# Patient Record
Sex: FEMALE
[De-identification: names, ages, dates, MRNs, and addresses within clinical notes are randomized per-mention and may not be internally consistent; named-entity substitution may affect disease eponyms.]

---

## 2017-07-21 ENCOUNTER — HOSPITAL ENCOUNTER (EMERGENCY)
Dept: HOSPITAL 31 - C.ER | Age: 82
Discharge: HOME | End: 2017-07-21
Payer: MEDICARE

## 2017-07-21 VITALS — RESPIRATION RATE: 18 BRPM

## 2017-07-21 VITALS — TEMPERATURE: 98.1 F | SYSTOLIC BLOOD PRESSURE: 115 MMHG | HEART RATE: 84 BPM | DIASTOLIC BLOOD PRESSURE: 67 MMHG

## 2017-07-21 VITALS — OXYGEN SATURATION: 97 %

## 2017-07-21 VITALS — BODY MASS INDEX: 25.5 KG/M2

## 2017-07-21 DIAGNOSIS — M25.462: Primary | ICD-10-CM

## 2017-07-21 NOTE — C.PDOC
History Of Present Illness


85 yr old female with PMHx of NIDDM, HTN, hyperlipidemia, TIA, cardiomyopathy 

and CHF, OA come in accompanied by family for evaluation of Left knee pain and 

swelling gradually developed for past week after " receive injection to knee by 

her PMD Dr. Fierro". As per pt, pain is localized over Left knee with severe 

pain especially today. Otherwise, pt and family denies known trauma or injury, 

fever, chills, CP, SOB, dyspnea, diaphoresis, cough, denies deformity to left 

knee, skin changes, denies new weakness, sensory or vascular deficits to left 

leg.


Time Seen by Provider: 07/21/17 12:09


Chief Complaint (Nursing): Lower Extremity Problem/Injury


History Per: Patient, Family


History/Exam Limitations: no limitations


Onset/Duration Of Symptoms: Days (1 week)





Past Medical History


Reviewed: Historical Data, Nursing Documentation, Vital Signs


Vital Signs: 


 Last Vital Signs











Temp  98.1 F   07/21/17 14:36


 


Pulse  84   07/21/17 14:36


 


Resp  18   07/21/17 14:36


 


BP  115/67   07/21/17 14:36


 


Pulse Ox  98   07/21/17 14:36














- Medical History


PMH: Anemia, Anxiety, Arthritis (BACK AND KNEES AND R SHOULDER), Asthma, Cardia 

Arrhythmia, CHF, COPD, Diabetes, HTN, Hypercholesterolemia, TIA


Surgical History: Appendectomy, Coronary Stent, Endoscopy, Pacemaker (2016)





- [facility] Procedures








ASSISTANCE WITH RESPIRATORY VENTILATION, <24 HRS, CPAP (07/21/16)


ESOPHAGOGASTRODUODENOSCOPY [EGD] W/CLOSED BIOPSY (06/14/15)


LEFT HEART CARDIAC CATH (04/03/14)


LT HEART ANGIOCARDIOGRAM (04/03/14)


MEASURE OF CARDIAC SAMPL & PRESSURE, L HEART, PERC APPROACH (11/03/15)


PACKED CELL TRANSFUSION (06/14/15)


PLAIN RADIOGRAPHY OF MULT COR ART USING OTH CONTRAST (11/03/15)


TRANSFUSE NONAUT RED BLOOD CELLS IN PERIPH VEIN, PERC (07/21/16)








Family History: States: No Known Family Hx





- Social History


Hx Tobacco Use: No


Hx Alcohol Use: No


Hx Substance Use: No





- Immunization History


Hx Tetanus Toxoid Vaccination: No


Hx Influenza Vaccination: Yes


Hx Pneumococcal Vaccination: Yes





Review Of Systems


Except As Marked, All Systems Reviewed And Found Negative.


Constitutional: Negative for: Fever, Chills


Cardiovascular: Negative for: Chest Pain


Respiratory: Negative for: Cough, Shortness of Breath


Musculoskeletal: Positive for: Other ((+) Left knee pain and swelling )


Neurological: Negative for: Weakness, Numbness





Physical Exam





- Physical Exam


Appears: Well, Non-toxic, No Acute Distress


Skin: Normal Color, Warm, No Rash, No Ecchymosis


Extremity: No Normal ROM ( decrease and discomfort left knee flexion/extension 

due to pain and swelling), Tenderness (diffuse tenderness over left knee with 

superior/lateral knee effusion. NO erythema, no flactulance, no palpable 

deformity.), No Pedal Edema, Calf Tenderness (mild, left ), Capillary Refill (

less than 2sec to Left foot), No Deformity


Neurological/Psych: Oriented x3, Normal Speech, Normal Motor, Normal Sensation, 

Normal Reflexes





ED Course And Treatment


O2 Sat by Pulse Oximetry: 97 (RA )


Pulse Ox Interpretation: Normal





- Other Rad


  ** Left knee xray


X-Ray: Read By Radiologist


Interpretation: Accession No. : [id number].  Patient Name / ID : JOSE A HESTER  / [id number].  Exam Date : 07/21/2017 12:45:24 ( Approved ).  Study 

Comment :  Sex / Age : F  / 85Y.  Creator : Jay Matos MD.  Dictator : 

Jay Matos MD.   :  Approver : Jay Matos MD.  

Approver2 :  Report Date : 07/21/2017 13:04:32.  My Comment :  *****************

******************************************************************.  PROCEDURE:

  Left Knee Radiographs.  HISTORY:  Pain.  COMPARISON:  Comparison made with 

prior radiographs of the left knee 12/30/2012.  FINDINGS:  BONES:  No 

definitive radiographic evidence of displaced fracture nor dislocation osseous 

structures grossly intact.  JOINTS:  Mild degenerative osteoarthritis medial 

joint space narrowing and small marginal medial osteophyte formation. Tiny 

posterior superior patellar osteophyte present. .  JOINT EFFUSION:  Small to 

medium size suprapatellar joint effusion.  OTHER FINDINGS:  None.  IMPRESSION:  

No definitive radiographic evidence of acute displaced fracture nor 

dislocation.  Suspect that it extends persist or occult fracture suspected 

clinically recommend repeat radiographs 5-10 days as most fractures should 

become radiographically evident in this timeframe. Small -medium-sized 

suprapatellar joint effusion.  Mild DJD.





- CT Scan/US


  ** Dupple US LLE


Other Rad Studies (CT/US): Read By Radiologist


CT/US Interpretation: (-) DVT LLE


Progress Note: On re-evaluation, pt appears comfortable.  Case dsicussed with 

ortho-on-call  and knee immobilizer, NWB, analgesics with outpt f/u 

on 7/25/17 recommend. Pt was given option to return to ED on 7/24/17, 

 will be in house.  results review and discussed with pt and family, 

advised on course of ds and ortho recommendation.  Pt has clinical findings c/w 

Left knee arthritis/DJD with knee effusion.  Knee immobilizer placed to left 

knee.  ref. to f/u with Ortho in 3 days for re-eavl and further tx.  return to 

ED at any time if any worsening or new changes.  Pt and family understand and 

agrees with discharges.





Medical Decision Making


Medical Decision Making: 


PLAN:


* X-Ray - Left Knee 


* Venous Duplex 


* Percocet PO 








Disposition


Counseled Patient/Family Regarding: Studies Performed, Diagnosis, Need For 

Followup





- Disposition


Referrals: 


Elliott Wells III, MD [Staff Provider] - 


Disposition: HOME/ ROUTINE


Disposition Time: 13:50


Condition: STABLE


Additional Instructions: 


KNEE IMMOBILIZER





STRICT WEIGHT BEARING





TAKE PAIN MEDICATION AS NEED





FOLLOW UP WITH ORTHOPEDIST  ON TUESDAY 7/25/17 FOR FURTHER EVALUATION AND 

TREATMENT AS NEED





RETURN TO ED AT ANY TIME IF ANY WORSENING OR NEW CHANGES.


Prescriptions: 


oxyCODONE/Acetaminophen [Percocet 5/325 mg Tab] 1 tab PO BID PRN #7 tab


 PRN Reason: Pain


Instructions:  Swollen Knee Joint (ED), Knee Pain (ED)


Print Language: [place]ian





- Clinical Impression


Clinical Impression: 


 Knee effusion








- PA / NP / Resident Statement


MD/DO has reviewed & agrees with the documentation as recorded.





- Scribe Statement


The provider has reviewed the documentation as recorded by the Scribe


Viktoriya Ayala





All medical record entries made by the Scribe were at my direction and 

personally dictated by me. I have reviewed the chart and agree that the record 

accurately reflects my personal performance of the history, physical exam, 

medical decision making, and the department course for this patient. I have 

also personally directed, reviewed, and agree with the discharge instructions 

and disposition.

## 2017-07-21 NOTE — RAD
PROCEDURE:  Left Knee Radiographs.



HISTORY:

Pain.



COMPARISON:

Comparison made with prior radiographs of the left knee 12/30/2012



FINDINGS:



BONES:

No definitive radiographic evidence of displaced fracture nor 

dislocation osseous structures grossly intact. 



JOINTS:

Mild degenerative osteoarthritis medial joint space narrowing and 

small marginal medial osteophyte formation. Tiny posterior superior 

patellar osteophyte present. . 



JOINT EFFUSION:

Small to medium size suprapatellar joint effusion 



OTHER FINDINGS:

None.



IMPRESSION:

No definitive radiographic evidence of acute displaced fracture nor 

dislocation.  Suspect that it extends persist or occult fracture 

suspected clinically recommend repeat radiographs 5-10 days as most 

fractures should become radiographically evident in this timeframe. 

Small -medium-sized suprapatellar joint effusion 



Mild DJD.

## 2017-07-24 ENCOUNTER — HOSPITAL ENCOUNTER (OUTPATIENT)
Dept: HOSPITAL 14 - H.ER | Age: 82
Setting detail: OBSERVATION
LOS: 1 days | Discharge: HOME HEALTH | End: 2017-07-25
Attending: HOSPITALIST | Admitting: HOSPITALIST
Payer: MEDICARE

## 2017-07-24 VITALS — BODY MASS INDEX: 25.5 KG/M2

## 2017-07-24 DIAGNOSIS — Z86.73: ICD-10-CM

## 2017-07-24 DIAGNOSIS — Z95.810: ICD-10-CM

## 2017-07-24 DIAGNOSIS — E78.00: ICD-10-CM

## 2017-07-24 DIAGNOSIS — E78.5: ICD-10-CM

## 2017-07-24 DIAGNOSIS — S83.232A: Primary | ICD-10-CM

## 2017-07-24 DIAGNOSIS — J44.9: ICD-10-CM

## 2017-07-24 DIAGNOSIS — K59.09: ICD-10-CM

## 2017-07-24 DIAGNOSIS — Z95.5: ICD-10-CM
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
## 2017-07-24 DIAGNOSIS — N18.9: ICD-10-CM

## 2017-07-24 DIAGNOSIS — Z95.0: ICD-10-CM

## 2017-07-24 DIAGNOSIS — M65.862: ICD-10-CM

## 2017-07-24 DIAGNOSIS — M17.12: ICD-10-CM

## 2017-07-24 DIAGNOSIS — I50.9: ICD-10-CM

## 2017-07-24 DIAGNOSIS — E11.22: ICD-10-CM

## 2017-07-24 DIAGNOSIS — I25.10: ICD-10-CM
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
## 2017-07-24 DIAGNOSIS — I42.9: ICD-10-CM

## 2017-07-24 DIAGNOSIS — I13.0: ICD-10-CM

## 2017-07-24 DIAGNOSIS — S83.272A: ICD-10-CM

## 2017-07-24 DIAGNOSIS — M25.462: ICD-10-CM

## 2017-07-24 LAB
ALBUMIN SERPL-MCNC: 3.8 G/DL (ref 3.5–5)
ALBUMIN/GLOB SERPL: 1.1 {RATIO} (ref 1–2.1)
ALT SERPL-CCNC: 23 U/L (ref 9–52)
APTT BLD: 22.5 SECONDS (ref 25.6–37.1)
AST SERPL-CCNC: 28 U/L (ref 14–36)
BASOPHILS # BLD AUTO: 0.1 K/UL (ref 0–0.2)
BASOPHILS NFR BLD: 1.2 % (ref 0–2)
BODY FLD TYPE: (no result)
BUN SERPL-MCNC: 28 MG/DL (ref 7–17)
CALCIUM SERPL-MCNC: 9.8 MG/DL (ref 8.4–10.2)
EOSINOPHIL # BLD AUTO: 0.2 K/UL (ref 0–0.7)
EOSINOPHIL NFR BLD: 2.4 % (ref 0–4)
ERYTHROCYTE [DISTWIDTH] IN BLOOD BY AUTOMATED COUNT: 14.5 % (ref 11.5–14.5)
GFR NON-AFRICAN AMERICAN: 36
HGB BLD-MCNC: 11.6 G/DL (ref 12–16)
INR PPP: 1 (ref 0.9–1.2)
LYMPHOCYTES # BLD AUTO: 0.8 K/UL (ref 1–4.3)
LYMPHOCYTES NFR BLD AUTO: 13 % (ref 20–40)
MCH RBC QN AUTO: 30 PG (ref 27–31)
MCHC RBC AUTO-ENTMCNC: 33.2 G/DL (ref 33–37)
MCV RBC AUTO: 90.3 FL (ref 81–99)
MONOCYTES # BLD: 0.8 K/UL (ref 0–0.8)
MONOCYTES NFR BLD: 12.2 % (ref 0–10)
NEUTROPHILS # BLD: 4.5 K/UL (ref 1.8–7)
NEUTROPHILS NFR BLD AUTO: 71.2 % (ref 50–75)
NRBC BLD AUTO-RTO: 0.1 % (ref 0–0)
PLATELET # BLD: 130 K/UL (ref 130–400)
PMV BLD AUTO: 10.4 FL (ref 7.2–11.7)
PROTHROMBIN TIME: 11.6 SECONDS (ref 9.8–13.1)
RBC # BLD AUTO: 3.87 MIL/UL (ref 3.8–5.2)
SF GROSS APPEARANCE: (no result)
SYNOVIAL FLUID COMMENT: (no result)
SYNOVIAL FLUID MONO/MACROPHAGE: 11 % (ref 0–0)
WBC # BLD AUTO: 6.4 K/UL (ref 4.8–10.8)

## 2017-07-24 PROCEDURE — 36415 COLL VENOUS BLD VENIPUNCTURE: CPT

## 2017-07-24 PROCEDURE — 73700 CT LOWER EXTREMITY W/O DYE: CPT

## 2017-07-24 PROCEDURE — 85610 PROTHROMBIN TIME: CPT

## 2017-07-24 PROCEDURE — 29880 ARTHRS KNE SRG MNISECTMY M&L: CPT

## 2017-07-24 PROCEDURE — 99285 EMERGENCY DEPT VISIT HI MDM: CPT

## 2017-07-24 PROCEDURE — 89051 BODY FLUID CELL COUNT: CPT

## 2017-07-24 PROCEDURE — 86850 RBC ANTIBODY SCREEN: CPT

## 2017-07-24 PROCEDURE — 80048 BASIC METABOLIC PNL TOTAL CA: CPT

## 2017-07-24 PROCEDURE — 87070 CULTURE OTHR SPECIMN AEROBIC: CPT

## 2017-07-24 PROCEDURE — 80053 COMPREHEN METABOLIC PANEL: CPT

## 2017-07-24 PROCEDURE — 85025 COMPLETE CBC W/AUTO DIFF WBC: CPT

## 2017-07-24 PROCEDURE — 29876 ARTHRS KNEE SURG SYNVCT MAJ: CPT

## 2017-07-24 PROCEDURE — 93005 ELECTROCARDIOGRAM TRACING: CPT

## 2017-07-24 PROCEDURE — 87116 MYCOBACTERIA CULTURE: CPT

## 2017-07-24 PROCEDURE — 86900 BLOOD TYPING SEROLOGIC ABO: CPT

## 2017-07-24 PROCEDURE — 87101 SKIN FUNGI CULTURE: CPT

## 2017-07-24 PROCEDURE — 85730 THROMBOPLASTIN TIME PARTIAL: CPT

## 2017-07-24 PROCEDURE — 87015 SPECIMEN INFECT AGNT CONCNTJ: CPT

## 2017-07-24 PROCEDURE — 82948 REAGENT STRIP/BLOOD GLUCOSE: CPT

## 2017-07-24 PROCEDURE — 71010: CPT

## 2017-07-24 PROCEDURE — 87206 SMEAR FLUORESCENT/ACID STAI: CPT

## 2017-07-24 PROCEDURE — 87075 CULTR BACTERIA EXCEPT BLOOD: CPT

## 2017-07-24 NOTE — CT
PROCEDURE:  CT right knee



HISTORY:

pain



COMPARISON:

Not available



TECHNIQUE:

2.5 mm contiguous axial sections were acquired through the right 

knee.  Sagittal and coronal images were reformatted from the axial 

scan.



FINDINGS:

There is no evidence of fracture. There is no lytic or blastic 

osseous lesion.



There is mild tricompartmental osteoarthritis.  There are no 

articular erosions.



There is a small joint effusion. This is nonspecific. 



IMPRESSION:

No acute fracture. Mild tricompartmental osteoarthritis.

## 2017-07-24 NOTE — ED PDOC
- Laboratory Results


Result Diagrams: 


 07/24/17 07:33





 07/24/17 07:33





- ECG


O2 Sat by Pulse Oximetry: 98 (RA)


Pulse Ox Interpretation: Normal





- Progress


ED Course And Treament: 





per dr gomez will admit to hospitalist. obtain preop labs and ct scan of le.


Condition: Unchanged





Disposition


Counseled Patient/Family Regarding: Studies Performed, Diagnosis





- Clinical Impression


Clinical Impression: 


 Knee injury, Gait abnormality








- POA


Present On Arrival: None





- Disposition


Disposition: Admitted as In-Patient


Disposition Time: 09:30


Condition: STABLE

## 2017-07-24 NOTE — CARD
--------------- APPROVED REPORT --------------





EKG Measurement

Heart [id number]

OR [id number]

[id number]

[id number]

[id number]



<Conclusion>

Normal sinus rhythm

Left axis deviation

Left bundle branch block

Abnormal ECG

## 2017-07-24 NOTE — RAD
HISTORY:

 pre op 



COMPARISON:

No prior. 



FINDINGS:



LUNGS:

Bilateral interstitial changes.



PLEURA:

No significant pleural effusion identified, no pneumothorax apparent.



CARDIOVASCULAR:

Cardiomegaly. Pacemaker and leads in place.



OSSEOUS STRUCTURES:

No significant abnormalities.



VISUALIZED UPPER ABDOMEN:

Normal.



OTHER FINDINGS:

None.



IMPRESSION:

Bilateral nonspecific interstitial changes.

## 2017-07-24 NOTE — CP.PCM.HP
History of Present Illness





- History of Present Illness


History of Present Illness: 


86 y/o female with PMH  cardiomyopathy s/p AICD placement  , CAD s/p 1 stent ,

DM , Dyslipidemia, hypertension , constipation presented to Er complaining of 

severe pain to her left knee and inability to ambulate. 


She denies any trauma to her knee. as per patient she has been experiencing 

progressive knee pain for a long time, more so the last 1 month until last week 

on Friday shew could not even put for foot down and ambulate due to severe 

pain.She was taken to [facility] ER and was told to follow up with Dr. Galindo and was given oxycodone for pain control which made her drowsy. Today 

she presented to ER with severe left knee pain/.


She has extensive cardiac history with stent and AICD placemnet ahnd follows up 

with a cardiologist. as per patient she hasd stress test done 1 month ago and 

was good.


At present denies any chest pain SOB, palpittaions, PND, orthopnea, urinary 

symptoms. Has chronic constipation.


Last meal was last night but had coffee at 5 AM.


She is on blood thinners Brilinta and ASa and and last dose was yesterday 











Allergies ; Plavix


PMH ; cardiomyopathy , CAD s/p 1 stent ,DM , Dyslipidemia, hypertension , 

constipation 


Medications: Brilinta,  mg po QD, metoprololdexilant, vitamin D, 

furosemide Nitro SL, ranolazine,prandin,rosuvastatin, entresto


Surgery; appendectomy, AICD placement ( December 2014)


Fmaily history ; Mother had heart condition and DM


Social history ; lives with  in [facility], has 2 children, denies any 

ETOH, smiking or drug abuse, walks with no assist devices








ROS ; 14 point review of system negative except above








PMD ; Dr. Miller


Code status ; full 








Present on Admission





- Present on Admission


Any Indicators Present on Admission: No





Review of Systems





- Review of Systems


All systems: reviewed and no additional remarkable complaints except





Past Patient History





- Infectious Disease


Hx of Infectious Diseases: None





- Tetanus Immunizations


Tetanus Immunization: Unknown





- Past Medical History & Family History


Past Medical History?: Yes





- Past Social History


Smoking Status: Never Smoked


Chewing Tobacco Use: No


Cigar Use: No


Alcohol: None


Drugs: Denies


Home Situation {Lives}: With Family


Domestic Violence: Negative





- CARDIAC


Hx Cardiac Disorders: Yes


Hx Congestive Heart Failure: Yes


Hx Hypercholesterolemia: Yes


Hx Hypertension: Yes





- PULMONARY


Hx Respiratory Disorders: Yes





- NEUROLOGICAL


Hx Neurological Disorder: No


Hx Transient Ischemic Attacks (TIA): Yes





- HEENT


Hx HEENT Problems: Yes





- ENDOCRINE/METABOLIC


Hx Endocrine Disorders: Yes





- HEMATOLOGICAL/ONCOLOGICAL


Hx Anemia: Yes





- MUSCULOSKELETAL/RHEUMATOLOGICAL


Hx Arthritis: Yes (BACK AND KNEES AND R SHOULDER)





- PSYCHIATRIC


Hx Anxiety: Yes





- SURGICAL HISTORY


Hx Appendectomy: Yes


Hx Coronary Stent: Yes





- ANESTHESIA


Hx Anesthesia: Yes


Hx Anesthesia Reactions: No


Hx Malignant Hyperthermia: No





Meds


Allergies/Adverse Reactions: 


 Allergies











Allergy/AdvReac Type Severity Reaction Status Date / Time


 


clopidogrel Allergy Mild SWELLING Verified 07/21/17 11:37














Physical Exam





- Constitutional


Appears: Non-toxic, No Acute Distress





- Head Exam


Head Exam: ATRAUMATIC, NORMAL INSPECTION, NORMOCEPHALIC





- Eye Exam


Eye Exam: EOMI, Normal appearance, PERRL


Pupil Exam: NORMAL ACCOMODATION





- ENT Exam


ENT Exam: Mucous Membranes Moist, Normal Exam





- Neck Exam


Neck exam: Positive for: Full Rom, Normal Inspection





- Respiratory Exam


Respiratory Exam: Clear to Auscultation Bilateral, NORMAL BREATHING PATTERN.  

absent: Rales, Rhonchi, Wheezes





- Cardiovascular Exam


Cardiovascular Exam: REGULAR RHYTHM, RRR, +S1, +S2.  absent: JVD





- GI/Abdominal Exam


GI & Abdominal Exam: Normal Bowel Sounds, Soft.  absent: Distended, Guarding, 

Rebound, Tenderness





- Rectal Exam


Rectal Exam: Deferred





- Extremities Exam


Extremities exam: Positive for: normal capillary refill, normal inspection, 

pedal pulses present.  Negative for: calf tenderness, pedal edema


Additional comments: 





left knee warmer to touch than right knee with minimal effusion 





- Back Exam


Back exam: NORMAL INSPECTION





- Neurological Exam


Neurological exam: Alert, CN II-XII Intact, Oriented x3, Reflexes Normal





- Psychiatric Exam


Psychiatric exam: Normal Affect, Normal Mood





- Skin


Skin Exam: Dry, Intact, Normal Color, Warm





Results





- Vital Signs


Recent Vital Signs: 





 Last Vital Signs











Temp  98.5 F   07/24/17 10:23


 


Pulse  90   07/24/17 10:23


 


Resp  18   07/24/17 10:23


 


BP  148/73   07/24/17 10:23


 


Pulse Ox  99   07/24/17 10:23














- Labs


Result Diagrams: 


 07/24/17 07:33





 07/24/17 07:33





Assessment & Plan





- Assessment and Plan (Free Text)


Assessment: 


86 y/o female with PMH  cardiomyopathy s/p AICD placement  , CAD s/p 1 stent ,

DM , Dyslipidemia, hypertension , constipation presented to Er complaining of 

severe pain to her left knee and inability to ambulate. 


She denies any trauma to her knee. as per patient she has been experiencing 

progressive knee pain for a long time, more so the last 1 month until last week 

on Friday shew could not even put for foot down and ambulate due to severe 

pain.She was taken to [facility] ER and was told to follow up with Dr. Galindo and was given oxycodone for pain control which made her drowsy. Today 

she presented to ER with severe left knee pain/.


She has extensive cardiac history with stent and AICD placemnet ahnd follows up 

with a cardiologist. as per patient she hasd stress test done 1 month ago and 

was good.


At present denies any chest pain SOB, palpittaions, PND, orthopnea, urinary 

symptoms. Has chronic constipation.


Last meal was last night but had coffee at 5 AM.


She is on blood thinners Brilinta and ASa and and last dose was yesterday 








1.Intractable left knee pain secondary to OA 


will place patient under observation in med/surg


Ortho consult with Dr. Galindo


CT knee showed No acute fracture. Mild tricompartmental osteoarthritis.


pain mangement


Keep NPO for now


Patient is 


on Brilinta and ASa 325 mg po daily with last dose yesterday. Orthopedist Dr. Galindo is aware.Family informed of risks of bleeding while on blood thinners 


Will get in touch with her cardiologist Dr. Miller [phone] to get stress 

test results that was performed 1 month ago for preop clearance





2. Cardiomyopathy/ CAD s/p AICD 


Resume her home meds Metoprolol, nitro SL, Ensestro,Lasix


hold ASA and Brilinta for now


Will call her cardiologist for stress trest results








3. Hypertension


resume home meds








4. DM 


Keep NPO


start IVF D%NS


Accucfhecks with insulin coverage


Hold lantus and prandin for now








5. Dyslipidemia


on rosuvastatin








6. Constipation


colace 








7. JAI on CKD


most likely patient has baseline CKD 


start IVF








8.DVt prophylaxis


patient is on Brilinta ( last dose yesterday)


hold any anticoagulation for now


SCD

## 2017-07-24 NOTE — CP.PCM.CON
History of Present Illness





- History of Present Illness


History of Present Illness: 





THE PATIENT IS AN 85 YEAR OLD FEMALE WHI IS ADMITTED FOR OA OF THE LEFT KNEE 

WITH SEVERE PAIN, SWELLING AND INABILITY TO WALK THAT GOT PROGRESSIVELY WORSE 

OVER THE LAST MONTH.  SHE DENIES TRAUMA.  SHE CAME TO THE ER TODAY AND WAS 

ADMITTED FOR SURGERY.  SHE HAS A LONG PAST MEDICAL HISTORY THAT INCLUDES CAD, 

CARDIOMYOPATHY, HYPERTENSION, HYPERLIPIDEMIA AND TYPE 2 DM.  SHE HAD A CORONARY 

STENT INSERTED AND HAS SIGNIFICANT LV HYPOKINESIA AND HAD AN AICD INSERTED.  

SHE HAD A STRESS ECHO LAST MONTH BY HER LOCAL CARDIOLOGIST AND IT WAS REPORTED 

TO BE GOOD.  SHE IS PRESENTLY CHEST PAIN FREE.  CARDIOLOGY WAS ASKED TO SEE HER 

PREOPERATIVELY.  SHE IS ON ASPIRIN AND BRILINTA BUT DID NOT TAKE THEM SINCE 

YESTERDAY.





Past Patient History





- Infectious Disease


Hx of Infectious Diseases: None





- Tetanus Immunizations


Tetanus Immunization: Unknown





- Past Medical History & Family History


Past Medical History?: Yes





- Past Social History


Smoking Status: Never Smoked





- CARDIAC


Hx Cardiac Disorders: Yes


Hx Congestive Heart Failure: Yes


Hx Hypercholesterolemia: Yes


Hx Hypertension: Yes





- PULMONARY


Hx Respiratory Disorders: Yes





- NEUROLOGICAL


Hx Neurological Disorder: No


Hx Transient Ischemic Attacks (TIA): Yes





- HEENT


Hx HEENT Problems: Yes





- ENDOCRINE/METABOLIC


Hx Endocrine Disorders: Yes





- HEMATOLOGICAL/ONCOLOGICAL


Hx Anemia: Yes





- MUSCULOSKELETAL/RHEUMATOLOGICAL


Hx Falls: No





- PSYCHIATRIC


Hx Substance Use: No





- SURGICAL HISTORY


Hx Appendectomy: Yes


Hx Coronary Stent: Yes





- ANESTHESIA


Hx Anesthesia: Yes


Hx Anesthesia Reactions: No


Hx Malignant Hyperthermia: No





Meds


Allergies/Adverse Reactions: 


 Allergies











Allergy/AdvReac Type Severity Reaction Status Date / Time


 


clopidogrel Allergy Mild SWELLING Verified 07/21/17 11:37














- Medications


Medications: 


 Current Medications





Acetaminophen (Tylenol 325mg Tab)  650 mg PO Q6 PRN


   PRN Reason: Pain, Mild (1-3)


Docusate Sodium (Colace)  100 mg PO BID FILIPPO


Ergocalciferol (Drisdol 50,000 Intl Units Cap)  1 cap PO QWK FILIPPO


Furosemide (Lasix)  40 mg PO DAILY FILIPPO


Home Med (Nitroglycerin [Nitrostat Sl Tab])  0.6 mg SL Q5MIN PRN


   PRN Reason: CHEST PAIN


Dextrose/Sodium Chloride (Dextrose 5%/0.9% Ns 1000 Ml)  1,000 mls @ 60 mls/hr 

IV [id number] ONE


   Stop: 07/25/17 03:46


   Last Admin: 07/24/17 12:00 Dose:  60 mls/hr


Insulin Human Regular (Humulin R)  0 units SC ACHS FILIPPO


   PRN Reason: Protocol


   Last Admin: 07/24/17 12:30 Dose:  Not Given


Ketorolac Tromethamine (Toradol)  15 mg IVP Q6 PRN


   PRN Reason: Pain, moderate (4-7)


Ketorolac Tromethamine (Toradol)  30 mg IVP Q6 PRN


   PRN Reason: Pain, severe (8-10)


Metoprolol Tartrate (Lopressor)  25 mg PO BID FILIPPO


Ondansetron HCl (Zofran Inj)  4 mg IVP Q6 PRN


   PRN Reason: Nausea/Vomiting


Pantoprazole Sodium (Protonix Ec Tab)  40 mg PO DAILY FILIPPO


Repaglinide (Prandin)  2 mg PO DAILY FILIPPO











Physical Exam





- Respiratory Exam


Respiratory Exam: Clear to Auscultation Bilateral





- Cardiovascular Exam


Cardiovascular Exam: REGULAR RHYTHM, +S1, +S2





- Extremities Exam


Additional comments: 





LEFT KNEE SWOLLEN AND WARM





- Additional Findings


Additional findings: 





EKG NSR, LBBB





Results





- Vital Signs


Recent Vital Signs: 


 Last Vital Signs











Temp  97.6 F   07/24/17 12:00


 


Pulse  90   07/24/17 12:00


 


Resp  18   07/24/17 12:00


 


BP  155/78 H  07/24/17 12:00


 


Pulse Ox  98   07/24/17 12:00














- Labs


Result Diagrams: 


 07/24/17 07:33





 07/24/17 07:33


Labs: 


 Laboratory Results - last 24 hr











  07/24/17





  11:06


 


POC Glucose (mg/dL)  160 H














Assessment & Plan





- Assessment and Plan (Free Text)


Assessment: 





PAIN LEFT KNEE OA





CAD





CARDIOMYOPATHY





HYPERTENSION





HYPERLIPIDEMIA





DM


Plan: 





THE PATIENT IS CLEARED FOR SURGERY BUT THERE IS A MODERATE RISK DUE TO HER 

CARDIOMYOPATHY





THE  WAS TOLD OF THE RISK DUE TO HER CARDIAC DISEASE AND HE UNDERSTANDS 

AND AGREES TO PROCEED.

## 2017-07-24 NOTE — PCM.SURG1
Surgeon's Initial Post Op Note





- Surgeon's Notes


Surgeon: Mateo


Assistant: SANDRA Giraldo


Type of Anesthesia: General LMA


Anesthesia Administered By: dR Ritter


Pre-Operative Diagnosis: lKNEE EFFUSION/TRICOMPARTMENTAL o/a l KNEE


Operative Findings: TEAR MEDIAL MENISCUS l kNEE/ TEAR LATERAL MENISCUS l KNEE.  

TRICOMPARTMENTAL SYNOVITIS.  L knee effusion


Post-Operative Diagnosis: as above


Operation Performed: arthroscopic partial medial/lateral meniscectomy.  

arthroscopic tricompartmental synovectomy.  intraarticular injection


Specimen/Specimens Removed: synovium/cartilage


Estimated Blood Loss: EBL {In ML}: 5


Blood Products Given: N/A


Drains Used: No Drains


Post-Op Condition: Good


Date of Surgery/Procedure: 07/24/17


Time of Surgery/Procedure: 16:55 (time iun room 16:15)

## 2017-07-24 NOTE — VASCLAB
PROCEDURE:  Left Lower Extremity Venous Duplex Exam. 



HISTORY:

pain 



PRIORS:

None. 



TECHNIQUE:

Left common femoral, femoral, popliteal and posterior tibial, 

peroneal and great saphenous veins were evaluated. Flow was assessed 

with color Doppler, compressibility, assessment of phasic flow and 

augmentation response.



Report prepared by   MERNA Lizarraga, RVT



FINDINGS:



LEFT:

1. Common Femoral Vein:



1.1.  Compressibility - Fully compressible: Thrombus - None : Flow - 

Phasic: Augmentation -Normal: Reflux - None.



2. Femoral Vein: 



2.1. Compressibility - Fully compressible: Thrombus -  None: Flow - 

Phasic: Augmentation -Normal: Reflux - None.



3. Popliteal Vein: 



3.1. Compressibility - Fully compressible: Thrombus -  None: Flow - 

Phasic: Augmentation -Normal: Reflux - None.



4. Posterior Tibial Vein: 



4.1. Compressibility - Fully compressible: Thrombus -  None: Flow - 

Phasic: Augmentation -Normal: Reflux - None.



5. Peroneal Vein: 



5.1. Compressibility - Fully compressible: Thrombus -  None: Flow - 

Phasic: Augmentation -Normal: Reflux - None.



6. Great Saphenous Vein:

6.1.  Compressibility - Fully compressible: Thrombus - None: Flow - 

Phasic: Augmentation - Normal: Reflux - Severe.





OTHER FINDINGS:  Severe valvular incompetence of the left greater 

saphenous vein.



IMPRESSION:

No evidence of deep or superficial vein thrombosis of the left lower 

extremity with excellent venous flow.     



Normal venous flow noted in the right common femoral vein.

## 2017-07-24 NOTE — ED PDOC
Lower Extremity Pain/Injury


Time Seen by Provider: 17 06:11


Chief Complaint (Nursing): Lower Extremity Problem/Injury


Chief Complaint (Provider): Knee Effusion


History Per: Patient


History/Exam Limitations: no limitations


Additional Complaint(s): 


Nayely Byrd, an 85 year old female, presents to the ED for same day surgery 

with Dr. Galindo. The patient sates thats he is here for arthrocentesis of the 

left knee.








Past Medical History


Reviewed: Historical Data, Nursing Documentation, Vital Signs


Vital Signs: 





 Last Vital Signs











Temp  98 F   17 06:12


 


Pulse  98 H  17 06:12


 


Resp  18   17 06:12


 


BP  134/64   17 06:12


 


Pulse Ox  98   17 06:12














- Medical History


PMH: Anemia, Anxiety, Arthritis (BACK AND KNEES AND R SHOULDER), Asthma, Cardia 

Arrhythmia, CHF, COPD, Diabetes, HTN, Hypercholesterolemia, TIA





- Surgical History


Surgical History: Appendectomy, Coronary Stent, Endoscopy, Pacemaker ()





- Family History


Family History: States: Unknown Family Hx





- Immunization History


Hx Tetanus Toxoid Vaccination: No


Hx Influenza Vaccination: Yes


Hx Pneumococcal Vaccination: Yes





- Home Medications


Home Medications: 


 Ambulatory Orders











 Medication  Instructions  Recorded


 


Aspirin [Ecotrin] 325 mg PO DAILY 04/05/15


 


Ranolazine [Ranexa] 500 mg PO BID 04/05/15


 


Ticagrelor [Brilinta] 90 mg PO QOD6 04/05/15


 


Insulin Glargine, Recombina 20 unit SC HS #0 unit 04/07/15





[Lantus]  


 


Nitroglycerin [Nitrostat SL Tab] 0.6 mg SL Q5MIN PRN 11/03/15


 


Dexlansoprazole [Dexilant] 60 mg PO DAILY 17


 


Furosemide 40 mg PO DAILY 17


 


Repaglinide [Prandin] 2 mg PO DAILY 17


 


Rosuvastatin Calcium [Crestor] 20 mg PO HS 17


 


Sacubitril/Valsartan [Entresto 24 1 tab PO DAILY 17





mg-26 mg]  


 


oxyCODONE/Acetaminophen [Percocet 1 tab PO BID PRN #7 tab 17





5/325 mg Tab]  


 


Acetaminophen [Tylenol 325mg tab] 650 mg PO Q6 PRN #30 tab 17


 


Ergocalciferol (Vitamin D2) 50,000 unit PO QWK 17





[Vitamin D2]  


 


Metoprolol Tartrate [Lopressor] 25 mg PO BID 17


 


oxyCODONE/Acetaminophen 1/2TAB 0.5 ea PO Q6 PRN #20 tab 17





[Percocet 5-325 mg HALF TAB]  














- Allergies


Allergies/Adverse Reactions: 


 Allergies











Allergy/AdvReac Type Severity Reaction Status Date / Time


 


clopidogrel Allergy Mild SWELLING Verified 17 11:37














Review of Systems


Musculoskeletal: Positive for: Other (arthrocentesis of left knee)





Physical Exam





- Reviewed


Nursing Documentation Reviewed: Yes


Vital Signs Reviewed: Yes





- Physical Exam


Appears: Positive for: Non-toxic, No Acute Distress


Extremity: Positive for: Other (Left knee in a knee mobilizer with effusion; 

nerovascularly intact.).  Negative for: Tenderness, Pedal Edema, Deformity, 

Swelling


Neurologic/Psych: Positive for: Alert, Oriented





- Laboratory Results


Result Diagrams: 


 17 07:33





 17 07:33





- ECG


O2 Sat by Pulse Oximetry: 98 (RA)


Pulse Ox Interpretation: Normal





Medical Decision Making


Medical Decision Makin Initial Impression: 85 year old female presenting with knee effusion.  

Awaiting call back from Dr. Galindo.  Will transfer care to Dr. Howe.





_________________________________________________________________


Scribe Attestation


Documented by Zina Calderón acting as a scribe for Wu Alexandra MD.





Provider Attestation


All medical record entries made by the Scribe were at my direction and 

personally dictated by me. I have reviewed the chart and agree that the record 

accurately reflects my personal performance of the history, physical exam, 

medical decision making, and the department course for this patient. I have 

also personally directed, reviewed, and agree with the discharge instructions 

and disposition.








Disposition





- Clinical Impression


Clinical Impression: 


 Knee injury, Gait abnormality








- Disposition


Disposition: Transfer of Care


Disposition Time: 07:00


Condition: STABLE


Patient Signed Over To: Wilian Howe


Handoff Comments: pending call back from Dr. Galindo

## 2017-07-24 NOTE — CP.PCM.DIS
Provider





- Provider


Date of Admission: 


07/24/17 09:10





Attending physician: 


Silver Singh MD





Primary care physician: 


Al Richter MD





Consults: 





cardiology consult


Ortho consult 


Time Spent in preparation of Discharge (in minutes): 20





Hospital Course





- Lab Results


Lab Results: 


 Micro Results





07/24/17 17:25   Knee - Left   Gram Stain - Final





 Most Recent Lab Values











WBC  6.4 K/uL (4.8-10.8)   07/24/17  07:33    


 


RBC  3.87 Mil/uL (3.80-5.20)   07/24/17  07:33    


 


Hgb  11.6 g/dL (12.0-16.0)  L  07/24/17  07:33    


 


Hct  34.9 % (34.0-47.0)   07/24/17  07:33    


 


MCV  90.3 fl (81.0-99.0)   07/24/17  07:33    


 


MCH  30.0 pg (27.0-31.0)   07/24/17  07:33    


 


MCHC  33.2 g/dL (33.0-37.0)   07/24/17  07:33    


 


RDW  14.5 % (11.5-14.5)   07/24/17  07:33    


 


Plt Count  130 K/uL (130-400)   07/24/17  07:33    


 


MPV  10.4 fl (7.2-11.7)   07/24/17  07:33    


 


Neut % (Auto)  71.2 % (50.0-75.0)   07/24/17  07:33    


 


Lymph % (Auto)  13.0 % (20.0-40.0)  L  07/24/17  07:33    


 


Mono % (Auto)  12.2 % (0.0-10.0)  H  07/24/17  07:33    


 


Eos % (Auto)  2.4 % (0.0-4.0)   07/24/17  07:33    


 


Baso % (Auto)  1.2 % (0.0-2.0)   07/24/17  07:33    


 


Neut #  4.5 K/uL (1.8-7.0)   07/24/17  07:33    


 


Lymph #  0.8 K/uL (1.0-4.3)  L  07/24/17  07:33    


 


Mono #  0.8 K/uL (0.0-0.8)   07/24/17  07:33    


 


Eos #  0.2 K/uL (0.0-0.7)   07/24/17  07:33    


 


Baso #  0.1 K/uL (0.0-0.2)   07/24/17  07:33    


 


PT  11.6 Seconds (9.8-13.1)   07/24/17  07:33    


 


INR  1.0  (0.9-1.2)   07/24/17  07:33    


 


APTT  22.5 Seconds (25.6-37.1)  L  07/24/17  07:33    


 


Sodium  139 mmol/l (132-148)   07/24/17  07:33    


 


Potassium  4.6 MMOL/L (3.6-5.0)   07/24/17  07:33    


 


Chloride  107 mmol/L ()   07/24/17  07:33    


 


Carbon Dioxide  24 mmol/L (22-30)   07/24/17  07:33    


 


Anion Gap  13  (10-20)   07/24/17  07:33    


 


BUN  28 mg/dl (7-17)  H  07/24/17  07:33    


 


Creatinine  1.4 mg/dL (0.7-1.2)  H  07/24/17  07:33    


 


Est GFR ( Amer)  43   07/24/17  07:33    


 


Est GFR (Non-Af Amer)  36   07/24/17  07:33    


 


POC Glucose (mg/dL)  147 mg/dL ()  H  07/24/17  17:43    


 


Random Glucose  155 mg/dL ()  H  07/24/17  07:33    


 


Calcium  9.8 mg/dL (8.4-10.2)   07/24/17  07:33    


 


Total Bilirubin  0.5 mg/dl (0.2-1.3)   07/24/17  07:33    


 


AST  28 U/L (14-36)   07/24/17  07:33    


 


ALT  23 U/L (9-52)   07/24/17  07:33    


 


Alkaline Phosphatase  67 U/L ()   07/24/17  07:33    


 


Total Protein  7.2 G/DL (6.3-8.2)   07/24/17  07:33    


 


Albumin  3.8 g/dL (3.5-5.0)   07/24/17  07:33    


 


Globulin  3.4 gm/dL (2.2-3.9)   07/24/17  07:33    


 


Albumin/Globulin Ratio  1.1  (1.0-2.1)   07/24/17  07:33    


 


Fluid Type  Synovial fluid   07/24/17  17:25    


 


Synovial WBC  139.0 /mm3 (0.0-150.0)   07/24/17  17:25    


 


Synovial RBC  701.0 /mm3 (0.0-0.0)  H  07/24/17  17:25    


 


Synovial Neutrophils  67.0 % (0-0)  H  07/24/17  17:25    


 


Synovial Lymphocytes  22.0 % (0-0)  H  07/24/17  17:25    


 


Synov Monos/Macrophage  11 % (0-0)  H  07/24/17  17:25    


 


Synovial Fluid Comment  Light yellow   07/24/17  17:25    


 


Blood Type  A POSITIVE   07/24/17  07:33    


 


Antibody Screen  Negative   07/24/17  07:33    


 


BBK History Checked  Patient has bt   07/24/17  07:33    














- Hospital Course


Hospital Course: 


86 y/o female with PMH  cardiomyopathy s/p AICD placement  , CAD s/p 1 stent ,

DM , Dyslipidemia, hypertension , constipation presented to Er complaining of 

severe pain to her left knee and inability to ambulate. 


She denies any trauma to her knee. as per patient she has been experiencing 

progressive knee pain for a long time, more so the last 1 month until last week 

on Friday shew could not even put for foot down and ambulate due to severe 

pain.She was taken to [facility] ER and was told to follow up with Dr. Galindo and was given oxycodone for pain control which made her drowsy. Today 

she presented to ER with severe left knee pain.


She has extensive cardiac history with stent and AICD placemnet and follows up 

with a cardiologist. As per patient she had stress test done 1 month ago and 

was good.


At present denies any chest pain SOB, palpittaions, PND, orthopnea, urinary 

symptoms. Has chronic constipation.


Last meal was last night but had coffee at 5 AM.


She is on blood thinners Brilinta and ASa and and last dose was yesterday 


Echo nuclear test report was faxed by dain cardiologist gerardo and showed 

severe LV dysfunction with hypokineiss. Cardiology was consulted for preop 

clearance and patient was cleared for surgery


CT left knee showed tricompartmental DJD . Ortho was consulted and decided to 

take patient to OR for arthroscopy. Orthopedist informed about patient being on 

blood thinners.


Post procedure she was monitored on rexcovery room and telemetry and doing well


Awake , alert oriented , hemodynamically stable, pain controlled 


Will discharge [facility] family


Weaight bearing as tolerated with walker to LLE


Follow up with Dr. Galindo in his office in 1 week


Continue pain mangement Percoset PRN or Tylenol PRN 











1.Intractable left knee pain secondary to OA 


s/p arthroscopy 





2. Cardiomyopathy/ CAD s/p AICD 


Resume her home meds Metoprolol, nitro SL, Ensestro,Lasix


 ASA and Brilinta 











3. Hypertension


resume home meds








4. DM 





continue home meds on discharge  lantus and prandin 








5. Dyslipidemia


on rosuvastatin








6. Constipation


 








7. JAI on CKD


most likely patient has baseline CKD 


follow up with her PMD 








8.DVt prophylaxis





SCD








Discharge Exam





- Head Exam


Head Exam: ATRAUMATIC, NORMAL INSPECTION, NORMOCEPHALIC





- Eye Exam


Eye Exam: EOMI, Normal appearance, PERRL


Pupil Exam: NORMAL ACCOMODATION





- ENT Exam


ENT Exam: Mucous Membranes Moist, Normal Exam





- Neck Exam


Neck exam: Full Rom, Normal Inspection





- Respiratory Exam


Respiratory Exam: Clear to PA & Lateral, NORMAL BREATHING PATTERN.  absent: 

Rales, Rhonchi, Wheezes





- Cardiovascular Exam


Cardiovascular Exam: REGULAR RHYTHM, RRR, +S1, +S2.  absent: JVD





- GI/Abdominal Exam


GI & Abdominal Exam: Normal Bowel Sounds, Soft.  absent: Guarding, Rebound, 

Tenderness





- Rectal Exam


Rectal Exam: Deferred





- Extremities Exam


Extremities exam: normal capillary refill, normal inspection, pedal pulses 

present


Additional comments: 





left knee ace bandage in place


pulses intact 





- Back Exam


Back exam: NORMAL INSPECTION





- Neurological Exam


Neurological exam: Alert, CN II-XII Intact, Oriented x3





- Psychiatric Exam


Psychiatric exam: Normal Affect





- Skin


Skin Exam: Dry, Normal Color, Warm





Discharge Plan





- Discharge Medications


Prescriptions: 


Acetaminophen [Tylenol 325mg tab] 650 mg PO Q6 PRN #30 tab


 PRN Reason: Pain, Mild (1-3)


oxyCODONE/Acetaminophen 1/2TAB [Percocet 5-325 mg HALF TAB] 0.5 ea PO Q6 PRN #

20 tab


 PRN Reason: Pain, Severe (8-10)





- Follow Up Plan


Condition: STABLE


Disposition: HOME/ ROUTINE


Patient education suggested?: Yes


Instructions:  Operative Knee Arthroscopy (DC)


Additional Instructions: 


weight bearing as tolerated to LLE with walker


follow up with Dr. Galindo in  1week


Referrals: 


Al Richter MD [Primary Care Provider] - 


William Galindo III, MD [Staff Provider] -

## 2017-07-25 VITALS — OXYGEN SATURATION: 97 % | TEMPERATURE: 97.6 F

## 2017-07-25 VITALS — DIASTOLIC BLOOD PRESSURE: 72 MMHG | SYSTOLIC BLOOD PRESSURE: 128 MMHG | RESPIRATION RATE: 18 BRPM | HEART RATE: 90 BPM

## 2017-07-25 LAB
ANISOCYTOSIS BLD QL SMEAR: SLIGHT
BASOPHILS # BLD AUTO: 0 K/UL (ref 0–0.2)
BASOPHILS NFR BLD: 0.3 % (ref 0–2)
BASOPHILS NFR BLD: 1 % (ref 0–2)
BUN SERPL-MCNC: 27 MG/DL (ref 7–17)
CALCIUM SERPL-MCNC: 9.3 MG/DL (ref 8.4–10.2)
EOSINOPHIL # BLD AUTO: 0 K/UL (ref 0–0.7)
EOSINOPHIL NFR BLD: 0.3 % (ref 0–4)
ERYTHROCYTE [DISTWIDTH] IN BLOOD BY AUTOMATED COUNT: 14.8 % (ref 11.5–14.5)
GFR NON-AFRICAN AMERICAN: 36
HGB BLD-MCNC: 11.5 G/DL (ref 12–16)
HYPOCHROMIC: SLIGHT
LG PLATELETS BLD QL SMEAR: PRESENT
LYMPHOCYTE: 9 % (ref 20–50)
LYMPHOCYTES # BLD AUTO: 0.5 K/UL (ref 1–4.3)
LYMPHOCYTES NFR BLD AUTO: 6.6 % (ref 20–40)
MCH RBC QN AUTO: 29.6 PG (ref 27–31)
MCHC RBC AUTO-ENTMCNC: 32.4 G/DL (ref 33–37)
MCV RBC AUTO: 91.3 FL (ref 81–99)
MONOCYTE: 6 % (ref 0–10)
MONOCYTES # BLD: 0.3 K/UL (ref 0–0.8)
MONOCYTES NFR BLD: 4.3 % (ref 0–10)
NEUTROPHILS # BLD: 6.4 K/UL (ref 1.8–7)
NEUTROPHILS NFR BLD AUTO: 83 % (ref 42–75)
NEUTROPHILS NFR BLD AUTO: 88.5 % (ref 50–75)
NEUTS BAND NFR BLD: 1 % (ref 0–2)
NRBC BLD AUTO-RTO: 0 % (ref 0–0)
PLATELET # BLD EST: NORMAL 10*3/UL
PLATELET # BLD: 132 K/UL (ref 130–400)
PLATELET CLUMP BLD QL SMEAR: PRESENT
PMV BLD AUTO: 10.1 FL (ref 7.2–11.7)
RBC # BLD AUTO: 3.89 MIL/UL (ref 3.8–5.2)
TOTAL CELLS COUNTED BLD: 100
WBC # BLD AUTO: 7.3 K/UL (ref 4.8–10.8)

## 2017-07-25 NOTE — CP.PCM.PN
Subjective





- Date & Time of Evaluation


Date of Evaluation: 07/25/17


Time of Evaluation: 08:00





- Subjective


Subjective: 





FEELS GOOD





NO CHEST PAIN OR SOB





Objective





- Vital Signs/Intake and Output


Vital Signs (last 24 hours): 


 











Temp Pulse Resp BP Pulse Ox


 


 97.9 F   88   19   138/69   95 


 


 07/25/17 04:56  07/25/17 04:56  07/25/17 04:56  07/25/17 04:56  07/25/17 04:56











- Medications


Medications: 


 Current Medications





Acetaminophen (Tylenol 325mg Tab)  650 mg PO Q6 PRN


   PRN Reason: Pain, Mild (1-3)


Docusate Sodium (Colace)  100 mg PO BID [facility]


Ergocalciferol (Drisdol 50,000 Intl Units Cap)  1 cap PO QWK [facility]


Furosemide (Lasix)  40 mg PO DAILY [facility]


Home Med (Nitroglycerin [Nitrostat Sl Tab])  0.6 mg SL Q5MIN PRN


   PRN Reason: CHEST PAIN


Insulin Human Regular (Humulin R)  0 units SC ACHS [facility]


   PRN Reason: Protocol


   Last Admin: 07/25/17 06:30 Dose:  1 u


Ketorolac Tromethamine (Toradol)  15 mg IVP Q6 PRN


   PRN Reason: Pain, moderate (4-7)


Ketorolac Tromethamine (Toradol)  30 mg IVP Q6 PRN


   PRN Reason: Pain, severe (8-10)


Metoprolol Tartrate (Lopressor)  25 mg PO BID [facility]


   Last Admin: 07/24/17 17:00 Dose:  Not Given


Ondansetron HCl (Zofran Inj)  4 mg IVP Q6 PRN


   PRN Reason: Nausea/Vomiting


Pantoprazole Sodium (Protonix Ec Tab)  40 mg PO DAILY [facility]


Repaglinide (Prandin)  2 mg PO DAILY [facility]











- Labs


Labs: 


 





 07/25/17 05:30 





 07/25/17 05:30 





 











PT  11.6 Seconds (9.8-13.1)   07/24/17  07:33    


 


INR  1.0  (0.9-1.2)   07/24/17  07:33    


 


APTT  22.5 Seconds (25.6-37.1)  L  07/24/17  07:33    














- Respiratory Exam


Respiratory Exam: Clear to Ausculation Bilateral





- Cardiovascular Exam


Cardiovascular Exam: REGULAR RHYTHM, +S1, +S2





- Extremities Exam


Additional comments: 





LEFT KNEE WITH SURGICAL DRESSINGS





- Additional Findings


Additional findings: 





LEFT KNEE OPERATIVE REPORT REVIEWED





Assessment and Plan





- Assessment and Plan (Free Text)


Assessment: 





S/P LEFT KNEE SURGERY





CAD WITH CARDIOMYOPATHY





HYPERTENSION





HYPERLIPIDEMIA





TYPE 2 DM


Plan: 





CONTINUE METOPROLOL





FOR PROBABLE DISCHARGE TODAY





FU WITH DR GIDEON ROMANO WITH HER LOCAL CARDIOLOGIST

## 2017-07-26 NOTE — OP
PROCEDURE DATE:  07/24/2017



PREOPERATIVE DIAGNOSES:

1.  Left knee effusion.

2.  Osteoarthritis, left knee.



POSTOPERATIVE DIAGNOSES:

1.  Complex tear, medial meniscus tear, lateral meniscus.

2.  Tricompartmental osteoarthritis.

3.  Tricompartmental synovitis.

4.  Left knee effusion.



PROCEDURE:  Surgical arthroscopy left knee, partial arthroscopic medial

meniscectomy, surgical arthroscopy, partial arthroscopic lateral

meniscectomy, arthroscopy left knee, partial tricompartmental synovectomy,

intraarticular injection.



SURGEON:  Dr. William Galindo.



FIRST ASSISTANT:  Migdalia Antoine, certified registered nursing.



TYPE OF ANESTHESIA:  General endotracheal anesthesia.



ANESTHESIA ADMINISTERED BY:  Dr. Holley.



OPERATIVE INDICATIONS:  Cherelle Byrd is an 85-year-old woman who presents

with marked contusion pain, and restricted range of motion of the left knee

to [facility] ER.  The patient was admitted as an

emergency.  The patient cannot ambulate and was having severe discomfort. 

There was no evidence of sepsis.  Pros, cons, risks, and benefits of

surgical arthroscopy were discussed, possibility of mechanical failure,

infection, thromboembolic disease secondary or tertiary surgery discussed. 

The concept that in my opinion, the patient will require a knee replacement

in the future was discussed with the patient and her .  It was

decided at this point, we will do a lavage and surgical arthroscopy of the

left knee.



DESCRIPTION OF PROCEDURE:  After having obtained informed consent from the

patient and her  through the counseling _____ and Maryann.  After the satisfactory induction of the anesthetic and after

having identified the side, site and procedure in a critical pause/timeout,

the left lower extremity was prepped and free-draped in the usual fashion

for lower extremity surgery.  A tourniquet had been applied, but not yet

inflated.  After exsanguinating the limb with 6-inch Esmarch bandage, the

tourniquet which had been applied was inflated to 350 mmHg.  The joint was

insufflated with 10 mL of 1% Lidocaine without epinephrine.  Using #11

blade from an anterolateral portal followed by spreading, followed by

insertion of blunt trocar, the arthroscope was introduced.  With the

arthroscope anterolaterally triangulation was accomplished using #18 gauge

spinal needle at a 0.1 fingerbreadth medial to the inferior fold of the

patella.  Using #11 blade followed by spreading, insertion of the blunt

trocar, the arthroscope was introduced.  With the arthroscope

anterolaterally, there was found to be a marked effusion, which was

drained.  There was no evidence of sepsis, fluid was sent to the lab for

stat Gram stain, number of white cells per high-power field, and also

cultures.  With the arthroscope anterolaterally with the surgeon exerting a

gentle valgus stress, a careful partial tricompartmental synovectomy was

accomplished with improved visualization and _____ tissue.  Careful partial

tricompartmental synovectomy having been accomplished.  Bleeding points

were controlled with the ArthroCare wand.  With the arthroscope

anteromedially, careful partial tricompartmental synovectomy was

accomplished, bleeding points were controlled.  With the arthroscope

anterolaterally, the surgeon exerting a gentle valgus stress, there was

found to be a tear of the inner free edge of the medial meniscus.  With

arthroscope anterolaterally using the arthroscopic shaver, there was found

to be chondrocalcinosis and a complex third medial meniscus.  Using a

straight biting basket forceps, and a side biting basket forceps, a partial

medial meniscectomy was accomplished.  Excision of chondrocalcinosis was

accomplished as well.  The inner free edge was smoothen using the Gema

surface on.  With the arthroscope anterolaterally, the knee in figure-4

position, lateral compartment was entered.  There was found to be a tear of

the lateral meniscus at the inner free edge.  There was chondrocalcinosis

as well.  Using a combination of the 3.4 mm Bizweb.vns suction punch and the

arthroscopic shaver, a partial lateral meniscectomy was accomplished.  With

the arthroscope anterolaterally, partial lateral meniscectomy having been

accomplished, careful partial tricompartmental synovectomy was completed,

bleeding points were controlled with the Gema surface on.  Wound was

thoroughly irrigated.  There was found to be complete eburnation of bone

and severe tricompartmental osteoarthritis.  Closure was in layers with

interrupted Vicryl and Nylon.  Again it have been discussed with the

patient that replacement arthroplasty would be necessary in the future with

the patient and her .  Cy Peters compression dressing was

applied.





__________________________________________

William Galindo MD



DD:  07/25/2017 9:17:37

DT:  07/25/2017 20:02:11

Job # [id number]

## 2017-11-28 ENCOUNTER — HOSPITAL ENCOUNTER (INPATIENT)
Dept: HOSPITAL 31 - C.ER | Age: 82
LOS: 4 days | Discharge: HOME | DRG: 291 | End: 2017-12-02
Attending: INTERNAL MEDICINE | Admitting: INTERNAL MEDICINE
Payer: MEDICARE

## 2017-11-28 VITALS — BODY MASS INDEX: 25.5 KG/M2

## 2017-11-28 DIAGNOSIS — I25.10: ICD-10-CM

## 2017-11-28 DIAGNOSIS — D64.9: ICD-10-CM

## 2017-11-28 DIAGNOSIS — Z86.73: ICD-10-CM

## 2017-11-28 DIAGNOSIS — J18.9: ICD-10-CM

## 2017-11-28 DIAGNOSIS — E78.00: ICD-10-CM

## 2017-11-28 DIAGNOSIS — I50.23: ICD-10-CM

## 2017-11-28 DIAGNOSIS — J44.0: ICD-10-CM

## 2017-11-28 DIAGNOSIS — I11.0: Primary | ICD-10-CM

## 2017-11-28 DIAGNOSIS — J96.01: ICD-10-CM

## 2017-11-28 DIAGNOSIS — Z95.0: ICD-10-CM

## 2017-11-28 DIAGNOSIS — Z90.49: ICD-10-CM

## 2017-11-28 DIAGNOSIS — E11.9: ICD-10-CM

## 2017-11-28 DIAGNOSIS — Z95.5: ICD-10-CM

## 2017-11-28 LAB
ALBUMIN/GLOB SERPL: 1 {RATIO} (ref 1–2.1)
ALBUMIN/GLOB SERPL: 1.4 {RATIO} (ref 1–2.1)
ALP SERPL-CCNC: 61 U/L (ref 38–126)
ALP SERPL-CCNC: 75 U/L (ref 38–126)
ALT SERPL-CCNC: 32 U/L (ref 9–52)
ALT SERPL-CCNC: 34 U/L (ref 9–52)
APTT BLD: 22 SECONDS (ref 21–34)
ARTERIAL PATENCY WRIST A: (no result)
AST SERPL-CCNC: 24 U/L (ref 14–36)
AST SERPL-CCNC: 32 U/L (ref 14–36)
BASOPHILS # BLD AUTO: 0 K/UL (ref 0–0.2)
BASOPHILS # BLD AUTO: 0.1 K/UL (ref 0–0.2)
BASOPHILS NFR BLD: 0.6 % (ref 0–2)
BASOPHILS NFR BLD: 0.7 % (ref 0–2)
BILIRUB SERPL-MCNC: 0.5 MG/DL (ref 0.2–1.3)
BILIRUB SERPL-MCNC: 0.7 MG/DL (ref 0.2–1.3)
BILIRUB UR-MCNC: NEGATIVE MG/DL
BUN SERPL-MCNC: 22 MG/DL (ref 7–17)
BUN SERPL-MCNC: 23 MG/DL (ref 7–17)
CALCIUM SERPL-MCNC: 8.1 MG/DL (ref 8.6–10.4)
CALCIUM SERPL-MCNC: 8.4 MG/DL (ref 8.6–10.4)
CHLORIDE SERPL-SCNC: 103 MMOL/L (ref 98–107)
CHLORIDE SERPL-SCNC: 105 MMOL/L (ref 98–107)
CO2 SERPL-SCNC: 21 MMOL/L (ref 22–30)
CO2 SERPL-SCNC: 24 MMOL/L (ref 22–30)
DRAW SITE: (no result)
EOSINOPHIL # BLD AUTO: 0.1 K/UL (ref 0–0.7)
EOSINOPHIL # BLD AUTO: 0.2 K/UL (ref 0–0.7)
EOSINOPHIL NFR BLD: 0.7 % (ref 0–4)
EOSINOPHIL NFR BLD: 2.3 % (ref 0–4)
ERYTHROCYTE [DISTWIDTH] IN BLOOD BY AUTOMATED COUNT: 15.8 % (ref 11.5–14.5)
ERYTHROCYTE [DISTWIDTH] IN BLOOD BY AUTOMATED COUNT: 15.9 % (ref 11.5–14.5)
GLOBULIN SER-MCNC: 2.5 GM/DL (ref 2.2–3.9)
GLOBULIN SER-MCNC: 3.7 GM/DL (ref 2.2–3.9)
GLUCOSE SERPL-MCNC: 108 MG/DL (ref 65–105)
GLUCOSE SERPL-MCNC: 163 MG/DL (ref 65–105)
GLUCOSE UR STRIP-MCNC: NORMAL MG/DL
HCT VFR BLD CALC: 34.4 % (ref 34–47)
HCT VFR BLD CALC: 38 % (ref 34–47)
INR PPP: 1
KETONES UR STRIP-MCNC: NEGATIVE MG/DL
LEUKOCYTE ESTERASE UR-ACNC: (no result) LEU/UL
LYMPHOCYTES # BLD AUTO: 0.8 K/UL (ref 1–4.3)
LYMPHOCYTES # BLD AUTO: 1 K/UL (ref 1–4.3)
LYMPHOCYTES NFR BLD AUTO: 10.4 % (ref 20–40)
LYMPHOCYTES NFR BLD AUTO: 13.1 % (ref 20–40)
MCH RBC QN AUTO: 28.4 PG (ref 27–31)
MCH RBC QN AUTO: 28.5 PG (ref 27–31)
MCHC RBC AUTO-ENTMCNC: 32.3 G/DL (ref 33–37)
MCHC RBC AUTO-ENTMCNC: 32.7 G/DL (ref 33–37)
MCV RBC AUTO: 86.9 FL (ref 81–99)
MCV RBC AUTO: 88.2 FL (ref 81–99)
MONOCYTES # BLD: 0.6 K/UL (ref 0–0.8)
MONOCYTES # BLD: 0.6 K/UL (ref 0–0.8)
MONOCYTES NFR BLD: 7.7 % (ref 0–10)
MONOCYTES NFR BLD: 8.4 % (ref 0–10)
NRBC BLD AUTO-RTO: 0 % (ref 0–2)
NRBC BLD AUTO-RTO: 0 % (ref 0–2)
PH UR STRIP: 5 [PH] (ref 5–8)
PLATELET # BLD: 103 K/UL (ref 130–400)
PLATELET # BLD: 91 K/UL (ref 130–400)
PMV BLD AUTO: 10.5 FL (ref 7.2–11.7)
PMV BLD AUTO: 10.8 FL (ref 7.2–11.7)
POTASSIUM SERPL-SCNC: 4 MMOL/L (ref 3.6–5.2)
POTASSIUM SERPL-SCNC: 4.4 MMOL/L (ref 3.6–5.2)
PROT SERPL-MCNC: 5.9 G/DL (ref 6.3–8.3)
PROT SERPL-MCNC: 7.4 G/DL (ref 6.3–8.3)
PROT UR STRIP-MCNC: NEGATIVE MG/DL
RBC # UR STRIP: (no result) /UL
RBC #/AREA URNS HPF: 2 /HPF (ref 0–3)
SODIUM SERPL-SCNC: 135 MMOL/L (ref 132–148)
SODIUM SERPL-SCNC: 136 MMOL/L (ref 132–148)
SP GR UR STRIP: 1 (ref 1–1.03)
TROPONIN I SERPL-MCNC: 0.06 NG/ML (ref 0–0.12)
TROPONIN I SERPL-MCNC: 0.08 NG/ML (ref 0–0.12)
TROPONIN I SERPL-MCNC: 0.09 NG/ML (ref 0–0.12)
UROBILINOGEN UR-MCNC: NORMAL MG/DL (ref 0.2–1)
WBC # BLD AUTO: 7.3 K/UL (ref 4.8–10.8)
WBC # BLD AUTO: 7.4 K/UL (ref 4.8–10.8)
WBC #/AREA URNS HPF: 3 /HPF (ref 0–5)

## 2017-11-28 PROCEDURE — 5A09457 ASSISTANCE WITH RESPIRATORY VENTILATION, 24-96 CONSECUTIVE HOURS, CONTINUOUS POSITIVE AIRWAY PRESSURE: ICD-10-PCS | Performed by: INTERNAL MEDICINE

## 2017-11-28 RX ADMIN — SACUBITRIL AND VALSARTAN SCH: 24; 26 TABLET, FILM COATED ORAL at 10:35

## 2017-11-28 RX ADMIN — RANOLAZINE SCH MG: 500 TABLET, FILM COATED, EXTENDED RELEASE ORAL at 10:35

## 2017-11-28 RX ADMIN — HUMAN INSULIN SCH UNIT: 100 INJECTION, SOLUTION SUBCUTANEOUS at 17:23

## 2017-11-28 RX ADMIN — ASPIRIN SCH MG: 325 TABLET, DELAYED RELEASE ORAL at 10:35

## 2017-11-28 RX ADMIN — HUMAN INSULIN SCH: 100 INJECTION, SOLUTION SUBCUTANEOUS at 12:01

## 2017-11-28 RX ADMIN — INSULIN GLARGINE SCH: 100 INJECTION, SOLUTION SUBCUTANEOUS at 23:07

## 2017-11-28 RX ADMIN — HUMAN INSULIN SCH: 100 INJECTION, SOLUTION SUBCUTANEOUS at 21:58

## 2017-11-28 RX ADMIN — HUMAN INSULIN SCH UNIT: 100 INJECTION, SOLUTION SUBCUTANEOUS at 07:53

## 2017-11-28 RX ADMIN — IPRATROPIUM BROMIDE AND ALBUTEROL SULFATE SCH ML: .5; 3 SOLUTION RESPIRATORY (INHALATION) at 01:22

## 2017-11-28 RX ADMIN — RANOLAZINE SCH MG: 500 TABLET, FILM COATED, EXTENDED RELEASE ORAL at 18:50

## 2017-11-28 NOTE — C.PDOC
History Of Present Illness


Pt presents in acutre respiratory distress. Worsening since yesterday. 

Occasionally c/o chest discomfort. Answers in one word sentences. No f/c/n/v


Time Seen by Provider: 11/28/17 00:32


Chief Complaint (Nursing): Chest Pain


History Per: Patient


History/Exam Limitations: no limitations


Onset/Duration Of Symptoms: Hrs


Current Symptoms Are (Timing): Worse


Initiating Event: Other


Quality: Dull


Exacerbating Factor(s): Laying Flat, Coughing.  denies: Exertion


Current Respiratory Medications: See Home Med List


Severity: Severe


Pain Scale Rating Of: 8


Associated Symptoms: denies: Fever, Chills, Chest Pain


Reports Recently: Seen In ED, Treated By A Physician, Hospitalized


Recent travel outside of the [place]: No


Additional History Per: Family





Past Medical History


Reviewed: Historical Data, Nursing Documentation, Vital Signs


Vital Signs: 


 Last Vital Signs











Temp      


 


Pulse  93 H  11/28/17 02:09


 


Resp  24   11/28/17 02:09


 


BP  87/45 L  11/28/17 02:09


 


Pulse Ox  100   11/28/17 02:30














- Medical History


PMH: Anemia, Anxiety, Arthritis (BACK AND KNEES AND R SHOULDER), Asthma, Cardia 

Arrhythmia, CHF, COPD, Diabetes, HTN, Hypercholesterolemia, TIA


Surgical History: Appendectomy, Coronary Stent, Endoscopy, Pacemaker (2016)





- [facility] Procedures








ASSISTANCE WITH RESPIRATORY VENTILATION, <24 HRS, CPAP (07/21/16)


ESOPHAGOGASTRODUODENOSCOPY [EGD] W/CLOSED BIOPSY (06/14/15)


LEFT HEART CARDIAC CATH (04/03/14)


LT HEART ANGIOCARDIOGRAM (04/03/14)


MEASURE OF CARDIAC SAMPL & PRESSURE, L HEART, PERC APPROACH (11/03/15)


PACKED CELL TRANSFUSION (06/14/15)


PLAIN RADIOGRAPHY OF MULT COR ART USING OTH CONTRAST (11/03/15)


TRANSFUSE NONAUT RED BLOOD CELLS IN PERIPH VEIN, PERC (07/21/16)








Family History: States: Unknown Family Hx





- Social History


Hx Tobacco Use: No


Hx Alcohol Use: No


Hx Substance Use: No





- Immunization History


Hx Tetanus Toxoid Vaccination: No


Hx Influenza Vaccination: Yes


Hx Pneumococcal Vaccination: Yes





Review Of Systems


Review Of Systems: ROS cannot be obtained secondary to pt's inabilty to answer 

questions.





Physical Exam





- Physical Exam


Appears: In Acute Distress


Skin: Warm


Head: Normacephalic


Eye(s): bilateral: Normal Inspection


Oral Mucosa: Moist


Neck: Trachea Midline, Supple


Chest: Symmetrical


Cardiovascular: Rhythm Regular (tachy)


Respiratory: Decreased Breath Sounds, Rales (thruout), Wheezing


Gastrointestinal/Abdominal: Soft, No Distention


Back: Normal Inspection


Extremity: Normal ROM


Extremity: Bilateral: Atraumatic, Normal Color And Temperature, Normal ROM


Pulses: Left Dorsalis Pedis: Normal, Right Dorsalis Pedis: Normal


Neurological/Psych: Oriented x3


Gait: Steady





ED Course And Treatment





- Laboratory Results


Result Diagrams: 


 11/28/17 00:57





 11/28/17 00:57


ECG: Interpreted By Me, Viewed By Me


ECG Rhythm: Sinus Rhythm (113), L BBB, Nonspecific Changes


O2 Sat by Pulse Oximetry: 100


Pulse Ox Interpretation: Normal





- Radiology


CXR: Interpreted by Me, Viewed By Me


CXR Interpretation: Yes: Infiltrates, Cardiomegaly, Other (pacer left, chf, ? 

rll infiltrate)





Critical Care Time





- Critical Care Note


Total Time (in mins): 30


Documented critical care: time excludes all time spent performing seperately 

billable procedures.





Disposition


Discussed With Dr.: Osmar Fierro


Comment: accepted the pt on his service and took over the care at 2:33 AM


Doctor Will See Patient In The: Hospital


Counseled Patient/Family Regarding: Studies Performed, Diagnosis





- Disposition


Disposition: HOSPITALIZED


Disposition Time: 00:40


Condition: GUARDED


Forms:  [facility] (English)





- POA


Present On Arrival: None





- Clinical Impression


Clinical Impression: 


 Congestive heart failure, Respiratory distress, Chest pain








Decision To Admit





- Pt Status Changed To:


Hospital Disposition Of: Inpatient





- Admit Certification


Admit to Inpatient:: After my assessment, the patient will require 

hospitalization for at least two midnights.  This is because of the severity of 

symptoms shown, intensity of services needed, and/or the medical risk in this 

patient being treated as an outpatient.





- InPatient:


Physician Admission Certification: I certify that this patient requires 2 or 

more midnights of care for the following reason:: After my assessment, the 

patient will require hospitalization for at least two midnights.  This is 

because of the severity of symptoms shown, intensity of services needed, and/or 

the medical risk in this patient being treated as an outpatient.





- .


Bed Request Type: Telemetry


Admitting Physician: Osmar Fierro


Patient Diagnosis: 


 Congestive heart failure, Respiratory distress, Chest pain

## 2017-11-28 NOTE — CP.PCM.HP
History of Present Illness





- History of Present Illness


History of Present Illness: 





COMPREHENSIVE   HISTORY & PHYSICAL EXAM 


   


HPI


FOR LAST FEW DAYS PT HAS SOB AND INCREASINGLY GETTING WORSE WITH SOB AT [facility]/

E COUGH AND WHEEZING MILD EXPECTORATION 


IN  ER PT HAD CHF/PNEUMONIA AND IMPROVED ON IV LASIX WITH BIPAP 





PAST HIST.


CAD/2 STENTS RECENT /CARIOMYOPATHY EF 30% /


T2DM/COPD/FE DEF ANEMIA /


PERSONAL HIST:   Smoking.   N   Alcohol.   N      Allergy N            Travel_-

      .


FAMILY HIST : 


ROS :


Constitutional: Negative for weight change, 


  Eyes: Negative for redness, swelling, itching, discharge, vision changes, 

blurry vision, double vision, glaucoma, cataracts, 


  Ears: Negative for hearing loss, ringing, , tinnitus, vertigo


 Nose: Negative for rhinorrhea, stuffiness, sniffing, itching, postnasal drip, 

discoloration, nasal congestion and epistaxis. 


 Throat: Negative for throat clearing, sore throat, hoarseness, difficulty 

swallowing and difficulty speaking. 


  Respiratory: Negative for , pleuritic chest pain ,daytime somnolence, chronic 

cough, hemoptysis, snoring at night,


 Cardiovascular: Negative for chest pain, palpitations, , Edema of legs, leg 

cramps, angina, claudication, , irregular heartbeat,


 Neurology: Negative for irritability, muscle weakness, numbness and tingling, 

seizures, tremors, migraines, slurred speech, syncope, memory loss, mood changes

, recurrent headaches 


Gastrointestinal: Negative for difficulty swallowing, diarrhea, constipation, 

black stools, rectal bleeding, nausea, flatulence, reflux, poor appetite, 

changes in bowel habits, abdominal pain


  Genitourinary: Negative for frequent urination, hematuria, discharge, 

incontinence, urinary retention, frequent UTI, Psychiatric: Negative for 

depression, anxiety/panic, suicidal tendencies, 


Musculoskeletal: Negative for swollen joints, back pain, , neck pain, morning 

stiffness of joints, . 


 Skin: Negative for rash, ulcers, itching, dry skin and pigmented lesions.


P/E: 


  Constitutional: Appears stated age and in no apparent distress. 


 Head: Normocephalic. 


  Ears: External ear canals patent without inflammation. Tympanic membranes 

intact with normal light reflex and landmark. 


  Eyes: Pupils are central, bilaterally equal, symmetrical and reacts to light 

with normal movements and no icterus or pallor. 


 Nose: External nares are patent. Mucosa is pink  Mouth-Throat: Good general 

appearance and condition. No post-pharyngeal/oropharyngeal erythema and 

tonsillar hypertrophy. Good dental hygiene. 


  Neck-Lymphatic: Neck is supple with normal ROM, no thyromegaly, lymph nodes 

or masses. JVD is normal with no carotid bruit. 


  Lungs: Clear to percussion and auscultation with bilateral normal air entry. 


  Cardiovascular: S1 and S2 are normal with no murmurs, gallops and rub. 


  GI Exam: No hepatomegaly. Abdomen is soft and non-tender. No Organomegaly , 

masses or hernias are evident and bowel sounds are normal and active. 


  Neurology: Higher function and all cranial nerves intact, with no gross motor 

or sensory deficit. Superficial and deep reflexes are normal with downwards 

planters. No cerebellar deficit with normal gait. 


  Musculoskeletal: No tender spots with normal curvature of the spine with no 

swelling or restricted ROM of the small and large joints. 


  Extremities: Homans sign absent. Intact pulses with no pitting edema, calf 

tenderness or skin color changes. 


  Skin: No rash, eruptions or abnormal skin pigmentation





LAB/RADIOLOGY:


ASSESMENT :


ACUTE ON CH SYSTOLIC HF (HFrEF)


MULTI LOBE PNEUMONIA 


COPD 


T2DM








PLAN: 


SEE ORDERS 








Present on Admission





- Present on Admission


Any Indicators Present on Admission: No





Past Patient History





- Infectious Disease


Hx of Infectious Diseases: None





- Tetanus Immunizations


Tetanus Immunization: Unknown





- Past Medical History & Family History


Past Medical History?: Yes





- Past Social History


Smoking Status: Never Smoked





- CARDIAC


Hx Cardia Arrhythmia: Yes


Hx Congestive Heart Failure: Yes


Hx Hypercholesterolemia: Yes


Hx Hypertension: Yes


Hx Pacemaker: Yes (2016)





- PULMONARY


Hx Asthma: Yes


Hx Chronic Obstructive Pulmonary Disease (COPD): Yes





- NEUROLOGICAL


Hx Transient Ischemic Attacks (TIA): Yes





- HEENT


Hx HEENT Problems: No





- RENAL


Hx Chronic Kidney Disease: No





- ENDOCRINE/METABOLIC


Hx Endocrine Disorders: Yes


Hx Diabetes Mellitus Type 2: Yes





- HEMATOLOGICAL/ONCOLOGICAL


Hx Anemia: Yes





- MUSCULOSKELETAL/RHEUMATOLOGICAL


Hx Falls: Yes





- PSYCHIATRIC


Hx Substance Use: No





- SURGICAL HISTORY


Hx Appendectomy: Yes


Hx Coronary Stent: Yes





- ANESTHESIA


Hx Anesthesia: Yes


Hx Anesthesia Reactions: No


Hx Malignant Hyperthermia: No





Meds


Allergies/Adverse Reactions: 


 Allergies











Allergy/AdvReac Type Severity Reaction Status Date / Time


 


clopidogrel Allergy Mild SWELLING Verified 11/28/17 00:30














Results





- Vital Signs


Recent Vital Signs: 





 Last Vital Signs











Temp  97.7 F   11/28/17 10:05


 


Pulse  83   11/28/17 12:40


 


Resp  16   11/28/17 12:26


 


BP  99/60 L  11/28/17 12:26


 


Pulse Ox  100   11/28/17 12:26














- Labs


Result Diagrams: 


 11/28/17 09:20





 11/28/17 09:20


Labs: 





 Laboratory Results - last 24 hr











  11/28/17 11/28/17 11/28/17





  00:57 00:57 00:57


 


WBC  7.4  


 


RBC  4.30  


 


Hgb  12.3  


 


Hct  38.0  


 


MCV  88.2  D  


 


MCH  28.5  


 


MCHC  32.3 L  


 


RDW  15.9 H  


 


Plt Count  103 L D  


 


MPV  10.8  


 


Neut % (Auto)  76.2 H  


 


Lymph % (Auto)  13.1 L  


 


Mono % (Auto)  7.7  


 


Eos % (Auto)  2.3  


 


Baso % (Auto)  0.7  


 


Neut #  5.6  


 


Lymph #  1.0  


 


Mono #  0.6  


 


Eos #  0.2  


 


Baso #  0.1  


 


Differential Comment   


 


PT   11.5 


 


INR   1.0 


 


APTT   22 


 


Puncture Site   


 


pCO2   


 


pO2   


 


HCO3   


 


ABG pH   


 


ABG Total CO2   


 


ABG O2 Saturation   


 


ABG Base Excess   


 


Benji Test   


 


ABG Potassium   


 


A-a O2 Difference   


 


Respiratory Index   


 


Glucose   


 


Lactate   


 


Liter Flow   


 


FiO2   


 


Sodium    135


 


Potassium    4.4


 


Chloride    105


 


Carbon Dioxide    21 L


 


Anion Gap    14


 


BUN    23 H


 


Creatinine    1.4 H


 


Est GFR ( Amer)    43


 


Est GFR (Non-Af Amer)    36


 


POC Glucose (mg/dL)   


 


Random Glucose    163 H


 


Calcium    8.4 L


 


Total Bilirubin    0.5


 


AST    32


 


ALT    34


 


Alkaline Phosphatase    75


 


Total Creatine Kinase   


 


CK-MB (Mass)   


 


Troponin I    0.0590


 


NT-Pro-B Natriuret Pep    86200 H


 


Total Protein    7.4


 


Albumin    3.8


 


Globulin    3.7


 


Albumin/Globulin Ratio    1.0


 


Arterial Blood Potassium   


 


Urine Color   


 


Urine Clarity   


 


Urine pH   


 


Ur Specific Gravity   


 


Urine Protein   


 


Urine Glucose (UA)   


 


Urine Ketones   


 


Urine Blood   


 


Urine Nitrate   


 


Urine Bilirubin   


 


Urine Urobilinogen   


 


Ur Leukocyte Esterase   


 


Urine WBC (Auto)   


 


Urine RBC (Auto)   


 


Ur Squamous Epith Cells   














  11/28/17 11/28/17 11/28/17





  01:05 03:44 07:31


 


WBC   


 


RBC   


 


Hgb   


 


Hct   


 


MCV   


 


MCH   


 


MCHC   


 


RDW   


 


Plt Count   


 


MPV   


 


Neut % (Auto)   


 


Lymph % (Auto)   


 


Mono % (Auto)   


 


Eos % (Auto)   


 


Baso % (Auto)   


 


Neut #   


 


Lymph #   


 


Mono #   


 


Eos #   


 


Baso #   


 


Differential Comment   


 


PT   


 


INR   


 


APTT   


 


Puncture Site  Rr  


 


pCO2  34 L  


 


pO2  57 L  


 


HCO3  21.9  


 


ABG pH  7.39  


 


ABG Total CO2  21.6 L  


 


ABG O2 Saturation  93.3 L  


 


ABG Base Excess  -3.6 L  


 


Benji Test  Pos  


 


ABG Potassium  4.5  


 


A-a O2 Difference  100.0  


 


Respiratory Index  1.8  


 


Glucose  184 H  


 


Lactate  1.0  


 


Liter Flow  2.0  


 


FiO2  28.0  


 


Sodium  139.0  


 


Potassium   


 


Chloride  109.0 H  


 


Carbon Dioxide   


 


Anion Gap   


 


BUN   


 


Creatinine   


 


Est GFR ( Amer)   


 


Est GFR (Non-Af Amer)   


 


POC Glucose (mg/dL)    162 H


 


Random Glucose   


 


Calcium   


 


Total Bilirubin   


 


AST   


 


ALT   


 


Alkaline Phosphatase   


 


Total Creatine Kinase   


 


CK-MB (Mass)   


 


Troponin I   


 


NT-Pro-B Natriuret Pep   


 


Total Protein   


 


Albumin   


 


Globulin   


 


Albumin/Globulin Ratio   


 


Arterial Blood Potassium  4.5  


 


Urine Color   Straw 


 


Urine Clarity   Clear 


 


Urine pH   5.0 


 


Ur Specific Gravity   1.005 


 


Urine Protein   Negative 


 


Urine Glucose (UA)   Normal 


 


Urine Ketones   Negative 


 


Urine Blood   1+ H 


 


Urine Nitrate   Negative 


 


Urine Bilirubin   Negative 


 


Urine Urobilinogen   Normal 


 


Ur Leukocyte Esterase   1+ H 


 


Urine WBC (Auto)   3 


 


Urine RBC (Auto)   2 


 


Ur Squamous Epith Cells   < 1 














  11/28/17 11/28/17 11/28/17





  09:20 09:20 12:00


 


WBC   7.3 


 


RBC   3.96 


 


Hgb   11.3 


 


Hct   34.4 


 


MCV   86.9 


 


MCH   28.4 


 


MCHC   32.7 L 


 


RDW   15.8 H 


 


Plt Count   91 L 


 


MPV   10.5 


 


Neut % (Auto)   79.9 H 


 


Lymph % (Auto)   10.4 L 


 


Mono % (Auto)   8.4 


 


Eos % (Auto)   0.7 


 


Baso % (Auto)   0.6 


 


Neut #   5.8 


 


Lymph #   0.8 L 


 


Mono #   0.6 


 


Eos #   0.1 


 


Baso #   0.0 


 


Differential Comment    


 


PT   


 


INR   


 


APTT   


 


Puncture Site   


 


pCO2   


 


pO2   


 


HCO3   


 


ABG pH   


 


ABG Total CO2   


 


ABG O2 Saturation   


 


ABG Base Excess   


 


Benji Test   


 


ABG Potassium   


 


A-a O2 Difference   


 


Respiratory Index   


 


Glucose   


 


Lactate   


 


Liter Flow   


 


FiO2   


 


Sodium  136  


 


Potassium  4.0  


 


Chloride  103  


 


Carbon Dioxide  24  


 


Anion Gap  13  


 


BUN  22 H  


 


Creatinine  1.3 H  


 


Est GFR ( Amer)  47  


 


Est GFR (Non-Af Amer)  39  


 


POC Glucose (mg/dL)    96


 


Random Glucose  108 H  


 


Calcium  8.1 L  


 


Total Bilirubin  0.7  


 


AST  24  


 


ALT  32  


 


Alkaline Phosphatase  61  


 


Total Creatine Kinase  76  


 


CK-MB (Mass)  2.50  


 


Troponin I  0.0920  


 


NT-Pro-B Natriuret Pep   


 


Total Protein  5.9 L  


 


Albumin  3.4 L  


 


Globulin  2.5  


 


Albumin/Globulin Ratio  1.4  


 


Arterial Blood Potassium   


 


Urine Color   


 


Urine Clarity   


 


Urine pH   


 


Ur Specific Gravity   


 


Urine Protein   


 


Urine Glucose (UA)   


 


Urine Ketones   


 


Urine Blood   


 


Urine Nitrate   


 


Urine Bilirubin   


 


Urine Urobilinogen   


 


Ur Leukocyte Esterase   


 


Urine WBC (Auto)   


 


Urine RBC (Auto)   


 


Ur Squamous Epith Cells

## 2017-11-28 NOTE — CP.PCM.CON
History of Present Illness





- History of Present Illness


History of Present Illness: 


INFECTIOUS DISEASE CONSULT;


HPI;





85-year-old female with history of CAD/2 stents recent, hypertension, 

cardiomyopathy with ejection fraction of 30%, diabetes mellitus type 2, COPD, 

iron deficiency anemia, TIA, pacemaker 2016 who presented to [facility] 

with progressive increasing shortness of breath and cough and wheezing with 

mild expectoration.  Patient was experiencing worsening shortness of breath 

especially on lying down and on exertion.  Patient denies any fever or chills 

and admits to feeling weak.  Chest x-ray on admission showed multifocal air 

space disease RT> LT side consistent with pneumonia versus pulmonary edema.


Patient's proBNP was found to be 17,000.  Patient was treated with Lasix and 

appropriately cultured with a dose of ceftriaxone,and zithromax 





.PATIENT PRESENTLY ON BIPAP AND MORE COMFORTABLE.


Patient admits to some  chest discomfort anteriorly.


Infectious disease consultation requested by PMD for possible pneumonia/CHF.





PMH: Anemia, Anxiety, Arthritis (BACK AND KNEES AND R SHOULDER), Asthma, Cardia 

Arrhythmia, CHF, COPD, Diabetes, HTN, Hypercholesterolemia, TIA


Surgical History: Appendectomy, Coronary Stent, Endoscopy, Pacemaker (2016)





- [place] Procedures








ASSISTANCE WITH RESPIRATORY VENTILATION, <24 HRS, CPAP (07/21/16)


ESOPHAGOGASTRODUODENOSCOPY [EGD] W/CLOSED BIOPSY (06/14/15)


LEFT HEART CARDIAC CATH (04/03/14)


LT HEART ANGIOCARDIOGRAM (04/03/14)


MEASURE OF CARDIAC SAMPL & PRESSURE, L HEART, PERC APPROACH (11/03/15)


PACKED CELL TRANSFUSION (06/14/15)


PLAIN RADIOGRAPHY OF MULT COR ART USING OTH CONTRAST (11/03/15)


TRANSFUSE NONAUT RED BLOOD CELLS IN PERIPH VEIN, PERC (07/21/16)








Family History: States: Unknown Family Hx





- Social History


Hx Tobacco Use: No


Hx Alcohol Use: No


Hx Substance Use: No





- Immunization History


Hx Tetanus Toxoid Vaccination: No


Hx Influenza Vaccination: Yes


Hx Pneumococcal Vaccination: Yes


ALLERGY;  PLAVIX.





Review of Systems





- Review of Systems


Systems not reviewed;Unavailable: Respiratory Distress





Past Patient History





- Infectious Disease


Hx of Infectious Diseases: None





- Tetanus Immunizations


Tetanus Immunization: Unknown





- Past Medical History & Family History


Past Medical History?: Yes





- Past Social History


Smoking Status: Never Smoked





- CARDIAC


Hx Cardia Arrhythmia: Yes


Hx Congestive Heart Failure: Yes


Hx Hypercholesterolemia: Yes


Hx Hypertension: Yes


Hx Pacemaker: Yes (2016)





- PULMONARY


Hx Asthma: Yes


Hx Chronic Obstructive Pulmonary Disease (COPD): Yes





- NEUROLOGICAL


Hx Transient Ischemic Attacks (TIA): Yes





- HEENT


Hx HEENT Problems: No





- RENAL


Hx Chronic Kidney Disease: No





- ENDOCRINE/METABOLIC


Hx Endocrine Disorders: Yes


Hx Diabetes Mellitus Type 2: Yes





- HEMATOLOGICAL/ONCOLOGICAL


Hx Anemia: Yes





- MUSCULOSKELETAL/RHEUMATOLOGICAL


Hx Falls: Yes





- PSYCHIATRIC


Hx Substance Use: No





- SURGICAL HISTORY


Hx Appendectomy: Yes


Hx Coronary Stent: Yes





- ANESTHESIA


Hx Anesthesia: Yes


Hx Anesthesia Reactions: No


Hx Malignant Hyperthermia: No





Meds


Allergies/Adverse Reactions: 


 Allergies











Allergy/AdvReac Type Severity Reaction Status Date / Time


 


clopidogrel Allergy Mild SWELLING Verified 11/28/17 00:30














- Medications


Medications: 


 Current Medications





Aspirin (Ecotrin)  325 mg PO DAILY [facility]


   Last Admin: 11/28/17 10:35 Dose:  325 mg


Furosemide (Lasix)  40 mg IVP DAILY [facility]


   Last Admin: 11/28/17 10:30 Dose:  Not Given


Heparin Sodium (Porcine) (Heparin)  5,000 units SC Q12 [facility]


   Last Admin: 11/28/17 13:53 Dose:  5,000 units


Ceftriaxone Sodium (Rocephin Iv 1 Gm Duplex)  50 mls @ 100 mls/hr IVPB Q24H [facility]


Insulin Glargine (Lantus)  20 unit SC HS [facility]


Insulin Human Regular (Novolin R)  0 unit SC ACHS [facility]


   PRN Reason: Protocol


   Last Admin: 11/28/17 12:01 Dose:  Not Given


Metoprolol Tartrate (Lopressor)  25 mg PO BID [facility]


   Last Admin: 11/28/17 10:28 Dose:  Not Given


Pneumococcal Polyvalent Vaccine (Pneumovax 23 Vaccine)  0.5 ml IM .ONCE ONE


   Stop: 12/01/17 10:01


Ranolazine (Ranexa)  500 mg PO BID [facility]


   Last Admin: 11/28/17 10:35 Dose:  500 mg


Repaglinide (Prandin)  2 mg PO DAILY [facility]


   Last Admin: 11/28/17 10:35 Dose:  2 mg


Rosuvastatin Calcium (Crestor)  20 mg PO HS [facility]


Sacubitril/Valsartan (Entresto 24 Mg-26 Mg)  1 tab PO DAILY [facility]


   Last Admin: 11/28/17 10:35 Dose:  Not Given


Ticagrelor (Brilinta)  90 mg PO QOD6 [facility]











Physical Exam





- Constitutional


Appears: No Acute Distress





- Head Exam


Head Exam: NORMAL INSPECTION





- Eye Exam


Eye Exam: EOMI, PERRL





- ENT Exam


ENT Exam: Mucous Membranes Moist





- Neck Exam


Neck exam: Positive for: Normal Inspection





- Respiratory Exam


Respiratory Exam: Rales, Wheezes





- Cardiovascular Exam


Cardiovascular Exam: REGULAR RHYTHM, +S1, +S2





- GI/Abdominal Exam


GI & Abdominal Exam: Normal Bowel Sounds, Soft.  absent: Tenderness





- Extremities Exam


Extremities exam: Positive for: normal capillary refill, pedal pulses present.  

Negative for: calf tenderness, pedal edema





- Neurological Exam


Neurological exam: Alert, CN II-XII Intact, Oriented x3, Reflexes Normal





- Psychiatric Exam


Psychiatric exam: Normal Mood





- Skin


Skin Exam: Normal Color, Warm





Results





- Vital Signs


Recent Vital Signs: 


 Last Vital Signs











Temp  97.5 F L  11/28/17 15:15


 


Pulse  93 H  11/28/17 15:15


 


Resp  17   11/28/17 15:15


 


BP  101/39 L  11/28/17 15:15


 


Pulse Ox  99   11/28/17 15:15














- Labs


Result Diagrams: 


 11/28/17 09:20





 11/28/17 09:20


Labs: 


 Laboratory Results - last 24 hr











  11/28/17 11/28/17 11/28/17





  00:57 00:57 00:57


 


WBC  7.4  


 


RBC  4.30  


 


Hgb  12.3  


 


Hct  38.0  


 


MCV  88.2  D  


 


MCH  28.5  


 


MCHC  32.3 L  


 


RDW  15.9 H  


 


Plt Count  103 L D  


 


MPV  10.8  


 


Neut % (Auto)  76.2 H  


 


Lymph % (Auto)  13.1 L  


 


Mono % (Auto)  7.7  


 


Eos % (Auto)  2.3  


 


Baso % (Auto)  0.7  


 


Neut #  5.6  


 


Lymph #  1.0  


 


Mono #  0.6  


 


Eos #  0.2  


 


Baso #  0.1  


 


Differential Comment   


 


PT   11.5 


 


INR   1.0 


 


APTT   22 


 


Puncture Site   


 


pCO2   


 


pO2   


 


HCO3   


 


ABG pH   


 


ABG Total CO2   


 


ABG O2 Saturation   


 


ABG Base Excess   


 


Benji Test   


 


ABG Potassium   


 


A-a O2 Difference   


 


Respiratory Index   


 


Glucose   


 


Lactate   


 


Liter Flow   


 


FiO2   


 


Sodium    135


 


Potassium    4.4


 


Chloride    105


 


Carbon Dioxide    21 L


 


Anion Gap    14


 


BUN    23 H


 


Creatinine    1.4 H


 


Est GFR ( Amer)    43


 


Est GFR (Non-Af Amer)    36


 


POC Glucose (mg/dL)   


 


Random Glucose    163 H


 


Calcium    8.4 L


 


Total Bilirubin    0.5


 


AST    32


 


ALT    34


 


Alkaline Phosphatase    75


 


Total Creatine Kinase   


 


CK-MB (Mass)   


 


Troponin I    0.0590


 


NT-Pro-B Natriuret Pep    23325 H


 


Total Protein    7.4


 


Albumin    3.8


 


Globulin    3.7


 


Albumin/Globulin Ratio    1.0


 


Arterial Blood Potassium   


 


Urine Color   


 


Urine Clarity   


 


Urine pH   


 


Ur Specific Gravity   


 


Urine Protein   


 


Urine Glucose (UA)   


 


Urine Ketones   


 


Urine Blood   


 


Urine Nitrate   


 


Urine Bilirubin   


 


Urine Urobilinogen   


 


Ur Leukocyte Esterase   


 


Urine WBC (Auto)   


 


Urine RBC (Auto)   


 


Ur Squamous Epith Cells   














  11/28/17 11/28/17 11/28/17





  01:05 03:44 07:31


 


WBC   


 


RBC   


 


Hgb   


 


Hct   


 


MCV   


 


MCH   


 


MCHC   


 


RDW   


 


Plt Count   


 


MPV   


 


Neut % (Auto)   


 


Lymph % (Auto)   


 


Mono % (Auto)   


 


Eos % (Auto)   


 


Baso % (Auto)   


 


Neut #   


 


Lymph #   


 


Mono #   


 


Eos #   


 


Baso #   


 


Differential Comment   


 


PT   


 


INR   


 


APTT   


 


Puncture Site  Rr  


 


pCO2  34 L  


 


pO2  57 L  


 


HCO3  21.9  


 


ABG pH  7.39  


 


ABG Total CO2  21.6 L  


 


ABG O2 Saturation  93.3 L  


 


ABG Base Excess  -3.6 L  


 


Benji Test  Pos  


 


ABG Potassium  4.5  


 


A-a O2 Difference  100.0  


 


Respiratory Index  1.8  


 


Glucose  184 H  


 


Lactate  1.0  


 


Liter Flow  2.0  


 


FiO2  28.0  


 


Sodium  139.0  


 


Potassium   


 


Chloride  109.0 H  


 


Carbon Dioxide   


 


Anion Gap   


 


BUN   


 


Creatinine   


 


Est GFR ( Amer)   


 


Est GFR (Non-Af Amer)   


 


POC Glucose (mg/dL)    162 H


 


Random Glucose   


 


Calcium   


 


Total Bilirubin   


 


AST   


 


ALT   


 


Alkaline Phosphatase   


 


Total Creatine Kinase   


 


CK-MB (Mass)   


 


Troponin I   


 


NT-Pro-B Natriuret Pep   


 


Total Protein   


 


Albumin   


 


Globulin   


 


Albumin/Globulin Ratio   


 


Arterial Blood Potassium  4.5  


 


Urine Color   Straw 


 


Urine Clarity   Clear 


 


Urine pH   5.0 


 


Ur Specific Gravity   1.005 


 


Urine Protein   Negative 


 


Urine Glucose (UA)   Normal 


 


Urine Ketones   Negative 


 


Urine Blood   1+ H 


 


Urine Nitrate   Negative 


 


Urine Bilirubin   Negative 


 


Urine Urobilinogen   Normal 


 


Ur Leukocyte Esterase   1+ H 


 


Urine WBC (Auto)   3 


 


Urine RBC (Auto)   2 


 


Ur Squamous Epith Cells   < 1 














  11/28/17 11/28/17 11/28/17





  09:20 09:20 12:00


 


WBC   7.3 


 


RBC   3.96 


 


Hgb   11.3 


 


Hct   34.4 


 


MCV   86.9 


 


MCH   28.4 


 


MCHC   32.7 L 


 


RDW   15.8 H 


 


Plt Count   91 L 


 


MPV   10.5 


 


Neut % (Auto)   79.9 H 


 


Lymph % (Auto)   10.4 L 


 


Mono % (Auto)   8.4 


 


Eos % (Auto)   0.7 


 


Baso % (Auto)   0.6 


 


Neut #   5.8 


 


Lymph #   0.8 L 


 


Mono #   0.6 


 


Eos #   0.1 


 


Baso #   0.0 


 


Differential Comment    


 


PT   


 


INR   


 


APTT   


 


Puncture Site   


 


pCO2   


 


pO2   


 


HCO3   


 


ABG pH   


 


ABG Total CO2   


 


ABG O2 Saturation   


 


ABG Base Excess   


 


Benji Test   


 


ABG Potassium   


 


A-a O2 Difference   


 


Respiratory Index   


 


Glucose   


 


Lactate   


 


Liter Flow   


 


FiO2   


 


Sodium  136  


 


Potassium  4.0  


 


Chloride  103  


 


Carbon Dioxide  24  


 


Anion Gap  13  


 


BUN  22 H  


 


Creatinine  1.3 H  


 


Est GFR ( Amer)  47  


 


Est GFR (Non-Af Amer)  39  


 


POC Glucose (mg/dL)    96


 


Random Glucose  108 H  


 


Calcium  8.1 L  


 


Total Bilirubin  0.7  


 


AST  24  


 


ALT  32  


 


Alkaline Phosphatase  61  


 


Total Creatine Kinase  76  


 


CK-MB (Mass)  2.50  


 


Troponin I  0.0920  


 


NT-Pro-B Natriuret Pep   


 


Total Protein  5.9 L  


 


Albumin  3.4 L  


 


Globulin  2.5  


 


Albumin/Globulin Ratio  1.4  


 


Arterial Blood Potassium   


 


Urine Color   


 


Urine Clarity   


 


Urine pH   


 


Ur Specific Gravity   


 


Urine Protein   


 


Urine Glucose (UA)   


 


Urine Ketones   


 


Urine Blood   


 


Urine Nitrate   


 


Urine Bilirubin   


 


Urine Urobilinogen   


 


Ur Leukocyte Esterase   


 


Urine WBC (Auto)   


 


Urine RBC (Auto)   


 


Ur Squamous Epith Cells   














- Imaging and Cardiology


  ** Chest x-ray


Status: Report reviewed by me (see report.)





Assessment & Plan


(1) Respiratory distress


Assessment and Plan: 


PATIENT PRESENTLY ON BIPAP.


CONTINUE PULMONARY TOILET/ DIURESIS.


Status: Acute   





(2) Pneumonia


Assessment and Plan: 


pancultures


ESR,CRP.


ATYPICAL TITERS.


MRSA SCREEN.


SPUTUM gRAM STAIN AND CULTURE.


CONTINUE iv CEFTRIAXONE 1 G ONCE A DAY DAILY.11/28/17


CONTINUE iv ZITHROMAX 500 MG ONCE A DAY DAILY.11/28/17.


PULMONARY TOILET.


DIURESESIS AS PER  PMD.


FOLLOW-UP CHEST X-RAY


Status: Acute   





(3) CHF exacerbation


Status: Acute   





(4) Cardiomyopathy


Assessment and Plan: 


2-d ECHO IN PROGRESS.


Status: Acute   





(5) CAD (coronary artery disease)


Status: Acute

## 2017-11-28 NOTE — CP.PCM.CON
History of Present Illness





- History of Present Illness


History of Present Illness: 





84yo F. PMHx CAD (2 stents), systolic HF (EF-25%, 01/2016), DM type 2, COPD, 

iron deficiency anemia.  p/w SOB, placed on BIPAP in ED, with improvement.  





Review of Systems





- Review of Systems


All systems: reviewed and no additional remarkable complaints except (feels hot)





Past Patient History





- Infectious Disease


Hx of Infectious Diseases: None





- Tetanus Immunizations


Tetanus Immunization: Unknown





- Past Medical History & Family History


Past Medical History?: Yes





- Past Social History


Smoking Status: Never Smoked





- CARDIAC


Hx Cardia Arrhythmia: Yes


Hx Congestive Heart Failure: Yes


Hx Hypercholesterolemia: Yes


Hx Hypertension: Yes


Hx Pacemaker: Yes (2016)





- PULMONARY


Hx Asthma: Yes


Hx Chronic Obstructive Pulmonary Disease (COPD): Yes





- NEUROLOGICAL


Hx Transient Ischemic Attacks (TIA): Yes





- HEENT


Hx HEENT Problems: No





- RENAL


Hx Chronic Kidney Disease: No





- ENDOCRINE/METABOLIC


Hx Endocrine Disorders: Yes


Hx Diabetes Mellitus Type 2: Yes





- HEMATOLOGICAL/ONCOLOGICAL


Hx Anemia: Yes





- MUSCULOSKELETAL/RHEUMATOLOGICAL


Hx Falls: Yes





- PSYCHIATRIC


Hx Substance Use: No





- SURGICAL HISTORY


Hx Appendectomy: Yes


Hx Coronary Stent: Yes





- ANESTHESIA


Hx Anesthesia: Yes


Hx Anesthesia Reactions: No


Hx Malignant Hyperthermia: No





Meds


Allergies/Adverse Reactions: 


 Allergies











Allergy/AdvReac Type Severity Reaction Status Date / Time


 


clopidogrel Allergy Mild SWELLING Verified 11/28/17 00:30














- Medications


Medications: 


 Current Medications





Aspirin (Ecotrin)  325 mg PO DAILY [facility]


   Last Admin: 11/28/17 10:35 Dose:  325 mg


Furosemide (Lasix)  40 mg IVP Q12 [facility]


Heparin Sodium (Porcine) (Heparin)  5,000 units SC Q12 [facility]


   Last Admin: 11/28/17 13:53 Dose:  5,000 units


Azithromycin 500 mg/ Sodium (Chloride)  250 mls @ 250 mls/hr IVPB DAILY [facility]


Ceftriaxone Sodium 1 gm/ (Sodium Chloride)  100 mls @ 100 mls/hr IVPB DAILY [facility]


   Last Admin: 11/28/17 19:54 Dose:  100 mls/hr


Insulin Glargine (Lantus)  20 unit SC HS [facility]


Insulin Human Regular (Novolin R)  0 unit SC ACHS [facility]


   PRN Reason: Protocol


   Last Admin: 11/28/17 17:23 Dose:  3 unit


Metoprolol Tartrate (Lopressor)  25 mg PO BID [facility]


   Last Admin: 11/28/17 18:38 Dose:  Not Given


Pneumococcal Polyvalent Vaccine (Pneumovax 23 Vaccine)  0.5 ml IM .ONCE ONE


   Stop: 12/01/17 10:01


Ranolazine (Ranexa)  500 mg PO BID [facility]


   Last Admin: 11/28/17 18:50 Dose:  500 mg


Repaglinide (Prandin)  2 mg PO DAILY [facility]


   Last Admin: 11/28/17 10:35 Dose:  2 mg


Rosuvastatin Calcium (Crestor)  20 mg PO [facility]


Sacubitril/Valsartan (Entresto 24 Mg-26 Mg)  1 tab PO DAILY [facility]


   Last Admin: 11/28/17 10:35 Dose:  Not Given


Ticagrelor (Brilinta)  90 mg PO QOD6 [facility]


   Last Admin: 11/28/17 18:50 Dose:  90 mg











Physical Exam





- Head Exam


Head Exam: ATRAUMATIC, NORMAL INSPECTION, NORMOCEPHALIC





- Eye Exam


Eye Exam: EOMI, Normal appearance, PERRL


Pupil Exam: NORMAL ACCOMODATION, PERRL





- ENT Exam


ENT Exam: Mucous Membranes Dry, Normal Exam





- Respiratory Exam


Respiratory Exam: Rales, NORMAL BREATHING PATTERN





- Cardiovascular Exam


Cardiovascular Exam: REGULAR RHYTHM





- GI/Abdominal Exam


GI & Abdominal Exam: Normal Bowel Sounds, Soft.  absent: Tenderness





- Neurological Exam


Neurological exam: Alert, CN II-XII Intact, Oriented x3, Reflexes Normal





- Psychiatric Exam


Psychiatric exam: Normal Affect, Normal Mood





Results





- Vital Signs


Recent Vital Signs: 


 Last Vital Signs











Temp  97.5 F L  11/28/17 18:50


 


Pulse  97 H  11/28/17 18:25


 


Resp  16   11/28/17 18:25


 


BP  109/46 L  11/28/17 18:38


 


Pulse Ox  100   11/28/17 18:25














- Labs


Result Diagrams: 


 11/28/17 09:20





 11/28/17 09:20


Labs: 


 Laboratory Results - last 24 hr











  11/28/17 11/28/17 11/28/17





  00:57 00:57 00:57


 


WBC  7.4  


 


RBC  4.30  


 


Hgb  12.3  


 


Hct  38.0  


 


MCV  88.2  D  


 


MCH  28.5  


 


MCHC  32.3 L  


 


RDW  15.9 H  


 


Plt Count  103 L D  


 


MPV  10.8  


 


Neut % (Auto)  76.2 H  


 


Lymph % (Auto)  13.1 L  


 


Mono % (Auto)  7.7  


 


Eos % (Auto)  2.3  


 


Baso % (Auto)  0.7  


 


Neut #  5.6  


 


Lymph #  1.0  


 


Mono #  0.6  


 


Eos #  0.2  


 


Baso #  0.1  


 


Differential Comment   


 


ESR   


 


PT   11.5 


 


INR   1.0 


 


APTT   22 


 


Puncture Site   


 


pCO2   


 


pO2   


 


HCO3   


 


ABG pH   


 


ABG Total CO2   


 


ABG O2 Saturation   


 


ABG Base Excess   


 


Benji Test   


 


ABG Potassium   


 


A-a O2 Difference   


 


Respiratory Index   


 


Glucose   


 


Lactate   


 


Liter Flow   


 


FiO2   


 


Sodium    135


 


Potassium    4.4


 


Chloride    105


 


Carbon Dioxide    21 L


 


Anion Gap    14


 


BUN    23 H


 


Creatinine    1.4 H


 


Est GFR ( Amer)    43


 


Est GFR (Non-Af Amer)    36


 


POC Glucose (mg/dL)   


 


Random Glucose    163 H


 


Calcium    8.4 L


 


Total Bilirubin    0.5


 


AST    32


 


ALT    34


 


Alkaline Phosphatase    75


 


Total Creatine Kinase   


 


CK-MB (Mass)   


 


Troponin I    0.0590


 


NT-Pro-B Natriuret Pep    14278 H


 


Total Protein    7.4


 


Albumin    3.8


 


Globulin    3.7


 


Albumin/Globulin Ratio    1.0


 


Arterial Blood Potassium   


 


Urine Color   


 


Urine Clarity   


 


Urine pH   


 


Ur Specific Gravity   


 


Urine Protein   


 


Urine Glucose (UA)   


 


Urine Ketones   


 


Urine Blood   


 


Urine Nitrate   


 


Urine Bilirubin   


 


Urine Urobilinogen   


 


Ur Leukocyte Esterase   


 


Urine WBC (Auto)   


 


Urine RBC (Auto)   


 


Ur Squamous Epith Cells   


 


Mycoplasma pneumon IgM   














  11/28/17 11/28/17 11/28/17





  01:05 03:44 07:31


 


WBC   


 


RBC   


 


Hgb   


 


Hct   


 


MCV   


 


MCH   


 


MCHC   


 


RDW   


 


Plt Count   


 


MPV   


 


Neut % (Auto)   


 


Lymph % (Auto)   


 


Mono % (Auto)   


 


Eos % (Auto)   


 


Baso % (Auto)   


 


Neut #   


 


Lymph #   


 


Mono #   


 


Eos #   


 


Baso #   


 


Differential Comment   


 


ESR   


 


PT   


 


INR   


 


APTT   


 


Puncture Site  Rr  


 


pCO2  34 L  


 


pO2  57 L  


 


HCO3  21.9  


 


ABG pH  7.39  


 


ABG Total CO2  21.6 L  


 


ABG O2 Saturation  93.3 L  


 


ABG Base Excess  -3.6 L  


 


Benji Test  Pos  


 


ABG Potassium  4.5  


 


A-a O2 Difference  100.0  


 


Respiratory Index  1.8  


 


Glucose  184 H  


 


Lactate  1.0  


 


Liter Flow  2.0  


 


FiO2  28.0  


 


Sodium  139.0  


 


Potassium   


 


Chloride  109.0 H  


 


Carbon Dioxide   


 


Anion Gap   


 


BUN   


 


Creatinine   


 


Est GFR ( Amer)   


 


Est GFR (Non-Af Amer)   


 


POC Glucose (mg/dL)    162 H


 


Random Glucose   


 


Calcium   


 


Total Bilirubin   


 


AST   


 


ALT   


 


Alkaline Phosphatase   


 


Total Creatine Kinase   


 


CK-MB (Mass)   


 


Troponin I   


 


NT-Pro-B Natriuret Pep   


 


Total Protein   


 


Albumin   


 


Globulin   


 


Albumin/Globulin Ratio   


 


Arterial Blood Potassium  4.5  


 


Urine Color   Straw 


 


Urine Clarity   Clear 


 


Urine pH   5.0 


 


Ur Specific Gravity   1.005 


 


Urine Protein   Negative 


 


Urine Glucose (UA)   Normal 


 


Urine Ketones   Negative 


 


Urine Blood   1+ H 


 


Urine Nitrate   Negative 


 


Urine Bilirubin   Negative 


 


Urine Urobilinogen   Normal 


 


Ur Leukocyte Esterase   1+ H 


 


Urine WBC (Auto)   3 


 


Urine RBC (Auto)   2 


 


Ur Squamous Epith Cells   < 1 


 


Mycoplasma pneumon IgM   














  11/28/17 11/28/17 11/28/17





  09:20 09:20 12:00


 


WBC   7.3 


 


RBC   3.96 


 


Hgb   11.3 


 


Hct   34.4 


 


MCV   86.9 


 


MCH   28.4 


 


MCHC   32.7 L 


 


RDW   15.8 H 


 


Plt Count   91 L 


 


MPV   10.5 


 


Neut % (Auto)   79.9 H 


 


Lymph % (Auto)   10.4 L 


 


Mono % (Auto)   8.4 


 


Eos % (Auto)   0.7 


 


Baso % (Auto)   0.6 


 


Neut #   5.8 


 


Lymph #   0.8 L 


 


Mono #   0.6 


 


Eos #   0.1 


 


Baso #   0.0 


 


Differential Comment    


 


ESR   


 


PT   


 


INR   


 


APTT   


 


Puncture Site   


 


pCO2   


 


pO2   


 


HCO3   


 


ABG pH   


 


ABG Total CO2   


 


ABG O2 Saturation   


 


ABG Base Excess   


 


Benji Test   


 


ABG Potassium   


 


A-a O2 Difference   


 


Respiratory Index   


 


Glucose   


 


Lactate   


 


Liter Flow   


 


FiO2   


 


Sodium  136  


 


Potassium  4.0  


 


Chloride  103  


 


Carbon Dioxide  24  


 


Anion Gap  13  


 


BUN  22 H  


 


Creatinine  1.3 H  


 


Est GFR ( Amer)  47  


 


Est GFR (Non-Af Amer)  39  


 


POC Glucose (mg/dL)    96


 


Random Glucose  108 H  


 


Calcium  8.1 L  


 


Total Bilirubin  0.7  


 


AST  24  


 


ALT  32  


 


Alkaline Phosphatase  61  


 


Total Creatine Kinase  76  


 


CK-MB (Mass)  2.50  


 


Troponin I  0.0920  


 


NT-Pro-B Natriuret Pep   


 


Total Protein  5.9 L  


 


Albumin  3.4 L  


 


Globulin  2.5  


 


Albumin/Globulin Ratio  1.4  


 


Arterial Blood Potassium   


 


Urine Color   


 


Urine Clarity   


 


Urine pH   


 


Ur Specific Gravity   


 


Urine Protein   


 


Urine Glucose (UA)   


 


Urine Ketones   


 


Urine Blood   


 


Urine Nitrate   


 


Urine Bilirubin   


 


Urine Urobilinogen   


 


Ur Leukocyte Esterase   


 


Urine WBC (Auto)   


 


Urine RBC (Auto)   


 


Ur Squamous Epith Cells   


 


Mycoplasma pneumon IgM   














  11/28/17 11/28/17 11/28/17





  16:57 18:19 18:19


 


WBC   


 


RBC   


 


Hgb   


 


Hct   


 


MCV   


 


MCH   


 


MCHC   


 


RDW   


 


Plt Count   


 


MPV   


 


Neut % (Auto)   


 


Lymph % (Auto)   


 


Mono % (Auto)   


 


Eos % (Auto)   


 


Baso % (Auto)   


 


Neut #   


 


Lymph #   


 


Mono #   


 


Eos #   


 


Baso #   


 


Differential Comment   


 


ESR   


 


PT   


 


INR   


 


APTT   


 


Puncture Site   


 


pCO2   


 


pO2   


 


HCO3   


 


ABG pH   


 


ABG Total CO2   


 


ABG O2 Saturation   


 


ABG Base Excess   


 


Benji Test   


 


ABG Potassium   


 


A-a O2 Difference   


 


Respiratory Index   


 


Glucose   


 


Lactate   


 


Liter Flow   


 


FiO2   


 


Sodium   


 


Potassium   


 


Chloride   


 


Carbon Dioxide   


 


Anion Gap   


 


BUN   


 


Creatinine   


 


Est GFR ( Amer)   


 


Est GFR (Non-Af Amer)   


 


POC Glucose (mg/dL)  200 H  


 


Random Glucose   


 


Calcium   


 


Total Bilirubin   


 


AST   


 


ALT   


 


Alkaline Phosphatase   


 


Total Creatine Kinase   55 


 


CK-MB (Mass)   1.71 


 


Troponin I   0.0780 


 


NT-Pro-B Natriuret Pep   


 


Total Protein   


 


Albumin   


 


Globulin   


 


Albumin/Globulin Ratio   


 


Arterial Blood Potassium   


 


Urine Color   


 


Urine Clarity   


 


Urine pH   


 


Ur Specific Gravity   


 


Urine Protein   


 


Urine Glucose (UA)   


 


Urine Ketones   


 


Urine Blood   


 


Urine Nitrate   


 


Urine Bilirubin   


 


Urine Urobilinogen   


 


Ur Leukocyte Esterase   


 


Urine WBC (Auto)   


 


Urine RBC (Auto)   


 


Ur Squamous Epith Cells   


 


Mycoplasma pneumon IgM    Positive H














  11/28/17





  18:19


 


WBC 


 


RBC 


 


Hgb 


 


Hct 


 


MCV 


 


MCH 


 


MCHC 


 


RDW 


 


Plt Count 


 


MPV 


 


Neut % (Auto) 


 


Lymph % (Auto) 


 


Mono % (Auto) 


 


Eos % (Auto) 


 


Baso % (Auto) 


 


Neut # 


 


Lymph # 


 


Mono # 


 


Eos # 


 


Baso # 


 


Differential Comment 


 


ESR  40 H


 


PT 


 


INR 


 


APTT 


 


Puncture Site 


 


pCO2 


 


pO2 


 


HCO3 


 


ABG pH 


 


ABG Total CO2 


 


ABG O2 Saturation 


 


ABG Base Excess 


 


Benji Test 


 


ABG Potassium 


 


A-a O2 Difference 


 


Respiratory Index 


 


Glucose 


 


Lactate 


 


Liter Flow 


 


FiO2 


 


Sodium 


 


Potassium 


 


Chloride 


 


Carbon Dioxide 


 


Anion Gap 


 


BUN 


 


Creatinine 


 


Est GFR ( Amer) 


 


Est GFR (Non-Af Amer) 


 


POC Glucose (mg/dL) 


 


Random Glucose 


 


Calcium 


 


Total Bilirubin 


 


AST 


 


ALT 


 


Alkaline Phosphatase 


 


Total Creatine Kinase 


 


CK-MB (Mass) 


 


Troponin I 


 


NT-Pro-B Natriuret Pep 


 


Total Protein 


 


Albumin 


 


Globulin 


 


Albumin/Globulin Ratio 


 


Arterial Blood Potassium 


 


Urine Color 


 


Urine Clarity 


 


Urine pH 


 


Ur Specific Gravity 


 


Urine Protein 


 


Urine Glucose (UA) 


 


Urine Ketones 


 


Urine Blood 


 


Urine Nitrate 


 


Urine Bilirubin 


 


Urine Urobilinogen 


 


Ur Leukocyte Esterase 


 


Urine WBC (Auto) 


 


Urine RBC (Auto) 


 


Ur Squamous Epith Cells 


 


Mycoplasma pneumon IgM 














Assessment & Plan


(1) Congestive heart failure


Assessment and Plan: 


84yo F. PMHx CAD (2 stents), systolic HF (EF-25%), DM type 2, COPD, iron 

deficiency anemia.  p/w acute on chronic systolic CHF exacerbation.  





Neuro: alert and oriented x 3.





Pulm: acute hypoxic respiratory failure secondary to pulmonary edema from acute 

on chronic CHF exacerbation, started on BIPAP, adding diuretics.





CV: relatively hypotensive for a normally hypertensive patient, but 

asymptomatic.  





Hem: chronic anemia, continue iron supplementation.





Renal: no acute issues.  Starting albumin drip with diuretics to improve blood 

pressure and pulmonary edema.





Endo: type 2 DM, short acting insulin sliding scale for coverage





GI: heart healthy diet





ID: empiric treatemnt for possible underlying community acquired pneumonia, 

suspicion low.  continue ceftriaxone and azithromycin.





DVT proph - lovenox


GI proph - not currently indicated


esparza for strict I/O's during acute illness


Code status - full code





Patient is clinically stable for continued management on telemetry floors, if 

clinical status changes, please reconsult ICU.





Critical Care Time spent 35 minutes


The documented time is cumulative and includes review of patient data/exams/labs

/chart review and examination of the patient on rounds and throughout the day; 

time is exclusive of any procedures or teaching time.


Status: Acute

## 2017-11-28 NOTE — RAD
PROCEDURE:  CHEST RADIOGRAPH, 1 VIEW



HISTORY:

Shortness of breath 



COMPARISON:

07/22/2016.



FINDINGS:



LUNGS:

Again seen are low lung volumes.  There is interval worsening of 

multifocal airspace disease with near complete opacification of the 

right lung.



PLEURA:

No pneumothorax. No change in bilateral pleural effusions. 



CARDIOVASCULAR:

The heart remains enlarged.  There is stable position of left-sided 

AICD.



OSSEOUS STRUCTURES:

No significant abnormalities.



VISUALIZED UPPER ABDOMEN:

Normal.



OTHER FINDINGS:

None. 



IMPRESSION:

Interval worsening of multifocal airspace disease, worse in the right 

lung which may represent multifocal pneumonia or pulmonary edema.  

Follow-up is advised.

## 2017-11-29 LAB
ALBUMIN/GLOB SERPL: 1.3 {RATIO} (ref 1–2.1)
ALP SERPL-CCNC: 62 U/L (ref 38–126)
ALT SERPL-CCNC: 30 U/L (ref 9–52)
AST SERPL-CCNC: 23 U/L (ref 14–36)
BASOPHILS # BLD AUTO: 0 K/UL (ref 0–0.2)
BASOPHILS NFR BLD: 0.5 % (ref 0–2)
BILIRUB SERPL-MCNC: 0.7 MG/DL (ref 0.2–1.3)
BUN SERPL-MCNC: 18 MG/DL (ref 7–17)
CALCIUM SERPL-MCNC: 8.2 MG/DL (ref 8.6–10.4)
CHLORIDE SERPL-SCNC: 99 MMOL/L (ref 98–107)
CO2 SERPL-SCNC: 31 MMOL/L (ref 22–30)
EOSINOPHIL # BLD AUTO: 0.1 K/UL (ref 0–0.7)
EOSINOPHIL NFR BLD: 3 % (ref 0–4)
ERYTHROCYTE [DISTWIDTH] IN BLOOD BY AUTOMATED COUNT: 15.7 % (ref 11.5–14.5)
GLOBULIN SER-MCNC: 2.6 GM/DL (ref 2.2–3.9)
GLUCOSE SERPL-MCNC: 95 MG/DL (ref 65–105)
HCT VFR BLD CALC: 34 % (ref 34–47)
LYMPHOCYTES # BLD AUTO: 0.7 K/UL (ref 1–4.3)
LYMPHOCYTES NFR BLD AUTO: 13.6 % (ref 20–40)
MCH RBC QN AUTO: 28.4 PG (ref 27–31)
MCHC RBC AUTO-ENTMCNC: 32.8 G/DL (ref 33–37)
MCV RBC AUTO: 86.6 FL (ref 81–99)
MONOCYTES # BLD: 0.5 K/UL (ref 0–0.8)
MONOCYTES NFR BLD: 10.6 % (ref 0–10)
NRBC BLD AUTO-RTO: 0.1 % (ref 0–2)
PLATELET # BLD: 84 K/UL (ref 130–400)
PMV BLD AUTO: 11.2 FL (ref 7.2–11.7)
POTASSIUM SERPL-SCNC: 3.5 MMOL/L (ref 3.6–5.2)
PROT SERPL-MCNC: 5.9 G/DL (ref 6.3–8.3)
SODIUM SERPL-SCNC: 138 MMOL/L (ref 132–148)
WBC # BLD AUTO: 4.8 K/UL (ref 4.8–10.8)

## 2017-11-29 RX ADMIN — INSULIN GLARGINE SCH UNITS: 100 INJECTION, SOLUTION SUBCUTANEOUS at 22:01

## 2017-11-29 RX ADMIN — ASPIRIN SCH MG: 325 TABLET, DELAYED RELEASE ORAL at 10:10

## 2017-11-29 RX ADMIN — HUMAN INSULIN SCH: 100 INJECTION, SOLUTION SUBCUTANEOUS at 12:08

## 2017-11-29 RX ADMIN — RANOLAZINE SCH MG: 500 TABLET, FILM COATED, EXTENDED RELEASE ORAL at 18:17

## 2017-11-29 RX ADMIN — HUMAN INSULIN SCH: 100 INJECTION, SOLUTION SUBCUTANEOUS at 07:48

## 2017-11-29 RX ADMIN — SACUBITRIL AND VALSARTAN SCH TAB: 24; 26 TABLET, FILM COATED ORAL at 10:09

## 2017-11-29 RX ADMIN — RANOLAZINE SCH MG: 500 TABLET, FILM COATED, EXTENDED RELEASE ORAL at 10:09

## 2017-11-29 RX ADMIN — HUMAN INSULIN SCH: 100 INJECTION, SOLUTION SUBCUTANEOUS at 17:15

## 2017-11-29 RX ADMIN — HUMAN INSULIN SCH: 100 INJECTION, SOLUTION SUBCUTANEOUS at 21:47

## 2017-11-29 NOTE — CP.PCM.PN
Subjective





- Date & Time of Evaluation


Date of Evaluation: 11/29/17


Time of Evaluation: 13:53





- Subjective


Subjective: 





CHIEF COMPLAINTS TODAY :


afebrile


Occasional cough


SOB ON MINIMAL EXERTION 





ROS.


HEENT :  N.


Resp :       No pleuritic CP ,or hemoptysis 


Cardio :     No anginal  CP, 


GI :           No abd.pain, n/v ,diarrhea or GI bleeding .


CNS : No headache, vertigo, focal deficit.


Musculoskel :  No joint swelling ,


Derm :        No rash 


Psych :     Normal affect.


Ext :  No  swelling ,calf pain 





PE.


Pt. is alert awake in no distress.


V.S  As noted in the chart 


Head ,ear nose,throat and eyes : Normal.


Neck : Supple with normal carotids.


Lungs: EVELIA POOR AIR ENTRY WITH WHEEZING 


Heart : S1 & S2 normal with S4. No murmur.


Abd : Soft non tender with normal bowel sounds.


Neuro : Moves all ext. with no localized deficit.


Ext : No edema with intact pulses.Non tender calves 


Derm : No rashes or decubitus ulcer.





LABS/RADIOLOGY:


WBC 4.8, H&H 11.2 /34.0,


 plt 84K





creatinine 1.3/BUN 18





Chest x-ray;11/28/17.


 improving infiltrate and peripheral vascular congestion.








Objective





- Vital Signs/Intake and Output


Vital Signs (last 24 hours): 


 











Temp Pulse Resp BP Pulse Ox


 


 98.1 F   90   20   127/74   101 H


 


 11/29/17 08:03  11/29/17 08:03  11/29/17 08:03  11/29/17 10:10  11/29/17 08:03








Intake and Output: 


 











 11/29/17 11/29/17





 06:59 18:59


 


Intake Total  250


 


Output Total 1900 


 


Balance -1900 250














- Medications


Medications: 


 Current Medications





Aspirin (Ecotrin)  325 mg PO DAILY [facility]


   Last Admin: 11/29/17 10:10 Dose:  325 mg


Furosemide (Lasix)  40 mg IVP Q12 [facility]


   Last Admin: 11/29/17 10:10 Dose:  40 mg


Heparin Sodium (Porcine) (Heparin)  5,000 units SC Q12 [facility]


   Last Admin: 11/29/17 10:11 Dose:  5,000 units


Azithromycin 500 mg/ Sodium (Chloride)  250 mls @ 250 mls/hr IVPB DAILY [facility]


   Last Admin: 11/29/17 11:00 Dose:  250 mls/hr


Ceftriaxone Sodium 1 gm/ (Sodium Chloride)  100 mls @ 100 mls/hr IVPB DAILY [facility]


   Last Admin: 11/29/17 08:59 Dose:  100 mls/hr


Insulin Glargine (Lantus)  20 unit SC HS [facility]


   Last Admin: 11/28/17 23:07 Dose:  Not Given


Insulin Human Regular (Novolin R)  0 unit SC ACHS [facility]


   PRN Reason: Protocol


   Last Admin: 11/29/17 12:08 Dose:  Not Given


Metoprolol Tartrate (Lopressor)  25 mg PO BID [facility]


   Last Admin: 11/29/17 10:09 Dose:  25 mg


Pneumococcal Polyvalent Vaccine (Pneumovax 23 Vaccine)  0.5 ml IM .ONCE ONE


   Stop: 12/01/17 10:01


Ranolazine (Ranexa)  500 mg PO BID [facility]


   Last Admin: 11/29/17 10:09 Dose:  500 mg


Repaglinide (Prandin)  2 mg PO DAILY [facility]


   Last Admin: 11/29/17 10:09 Dose:  2 mg


Rosuvastatin Calcium (Crestor)  20 mg PO HS [facility]


   Last Admin: 11/28/17 23:07 Dose:  Not Given


Sacubitril/Valsartan (Entresto 24 Mg-26 Mg)  1 tab PO DAILY [facility]


   Last Admin: 11/29/17 10:09 Dose:  1 tab


Ticagrelor (Brilinta)  90 mg PO QOD6 [facility]


   Last Admin: 11/28/17 18:50 Dose:  90 mg











- Labs


Labs: 


 





 11/29/17 07:10 





 11/29/17 07:10 





 











PT  11.5 SECONDS (9.7-12.2)   11/28/17  00:57    


 


INR  1.0   11/28/17  00:57    


 


APTT  22 SECONDS (21-34)   11/28/17  00:57    














Assessment and Plan


(1) Respiratory distress


Assessment & Plan: 


patient on BiPAP and nasal cannula intermittently..


continue IV ceftriaxone 1 g once a day daily.11/28/17


Continue IV Zithromax 500 mg once a day daily.11/28/17.


Sputum Gram stain and culture.-p


Status: Acute   





(2) Pneumonia


Assessment & Plan: 


follow-up chest x-ray.


Patient on IV


Status: Acute   





(3) CHF exacerbation


Assessment & Plan: 


f/u pro- bnp


Status: Acute   





(4) Cardiomyopathy


Assessment & Plan: 


2-d ECHO IN PROGRESS


Status: Acute   





(5) CAD (coronary artery disease)


Status: Acute

## 2017-11-29 NOTE — RAD
HISTORY:

Repeat  



COMPARISON:

Portable chest 10/20/2017 1:06 a.m.. 



FINDINGS:



LUNGS:

Diminishing scattered infiltrates are appreciated with improved CHF 

evident.  Cardiomegaly is stable with pacemaker again identified.  

Large hiatal hernia is again appreciate posterior the heart.  No 

apparent pneumothorax bilaterally.  Trachea remains midline in 

position. Minimal left pleural effusion in question with none 

identified at the right.



OSSEOUS STRUCTURES:

No significant abnormalities.



VISUALIZED UPPER ABDOMEN:

Normal.



OTHER FINDINGS:

None.



IMPRESSION:

Improved CHF pattern with bilateral airspace disease diminishing as 

well. Stable cardiomegaly with residual pulmonary venous congestion 

identified as discussed above.

## 2017-11-29 NOTE — CP.PCM.PN
Subjective





- Date & Time of Evaluation


Date of Evaluation: 11/29/17


Time of Evaluation: 13:41





- Subjective


Subjective: 





CHIEF COMPLAINTS TODAY :


SOB ON MINIMAL EXERTION 





ROS.


HEENT :  N.


Resp :       No pleuritic CP ,or hemoptysis 


Cardio :     No anginal  CP, 


GI :           No abd.pain, n/v ,diarrhea or GI bleeding .


CNS : No headache, vertigo, focal deficit.


Musculoskel :  No joint swelling ,


Derm :        No rash 


Psych :     Normal affect.


Ext :  No  swelling ,calf pain 





PE.


Pt. is alert awake in no distress.


V.S  As noted in the chart 


Head ,ear nose,throat and eyes : Normal.


Neck : Supple with normal carotids.


Lungs: EVELIA POOR AIR ENTRY WITH WHEEZING 


Heart : S1 & S2 normal with S4. No murmur.


Abd : Soft non tender with normal bowel sounds.


Neuro : Moves all ext. with no localized deficit.


Ext : No edema with intact pulses.Non tender calves 


Derm : No rashes or decubitus ulcer.





LABS/RADIOLOGY:





ASSESSMENT/PLAN :


BIPAP


IV LASIX/STEROIDS /AB 





Objective





- Vital Signs/Intake and Output


Vital Signs (last 24 hours): 


 











Temp Pulse Resp BP Pulse Ox


 


 98.1 F   90   20   127/74   101 H


 


 11/29/17 08:03  11/29/17 08:03  11/29/17 08:03  11/29/17 10:10  11/29/17 08:03








Intake and Output: 


 











 11/29/17 11/29/17





 11:59 23:59


 


Intake Total  250


 


Output Total 1900 


 


Balance -1900 250














- Medications


Medications: 


 Current Medications





Aspirin (Ecotrin)  325 mg PO DAILY [facility]


   Last Admin: 11/29/17 10:10 Dose:  325 mg


Furosemide (Lasix)  40 mg IVP Q12 [facility]


   Last Admin: 11/29/17 10:10 Dose:  40 mg


Heparin Sodium (Porcine) (Heparin)  5,000 units SC Q12 [facility]


   Last Admin: 11/29/17 10:11 Dose:  5,000 units


Azithromycin 500 mg/ Sodium (Chloride)  250 mls @ 250 mls/hr IVPB DAILY [facility]


   Last Admin: 11/29/17 11:00 Dose:  250 mls/hr


Ceftriaxone Sodium 1 gm/ (Sodium Chloride)  100 mls @ 100 mls/hr IVPB DAILY [facility]


   Last Admin: 11/29/17 08:59 Dose:  100 mls/hr


Insulin Glargine (Lantus)  20 unit SC [facility]


   Last Admin: 11/28/17 23:07 Dose:  Not Given


Ins[facility] Human [facility]  [facility]


   PRN Reason: Protocol


   Last Admin: 11/29/17 12:08 Dose:  Not Given


Metoprolol Tartrate (Lopressor)  25 mg PO BID [facility]


   Last Admin: 11/29/17 10:09 Dose:  25 mg


Pneumococcal Polyvalent Vaccine (Pneumovax 23 Vaccine)  0.5 ml IM .ONCE ONE


   Stop: 12/01/17 10:01


Ranolazine (Ranexa)  500 mg PO BID [facility]


   Last Admin: 11/29/17 10:09 Dose:  500 mg


Repaglinide (Prandin)  2 mg PO DAILY [facility]


   Last Admin: 11/29/17 10:09 Dose:  2 mg


Rosuvastatin Calcium (Crestor)  20 mg PO HS [facility]


   Last Admin: 11/28/17 23:07 Dose:  Not Given


Sacubitril/Valsartan (Entresto 24 Mg-26 Mg)  1 tab PO DAILY [facility]


   Last Admin: 11/29/17 10:09 Dose:  1 tab


Ticagrelor (Brilinta)  90 mg PO QOD6 [facility]


   Last Admin: 11/28/17 18:50 Dose:  90 mg











- Labs


Labs: 


 





 11/29/17 07:10 





 11/29/17 07:10 





 











PT  11.5 SECONDS (9.7-12.2)   11/28/17  00:57    


 


INR  1.0   11/28/17  00:57    


 


APTT  22 SECONDS (21-34)   11/28/17  00:57

## 2017-11-30 LAB
ALBUMIN/GLOB SERPL: 1.3 {RATIO} (ref 1–2.1)
ALP SERPL-CCNC: 57 U/L (ref 38–126)
ALT SERPL-CCNC: 26 U/L (ref 9–52)
AST SERPL-CCNC: 28 U/L (ref 14–36)
BASOPHILS # BLD AUTO: 0 K/UL (ref 0–0.2)
BASOPHILS NFR BLD: 0.7 % (ref 0–2)
BILIRUB SERPL-MCNC: 0.6 MG/DL (ref 0.2–1.3)
BUN SERPL-MCNC: 23 MG/DL (ref 7–17)
CALCIUM SERPL-MCNC: 8.5 MG/DL (ref 8.6–10.4)
CHLORIDE SERPL-SCNC: 95 MMOL/L (ref 98–107)
CO2 SERPL-SCNC: 35 MMOL/L (ref 22–30)
EOSINOPHIL # BLD AUTO: 0.2 K/UL (ref 0–0.7)
EOSINOPHIL NFR BLD: 3.6 % (ref 0–4)
ERYTHROCYTE [DISTWIDTH] IN BLOOD BY AUTOMATED COUNT: 15.8 % (ref 11.5–14.5)
GLOBULIN SER-MCNC: 2.6 GM/DL (ref 2.2–3.9)
GLUCOSE SERPL-MCNC: 61 MG/DL (ref 65–105)
HCT VFR BLD CALC: 35 % (ref 34–47)
LYMPHOCYTES # BLD AUTO: 0.9 K/UL (ref 1–4.3)
LYMPHOCYTES NFR BLD AUTO: 14.9 % (ref 20–40)
MCH RBC QN AUTO: 28.6 PG (ref 27–31)
MCHC RBC AUTO-ENTMCNC: 32.9 G/DL (ref 33–37)
MCV RBC AUTO: 87.2 FL (ref 81–99)
MONOCYTES # BLD: 0.7 K/UL (ref 0–0.8)
MONOCYTES NFR BLD: 11.8 % (ref 0–10)
NRBC BLD AUTO-RTO: 0 % (ref 0–2)
PLATELET # BLD: 98 K/UL (ref 130–400)
PMV BLD AUTO: 10.9 FL (ref 7.2–11.7)
POTASSIUM SERPL-SCNC: 3.7 MMOL/L (ref 3.6–5.2)
PROT SERPL-MCNC: 6 G/DL (ref 6.3–8.3)
SODIUM SERPL-SCNC: 138 MMOL/L (ref 132–148)
WBC # BLD AUTO: 6 K/UL (ref 4.8–10.8)

## 2017-11-30 RX ADMIN — HUMAN INSULIN SCH UNIT: 100 INJECTION, SOLUTION SUBCUTANEOUS at 11:47

## 2017-11-30 RX ADMIN — HUMAN INSULIN SCH: 100 INJECTION, SOLUTION SUBCUTANEOUS at 07:36

## 2017-11-30 RX ADMIN — HUMAN INSULIN SCH: 100 INJECTION, SOLUTION SUBCUTANEOUS at 16:30

## 2017-11-30 RX ADMIN — RANOLAZINE SCH MG: 500 TABLET, FILM COATED, EXTENDED RELEASE ORAL at 09:11

## 2017-11-30 RX ADMIN — RANOLAZINE SCH MG: 500 TABLET, FILM COATED, EXTENDED RELEASE ORAL at 17:15

## 2017-11-30 RX ADMIN — ASPIRIN SCH MG: 325 TABLET, DELAYED RELEASE ORAL at 09:10

## 2017-11-30 RX ADMIN — HUMAN INSULIN SCH: 100 INJECTION, SOLUTION SUBCUTANEOUS at 21:42

## 2017-11-30 RX ADMIN — SACUBITRIL AND VALSARTAN SCH TAB: 24; 26 TABLET, FILM COATED ORAL at 09:19

## 2017-11-30 RX ADMIN — INSULIN GLARGINE SCH UNITS: 100 INJECTION, SOLUTION SUBCUTANEOUS at 21:38

## 2017-11-30 NOTE — CP.PCM.PN
Subjective





- Date & Time of Evaluation


Date of Evaluation: 11/30/17


Time of Evaluation: 22:32





- Subjective


Subjective: 





CHIEF COMPLAINTS TODAY :


afebrile


FEELING BETTER


DENIES SHORTNESS OF BREATH





ROS.


HEENT :  N.


Resp :       No pleuritic CP ,or hemoptysis 


Cardio :     No anginal  CP, 


GI :           No abd.pain, n/v ,diarrhea or GI bleeding .


CNS : No headache, vertigo, focal deficit.


Musculoskel :  No joint swelling ,


Derm :        No rash 


Psych :     Normal affect.


Ext :  No  swelling ,calf pain 





PE.


Pt. is alert awake in no distress.


V.S  As noted in the chart 


Head ,ear nose,throat and eyes : Normal.


Neck : Supple with normal carotids.


Lungs: FEW RHONCHI NO WHEEZES. 


Heart : S1 & S2 normal with S4. No murmur.


Abd : Soft non tender with normal bowel sounds.


Neuro : Moves all ext. with no localized deficit.


Ext : No edema with intact pulses.Non tender calves 


Derm : No rashes or decubitus ulcer.





LABS/RADIOLOGY:


WBC 6.0 IMPROVING


 plt 98K IMPROVING


Mycoplasma IgM +ve





creatinine 1.7/bun 23  gfr 29-low





Chest x-ray;11/28/17.


 improving infiltrate and peripheral vascular congestion.











Objective





- Vital Signs/Intake and Output


Vital Signs (last 24 hours): 


 











Temp Pulse Resp BP Pulse Ox


 


 97.8 F   93 H  20   111/69   100 


 


 11/30/17 16:00  11/30/17 16:00  11/30/17 16:00  11/30/17 21:35  11/30/17 16:00








Intake and Output: 


 











 11/30/17 12/01/17





 18:59 06:59


 


Intake Total 700 


 


Output Total 600 


 


Balance 100 














- Medications


Medications: 


 Current Medications





Aspirin (Ecotrin)  325 mg PO DAILY [facility]


   Last Admin: 11/30/17 09:10 Dose:  325 mg


Furosemide (Lasix)  40 mg IVP Q12 [facility]


   Last Admin: 11/30/17 21:35 Dose:  40 mg


Heparin Sodium (Porcine) (Heparin)  5,000 units SC Q12 [facility]


   Last Admin: 11/30/17 21:24 Dose:  5,000 units


Azithromycin 500 mg/ Sodium (Chloride)  250 mls @ 250 mls/hr IVPB DAILY [facility]


   Last Admin: 11/30/17 09:18 Dose:  250 mls/hr


Ceftriaxone Sodium 1 gm/ (Sodium Chloride)  100 mls @ 100 mls/hr IVPB DAILY [facility]


   Last Admin: 11/30/17 09:13 Dose:  100 mls/hr


Insulin Glargine (Lantus)  20 unit SC [facility]


   Last Admin: 11/30/17 21:38 Dose:  20 units


Insulin [facility] ([facility] R)  [facility]


   PRN Reason: Protocol


   Last Admin: 11/30/17 21:42 Dose:  Not Given


Lactulose (Enulose)  20 gm PO [facility]


   Last Admin: 11/30/17 21:30 Dose:  Not Given


Metoprolol Tartrate (Lopressor)  25 mg PO BID [facility]


   Last Admin: 11/30/17 17:14 Dose:  Not Given


Pneumococcal Polyvalent Vaccine (Pneumovax 23 Vaccine)  0.5 ml IM .ONCE ONE


   Stop: 12/01/17 10:01


Ranolazine (Ranexa)  500 mg PO BID [facility]


   Last Admin: 11/30/17 17:15 Dose:  500 mg


Repaglinide (Prandin)  2 mg PO DAILY [facility]


   Last Admin: 11/30/17 09:11 Dose:  2 mg


Rosuvastatin Calcium (Crestor)  20 mg PO HS [facility]


   Last Admin: 11/30/17 21:29 Dose:  20 mg


Sacubitril/Valsartan (Entresto 24 Mg-26 Mg)  1 tab PO DAILY [facility]


   Last Admin: 11/30/17 09:19 Dose:  1 tab


Ticagrelor (Brilinta)  90 mg PO QOD6 [facility]


   Last Admin: 11/30/17 17:15 Dose:  90 mg











- Labs


Labs: 


 





 11/30/17 06:35 





 11/30/17 06:35 





 











PT  11.5 SECONDS (9.7-12.2)   11/28/17  00:57    


 


INR  1.0   11/28/17  00:57    


 


APTT  22 SECONDS (21-34)   11/28/17  00:57    














Assessment and Plan


(1) Respiratory distress


Status: Acute   





(2) Pneumonia


Assessment & Plan: 


continue IV ceftriaxone 1 g once a day daily.11/28/17


Continue IV Zithromax 500 mg once a day daily.11/28/17.


f/u CXR.


Status: Acute   





(3) CHF exacerbation


Status: Acute   





(4) Cardiomyopathy


Assessment & Plan: 


2-D echo to follow


Status: Acute   





(5) CAD (coronary artery disease)


Status: Acute

## 2017-11-30 NOTE — CARD
--------------- APPROVED REPORT --------------





EXAM: Two-dimensional and M-mode echocardiogram with Doppler and 

color Doppler.



Other Information 

Quality : GoodRhythm : NSR



INDICATION

Dyspnea Cardiac Disease: CAD Congestive Heart Failure RESPIRATORY 

DISTRESS



RISK FACTORS

Hypertension 

Hyperlipidemia

Diabetes



2D DIMENSIONS 

IVSd1.1   (0.7-1.1cm)LVDd4.5   (3.9-5.9cm)

PWd1.2   (0.7-1.1cm)LVDs4.1   (2.5-4.0cm)

FS (%) 9.9   %LVEF (%)21.9   (>50%)



M-Mode DIMENSIONS 

RVDd1.04   (2.1-3.2cm)Left Atrium (MM)2.22   (2.5-4.0cm)

IVSd1.04   (0.7-1.1cm)Aortic Root2.57   (2.2-3.7cm)

LVDd4.91   (4.0-5.6cm)Aortic Cusp Exc.1.65   (1.5-2.0cm)

PWd1.16   (0.7-1.1cm)FS (%) 11   %

LVDs4.37   (2.0-3.8cm)LVEF (%)24   (>50%)



Aortic Valve

AI P 1/2 [id number]



Mitral Valve

MV E Qeuojram925.4cm/sMV A Mxxmdhuk01.3cm/sE/A ratio1.1



TDI

E/Lateral E'0.0E/Medial E'0.0



Tricuspid Valve

TR Peak Lndlcxqa230ux/sTR Peak Gr.[id number]



 LEFT VENTRICLE 

The left ventricle is normal size.

There is normal left ventricular wall thickness.

The systolic function is severely impaired.

There is global hypokinesis of the left ventricle.

The left ventricular diastolic function is normal.



 RIGHT VENTRICLE 

The right ventricle is normal size.



 ATRIA 

The left atrium size is normal.

The right atrium size is normal.



 AORTIC VALVE 

There is trace to mild aortic regurgitation.



 MITRAL VALVE 

Mitral regurgitation is trace to mild.



 TRICUSPID VALVE 

There is mild tricuspid regurgitation.



<Conclusion>

Severe LV systolic dysfunction.

Normal chamber size.

Trace to mild AR.

Trace to mild MR.

Mild TR.

## 2017-11-30 NOTE — CP.PCM.PN
Subjective





- Date & Time of Evaluation


Date of Evaluation: 11/30/17


Time of Evaluation: 14:02





- Subjective


Subjective: 





CHIEF COMPLAINTS TODAY :


SOB ON MINIMAL EXERTION 





ROS.


HEENT :  N.


Resp :       No pleuritic CP ,or hemoptysis 


Cardio :     No anginal  CP, 


GI :           No abd.pain, n/v ,diarrhea or GI bleeding .


CNS : No headache, vertigo, focal deficit.


Musculoskel :  No joint swelling ,


Derm :        No rash 


Psych :     Normal affect.


Ext :  No  swelling ,calf pain 





PE.


Pt. is alert awake in no distress.


V.S  As noted in the chart 


Head ,ear nose,throat and eyes : Normal.


Neck : Supple with normal carotids.


Lungs: EVELIA POOR AIR ENTRY WITH WHEEZING 


Heart : S1 & S2 normal with S4. No murmur.


Abd : Soft non tender with normal bowel sounds.


Neuro : Moves all ext. with no localized deficit.


Ext : No edema with intact pulses.Non tender calves 


Derm : No rashes or decubitus ulcer.





LABS/RADIOLOGY: CXR IMPROVING , LABS OK 





ASSESSMENT/PLAN :


BIPAP


IV LASIX/STEROIDS /AB 





Objective





- Vital Signs/Intake and Output


Vital Signs (last 24 hours): 


 











Temp Pulse Resp BP Pulse Ox


 


 98.2 F   80   20   99/52 L  99 


 


 11/30/17 08:03  11/30/17 08:03  11/30/17 08:03  11/30/17 09:20  11/30/17 08:03








Intake and Output: 


 











 11/30/17 11/30/17





 11:59 23:59


 


Output Total 1250 


 


Balance -1250 














- Medications


Medications: 


 Current Medications





Aspirin (Ecotrin)  325 mg PO DAILY [facility]


   Last Admin: 11/30/17 09:10 Dose:  325 mg


Furosemide (Lasix)  40 mg IVP Q12 [facility]


   Last Admin: 11/30/17 09:09 Dose:  Not Given


Heparin Sodium (Porcine) (Heparin)  5,000 units SC Q12 [facility]


   Last Admin: 11/30/17 09:12 Dose:  5,000 units


Azithromycin 500 mg/ Sodium (Chloride)  250 mls @ 250 mls/hr IVPB DAILY [facility]


   Last Admin: 11/30/17 09:18 Dose:  250 mls/hr


Ceftriaxone Sodium 1 gm/ (Sodium Chloride)  100 mls @ 100 mls/hr IVPB DAILY [facility]


   Last Admin: 11/30/17 09:13 Dose:  100 mls/hr


Insulin Glargine (Lantus)  20 unit SC [facility]


   Last Admin: 11/29/17 22:01 Dose:  20 units


Insulin Human Regular (Novolin R)  0 unit SC [facility]


   PRN Reason: Protocol


   Last Admin: 11/30/17 11:47 Dose:  3 unit


Metoprolol Tartrate (Lopressor)  25 mg PO BID [facility]


   Last Admin: 11/30/17 09:20 Dose:  Not Given


Pneumococcal Polyvalent Vaccine (Pneumovax 23 Vaccine)  0.5 ml IM .ONCE ONE


   Stop: 12/01/17 10:01


Ranolazine (Ranexa)  500 mg PO BID [facility]


   Last Admin: 11/30/17 09:11 Dose:  500 mg


Repaglinide (Prandin)  2 mg PO DAILY [facility]


   Last Admin: 11/30/17 09:11 Dose:  2 mg


Rosuvastatin Calcium (Crestor)  20 mg PO HS [facility]


   Last Admin: 11/29/17 21:58 Dose:  20 mg


Sacubitril/Valsartan (Entresto 24 Mg-26 Mg)  1 tab PO DAILY [facility]


   Last Admin: 11/30/17 09:19 Dose:  1 tab


Ticagrelor (Brilinta)  90 mg PO QOD6 [facility]


   Last Admin: 11/28/17 18:50 Dose:  90 mg











- Labs


Labs: 


 





 11/30/17 06:35 





 11/30/17 06:35 





 











PT  11.5 SECONDS (9.7-12.2)   11/28/17  00:57    


 


INR  1.0   11/28/17  00:57    


 


APTT  22 SECONDS (21-34)   11/28/17  00:57

## 2017-12-01 LAB
ALBUMIN/GLOB SERPL: 0.9 {RATIO} (ref 1–2.1)
ALP SERPL-CCNC: 68 U/L (ref 38–126)
ALT SERPL-CCNC: 38 U/L (ref 9–52)
AST SERPL-CCNC: 29 U/L (ref 14–36)
BASOPHILS # BLD AUTO: 0 K/UL (ref 0–0.2)
BASOPHILS NFR BLD: 0.5 % (ref 0–2)
BASOPHILS NFR BLD: 1 % (ref 0–2)
BILIRUB SERPL-MCNC: 0.3 MG/DL (ref 0.2–1.3)
BUN SERPL-MCNC: 22 MG/DL (ref 7–17)
CALCIUM SERPL-MCNC: 8.7 MG/DL (ref 8.6–10.4)
CHLORIDE SERPL-SCNC: 97 MMOL/L (ref 98–107)
CO2 SERPL-SCNC: 36 MMOL/L (ref 22–30)
EOSINOPHIL # BLD AUTO: 0.2 K/UL (ref 0–0.7)
EOSINOPHIL NFR BLD: 2.8 % (ref 0–4)
EOSINOPHIL NFR BLD: 5 % (ref 0–4)
ERYTHROCYTE [DISTWIDTH] IN BLOOD BY AUTOMATED COUNT: 15.6 % (ref 11.5–14.5)
GLOBULIN SER-MCNC: 3.6 GM/DL (ref 2.2–3.9)
GLUCOSE SERPL-MCNC: 73 MG/DL (ref 65–105)
HCT VFR BLD CALC: 36.4 % (ref 34–47)
LYMPHOCYTES # BLD AUTO: 0.6 K/UL (ref 1–4.3)
LYMPHOCYTES NFR BLD AUTO: 9.8 % (ref 20–40)
MCH RBC QN AUTO: 28.5 PG (ref 27–31)
MCHC RBC AUTO-ENTMCNC: 32.9 G/DL (ref 33–37)
MCV RBC AUTO: 86.8 FL (ref 81–99)
MONOCYTES # BLD: 0.7 K/UL (ref 0–0.8)
MONOCYTES NFR BLD: 11.4 % (ref 0–10)
NEUTROPHILS NFR BLD AUTO: 71 % (ref 50–75)
NRBC BLD AUTO-RTO: 0 % (ref 0–2)
PLATELET # BLD: 101 K/UL (ref 130–400)
PMV BLD AUTO: 11.7 FL (ref 7.2–11.7)
POTASSIUM SERPL-SCNC: 3.7 MMOL/L (ref 3.6–5.2)
PROT SERPL-MCNC: 7 G/DL (ref 6.3–8.3)
SODIUM SERPL-SCNC: 140 MMOL/L (ref 132–148)
TOTAL CELLS COUNTED BLD: 100
VARIANT LYMPHS NFR BLD MANUAL: 1 % (ref 0–0)
WBC # BLD AUTO: 6.2 K/UL (ref 4.8–10.8)

## 2017-12-01 RX ADMIN — RANOLAZINE SCH MG: 500 TABLET, FILM COATED, EXTENDED RELEASE ORAL at 17:46

## 2017-12-01 RX ADMIN — HUMAN INSULIN SCH UNIT: 100 INJECTION, SOLUTION SUBCUTANEOUS at 17:00

## 2017-12-01 RX ADMIN — INSULIN GLARGINE SCH: 100 INJECTION, SOLUTION SUBCUTANEOUS at 21:33

## 2017-12-01 RX ADMIN — HUMAN INSULIN SCH: 100 INJECTION, SOLUTION SUBCUTANEOUS at 11:39

## 2017-12-01 RX ADMIN — HUMAN INSULIN SCH: 100 INJECTION, SOLUTION SUBCUTANEOUS at 07:44

## 2017-12-01 RX ADMIN — SACUBITRIL AND VALSARTAN SCH TAB: 24; 26 TABLET, FILM COATED ORAL at 09:30

## 2017-12-01 RX ADMIN — RANOLAZINE SCH MG: 500 TABLET, FILM COATED, EXTENDED RELEASE ORAL at 09:29

## 2017-12-01 RX ADMIN — HUMAN INSULIN SCH: 100 INJECTION, SOLUTION SUBCUTANEOUS at 21:32

## 2017-12-01 RX ADMIN — ASPIRIN SCH MG: 325 TABLET, DELAYED RELEASE ORAL at 09:31

## 2017-12-01 NOTE — CP.PCM.PN
Subjective





- Date & Time of Evaluation


Date of Evaluation: 12/01/17


Time of Evaluation: 13:48





- Subjective


Subjective: 





CHIEF COMPLAINTS TODAY :


SOB ON MINIMAL EXERTION 





ROS.


HEENT :  N.


Resp :       No pleuritic CP ,or hemoptysis 


Cardio :     No anginal  CP, 


GI :           No abd.pain, n/v ,diarrhea or GI bleeding .


CNS : No headache, vertigo, focal deficit.


Musculoskel :  No joint swelling ,


Derm :        No rash 


Psych :     Normal affect.


Ext :  No  swelling ,calf pain 





PE.


Pt. is alert awake in no distress.


V.S  As noted in the chart 


Head ,ear nose,throat and eyes : Normal.


Neck : Supple with normal carotids.


Lungs: EVELIA POOR AIR ENTRY WITH WHEEZING 


Heart : S1 & S2 normal with S4. No murmur.


Abd : Soft non tender with normal bowel sounds.


Neuro : Moves all ext. with no localized deficit.


Ext : No edema with intact pulses.Non tender calves 


Derm : No rashes or decubitus ulcer.





LABS/RADIOLOGY: CXR IMPROVING , LABS OK 





ASSESSMENT/PLAN :


BIPAP


IV LASIX/STEROIDS /AB 





Objective





- Vital Signs/Intake and Output


Vital Signs (last 24 hours): 


 











Temp Pulse Resp BP Pulse Ox


 


 98.8 F   90   20   104/62   100 


 


 12/01/17 08:22  12/01/17 08:22  12/01/17 08:22  12/01/17 09:32  12/01/17 08:22











- Medications


Medications: 


 Current Medications





Aspirin (Ecotrin)  325 mg PO DAILY [facility]


   Last Admin: 12/01/17 09:31 Dose:  325 mg


Furosemide (Lasix)  40 mg IVP Q12 [facility]


   Last Admin: 12/01/17 09:32 Dose:  Not Given


Azithromycin 500 mg/ Sodium (Chloride)  250 mls @ 250 mls/hr IVPB DAILY [facility]


   Last Admin: 12/01/17 11:20 Dose:  250 mls/hr


Ceftriaxone Sodium 1 gm/ (Sodium Chloride)  100 mls @ 100 mls/hr IVPB DAILY [facility]


   Last Admin: 12/01/17 09:33 Dose:  100 mls/hr


Insulin Glargine (Lantus)  20 unit SC HS [facility]


   Last Admin: 11/30/17 21:38 Dose:  20 units


Ins[facility]  [facility]


   PRN Reason: Protocol


   Last Admin: 12/01/17 11:39 Dose:  Not Given


Lactulose (Enulose)  20 gm PO HS [facility]


   Last Admin: 11/30/17 21:30 Dose:  Not Given


Metoprolol Tartrate (Lopressor)  25 mg PO BID [facility]


   Last Admin: 12/01/17 09:32 Dose:  Not Given


Ranolazine (Ranexa)  500 mg PO BID [facility]


   Last Admin: 12/01/17 09:29 Dose:  500 mg


Repaglinide (Prandin)  2 mg PO DAILY [facility]


   Last Admin: 12/01/17 09:30 Dose:  2 mg


Rosuvastatin Calcium (Crestor)  20 mg PO HS [facility]


   Last Admin: 11/30/17 21:29 Dose:  20 mg


Sacubitril/Valsartan (Entresto 24 Mg-26 Mg)  1 tab PO DAILY [facility]


   Last Admin: 12/01/17 09:30 Dose:  1 tab


Ticagrelor (Brilinta)  90 mg PO QOD6 [facility]


   Last Admin: 11/30/17 17:15 Dose:  90 mg











- Labs


Labs: 


 





 12/01/17 06:37 





 12/01/17 06:37 





 











PT  11.5 SECONDS (9.7-12.2)   11/28/17  00:57    


 


INR  1.0   11/28/17  00:57    


 


APTT  22 SECONDS (21-34)   11/28/17  00:57

## 2017-12-01 NOTE — CARD
--------------- APPROVED REPORT --------------





EKG Measurement

Heart [id number]

AK [id number]

[id number]

[id number]

[id number]



<Conclusion>

Normal sinus rhythm

Left bundle branch block

Abnormal ECG

## 2017-12-01 NOTE — CP.PCM.PN
Subjective





- Date & Time of Evaluation


Date of Evaluation: 12/01/17


Time of Evaluation: 23:18





- Subjective


Subjective: 





CHIEF COMPLAINTS TODAY :


afebrile


FEELING BETTER


DENIES SHORTNESS OF BREATH





ROS.


HEENT :  N.


Resp :       No pleuritic CP ,or hemoptysis 


Cardio :     No anginal  CP, 


GI :           No abd.pain, n/v ,diarrhea or GI bleeding .


CNS : No headache, vertigo, focal deficit.


Musculoskel :  No joint swelling ,


Derm :        No rash 


Psych :     Normal affect.


Ext :  No  swelling ,calf pain 





PE.


Pt. is alert awake in no distress.


V.S  As noted in the chart 


Head ,ear nose,throat and eyes : Normal.


Neck : Supple with normal carotids.


Lungs: FEW RHONCHI NO WHEEZES. 


Heart : S1 & S2 normal with S4. No murmur.


Abd : Soft non tender with normal bowel sounds.


Neuro : Moves all ext. with no localized deficit.


Ext : No edema with intact pulses.Non tender calves 


Derm : No rashes or decubitus ulcer.





LABS/RADIOLOGY:


WBC 6.2 IMPROVING


 plt 101K IMPROVING


Mycoplasma IgM +ve





creatinine 1.8/bun 22  gfr 29-low


LFTS N








Objective





- Vital Signs/Intake and Output


Vital Signs (last 24 hours): 


 











Temp Pulse Resp BP Pulse Ox


 


 98.4 F   130 H  16   145/77   95 


 


 12/01/17 15:00  12/01/17 21:30  12/01/17 15:00  12/01/17 21:30  12/01/17 15:00








Intake and Output: 


 











 12/01/17 12/02/17





 18:59 06:59


 


Intake Total 650 


 


Balance 650 














- Medications


Medications: 


 Current Medications





Aspirin (Ecotrin)  325 mg PO DAILY [facility]


   Last Admin: 12/01/17 09:31 Dose:  325 mg


Furosemide (Lasix)  40 mg IVP Q12 [facility]


   Last Admin: 12/01/17 21:25 Dose:  40 mg


Azithromycin 500 mg/ Sodium (Chloride)  250 mls @ 250 mls/hr IVPB DAILY [facility]


   Last Admin: 12/01/17 11:20 Dose:  250 mls/hr


Ceftriaxone Sodium 1 gm/ (Sodium Chloride)  100 mls @ 100 mls/hr IVPB DAILY [facility]


   Last Admin: 12/01/17 09:33 Dose:  100 mls/hr


Insulin Glargine (Lantus)  20 unit SC [facility]


   Last Admin: 12/01/17 21:33 Dose:  Not Given


Insulin Human Regular (Novolin R)  [facility]


   PRN Reason: Protocol


   Last Admin: 12/01/17 21:32 Dose:  Not Given


Lactulose (Enulose)  20 gm PO [facility]


   Last Admin: 12/01/17 21:34 Dose:  Not Given


Metoprolol Tartrate (Lopressor)  25 mg PO BID [facility]


   Last Admin: 12/01/17 17:22 Dose:  Not Given


Ranolazine (Ranexa)  500 mg PO BID [facility]


   Last Admin: 12/01/17 17:46 Dose:  500 mg


Repaglinide (Prandin)  2 mg PO DAILY [facility]


   Last Admin: 12/01/17 09:30 Dose:  2 mg


Rosuvastatin Calcium (Crestor)  20 mg PO HS [facility]


   Last Admin: 12/01/17 21:24 Dose:  20 mg


Sacubitril/Valsartan (Entresto 24 Mg-26 Mg)  1 tab PO DAILY [facility]


   Last Admin: 12/01/17 09:30 Dose:  1 tab


Ticagrelor (Brilinta)  90 mg PO QOD6 [facility]


   Last Admin: 11/30/17 17:15 Dose:  90 mg











- Labs


Labs: 


 





 12/01/17 06:37 





 12/01/17 06:37 





 











PT  11.5 SECONDS (9.7-12.2)   11/28/17  00:57    


 


INR  1.0   11/28/17  00:57    


 


APTT  22 SECONDS (21-34)   11/28/17  00:57    














Assessment and Plan


(1) Respiratory distress


Assessment & Plan: 


PATIENT DENIES SHORTNESS OF BREATH


DENIES ANY EXPECTORATION.


C/O  INTERMITTENT DRY COUGH .


Status: Acute   





(2) Pneumonia


Assessment & Plan: 


continue IV ceftriaxone 1 g once a day daily.11/28/17


Continue IV Zithromax 500 mg once a day daily.11/28/17.





CASE DISCUSSED WITH PMD.





PATIENT HAS MYCOPLASMA PNEUMONIA WITH CHF SUPERIMPOSED AND IMPROVING.


DC IV CEFTRIAXONE IN A.M.


CONTINUE ZITHROMAX 500 MG BY MOUTH DAILY X 5 DAYS.


FOLLOW CHEST X-RAY AS OUTPATIENT AFTER COMPLETION OF THERAPY.





Status: Acute   





(3) CHF exacerbation


Status: Acute   





(4) Cardiomyopathy


Assessment & Plan: 


2-D echo noted.


SEVERE LEFT VENTRICLE systolic DYSFUNCTION


MILD AR.


mild MR.


MILD TR


Status: Acute   





(5) CAD (coronary artery disease)


Status: Acute

## 2017-12-01 NOTE — CARD
--------------- APPROVED REPORT --------------





EKG Measurement

Heart [id number]

AL 128P2

[id number]

[id number]

[id number]



<Conclusion>

Sinus tachycardia

Possible Left atrial enlargement

Left bundle branch block

Abnormal ECG

## 2017-12-02 VITALS — SYSTOLIC BLOOD PRESSURE: 135 MMHG | DIASTOLIC BLOOD PRESSURE: 74 MMHG

## 2017-12-02 VITALS — OXYGEN SATURATION: 98 % | RESPIRATION RATE: 18 BRPM | TEMPERATURE: 97.5 F

## 2017-12-02 VITALS — HEART RATE: 93 BPM

## 2017-12-02 LAB
ALBUMIN/GLOB SERPL: 1.2 {RATIO} (ref 1–2.1)
ALP SERPL-CCNC: 48 U/L (ref 38–126)
ALT SERPL-CCNC: 31 U/L (ref 9–52)
AST SERPL-CCNC: 28 U/L (ref 14–36)
BASOPHILS # BLD AUTO: 0 K/UL (ref 0–0.2)
BASOPHILS NFR BLD: 0.8 % (ref 0–2)
BILIRUB SERPL-MCNC: 0.7 MG/DL (ref 0.2–1.3)
BUN SERPL-MCNC: 30 MG/DL (ref 7–17)
CALCIUM SERPL-MCNC: 8.6 MG/DL (ref 8.6–10.4)
CHLORIDE SERPL-SCNC: 99 MMOL/L (ref 98–107)
CO2 SERPL-SCNC: 35 MMOL/L (ref 22–30)
EOSINOPHIL # BLD AUTO: 0.2 K/UL (ref 0–0.7)
EOSINOPHIL NFR BLD: 4.7 % (ref 0–4)
ERYTHROCYTE [DISTWIDTH] IN BLOOD BY AUTOMATED COUNT: 15.7 % (ref 11.5–14.5)
GLOBULIN SER-MCNC: 2.7 GM/DL (ref 2.2–3.9)
GLUCOSE SERPL-MCNC: 102 MG/DL (ref 65–105)
HCT VFR BLD CALC: 33.7 % (ref 34–47)
LYMPHOCYTES # BLD AUTO: 0.9 K/UL (ref 1–4.3)
LYMPHOCYTES NFR BLD AUTO: 18.6 % (ref 20–40)
MCH RBC QN AUTO: 28.5 PG (ref 27–31)
MCHC RBC AUTO-ENTMCNC: 32.8 G/DL (ref 33–37)
MCV RBC AUTO: 87 FL (ref 81–99)
MONOCYTES # BLD: 0.6 K/UL (ref 0–0.8)
MONOCYTES NFR BLD: 12.7 % (ref 0–10)
NRBC BLD AUTO-RTO: 0 % (ref 0–2)
PLATELET # BLD: 95 K/UL (ref 130–400)
PMV BLD AUTO: 11 FL (ref 7.2–11.7)
POTASSIUM SERPL-SCNC: 4.2 MMOL/L (ref 3.6–5.2)
PROT SERPL-MCNC: 5.9 G/DL (ref 6.3–8.3)
SODIUM SERPL-SCNC: 138 MMOL/L (ref 132–148)
WBC # BLD AUTO: 5 K/UL (ref 4.8–10.8)

## 2017-12-02 RX ADMIN — RANOLAZINE SCH MG: 500 TABLET, FILM COATED, EXTENDED RELEASE ORAL at 09:59

## 2017-12-02 RX ADMIN — HUMAN INSULIN SCH: 100 INJECTION, SOLUTION SUBCUTANEOUS at 09:46

## 2017-12-02 RX ADMIN — HUMAN INSULIN SCH UNIT: 100 INJECTION, SOLUTION SUBCUTANEOUS at 12:25

## 2017-12-02 RX ADMIN — ASPIRIN SCH MG: 325 TABLET, DELAYED RELEASE ORAL at 09:57

## 2017-12-02 RX ADMIN — SACUBITRIL AND VALSARTAN SCH TAB: 24; 26 TABLET, FILM COATED ORAL at 09:58

## 2017-12-02 NOTE — PCM.HF
Heart Failure Core Measure





- Heart Failure


Ejection Fraction: Less Than 40 %


ACE Inhibitor Prescribed: Yes


Beta-Blocker Prescribed: Metoprolol Succinate


Angiotensin II Receptor Blocker Prescribed: Yes


AnticoagulationTherapy for Atrial Fibrillation/Atrialflutter: No


Contraindication/Reason for not providing: no hx of a ib 


Aldosterone Antagonist Prescribed: No


Contraindication/Reason for not providing: ARF


Hydralazine Nitrate Prescribed: No


Contraindication/Reason for not providing: bp running low


Implantable Cardioverter Defibrillator Therapy: No


Contraindication/Reason for not providing: pt has pacemaker


Cardiac Resynchronization Therapy Prescribed: No


Contraindication/Reason for not providing: NSR and pt has pacemaker

## 2017-12-02 NOTE — CP.PCM.PN
Subjective





- Date & Time of Evaluation


Date of Evaluation: 12/02/17


Time of Evaluation: 13:50





- Subjective


Subjective: 





NP NOTES 





Patient seen today and examined , awake, alert, ox3, denies any chest pain, sob,

m cough, fever, chills, abdominal pain /n/v/d


a febrile 





Objective





- Vital Signs/Intake and Output


Vital Signs (last 24 hours): 


 











Temp Pulse Resp BP Pulse Ox


 


 97.5 F L  93 H  18   135/74   98 


 


 12/02/17 07:05  12/02/17 14:13  12/02/17 07:05  12/02/17 09:58  12/02/17 07:05











- Medications


Medications: 


 Current Medications





Aspirin (Ecotrin)  325 mg PO DAILY [facility]


   Last Admin: 12/02/17 09:57 Dose:  325 mg


Furosemide (Lasix)  40 mg IVP Q12 [facility]


   Last Admin: 12/02/17 09:58 Dose:  40 mg


Azithromycin 500 mg/ Sodium (Chloride)  250 mls @ 250 mls/hr IVPB DAILY [facility]


   Last Admin: 12/02/17 10:06 Dose:  250 mls/hr


Ceftriaxone Sodium 1 gm/ (Sodium Chloride)  100 mls @ 100 mls/hr IVPB DAILY [facility]


   Last Admin: 12/02/17 09:59 Dose:  100 mls/hr


Insulin Glargine (Lantus)  20 unit SC [facility]


   Last Admin: 12/01/17 21:33 Dose:  Not Given


Insulin Human Regular (Novolin R)  0 unit SC [facility]


   PRN Reason: Protocol


   Last Admin: 12/02/17 12:25 Dose:  4 unit


Lactulose (Enulose)  20 gm PO [facility]


   Last Admin: 12/01/17 21:34 Dose:  Not Given


Metoprolol Tartrate (Lopressor)  25 mg PO BID [facility]


   Last Admin: 12/02/17 09:57 Dose:  25 mg


Ranolazine (Ranexa)  500 mg PO BID [facility]


   Last Admin: 12/02/17 09:59 Dose:  500 mg


Repaglinide (Prandin)  2 mg PO DAILY [facility]


   Last Admin: 12/02/17 09:58 Dose:  2 mg


Rosuvastatin Calcium (Crestor)  20 mg PO [facility]


   Last Admin: 12/01/17 21:24 Dose:  20 mg


Sacubitril/Valsartan (Entresto 24 Mg-26 Mg)  1 tab PO DAILY [facility]


   Last Admin: 12/02/17 09:58 Dose:  1 tab


Ticagrelor (Brilinta)  90 mg PO QOD6 [facility]


   Last Admin: 11/30/17 17:15 Dose:  90 mg











- Labs


Labs: 


 





 12/02/17 07:12 





 12/02/17 07:12 





 











PT  11.5 SECONDS (9.7-12.2)   11/28/17  00:57    


 


INR  1.0   11/28/17  00:57    


 


APTT  22 SECONDS (21-34)   11/28/17  00:57    














- Constitutional


Appears: Well, No Acute Distress





- Respiratory Exam


Respiratory Exam: Rhonchi, NORMAL BREATHING PATTERN





- Cardiovascular Exam


Cardiovascular Exam: REGULAR RHYTHM, +S1, +S2





- Neurological Exam


Neurological Exam: Alert, Awake, Oriented x3





Assessment and Plan





- Assessment and Plan (Free Text)


Assessment: 





A/P 


85 yr old female admitted with sob/pneumonia/ chf 


blood cultures -negative 


D/W Dr. JEFF killian , cleared for discharge home today with 5 days of zithromax and f

/u with Dr. Fierro office in 1 week 


D/W Dr. Piedra, stable for discharge home today and f/u with his offfice in 1 

week 


Discharge plan discussed with patient  and family at bed eunice e, who understands 

and  agrees with plan 


 rx e prescribed to patient pharmacy

## 2017-12-02 NOTE — CP.PCM.DIS
Provider





- Provider


Date of Admission: 


11/28/17 02:30





Attending physician: 


Osmar Fierro MD





Time Spent in preparation of Discharge (in minutes): 35





Hospital Course





- Lab Results


Lab Results: 


 Micro Results





11/28/17 04:15   Blood-Venous   Blood Culture - Preliminary


                            NO GROWTH AFTER 3 DAYS


11/28/17 00:45   Blood-Venous   Blood Culture - Preliminary


                            NO GROWTH AFTER 3 DAYS


11/28/17 17:11   Naris   MRSA Culture (Admit) - Final


                            MRSA NOT DETECTED





 Most Recent Lab Values











WBC  5.0 K/uL (4.8-10.8)   12/02/17  07:12    


 


RBC  3.88 Mil/uL (3.80-5.20)   12/02/17  07:12    


 


Hgb  11.1 g/dL (11.0-16.0)   12/02/17  07:12    


 


Hct  33.7 % (34.0-47.0)  L  12/02/17  07:12    


 


MCV  87.0 fL (81.0-99.0)   12/02/17  07:12    


 


MCH  28.5 pg (27.0-31.0)   12/02/17  07:12    


 


MCHC  32.8 g/dL (33.0-37.0)  L  12/02/17  07:12    


 


RDW  15.7 % (11.5-14.5)  H  12/02/17  07:12    


 


Plt Count  95 K/uL (130-400)  L  12/02/17  07:12    


 


MPV  11.0 fL (7.2-11.7)   12/02/17  07:12    


 


Neut % (Auto)  63.2 % (50.0-75.0)   12/02/17  07:12    


 


Lymph % (Auto)  18.6 % (20.0-40.0)  L  12/02/17  07:12    


 


Mono % (Auto)  12.7 % (0.0-10.0)  H  12/02/17  07:12    


 


Eos % (Auto)  4.7 % (0.0-4.0)  H  12/02/17  07:12    


 


Baso % (Auto)  0.8 % (0.0-2.0)   12/02/17  07:12    


 


Neut #  3.1 K/uL (1.8-7.0)   12/02/17  07:12    


 


Lymph #  0.9 K/uL (1.0-4.3)  L  12/02/17  07:12    


 


Mono #  0.6 K/uL (0.0-0.8)   12/02/17  07:12    


 


Eos #  0.2 K/uL (0.0-0.7)   12/02/17  07:12    


 


Baso #  0.0 K/uL (0.0-0.2)   12/02/17  07:12    


 


Neutrophils % (Manual)  71 % (50-75)   12/01/17  06:37    


 


Band Neutrophils %  3 % (0-2)  H  12/01/17  06:37    


 


Lymphocytes % (Manual)  8 % (20-40)  L  12/01/17  06:37    


 


Reactive Lymphs %  1 % (0-0)  H  12/01/17  06:37    


 


Monocytes % (Manual)  11 % (0-10)  H  12/01/17  06:37    


 


Eosinophils % (Manual)  5 % (0-4)  H  12/01/17  06:37    


 


Basophils % (Manual)  1 % (0-2)   12/01/17  06:37    


 


Differential Comment     11/28/17  09:20    


 


Platelet Estimate  Slightly decreased  (NORMAL)  L  12/01/17  06:37    


 


Anisocytosis (manual)  Slight   12/01/17  06:37    


 


ESR  40 mm/hr (0-20)  H  11/28/17  18:19    


 


PT  11.5 SECONDS (9.7-12.2)   11/28/17  00:57    


 


INR  1.0   11/28/17  00:57    


 


APTT  22 SECONDS (21-34)   11/28/17  00:57    


 


Puncture Site  Rr   11/28/17  01:05    


 


pCO2  34 mm/Hg (35-45)  L  11/28/17  01:05    


 


pO2  57 mm/Hg ()  L  11/28/17  01:05    


 


HCO3  21.9 mmol/L (21-28)   11/28/17  01:05    


 


ABG pH  7.39  (7.35-7.45)   11/28/17  01:05    


 


ABG Total CO2  21.6 mmol/L (22-28)  L  11/28/17  01:05    


 


ABG O2 Saturation  93.3 % (95-98)  L  11/28/17  01:05    


 


ABG Base Excess  -3.6 mmol/L (-2.0-3.0)  L  11/28/17  01:05    


 


Benji Test  Pos   11/28/17  01:05    


 


ABG Potassium  4.5 mmol/L (3.6-5.2)   11/28/17  01:05    


 


A-a O2 Difference  100.0 mm/Hg  11/28/17  01:05    


 


Respiratory Index  1.8   11/28/17  01:05    


 


Sodium  139.0 mmol/l (132-148)   11/28/17  01:05    


 


Chloride  109.0 mmol/L ()  H  11/28/17  01:05    


 


Glucose  184 mg/dl ()  H  11/28/17  01:05    


 


Lactate  1.0 mmol/L (0.7-2.1)   11/28/17  01:05    


 


Liter Flow  2.0   11/28/17  01:05    


 


FiO2  28.0 %  11/28/17  01:05    


 


Sodium  138 mmol/L (132-148)   12/02/17  07:12    


 


Potassium  4.2 mmol/L (3.6-5.2)   12/02/17  07:12    


 


Chloride  99 mmol/L ()   12/02/17  07:12    


 


Carbon Dioxide  35 mmol/L (22-30)  H  12/02/17  07:12    


 


Anion Gap  8  (10-20)  L  12/02/17  07:12    


 


BUN  30 mg/dL (7-17)  H  12/02/17  07:12    


 


Creatinine  2.0 mg/dL (0.7-1.2)  H  12/02/17  07:12    


 


Est GFR ( Amer)  29   12/02/17  07:12    


 


Est GFR (Non-Af Amer)  24   12/02/17  07:12    


 


POC Glucose (mg/dL)  252 mg/dL ()  H  12/02/17  11:40    


 


Random Glucose  102 mg/dL ()   12/02/17  07:12    


 


Calcium  8.6 mg/dl (8.6-10.4)   12/02/17  07:12    


 


Total Bilirubin  0.7 mg/dL (0.2-1.3)   12/02/17  07:12    


 


AST  28 U/L (14-36)   12/02/17  07:12    


 


ALT  31 U/L (9-52)   12/02/17  07:12    


 


Alkaline Phosphatase  48 U/L ()   12/02/17  07:12    


 


Total Creatine Kinase  55 U/L ()   11/28/17  18:19    


 


CK-MB (Mass)  1.71 ng/mL (0.0-3.38)   11/28/17  18:19    


 


Troponin I  0.0780 ng/mL (0.00-0.120)   11/28/17  18:19    


 


C-React Prot High Sens  2.83 mg/L (1.00-3.00)   11/29/17  13:44    


 


NT-Pro-B Natriuret Pep  59715 pg/mL (0-900)  H  11/28/17  00:57    


 


Total Protein  5.9 g/dL (6.3-8.3)  L  12/02/17  07:12    


 


Albumin  3.2 g/dL (3.5-5.0)  L  12/02/17  07:12    


 


Globulin  2.7 gm/dL (2.2-3.9)   12/02/17  07:12    


 


Albumin/Globulin Ratio  1.2  (1.0-2.1)   12/02/17  07:12    


 


Arterial Blood Potassium  4.5 mmol/L (3.6-5.2)   11/28/17  01:05    


 


Urine Color  Straw  (YELLOW)   11/28/17  03:44    


 


Urine Clarity  Clear  (Clear)   11/28/17  03:44    


 


Urine pH  5.0  (5.0-8.0)   11/28/17  03:44    


 


Ur Specific Gravity  1.005  (1.003-1.030)   11/28/17  03:44    


 


Urine Protein  Negative mg/dL (NEGATIVE)   11/28/17  03:44    


 


Urine Glucose (UA)  Normal mg/dL (Normal)   11/28/17  03:44    


 


Urine Ketones  Negative mg/dL (NEGATIVE)   11/28/17  03:44    


 


Urine Blood  1+  (NEGATIVE)  H  11/28/17  03:44    


 


Urine Nitrate  Negative  (NEGATIVE)   11/28/17  03:44    


 


Urine Bilirubin  Negative  (NEGATIVE)   11/28/17  03:44    


 


Urine Urobilinogen  Normal mg/dL (0.2-1.0)   11/28/17  03:44    


 


Ur Leukocyte Esterase  1+ Hunter/uL (Negative)  H  11/28/17  03:44    


 


Urine WBC (Auto)  3 /hpf (0-5)   11/28/17  03:44    


 


Urine RBC (Auto)  2 /hpf (0-3)   11/28/17  03:44    


 


Ur Squamous Epith Cells  < 1 /hpf (0-5)   11/28/17  03:44    


 


Ur L.pneumophila Ag  Negative  (NEGATIVE)   11/29/17  07:40    


 


Mycoplasma pneumon IgM  Positive  (NEGATIVE)  H  11/28/17  18:19    














- Hospital Course


Hospital Course: 





FOR LAST FEW DAYS PT HAS SOB AND INCREASINGLY GETTING WORSE WITH SOB AT [facility]/

E COUGH AND WHEEZING MILD EXPECTORATION 


IN [facility] PT HAD CHF/PNEUMONIA AND IMPROVED ON IV LASIX WITH BIPAP 





PAST HIST.


CAD/2 STENTS RECENT /CARIOMYOPATHY EF 30% /


T2DM/COPD/FE DEF ANEMIA /


PT RESPONDED TO IV LASIX/STEROIDS/BIPAP AND AB 


CXR SHOWED IMPROVEMENT 


D/W ID 


D/C ON PO MEDS AT HOME AND PO AB 





Discharge Exam





- Head Exam


Head Exam: NORMAL INSPECTION





Discharge Plan





- Follow Up Plan


Condition: GUARDED


Disposition: HOME/ ROUTINE

## 2017-12-02 NOTE — CP.PCM.PN
Subjective





- Date & Time of Evaluation


Date of Evaluation: 12/02/17


Time of Evaluation: 14:01





- Subjective


Subjective: 





afebrile


no acute events overnight.


NO SOB,


NO CHEST PAIN 





CASE DISCUSSED W NP MS WEEKS 


DC IV ABX,'


START PO ZITHROMAX 500MG WITH FOOD OD X 5 DAYS. 





Objective





- Vital Signs/Intake and Output


Vital Signs (last 24 hours): 


 











Temp Pulse Resp BP Pulse Ox


 


 97.5 F L  93 H  18   135/74   98 


 


 12/02/17 07:05  12/02/17 07:45  12/02/17 07:05  12/02/17 09:58  12/02/17 07:05











- Medications


Medications: 


 Current Medications





Aspirin (Ecotrin)  325 mg PO DAILY [facility]


   Last Admin: 12/02/17 09:57 Dose:  325 mg


Furosemide (Lasix)  40 mg IVP Q12 [facility]


   Last Admin: 12/02/17 09:58 Dose:  40 mg


Azithromycin 500 mg/ Sodium (Chloride)  250 mls @ 250 mls/hr IVPB DAILY [facility]


   Last Admin: 12/02/17 10:06 Dose:  250 mls/hr


Ceftriaxone Sodium 1 gm/ (Sodium Chloride)  100 mls @ 100 mls/hr IVPB DAILY [facility]


   Last Admin: 12/02/17 09:59 Dose:  100 mls/hr


Insulin Glargine (Lantus)  20 unit SC [facility]


   Last Admin: 12/01/17 21:33 Dose:  Not Given


Insulin Human Regular (Novolin R)  [facility] unit [facility]


   PRN Reason: Protocol


   Last Admin: 12/02/17 12:25 Dose:  4 unit


Lactulose (Enulose)  20 gm PO HS [facility]


   Last Admin: 12/01/17 21:34 Dose:  Not Given


Metoprolol Tartrate (Lopressor)  25 mg PO BID [facility]


   Last Admin: 12/02/17 09:57 Dose:  25 mg


Ranolazine (Ranexa)  500 mg PO BID [facility]


   Last Admin: 12/02/17 09:59 Dose:  500 mg


Repaglinide (Prandin)  2 mg PO DAILY [facility]


   Last Admin: 12/02/17 09:58 Dose:  2 mg


Rosuvastatin Calcium (Crestor)  20 mg PO HS [facility]


   Last Admin: 12/01/17 21:24 Dose:  20 mg


Sacubitril/Valsartan (Entresto 24 Mg-26 Mg)  1 tab PO DAILY [facility]


   Last Admin: 12/02/17 09:58 Dose:  1 tab


Ticagrelor (Brilinta)  90 mg PO QOD6 [facility]


   Last Admin: 11/30/17 17:15 Dose:  90 mg











- Labs


Labs: 


 





 12/02/17 07:12 





 12/02/17 07:12 





 











PT  11.5 SECONDS (9.7-12.2)   11/28/17  00:57    


 


INR  1.0   11/28/17  00:57    


 


APTT  22 SECONDS (21-34)   11/28/17  00:57    














- Constitutional


Appears: No Acute Distress





- Head Exam


Head Exam: NORMAL INSPECTION





- Eye Exam


Eye Exam: EOMI, PERRL





- ENT Exam


ENT Exam: Normal Oropharynx





- Respiratory Exam


Respiratory Exam: Rhonchi (FEW SCATTERRED RHONCHI), NORMAL BREATHING PATTERN.  

absent: Wheezes





- Cardiovascular Exam


Cardiovascular Exam: REGULAR RHYTHM, +S1





- GI/Abdominal Exam


GI & Abdominal Exam: Soft, Normal Bowel Sounds





- Neurological Exam


Neurological Exam: Awake, CN II-XII Intact, Oriented x3





- Skin


Skin Exam: Normal Color, Warm





Assessment and Plan


(1) Respiratory distress


Assessment & Plan: 


COMFORTABLE 


LYING FLAT


Status: Acute   





(2) Pneumonia


Assessment & Plan: 


AS ABOVE .


DC IV ABX 


PO ZITHROMAX 500MG X5DAYS.





F/U CXR AS OUTPATIENT.


Status: Acute   





(3) CHF exacerbation


Status: Acute   





(4) Cardiomyopathy


Status: Acute   





(5) CAD (coronary artery disease)


Status: Acute

## 2018-01-29 ENCOUNTER — HOSPITAL ENCOUNTER (INPATIENT)
Dept: HOSPITAL 31 - C.ER | Age: 83
LOS: 8 days | Discharge: HOME | DRG: 291 | End: 2018-02-06
Attending: INTERNAL MEDICINE | Admitting: INTERNAL MEDICINE
Payer: MEDICARE

## 2018-01-29 VITALS — BODY MASS INDEX: 25.5 KG/M2

## 2018-01-29 DIAGNOSIS — E11.9: ICD-10-CM

## 2018-01-29 DIAGNOSIS — Z95.5: ICD-10-CM

## 2018-01-29 DIAGNOSIS — J20.9: ICD-10-CM

## 2018-01-29 DIAGNOSIS — Z86.73: ICD-10-CM

## 2018-01-29 DIAGNOSIS — I11.0: Primary | ICD-10-CM

## 2018-01-29 DIAGNOSIS — J44.0: ICD-10-CM

## 2018-01-29 DIAGNOSIS — R06.03: ICD-10-CM

## 2018-01-29 DIAGNOSIS — I25.10: ICD-10-CM

## 2018-01-29 DIAGNOSIS — I50.23: ICD-10-CM

## 2018-01-29 DIAGNOSIS — Z95.0: ICD-10-CM

## 2018-01-29 DIAGNOSIS — E78.00: ICD-10-CM

## 2018-01-29 DIAGNOSIS — D64.9: ICD-10-CM

## 2018-01-29 DIAGNOSIS — I42.9: ICD-10-CM

## 2018-01-29 DIAGNOSIS — Z90.49: ICD-10-CM

## 2018-01-29 DIAGNOSIS — J18.9: ICD-10-CM

## 2018-01-29 LAB
ALBUMIN SERPL-MCNC: 3.8 G/DL (ref 3.5–5)
ALBUMIN/GLOB SERPL: 1.1 {RATIO} (ref 1–2.1)
ALT SERPL-CCNC: 27 U/L (ref 9–52)
ANISOCYTOSIS BLD QL SMEAR: SLIGHT
AST SERPL-CCNC: 42 U/L (ref 14–36)
BASOPHILS # BLD AUTO: 0 K/UL (ref 0–0.2)
BASOPHILS NFR BLD: 0.2 % (ref 0–2)
BUN SERPL-MCNC: 27 MG/DL (ref 7–17)
CALCIUM SERPL-MCNC: 9.2 MG/DL (ref 8.6–10.4)
EOSINOPHIL # BLD AUTO: 0 K/UL (ref 0–0.7)
EOSINOPHIL NFR BLD: 0 % (ref 0–4)
ERYTHROCYTE [DISTWIDTH] IN BLOOD BY AUTOMATED COUNT: 16.8 % (ref 11.5–14.5)
GFR NON-AFRICAN AMERICAN: 33
HGB BLD-MCNC: 8.8 G/DL (ref 11–16)
HYPOCHROMIC: (no result)
LYMPHOCYTE: 2 % (ref 20–40)
LYMPHOCYTES # BLD AUTO: 0.6 K/UL (ref 1–4.3)
LYMPHOCYTES NFR BLD AUTO: 4.3 % (ref 20–40)
MAGNESIUM SERPL-MCNC: 1.9 MG/DL (ref 1.6–2.3)
MCH RBC QN AUTO: 27.6 PG (ref 27–31)
MCHC RBC AUTO-ENTMCNC: 32 G/DL (ref 33–37)
MCV RBC AUTO: 86.3 FL (ref 81–99)
MONOCYTE: 6 % (ref 0–10)
MONOCYTES # BLD: 1.2 K/UL (ref 0–0.8)
MONOCYTES NFR BLD: 7.9 % (ref 0–10)
NEUTROPHILS # BLD: 13 K/UL (ref 1.8–7)
NEUTROPHILS NFR BLD AUTO: 87.6 % (ref 50–75)
NEUTROPHILS NFR BLD AUTO: 88 % (ref 50–75)
NEUTS BAND NFR BLD: 4 % (ref 0–2)
NRBC BLD AUTO-RTO: 0.2 % (ref 0–2)
OVALOCYTES BLD QL SMEAR: SLIGHT
PLATELET # BLD EST: NORMAL 10*3/UL
PLATELET # BLD: 208 K/UL (ref 130–400)
PMV BLD AUTO: 11.3 FL (ref 7.2–11.7)
POLYCHROMIC: SLIGHT
RBC # BLD AUTO: 3.18 MIL/UL (ref 3.8–5.2)
TEARDROP CELLS: SLIGHT
TOTAL CELLS COUNTED BLD: 100
TROPONIN I SERPL-MCNC: 0.04 NG/ML (ref 0–0.12)
WBC # BLD AUTO: 14.8 K/UL (ref 4.8–10.8)

## 2018-01-29 RX ADMIN — INSULIN GLARGINE SCH UNIT: 100 INJECTION, SOLUTION SUBCUTANEOUS at 22:32

## 2018-01-29 RX ADMIN — HUMAN INSULIN SCH UNIT: 100 INJECTION, SOLUTION SUBCUTANEOUS at 22:32

## 2018-01-29 NOTE — CP.PCM.HP
History of Present Illness





- History of Present Illness


History of Present Illness: 





COMPREHENSIVE   HISTORY & PHYSICAL EXAM 


   


HPI


ADMITTED WITH PNEUMONIA AND SOB 


PT FOR FEW DAYS HAS BEEN C/O SOB AND SEVERE NITE TIME COUGHING WITH NO 

IMPROVEMENT WITH OUTPT PO LEVAQUINE 


EVALUATED IN ER AND HAS CHF WITH EVELIA PNEUMONIA 





PAST HIST.


CAD/STENT/CARDIOMYOPATHY


AICD


T2DM/HTN/COPD


PERSONAL HIST:   Smoking.   N   Alcohol.   N      Allergy N            Travel_-

      .


FAMILY HIST : 


ROS :


Constitutional: Negative for weight change, usage of assist device. 


  Eyes: Negative for redness, swelling, itching, discharge, vision changes, 

blurry vision, double vision, glaucoma, cataracts, 


  Ears: Negative for hearing loss, ringing, , tinnitus, vertigo


 Nose: Negative for rhinorrhea, stuffiness, sniffing, itching, postnasal drip, 

discoloration, nasal congestion and epistaxis. 


 Throat: Negative for throat clearing, sore throat, hoarseness, difficulty 

swallowing and difficulty speaking. 


  Respiratory: Negative for  hemoptysis, snoring at night,


 Cardiovascular: Negative for chest pain, , Edema of legs, leg cramps, angina, 

claudication, , irregular heartbeat,


 Neurology: Negative for irritability, muscle weakness, numbness and tingling, 

seizures, tremors, migraines, slurred speech, syncope, memory loss, mood changes

, recurrent headaches 


Gastrointestinal: Negative for difficulty swallowing, diarrhea, constipation, 

black stools, rectal bleeding, nausea, flatulence, reflux, poor appetite, 

changes in bowel habits, abdominal pain


  Genitourinary: Negative for frequent urination, hematuria, discharge, 

incontinence, urinary retention, frequent UTI, Psychiatric: Negative for 

depression, anxiety/panic, suicidal tendencies, 


Musculoskeletal: Negative for swollen joints, back pain, , neck pain, morning 

stiffness of joints, . 


 Skin: Negative for rash, ulcers, itching, dry skin and pigmented lesions.


P/E: 


  Constitutional: Appears stated age and in no apparent distress. 


 Head: Normocephalic. 


  Ears: External ear canals patent without inflammation. Tympanic membranes 

intact with normal light reflex and landmark. 


  Eyes: Pupils are central, bilaterally equal, symmetrical and reacts to light 

with normal movements and no icterus or pallor. 


 Nose: External nares are patent. Mucosa is pink  Mouth-Throat: Good general 

appearance and condition. No post-pharyngeal/oropharyngeal erythema and 

tonsillar hypertrophy. Good dental hygiene. 


  Neck-Lymphatic: Neck is supple with normal ROM, no thyromegaly, lymph nodes 

or masses. JVD is normal with no carotid bruit. 


  Lungs: EVELIA RONCHI AND RALES 


  Cardiovascular: S1 and S2 are normal with nos, gallops and rub. 


  GI Exam: No hepatomegaly. Abdomen is soft and non-tender. No Organomegaly , 

masses or hernias are evident and bowel sounds are normal and active. 


  Neurology: Higher function and all cranial nerves intact, with no gross motor 

or sensory deficit. Superficial and deep reflexes are normal with downwards 

planters. No cerebellar deficit with normal gait. 


  Musculoskeletal: No tender spots with normal curvature of the spine with no 

swelling or restricted ROM of the small and large joints. 


  Extremities: Homans sign absent. Intact pulses with no pitting edema, calf 

tenderness or skin color changes. 


  Skin: No rash, eruptions or abnormal skin pigmentation





LAB/RADIOLOGY:


ASSESMENT :


ACUTE ON [facility] SYSTOLIC HF WITH DEPRESSED EF


ACUTE BRONCHITIS WITH PNEUMONITIS 


CAD/STENT/AICD


T2DM


COPD 





PLAN: 


SEE ORDERS 








Present on Admission





- Present on Admission


Any Indicators Present on Admission: No





Past Patient History





- Infectious Disease


Hx of Infectious Diseases: None





- Tetanus Immunizations


Tetanus Immunization: Unknown





- Past Medical History & Family History


Past Medical History?: Yes





- Past Social History


Smoking Status: Never Smoked





- CARDIAC


Hx Cardia Arrhythmia: Yes


Hx Congestive Heart Failure: Yes


Hx Hypercholesterolemia: Yes


Hx Hypertension: Yes


Hx Pacemaker: Yes (2016)





- PULMONARY


Hx Asthma: Yes


Hx Chronic Obstructive Pulmonary Disease (COPD): Yes





- NEUROLOGICAL


Hx Transient Ischemic Attacks (TIA): Yes





- HEENT


Hx HEENT Problems: No





- RENAL


Hx Chronic Kidney Disease: No





- ENDOCRINE/METABOLIC


Hx Diabetes Mellitus Type 2: Yes





- HEMATOLOGICAL/ONCOLOGICAL


Hx Anemia: Yes





- INTEGUMENTARY


Hx Dermatological Problems: No





- MUSCULOSKELETAL/RHEUMATOLOGICAL


Hx Arthritis: Yes (BACK AND KNEES AND R SHOULDER)





- GASTROINTESTINAL


Hx Gastrointestinal Disorders: No





- GENITOURINARY/GYNECOLOGICAL


Hx Genitourinary Disorders: No





- PSYCHIATRIC


Hx Anxiety: Yes


Hx Substance Use: No





- SURGICAL HISTORY


Hx Appendectomy: Yes


Hx Coronary Stent: Yes





- ANESTHESIA


Hx Anesthesia: Yes


Hx Anesthesia Reactions: No


Hx Malignant Hyperthermia: No





Meds


Allergies/Adverse Reactions: 


 Allergies











Allergy/AdvReac Type Severity Reaction Status Date / Time


 


clopidogrel Allergy Mild SWELLING Verified 01/29/18 16:20














Results





- Vital Signs


Recent Vital Signs: 





 Last Vital Signs











Temp  97.7 F   01/29/18 23:04


 


Pulse  102 H  01/29/18 23:04


 


Resp  20   01/29/18 23:04


 


BP  107/73   01/29/18 23:04


 


Pulse Ox  97   01/29/18 23:04














- Labs


Result Diagrams: 


 01/30/18 07:32





 01/30/18 07:32


Labs: 





 Laboratory Results - last 24 hr











  01/29/18 01/29/18 01/29/18





  17:10 17:10 22:23


 


WBC  14.8 H D  


 


RBC  3.18 L  


 


Hgb  8.8 L D  


 


Hct  27.5 L  


 


MCV  86.3  


 


MCH  27.6  


 


MCHC  32.0 L  


 


RDW  16.8 H  


 


Plt Count  208  D  


 


MPV  11.3  


 


Neut % (Auto)  87.6 H  


 


Lymph % (Auto)  4.3 L  


 


Mono % (Auto)  7.9  


 


Eos % (Auto)  0.0  


 


Baso % (Auto)  0.2  


 


Neut #  13.0 H  


 


Lymph #  0.6 L  


 


Mono #  1.2 H  


 


Eos #  0.0  


 


Baso #  0.0  


 


Neutrophils % (Manual)  88 H  


 


Band Neutrophils %  4 H  


 


Lymphocytes % (Manual)  2 L  


 


Monocytes % (Manual)  6  


 


Platelet Estimate  Normal  


 


Polychromasia  Slight  


 


Hypochromasia (manual)  Moderate  


 


Anisocytosis (manual)  Slight  


 


Tear Drop Cells  Slight  


 


Ovalocytes  Slight  


 


Sodium   135 


 


Potassium   4.0 


 


Chloride   102 


 


Carbon Dioxide   20 L 


 


Anion Gap   18 


 


BUN   27 H 


 


Creatinine   1.5 H 


 


Est GFR ( Amer)   40 


 


Est GFR (Non-Af Amer)   33 


 


POC Glucose (mg/dL)    326 H


 


Random Glucose   231 H 


 


Calcium   9.2 


 


Magnesium   1.9 


 


Total Bilirubin   0.7 


 


AST   42 H D 


 


ALT   27 


 


Alkaline Phosphatase   58 


 


Troponin I   0.0430 


 


NT-Pro-B Natriuret Pep   72344 H 


 


Total Protein   7.3 


 


Albumin   3.8 


 


Globulin   3.5 


 


Albumin/Globulin Ratio   1.1 


 


Influenza Typ A,B (EIA)   














  01/29/18





  23:19


 


WBC 


 


RBC 


 


Hgb 


 


Hct 


 


MCV 


 


MCH 


 


MCHC 


 


RDW 


 


Plt Count 


 


MPV 


 


Neut % (Auto) 


 


Lymph % (Auto) 


 


Mono % (Auto) 


 


Eos % (Auto) 


 


Baso % (Auto) 


 


Neut # 


 


Lymph # 


 


Mono # 


 


Eos # 


 


Baso # 


 


Neutrophils % (Manual) 


 


Band Neutrophils % 


 


Lymphocytes % (Manual) 


 


Monocytes % (Manual) 


 


Platelet Estimate 


 


Polychromasia 


 


Hypochromasia (manual) 


 


Anisocytosis (manual) 


 


Tear Drop Cells 


 


Ovalocytes 


 


Sodium 


 


Potassium 


 


Chloride 


 


Carbon Dioxide 


 


Anion Gap 


 


BUN 


 


Creatinine 


 


Est GFR ( Amer) 


 


Est GFR (Non-Af Amer) 


 


POC Glucose (mg/dL) 


 


Random Glucose 


 


Calcium 


 


Magnesium 


 


Total Bilirubin 


 


AST 


 


ALT 


 


Alkaline Phosphatase 


 


Troponin I 


 


NT-Pro-B Natriuret Pep 


 


Total Protein 


 


Albumin 


 


Globulin 


 


Albumin/Globulin Ratio 


 


Influenza Typ A,B (EIA)  Negative for flu a/b

## 2018-01-29 NOTE — C.PDOC
History Of Present Illness


85 y/o female presents to ED with complaints of productive white phlegm cough 

with associated sob for 2-3 weeks. Patient states she saw PMD Dr. Fierro 3 days 

ago and was given Levaquin and Prednisone. Patient reports no improvement which 

prompted visit to ED today and denies vomiting, diarrhea, chest pain or any 

other complaints at this time.  


Chief Complaint (Nursing): Shortness Of Breath


History Per: Patient


History/Exam Limitations: no limitations


Onset/Duration Of Symptoms: Days


Current Symptoms Are (Timing): Still Present


Initiating Event: Upper Respiratory Illness





Past Medical History


Reviewed: Historical Data, Nursing Documentation, Vital Signs


Vital Signs: 


 Last Vital Signs











Temp  98.1 F   18 15:48


 


Pulse  89   18 15:48


 


Resp  20   18 15:48


 


BP  110/68   18 15:48


 


Pulse Ox  99   18 15:48














- Medical History


PMH: Anemia, Anxiety, Arthritis (BACK AND KNEES AND R SHOULDER), Asthma, Cardia 

Arrhythmia, CHF, COPD, Diabetes, HTN, Hypercholesterolemia, TIA


Surgical History: Appendectomy, Coronary Stent, Endoscopy, Pacemaker (2016)





- [facility] Procedures








ASSISTANCE WITH RESPIRATORY VENTILATION, 24-96 HRS, CPAP (17)


ASSISTANCE WITH RESPIRATORY VENTILATION, <24 HRS, CPAP (16)


ESOPHAGOGASTRODUODENOSCOPY [EGD] W/CLOSED BIOPSY (06/14/15)


LEFT HEART CARDIAC CATH (14)


LT HEART ANGIOCARDIOGRAM (14)


MEASURE OF CARDIAC SAMPL & PRESSURE, L HEART, PERC APPROACH (11/03/15)


PACKED CELL TRANSFUSION (06/14/15)


PLAIN RADIOGRAPHY OF MULT COR ART USING OTH CONTRAST (11/03/15)


TRANSFUSE NONAUT RED BLOOD CELLS IN PERIPH VEIN, PERC (16)








Family History: States: No Known Family Hx





- Social History


Hx Tobacco Use: No


Hx Alcohol Use: No


Hx Substance Use: No





- Immunization History


Hx Tetanus Toxoid Vaccination: No


Hx Influenza Vaccination: Yes


Hx Pneumococcal Vaccination: Yes





Review Of Systems


Constitutional: Negative for: Fever, Chills


Cardiovascular: Negative for: Chest Pain


Respiratory: Positive for: Cough, Shortness of Breath


Gastrointestinal: Negative for: Nausea, Vomiting





Physical Exam





- Physical Exam


Appears: Non-toxic, No Acute Distress


Skin: Warm, Dry, No Rash


Head: Atraumatic, Normacephalic


Oral Mucosa: Moist


Neck: Supple


Cardiovascular: Rhythm Regular


Respiratory: No Rales, No Rhonchi, Wheezing (Expiratory bilateral)


Gastrointestinal/Abdominal: Soft, No Tenderness, No Guarding, No Rebound


Extremity: Other (+1 pitting edema b/l)


Neurological/Psych: Oriented x3





ED Course And Treatment





- Laboratory Results


Result Diagrams: 


 18 07:32





 18 07:32


ECG: Interpreted By Me, Viewed By Me


ECG Rhythm: Sinus Tachycardia, L BBB


Interpretation Of ECG: ST/T adversion on V5V6, Occasional PVCs


Rate From EC (bpm)


O2 Sat by Pulse Oximetry: 98 (RA)


Pulse Ox Interpretation: Normal





Medical Decision Making


Medical Decision Making: 


Assessment: SOB 





Progress: Discussed with Dr. Fierro, patient will be admitted under his service 


Diagnosis: CHF, Pneumonia 











Disposition





- Disposition


Disposition: HOSPITALIZED


Disposition Time: 18:44


Condition: FAIR





- Clinical Impression


Clinical Impression: 


 CHF (congestive heart failure), Pneumonia








- Scribe Statement


The provider has reviewed the documentation as recorded by the Yanaibmary Colon





All medical record entries made by the Yanaibmary were at my direction and 

personally dictated by me. I have reviewed the chart and agree that the record 

accurately reflects my personal performance of the history, physical exam, 

medical decision making, and the department course for this patient. I have 

also personally directed, reviewed, and agree with the discharge instructions 

and disposition.

## 2018-01-29 NOTE — CP.PCM.CON
History of Present Illness





- History of Present Illness


History of Present Illness: 


INFECTIOUS DISEASE CONSULT;


HPI;





87 y/o female presents to ED with complaints of productive cough with 

associated sob for 2-3 weeks. 


Patient states she saw PMD Dr. Fierro 3 days ago and was given Levaquin and 

Prednisone. Patient reports no improvement which prompted visit to ED today and 

denies vomiting, diarrhea, chest pain or any other complaints at this time. 


PATIENT DENIES ANY FEVER OR CHILLS BUT COMPLAINS OF COUGH/AND SHORTNESS OF 

BREATH WHICH PROMPTED HER TO COME TO THE ER.


PATIENT IS UP-TO-DATE ON HER VACCINATION.


PATIENT DENIES ANY RECENT TRAVEL OR ANY SICK CONTACTS.


ALLERGY; CLOPIDOGREL.


PATIENT WAS GIVEN A DOSE OF rOCEPHIN 1 G STAT AND ONE DOSE OF  ZITHROMAX IN THE 

er AFTER APPROPRIATE CULTURES.





INFECTIOUS DISEASE CONSULTATION REQUESTED BY DR. FIERRO.


PATIENT'S PRObnp WAS FOUND TO BE 38,000.


PATIENT ALSO HAD LEUKOCYTOSIS OF 14.8 WITH 4% BANDS AND FAILED OUTPATIENT 

TREATMENT..








PMH: Anemia, Anxiety, Arthritis (BACK AND KNEES AND R SHOULDER), Asthma, Cardia 

Arrhythmia, CHF, COPD, Diabetes, HTN, Hypercholesterolemia, TIA


Surgical History: Appendectomy, Coronary Stent, Endoscopy, Pacemaker (2016)





- [facility] Procedures








ASSISTANCE WITH RESPIRATORY VENTILATION, 24-96 HRS, CPAP (11/28/17)


ASSISTANCE WITH RESPIRATORY VENTILATION, <24 HRS, CPAP (07/21/16)


ESOPHAGOGASTRODUODENOSCOPY [EGD] W/CLOSED BIOPSY (06/14/15)


LEFT HEART CARDIAC CATH (04/03/14)


LT HEART ANGIOCARDIOGRAM (04/03/14)


MEASURE OF CARDIAC SAMPL & PRESSURE, L HEART, PERC APPROACH (11/03/15)


PACKED CELL TRANSFUSION (06/14/15)


PLAIN RADIOGRAPHY OF MULT COR ART USING OTH CONTRAST (11/03/15)


TRANSFUSE NONAUT RED BLOOD CELLS IN PERIPH VEIN, PERC (07/21/16)








Family History: States: No Known Family Hx





- Social History


Hx Tobacco Use: No


Hx Alcohol Use: No


Hx Substance Use: No





- Immunization History


Hx Tetanus Toxoid Vaccination: No


Hx Influenza Vaccination: Yes


Hx Pneumococcal Vaccination: Yes





 Review Of Systems 





Review of Systems





- Constitutional


Constitutional: absent: Chills, Fever





- EENT


Eyes: absent: Change in Vision


Nose/Mouth/Throat: absent: Nasal Congestion, Dry Mouth, Sore Throat





- Cardiovascular


Cardiovascular: Dyspnea on Exertion, Orthopnea.  absent: Chest Pain





- Respiratory


Respiratory: Cough, Dyspnea on Exertion, Chest Congestion, Excessive Mucous 

Production.  absent: Hemoptysis





- Gastrointestinal


Gastrointestinal: absent: Abdominal Pain, Diarrhea, Nausea, Vomiting





- Genitourinary


Genitourinary: absent: Dysuria, Urinary Frequency, Freq UTI





- Neurological


Neurological: Abnormal Gait.  absent: Headaches





- Hematologic/Lymphatic


Hematologic: As Per HPI.  absent: Easy Bleeding, Easy Bruising





Past Patient History





- Infectious Disease


Hx of Infectious Diseases: None





- Tetanus Immunizations


Tetanus Immunization: Unknown





- Past Medical History & Family History


Past Medical History?: Yes





- Past Social History


Smoking Status: Never Smoked





- CARDIAC


Hx Cardia Arrhythmia: Yes


Hx Congestive Heart Failure: Yes


Hx Hypercholesterolemia: Yes


Hx Hypertension: Yes


Hx Pacemaker: Yes (2016)





- PULMONARY


Hx Asthma: Yes


Hx Chronic Obstructive Pulmonary Disease (COPD): Yes





- NEUROLOGICAL


Hx Transient Ischemic Attacks (TIA): Yes





- HEENT


Hx HEENT Problems: No





- RENAL


Hx Chronic Kidney Disease: No





- ENDOCRINE/METABOLIC


Hx Diabetes Mellitus Type 2: Yes





- HEMATOLOGICAL/ONCOLOGICAL


Hx Anemia: Yes





- MUSCULOSKELETAL/RHEUMATOLOGICAL


Hx Arthritis: Yes (BACK AND KNEES AND R SHOULDER)





- PSYCHIATRIC


Hx Anxiety: Yes


Hx Substance Use: No





- SURGICAL HISTORY


Hx Appendectomy: Yes


Hx Coronary Stent: Yes





- ANESTHESIA


Hx Anesthesia: Yes


Hx Anesthesia Reactions: No


Hx Malignant Hyperthermia: No





Meds


Allergies/Adverse Reactions: 


 Allergies











Allergy/AdvReac Type Severity Reaction Status Date / Time


 


clopidogrel Allergy Mild SWELLING Verified 01/29/18 16:20














- Medications


Medications: 


 Current Medications





Acetaminophen (Tylenol 325mg Tab)  650 mg PO Q6 PRN


   PRN Reason: Pain, Mild (1-3)


Albuterol/Ipratropium (Duoneb 3 Mg/0.5 Mg (3 Ml) Ud)  3 ml INH RQ4 PRN


   PRN Reason: Shortness of Breath


Aspirin (Ecotrin)  325 mg PO DAILY [facility]


Ergocalciferol (Drisdol 50,000 Intl Units Cap)  1 cap PO QWK [facility]


Furosemide (Lasix)  40 mg IVP DAILY [facility]


Home Med (Nitroglycerin [Nitrostat Sl Tab])  0.6 mg SL Q5MIN PRN


   PRN Reason: CHEST PAIN


Insulin Glargine (Lantus)  20 unit SC HS [facility]


Insulin Human Regular (Novolin R)  0 unit SC ACHS [facility]


   PRN Reason: Protocol


Metoprolol Tartrate (Lopressor)  25 mg PO BID [facility]


Ranolazine (Ranexa)  500 mg PO BID [facility]


Repaglinide (Prandin)  2 mg PO DAILY [facility]


Rosuvastatin Calcium (Crestor)  20 mg PO HS [facility]


Sacubitril/Valsartan (Entresto 24 Mg-26 Mg)  1 tab PO DAILY [facility]


Ticagrelor (Brilinta)  90 mg PO BID [facility]











Physical Exam





- Constitutional


Appears: No Acute Distress





- Head Exam


Head Exam: NORMAL INSPECTION





- Eye Exam


Eye Exam: EOMI, PERRL





- ENT Exam


ENT Exam: Normal Oropharynx





- Neck Exam


Neck exam: Positive for: Normal Inspection





- Respiratory Exam


Respiratory Exam: Rales (LEFT MORE THAN RIGHT.), Rhonchi





- Cardiovascular Exam


Cardiovascular Exam: REGULAR RHYTHM, +S1, +S2





- GI/Abdominal Exam


GI & Abdominal Exam: Normal Bowel Sounds, Soft.  absent: Tenderness





-  Exam


 Exam: NORMAL INSPECTION.  absent: Bladder Distension





- Extremities Exam


Extremities exam: Positive for: pedal pulses present.  Negative for: calf 

tenderness, pedal edema





- Neurological Exam


Neurological exam: Alert, CN II-XII Intact, Oriented x3, Reflexes Normal





- Psychiatric Exam


Psychiatric exam: Normal Mood





- Skin


Skin Exam: Normal Color, Warm





Results





- Vital Signs


Recent Vital Signs: 


 Last Vital Signs











Temp  97.8 F   01/29/18 16:19


 


Pulse  102 H  01/29/18 21:45


 


Resp  26 H  01/29/18 21:45


 


BP  125/50 L  01/29/18 21:45


 


Pulse Ox  98   01/29/18 21:45














- Labs


Result Diagrams: 


 01/29/18 17:10





 01/29/18 17:10


Labs: 


 Laboratory Results - last 24 hr











  01/29/18 01/29/18





  17:10 17:10


 


WBC  14.8 H D 


 


RBC  3.18 L 


 


Hgb  8.8 L D 


 


Hct  27.5 L 


 


MCV  86.3 


 


MCH  27.6 


 


MCHC  32.0 L 


 


RDW  16.8 H 


 


Plt Count  208  D 


 


MPV  11.3 


 


Neut % (Auto)  87.6 H 


 


Lymph % (Auto)  4.3 L 


 


Mono % (Auto)  7.9 


 


Eos % (Auto)  0.0 


 


Baso % (Auto)  0.2 


 


Neut #  13.0 H 


 


Lymph #  0.6 L 


 


Mono #  1.2 H 


 


Eos #  0.0 


 


Baso #  0.0 


 


Neutrophils % (Manual)  88 H 


 


Band Neutrophils %  4 H 


 


Lymphocytes % (Manual)  2 L 


 


Monocytes % (Manual)  6 


 


Platelet Estimate  Normal 


 


Polychromasia  Slight 


 


Hypochromasia (manual)  Moderate 


 


Anisocytosis (manual)  Slight 


 


Tear Drop Cells  Slight 


 


Ovalocytes  Slight 


 


Sodium   135


 


Potassium   4.0


 


Chloride   102


 


Carbon Dioxide   20 L


 


Anion Gap   18


 


BUN   27 H


 


Creatinine   1.5 H


 


Est GFR ( Amer)   40


 


Est GFR (Non-Af Amer)   33


 


Random Glucose   231 H


 


Calcium   9.2


 


Magnesium   1.9


 


Total Bilirubin   0.7


 


AST   42 H D


 


ALT   27


 


Alkaline Phosphatase   58


 


Troponin I   0.0430


 


NT-Pro-B Natriuret Pep   67925 H


 


Total Protein   7.3


 


Albumin   3.8


 


Globulin   3.5


 


Albumin/Globulin Ratio   1.1














- Imaging and Cardiology


  ** Chest x-ray


Status: Report reviewed by me (SEE REPORT.)





Assessment & Plan


(1) Respiratory distress


Status: Acute   





(2) Pneumonia


Status: Acute   





(3) CHF exacerbation


Status: Acute   





(4) Anemia


Status: Acute   





(5) Diabetes mellitus, new onset


Status: Acute   





- Assessment and Plan (Free Text)


Plan: 





pancultures


Atypical titers


Sedimentation rate


CRP


MRSA screen


Continue IV Rocephin 1 g once daily.  1/29/18.


Continue IV Zithromax 500 mg once a day daily.1 29/18


SPUTUM gRAM STAIN AND CULTURES.


fOLLOW-UP lftS AND RENAL FUNCTIONS CLOSELY.


DIURESE IS AS PER PMD .


WILL FOLLOW AND MAKE RECOMMENDATIONS AS NEEDED.


THANK YOU.

## 2018-01-30 LAB
ALBUMIN SERPL-MCNC: 3.6 G/DL (ref 3.5–5)
ALBUMIN/GLOB SERPL: 1.2 {RATIO} (ref 1–2.1)
ALT SERPL-CCNC: 68 U/L (ref 9–52)
ANISOCYTOSIS BLD QL SMEAR: SLIGHT
AST SERPL-CCNC: 103 U/L (ref 14–36)
BASOPHILS # BLD AUTO: 0 K/UL (ref 0–0.2)
BASOPHILS NFR BLD: 0.5 % (ref 0–2)
BILIRUB DIRECT SERPL-MCNC: 0.3 MG/DL (ref 0–0.4)
BILIRUB UR-MCNC: NEGATIVE MG/DL
BUN SERPL-MCNC: 28 MG/DL (ref 7–17)
CALCIUM SERPL-MCNC: 9.1 MG/DL (ref 8.6–10.4)
EOSINOPHIL # BLD AUTO: 0 K/UL (ref 0–0.7)
EOSINOPHIL NFR BLD: 0 % (ref 0–4)
ERYTHROCYTE [DISTWIDTH] IN BLOOD BY AUTOMATED COUNT: 16.7 % (ref 11.5–14.5)
GFR NON-AFRICAN AMERICAN: 31
GLUCOSE UR STRIP-MCNC: NORMAL MG/DL
HGB BLD-MCNC: 8.7 G/DL (ref 11–16)
HYPOCHROMIC: SLIGHT
LEUKOCYTE ESTERASE UR-ACNC: (no result) LEU/UL
LYMPHOCYTE: 3 % (ref 20–40)
LYMPHOCYTES # BLD AUTO: 0.7 K/UL (ref 1–4.3)
LYMPHOCYTES NFR BLD AUTO: 7.5 % (ref 20–40)
MCH RBC QN AUTO: 28.6 PG (ref 27–31)
MCHC RBC AUTO-ENTMCNC: 33.5 G/DL (ref 33–37)
MCV RBC AUTO: 85.3 FL (ref 81–99)
MONOCYTE: 5 % (ref 0–10)
MONOCYTES # BLD: 0.4 K/UL (ref 0–0.8)
MONOCYTES NFR BLD: 4.6 % (ref 0–10)
NEUTROPHILS # BLD: 7.6 K/UL (ref 1.8–7)
NEUTROPHILS NFR BLD AUTO: 87.4 % (ref 50–75)
NEUTROPHILS NFR BLD AUTO: 91 % (ref 50–75)
NEUTS BAND NFR BLD: 1 % (ref 0–2)
NRBC BLD AUTO-RTO: 0.1 % (ref 0–2)
NRBC BLD AUTO-RTO: 1 % (ref 0–0)
PH UR STRIP: 5 [PH] (ref 5–8)
PLATELET # BLD EST: NORMAL 10*3/UL
PLATELET # BLD: 165 K/UL (ref 130–400)
PMV BLD AUTO: 11 FL (ref 7.2–11.7)
POLYCHROMIC: SLIGHT
PROT UR STRIP-MCNC: NEGATIVE MG/DL
RBC # BLD AUTO: 3.04 MIL/UL (ref 3.8–5.2)
RBC # UR STRIP: (no result) /UL
SP GR UR STRIP: 1.01 (ref 1–1.03)
SQUAMOUS EPITHIAL: < 1 /HPF (ref 0–5)
TOTAL CELLS COUNTED BLD: 100
URINE NITRATE: NEGATIVE
UROBILINOGEN UR-MCNC: NORMAL MG/DL (ref 0.2–1)
WBC # BLD AUTO: 8.7 K/UL (ref 4.8–10.8)

## 2018-01-30 RX ADMIN — HUMAN INSULIN SCH: 100 INJECTION, SOLUTION SUBCUTANEOUS at 16:59

## 2018-01-30 RX ADMIN — HUMAN INSULIN SCH: 100 INJECTION, SOLUTION SUBCUTANEOUS at 21:56

## 2018-01-30 RX ADMIN — IPRATROPIUM BROMIDE AND ALBUTEROL SULFATE PRN ML: .5; 3 SOLUTION RESPIRATORY (INHALATION) at 18:50

## 2018-01-30 RX ADMIN — ASPIRIN SCH MG: 325 TABLET, DELAYED RELEASE ORAL at 11:01

## 2018-01-30 RX ADMIN — RANOLAZINE SCH MG: 500 TABLET, FILM COATED, EXTENDED RELEASE ORAL at 10:59

## 2018-01-30 RX ADMIN — SACUBITRIL AND VALSARTAN SCH TAB: 24; 26 TABLET, FILM COATED ORAL at 11:02

## 2018-01-30 RX ADMIN — RANOLAZINE SCH MG: 500 TABLET, FILM COATED, EXTENDED RELEASE ORAL at 17:20

## 2018-01-30 RX ADMIN — HUMAN INSULIN SCH UNIT: 100 INJECTION, SOLUTION SUBCUTANEOUS at 08:38

## 2018-01-30 RX ADMIN — HUMAN INSULIN SCH UNIT: 100 INJECTION, SOLUTION SUBCUTANEOUS at 13:05

## 2018-01-30 RX ADMIN — INSULIN GLARGINE SCH UNIT: 100 INJECTION, SOLUTION SUBCUTANEOUS at 22:00

## 2018-01-30 NOTE — CP.PCM.PN
Subjective





- Date & Time of Evaluation


Date of Evaluation: 01/30/18


Time of Evaluation: 14:17





- Subjective


Subjective: 





CHIEF COMPLAINTS TODAY :


SOB/WHEEZING A/W COUGH 





ROS.


HEENT :  N.


Resp :       No c hemoptysis 


Cardio :     No anginal  CP,palpitation 


GI :           No abd.pain, n/v ,diarrhea or GI bleeding .


CNS : No headache, vertigo, focal deficit.


Musculoskel :  No joint swelling ,


Derm :        No rash 


Psych :     Normal affect.


Ext :  No  swelling ,calf pain 





PE.


Pt. is alert awake in no distress.


V.S  As noted in the chart 


Head ,ear nose,throat and eyes : Normal.


Neck : Supple with normal carotids.


Lungs: EVELIA RONCHI/RALES 


Heart : S1 & S2 normal with S4. MENDEZ 


Abd : Soft non tender with normal bowel sounds.


Neuro : Moves all ext. with no localized deficit.


Ext : No edema with intact pulses.Non tender calves 


Derm : No rashes or decubitus ulcer.





LABS/RADIOLOGY:





ASSESSMENT/PLAN : 


NEB/IVAB 


ID EVAL 


IV LASIX 











Objective





- Vital Signs/Intake and Output


Vital Signs (last 24 hours): 


 











Temp Pulse Resp BP Pulse Ox


 


 98.3 F   104 H  18   108/50 L  94 L


 


 01/30/18 07:40  01/30/18 07:40  01/30/18 07:40  01/30/18 11:04  01/30/18 07:40











- Medications


Medications: 


 Current Medications





Acetaminophen (Tylenol 325mg Tab)  650 mg PO Q6 PRN


   PRN Reason: Pain, Mild (1-3)


Albuterol/Ipratropium (Duoneb 3 Mg/0.5 Mg (3 Ml) Ud)  3 ml INH RQ4 PRN


   PRN Reason: Shortness of Breath


Aspirin (Ecotrin)  325 mg PO DAILY [facility]


   Last Admin: 01/30/18 11:01 Dose:  325 mg


Ergocalciferol (Drisdol 50,000 Intl Units Cap)  1 cap PO QWK [facility]


Furosemide (Lasix)  40 mg IVP DAILY [facility]


   Last Admin: 01/30/18 11:00 Dose:  40 mg


Ceftriaxone Sodium 1 gm/ (Sodium Chloride)  100 mls @ 100 mls/hr IVPB DAILY [facility]


   Last Admin: 01/30/18 10:59 Dose:  100 mls/hr


Azithromycin 500 mg/ Sodium (Chloride)  250 mls @ 250 mls/hr IVPB Q24H [facility]


Insulin Glargine (Lantus)  20 unit SC HS [facility]


   Last Admin: 01/29/18 22:32 Dose:  20 unit


Insulin Human [facility] ([facility] R)  [facility]


   PRN Reason: Protocol


   Last Admin: 01/30/18 13:05 Dose:  4 unit


Metoprolol Tartrate (Lopressor)  25 mg PO BID [facility]


   Last Admin: 01/30/18 11:04 Dose:  25 mg


Pneumococcal Polyvalent Vaccine (Pneumovax 23 Vaccine)  0.5 ml IM .ONCE ONE


   Stop: 02/01/18 10:01


Pneumococcal Polyvalent Vaccine (Prevnar 13)  0.5 ml IM .ONCE ONE


   Stop: 02/01/18 10:01


Ranolazine (Ranexa)  500 mg PO BID [facility]


   Last Admin: 01/30/18 10:59 Dose:  500 mg


Repaglinide (Prandin)  2 mg PO DAILY [facility]


   Last Admin: 01/30/18 11:01 Dose:  2 mg


Rosuvastatin Calcium (Crestor)  10 mg PO [facility]


Sacubitril/Valsartan (Entresto 24 Mg-26 Mg)  1 tab PO DAILY [facility]


   Last Admin: 01/30/18 11:02 Dose:  1 tab


Ticagrelor (Brilinta)  90 mg PO BID [facility]


   Last Admin: 01/30/18 11:01 Dose:  90 mg











- Labs


Labs: 


 





 01/30/18 07:32 





 01/30/18 07:32

## 2018-01-30 NOTE — CP.PCM.PN
Subjective





- Date & Time of Evaluation


Date of Evaluation: 01/30/18


Time of Evaluation: 22:37





- Subjective


Subjective: 





CHIEF COMPLAINTS TODAY :


AFEBRILE.


c/o cough and shortness of breath


productive cough








ROS.


HEENT :  N.


Resp :      +ve  cough, wheezing ,pleuritic CP ,or hemoptysis 


Cardio :     No anginal  CP, PND, orthopnea, palpitation 


GI :           No abd.pain, n/v ,diarrhea or GI bleeding .


CNS : No headache, vertigo, focal deficit.


Musculoskel :  No joint swelling ,


Derm :        No rash 


Psych :     Normal affect.


Ext :  No  swelling ,calf pain 





PE.


Pt. is alert awake in no distress.


V.S  As noted in the chart 


Head ,ear nose,throat and eyes : Normal.


Neck : Supple with normal carotids.


Lungs: BILATERAL EXPIRATORY WHEEZE./AND RHONCHI.


Heart : S1 & S2 normal with S4. No murmur.


Abd : Soft non tender with normal bowel sounds.


Neuro : Moves all ext. with no localized deficit.


Ext : No edema with intact pulses.Non tender calves 


Derm : No rashes or decubitus ulcer.





LABS/RADIOLOGY:


wbc 8.7 IMPROVED


CREATININE 1.6 bun 28


sEDIMENTATION RATE 26


LFTS- ast 103, alt 68, ALKALINE PHOSPHATASE NORMAL.


CHEST X-RAY 1/29/18 MODERATE VENOUS CONGESTION WITH RIGHT HILAR PROMINENCE,


trace left pleural effusion.  Left pacemaker in place.








ASSESSMENT/PLAN : 


RESPIRATORY INSUFFICIENCY


PNEUMONIA


CHF.


TRANSAMINITIS ?DRUGS.


ANEMIA.


DM





PLAN.


CONTINUE iv ROCEPHIN 1 G ONCE A DAY DAILY


decreased ZITHROMAX 250 MG iv PIGGYBACK ONCE A DAY DAILY.


DC CRESTOR


NEBULIZER THERAPY.


sputum Gram stain and culture.


.MONITOR lftS.


MONITOR RENAL FUNCTIONS CLOSELY


pATIENT ON lASIX 40 MG ONCE A DAY DAILY.











Objective





- Vital Signs/Intake and Output


Vital Signs (last 24 hours): 


 











Temp Pulse Resp BP Pulse Ox


 


 98.1 F   86   20   98/50 L  98 


 


 01/30/18 15:48  01/30/18 18:00  01/30/18 15:48  01/30/18 17:25  01/30/18 16:53








Intake and Output: 


 











 01/30/18 01/31/18





 18:59 06:59


 


Intake Total 590 


 


Balance 590 














- Medications


Medications: 


 Current Medications





Acetaminophen (Tylenol 325mg Tab)  650 mg PO Q6 PRN


   PRN Reason: Pain, Mild (1-3)


Albuterol/Ipratropium (Duoneb 3 Mg/0.5 Mg (3 Ml) Ud)  3 ml INH RQ4 PRN


   PRN Reason: Shortness of Breath


   Last Admin: 01/30/18 18:50 Dose:  3 ml


Aspirin (Ecotrin)  325 mg PO DAILY [facility]


   Last Admin: 01/30/18 11:01 Dose:  325 mg


Ergocalciferol (Drisdol 50,000 Intl Units Cap)  1 cap PO QWK [facility]


Furosemide (Lasix)  40 mg IVP DAILY [facility]


   Last Admin: 01/30/18 11:00 Dose:  40 mg


Ceftriaxone Sodium 1 gm/ (Sodium Chloride)  100 mls @ 100 mls/hr IVPB DAILY [facility]


   Last Admin: 01/30/18 10:59 Dose:  100 mls/hr


Azithromycin 500 mg/ Sodium (Chloride)  250 mls @ 250 mls/hr IVPB Q24H [facility]


   Last Admin: 01/30/18 17:20 Dose:  250 mls/hr


Insulin Glargine (Lantus)  20 unit SC [facility]


   Last Admin: 01/30/18 22:00 Dose:  20 unit


Insulin Human Regular (Novolin R)  0 unit SC ACHS [facility]


   PRN Reason: Protocol


   Last Admin: 01/30/18 21:56 Dose:  Not Given


Methylprednisolone (Solu-Medrol)  50 mg IVP Q8H [facility]


   Last Admin: 01/30/18 17:21 Dose:  50 mg


Metoprolol Tartrate (Lopressor)  25 mg PO BID [facility]


   Last Admin: 01/30/18 17:25 Dose:  Not Given


Pneumococcal Polyvalent Vaccine (Pneumovax 23 Vaccine)  0.5 ml IM .ONCE ONE


   Stop: 02/01/18 10:01


Pneumococcal Polyvalent Vaccine (Prevnar 13)  0.5 ml IM .ONCE ONE


   Stop: 02/01/18 10:01


Ranolazine (Ranexa)  500 mg PO BID [facility]


   Last Admin: 01/30/18 17:20 Dose:  500 mg


Repaglinide (Prandin)  2 mg PO DAILY [facility]


   Last Admin: 01/30/18 11:01 Dose:  2 mg


Rosuvastatin Calcium (Crestor)  10 mg PO HS [facility]


   Last Admin: 01/30/18 22:00 Dose:  10 mg


Sacubitril/Valsartan (Entresto 24 Mg-26 Mg)  1 tab PO DAILY [facility]


   Last Admin: 01/30/18 11:02 Dose:  1 tab


Ticagrelor (Brilinta)  90 mg PO BID [facility]


   Last Admin: 01/30/18 17:21 Dose:  90 mg











- Labs


Labs: 


 





 01/30/18 07:32 





 01/30/18 07:32 











Assessment and Plan


(1) Respiratory distress


Status: Acute   





(2) Pneumonia


Status: Acute   





(3) CHF exacerbation


Status: Acute   





(4) Anemia


Status: Acute   





(5) Diabetes mellitus, new onset


Status: Acute

## 2018-01-30 NOTE — RAD
Chest x-ray single frontal view 



History: Shortness of breath. 



Comparison: None available. 



Findings: 



Moderate venous congestion. 



Right hilar prominence. 



Mild blunting of the left costophrenic angle which may represent 

trace effusion. 



Cardiomegaly. 



Left-sided pacemaker. 



Degenerative changes in the spine and shoulders. 



Impression: 



Moderate venous congestion. 



Right hilar prominence. 



Mild blunting of the left costophrenic angle which may represent 

trace effusion. 



Cardiomegaly. 



Left-sided pacemaker.

## 2018-01-31 LAB
ALBUMIN SERPL-MCNC: 3.2 G/DL (ref 3.5–5)
ALBUMIN/GLOB SERPL: 1.1 {RATIO} (ref 1–2.1)
ALT SERPL-CCNC: 70 U/L (ref 9–52)
ANISOCYTOSIS BLD QL SMEAR: SLIGHT
AST SERPL-CCNC: 73 U/L (ref 14–36)
BASOPHILS # BLD AUTO: 0 K/UL (ref 0–0.2)
BASOPHILS NFR BLD: 0.1 % (ref 0–2)
BUN SERPL-MCNC: 35 MG/DL (ref 7–17)
CALCIUM SERPL-MCNC: 8.6 MG/DL (ref 8.6–10.4)
EOSINOPHIL # BLD AUTO: 0 K/UL (ref 0–0.7)
EOSINOPHIL NFR BLD: 0.1 % (ref 0–4)
ERYTHROCYTE [DISTWIDTH] IN BLOOD BY AUTOMATED COUNT: 16.3 % (ref 11.5–14.5)
GFR NON-AFRICAN AMERICAN: 31
HGB BLD-MCNC: 8.1 G/DL (ref 11–16)
LYMPHOCYTE: 4 % (ref 20–40)
LYMPHOCYTES # BLD AUTO: 0.2 K/UL (ref 1–4.3)
LYMPHOCYTES NFR BLD AUTO: 2.5 % (ref 20–40)
MCH RBC QN AUTO: 28.4 PG (ref 27–31)
MCHC RBC AUTO-ENTMCNC: 33.4 G/DL (ref 33–37)
MCV RBC AUTO: 85.1 FL (ref 81–99)
MONOCYTES # BLD: 0.1 K/UL (ref 0–0.8)
MONOCYTES NFR BLD: 1.7 % (ref 0–10)
NEUTROPHILS # BLD: 7.9 K/UL (ref 1.8–7)
NEUTROPHILS NFR BLD AUTO: 94 % (ref 50–75)
NEUTROPHILS NFR BLD AUTO: 95.6 % (ref 50–75)
NEUTS BAND NFR BLD: 2 % (ref 0–2)
NRBC BLD AUTO-RTO: 0.1 % (ref 0–2)
PLATELET # BLD EST: (no result) 10*3/UL
PLATELET # BLD: 129 K/UL (ref 130–400)
PMV BLD AUTO: 11.2 FL (ref 7.2–11.7)
RBC # BLD AUTO: 2.85 MIL/UL (ref 3.8–5.2)
TOTAL CELLS COUNTED BLD: 100
WBC # BLD AUTO: 8.3 K/UL (ref 4.8–10.8)

## 2018-01-31 RX ADMIN — HUMAN INSULIN SCH UNIT: 100 INJECTION, SOLUTION SUBCUTANEOUS at 13:15

## 2018-01-31 RX ADMIN — RANOLAZINE SCH MG: 500 TABLET, FILM COATED, EXTENDED RELEASE ORAL at 18:21

## 2018-01-31 RX ADMIN — ASPIRIN SCH MG: 325 TABLET, DELAYED RELEASE ORAL at 09:20

## 2018-01-31 RX ADMIN — HUMAN INSULIN SCH UNIT: 100 INJECTION, SOLUTION SUBCUTANEOUS at 08:55

## 2018-01-31 RX ADMIN — RANOLAZINE SCH MG: 500 TABLET, FILM COATED, EXTENDED RELEASE ORAL at 09:19

## 2018-01-31 RX ADMIN — IPRATROPIUM BROMIDE AND ALBUTEROL SULFATE PRN ML: .5; 3 SOLUTION RESPIRATORY (INHALATION) at 07:15

## 2018-01-31 RX ADMIN — SACUBITRIL AND VALSARTAN SCH TAB: 24; 26 TABLET, FILM COATED ORAL at 09:21

## 2018-01-31 RX ADMIN — HUMAN INSULIN SCH UNIT: 100 INJECTION, SOLUTION SUBCUTANEOUS at 18:20

## 2018-01-31 RX ADMIN — HUMAN INSULIN SCH: 100 INJECTION, SOLUTION SUBCUTANEOUS at 21:18

## 2018-01-31 RX ADMIN — INSULIN GLARGINE SCH UNIT: 100 INJECTION, SOLUTION SUBCUTANEOUS at 21:43

## 2018-01-31 NOTE — CP.PCM.PN
Subjective





- Date & Time of Evaluation


Date of Evaluation: 01/31/18


Time of Evaluation: 13:46





- Subjective


Subjective: 





CHIEF COMPLAINTS TODAY :


AFEBRILE.


c/o cough and shortness of breath


productive cough.











ROS.


HEENT :  N.


Resp :      +ve  cough, wheezing ,pleuritic CP ,or hemoptysis 


Cardio :     No anginal  CP, PND, orthopnea, palpitation 


GI :           No abd.pain, n/v ,diarrhea or GI bleeding .


CNS : No headache, vertigo, focal deficit.


Musculoskel :  No joint swelling ,


Derm :        No rash 


Psych :     Normal affect.


Ext :  No  swelling ,calf pain 





PE.


Pt. is alert awake in no distress.


V.S  As noted in the chart 


Head ,ear nose,throat and eyes : Normal.


Neck : Supple with normal carotids.


Lungs: BILATERAL EXPIRATORY WHEEZE./AND RHONCHI.


Heart : S1 & S2 normal with S4. No murmur.


Abd : Soft non tender with normal bowel sounds.


Neuro : Moves all ext. with no localized deficit.


Ext : No edema with intact pulses.Non tender calves 


Derm : No rashes or decubitus ulcer.





LABS/RADIOLOGY:


wbc 8.3 


CREATININE 1.6 bun 35


sEDIMENTATION RATE 26


LFTS- ast 73, alt 70, ALKALINE PHOSPHATASE NORMAL.


CHEST X-RAY 1/29/18 MODERATE VENOUS CONGESTION WITH RIGHT HILAR PROMINENCE,


trace left pleural effusion.  Left pacemaker in place


SPUTUM NORMAL [facility]ARR[facility].








ASSESSMENT/PLAN : 


RESPIRATORY INSUFFICIENCY


PNEUMONIA


CHF.


TRANSAMINITIS ?DRUGS.


ANEMIA.


DM





PLAN.


IV STEROIDS ADDED BY pmd AS NOTED.


CONTINUE iv ROCEPHIN 1 G ONCE A DAY DAILY


decreased ZITHROMAX 250 MG iv PIGGYBACK ONCE A DAY DAILY.


DC CRESTOR


NEBULIZER THERAPY.


sputum Gram stain and culture.


.MONITOR lftS.


MONITOR RENAL FUNCTIONS CLOSELY


pATIENT ON lASIX 40 MG ONCE A DAY DAILY.





Objective





- Vital Signs/Intake and Output


Vital Signs (last 24 hours): 


 











Temp Pulse Resp BP Pulse Ox


 


 98.3 F   86   20   104/68   100 


 


 01/31/18 07:40  01/31/18 08:19  01/31/18 07:40  01/31/18 09:21  01/31/18 07:40








Intake and Output: 


 











 01/31/18 01/31/18





 06:59 18:59


 


Intake Total 850 


 


Balance 850 














- Medications


Medications: 


 Current Medications





Acetaminophen (Tylenol 325mg Tab)  650 mg PO Q6 PRN


   PRN Reason: Pain, Mild (1-3)


Albuterol/Ipratropium (Duoneb 3 Mg/0.5 Mg (3 Ml) Ud)  3 ml INH RQ4 PRN


   PRN Reason: Shortness of Breath


   Last Admin: 01/31/18 07:15 Dose:  3 ml


Aspirin (Ecotrin)  325 mg PO DAILY [facility]


   Last Admin: 01/31/18 09:20 Dose:  325 mg


Ergocalciferol (Drisdol 50,000 Intl Units Cap)  1 cap PO QWK [facility]


Furosemide (Lasix)  40 mg IVP DAILY [facility]


   Last Admin: 01/31/18 09:19 Dose:  40 mg


Ceftriaxone Sodium 1 gm/ (Sodium Chloride)  100 mls @ 100 mls/hr IVPB DAILY [facility]


   Last Admin: 01/31/18 09:22 Dose:  100 mls/hr


Azithromycin 250 mg/ Sodium (Chloride)  250 mls @ 250 mls/hr IVPB Q24H [facility]


Insulin Glargine (Lantus)  20 unit SC HS [facility]


   Last Admin: 01/30/18 22:00 Dose:  20 unit


Insulin Human Regular (Novolin R)  0 unit SC ACHS [facility]


   PRN Reason: Protocol


   Last Admin: 01/31/18 13:15 Dose:  4 unit


Methylprednisolone (Solu-Medrol)  50 mg IVP Q8H [facility]


   Last Admin: 01/31/18 09:20 Dose:  50 mg


Metoprolol Tartrate (Lopressor)  25 mg PO BID [facility]


   Last Admin: 01/31/18 09:21 Dose:  25 mg


Pneumococcal Polyvalent Vaccine (Pneumovax 23 Vaccine)  0.5 ml IM .ONCE ONE


   Stop: 02/01/18 10:01


Pneumococcal Polyvalent Vaccine (Prevnar 13)  0.5 ml IM .ONCE ONE


   Stop: 02/01/18 10:01


Ranolazine (Ranexa)  500 mg PO BID [facility]


   Last Admin: 01/31/18 09:19 Dose:  500 mg


Repaglinide (Prandin)  2 mg PO DAILY [facility]


   Last Admin: 01/31/18 09:20 Dose:  2 mg


Rosuvastatin Calcium (Crestor)  10 mg PO HS [facility]


   Last Admin: 01/30/18 22:00 Dose:  10 mg


Sacubitril/Valsartan (Entresto 24 Mg-26 Mg)  1 tab PO DAILY [facility]


   Last Admin: 01/31/18 09:21 Dose:  1 tab


Ticagrelor (Brilinta)  90 mg PO BID [facility]


   Last Admin: 01/31/18 09:19 Dose:  90 mg











- Labs


Labs: 


 





 01/31/18 08:13 





 01/31/18 08:13 











Assessment and Plan


(1) Respiratory distress


Status: Acute   





(2) Pneumonia


Status: Acute   





(3) CHF exacerbation


Status: Acute   





(4) Anemia


Status: Acute   





(5) Diabetes mellitus, new onset


Status: Acute

## 2018-01-31 NOTE — RAD
PROCEDURE:  CHEST RADIOGRAPH, 1 VIEW



HISTORY:

CHF



COMPARISON:

Chest radiograph dated 01/29/2018.



FINDINGS:



LUNGS:

Stable chronic prominence of the bilateral interstitial markings. No 

focal consolidation.



PLEURA:

No pneumothorax or pleural fluid seen.



CARDIOVASCULAR:

Left subclavian access AICD/ pacemaker redemonstrated.  

Atherosclerotic aortic calcifications. Cardiomediastinal silhouette 

stably enlarged.



OSSEOUS STRUCTURES:

Unchanged.



VISUALIZED UPPER ABDOMEN:

Normal.



OTHER FINDINGS:

None. 



IMPRESSION:

Stable chronic prominence of the bilateral interstitial markings.  No 

focal consolidation or pleural effusion.

## 2018-01-31 NOTE — CP.PCM.PN
Subjective





- Date & Time of Evaluation


Date of Evaluation: 01/31/18


Time of Evaluation: 14:19





- Subjective


Subjective: 





CHIEF COMPLAINTS TODAY :


SOB/WHEEZING A/W COUGH 





ROS.


HEENT :  N.


Resp :       No c hemoptysis 


Cardio :     No anginal  CP,palpitation 


GI :           No abd.pain, n/v ,diarrhea or GI bleeding .


CNS : No headache, vertigo, focal deficit.


Musculoskel :  No joint swelling ,


Derm :        No rash 


Psych :     Normal affect.


Ext :  No  swelling ,calf pain 





PE.


Pt. is alert awake in no distress.


V.S  As noted in the chart 


Head ,ear nose,throat and eyes : Normal.


Neck : Supple with normal carotids.


Lungs: EVELIA RONCHI/RALES 


Heart : S1 & S2 normal with S4. MENDEZ 


Abd : Soft non tender with normal bowel sounds.


Neuro : Moves all ext. with no localized deficit.


Ext : No edema with intact pulses.Non tender calves 


Derm : No rashes or decubitus ulcer.





LABS/RADIOLOGY: SEPTIC W/P NEG SO FAR 





ASSESSMENT/PLAN : 


NEB/IVAB 


ID EVAL   NOTED 


IV LASIX 


CXR 


ADD SOLUMEDROL 








Objective





- Vital Signs/Intake and Output


Vital Signs (last 24 hours): 


 











Temp Pulse Resp BP Pulse Ox


 


 98.3 F   86   20   104/68   100 


 


 01/31/18 07:40  01/31/18 08:19  01/31/18 07:40  01/31/18 09:21  01/31/18 07:40








Intake and Output: 


 











 01/31/18 01/31/18





 11:59 23:59


 


Intake Total 300 


 


Balance 300 














- Medications


Medications: 


 Current Medications





Acetaminophen (Tylenol 325mg Tab)  650 mg PO Q6 PRN


   PRN Reason: Pain, Mild (1-3)


Albuterol/Ipratropium (Duoneb 3 Mg/0.5 Mg (3 Ml) Ud)  3 ml INH RQ4 PRN


   PRN Reason: Shortness of Breath


   Last Admin: 01/31/18 07:15 Dose:  3 ml


Aspirin (Ecotrin)  325 mg PO DAILY [facility]


   Last Admin: 01/31/18 09:20 Dose:  325 mg


Ergocalciferol (Drisdol 50,000 Intl Units Cap)  1 cap PO QWK [facility]


Furosemide (Lasix)  40 mg IVP DAILY [facility]


   Last Admin: 01/31/18 09:19 Dose:  40 mg


Ceftriaxone Sodium 1 gm/ (Sodium Chloride)  100 mls @ 100 mls/hr IVPB DAILY [facility]


   Last Admin: 01/31/18 09:22 Dose:  100 mls/hr


Azithromycin 250 mg/ Sodium (Chloride)  250 mls @ 250 mls/hr IVPB Q24H [facility]


Insulin Glargine (Lantus)  20 unit SC [facility]


   Last Admin: 01/30/18 22:00 Dose:  20 unit


Insulin Human Regular (Novolin R)  [facility]


   PRN Reason: Protocol


   Last Admin: 01/31/18 13:15 Dose:  4 unit


Methylprednisolone (Solu-Medrol)  50 mg IVP Q8H [facility]


   Last Admin: 01/31/18 09:20 Dose:  50 mg


Metoprolol Tartrate (Lopressor)  25 mg PO BID [facility]


   Last Admin: 01/31/18 09:21 Dose:  25 mg


Pneumococcal Polyvalent Vaccine (Pneumovax 23 Vaccine)  0.5 ml IM .ONCE ONE


   Stop: 02/01/18 10:01


Pneumococcal Polyvalent Vaccine (Prevnar 13)  0.5 ml IM .ONCE ONE


   Stop: 02/01/18 10:01


Ranolazine (Ranexa)  500 mg PO BID [facility]


   Last Admin: 01/31/18 09:19 Dose:  500 mg


Repaglinide (Prandin)  2 mg PO DAILY [facility]


   Last Admin: 01/31/18 09:20 Dose:  2 mg


Rosuvastatin Calcium (Crestor)  10 mg PO HS [facility]


   Last Admin: 01/30/18 22:00 Dose:  10 mg


Sacubitril/Valsartan (Entresto 24 Mg-26 Mg)  1 tab PO DAILY [facility]


   Last Admin: 01/31/18 09:21 Dose:  1 tab


Ticagrelor (Brilinta)  90 mg PO BID [facility]


   Last Admin: 01/31/18 09:19 Dose:  90 mg











- Labs


Labs: 


 





 01/31/18 08:13 





 01/31/18 08:13

## 2018-02-01 RX ADMIN — METHYLPREDNISOLONE SODIUM SUCCINATE SCH MG: 40 INJECTION, POWDER, FOR SOLUTION INTRAMUSCULAR; INTRAVENOUS at 14:41

## 2018-02-01 RX ADMIN — RANOLAZINE SCH MG: 500 TABLET, FILM COATED, EXTENDED RELEASE ORAL at 17:54

## 2018-02-01 RX ADMIN — HUMAN INSULIN SCH UNIT: 100 INJECTION, SOLUTION SUBCUTANEOUS at 13:38

## 2018-02-01 RX ADMIN — ASPIRIN SCH MG: 325 TABLET, DELAYED RELEASE ORAL at 10:01

## 2018-02-01 RX ADMIN — NYSTATIN SCH ML: 100000 SUSPENSION ORAL at 13:38

## 2018-02-01 RX ADMIN — NYSTATIN SCH ML: 100000 SUSPENSION ORAL at 17:54

## 2018-02-01 RX ADMIN — RANOLAZINE SCH MG: 500 TABLET, FILM COATED, EXTENDED RELEASE ORAL at 10:00

## 2018-02-01 RX ADMIN — IPRATROPIUM BROMIDE AND ALBUTEROL SULFATE PRN ML: .5; 3 SOLUTION RESPIRATORY (INHALATION) at 07:00

## 2018-02-01 RX ADMIN — HUMAN INSULIN SCH UNIT: 100 INJECTION, SOLUTION SUBCUTANEOUS at 22:05

## 2018-02-01 RX ADMIN — SACUBITRIL AND VALSARTAN SCH TAB: 24; 26 TABLET, FILM COATED ORAL at 10:02

## 2018-02-01 RX ADMIN — INSULIN GLARGINE SCH UNIT: 100 INJECTION, SOLUTION SUBCUTANEOUS at 22:04

## 2018-02-01 RX ADMIN — NYSTATIN SCH: 100000 SUSPENSION ORAL at 23:01

## 2018-02-01 RX ADMIN — HUMAN INSULIN SCH UNIT: 100 INJECTION, SOLUTION SUBCUTANEOUS at 17:54

## 2018-02-01 RX ADMIN — HUMAN INSULIN SCH: 100 INJECTION, SOLUTION SUBCUTANEOUS at 07:43

## 2018-02-01 RX ADMIN — IPRATROPIUM BROMIDE AND ALBUTEROL SULFATE PRN ML: .5; 3 SOLUTION RESPIRATORY (INHALATION) at 13:09

## 2018-02-01 NOTE — CP.PCM.PN
Subjective





- Date & Time of Evaluation


Date of Evaluation: 02/01/18


Time of Evaluation: 22:27





- Subjective


Subjective: 





CHIEF COMPLAINTS TODAY :


AFEBRILE.


c/o cough and shortness of breath


productive cough.


INCREASED WHEEZING TODAY.











ROS.


HEENT :  N.


Resp :      +ve  cough, wheezing ,pleuritic CP ,or hemoptysis 


Cardio :     No anginal  CP, PND, orthopnea, palpitation 


GI :           No abd.pain, n/v ,diarrhea or GI bleeding .


CNS : No headache, vertigo, focal deficit.


Musculoskel :  No joint swelling ,


Derm :        No rash 


Psych :     Normal affect.


Ext :  No  swelling ,calf pain 





PE.


Pt. is alert awake in no distress.


V.S  As noted in the chart 


Head ,ear nose,throat and eyes : Normal.


Neck : Supple with normal carotids.


Lungs: BILATERAL EXPIRATORY WHEEZE./AND RHONCHI.


Heart : S1 & S2 normal with S4. No murmur.


Abd : Soft non tender with normal bowel sounds.


Neuro : Moves all ext. with no localized deficit.


Ext : No edema with intact pulses.Non tender calves 


Derm : No rashes or decubitus ulcer.





LABS/RADIOLOGY:


wbc 8.3 


CREATININE 1.6 bun 35


sEDIMENTATION RATE 26


LFTS- ast 73, alt 70, ALKALINE PHOSPHATASE NORMAL.


CHEST X-RAY 1/31/18 NO FOCAL CONSOLIDATION OR EFFUSION.  cHRONIC INTERSTITIAL 

MARKINGS.


CHEST X-RAY 1/29/18 MODERATE VENOUS CONGESTION WITH RIGHT HILAR PROMINENCE,


trace left pleural effusion.  Left pacemaker in place


SPUTUM +VE YEAST








ASSESSMENT/PLAN : 


RESPIRATORY INSUFFICIENCY


PNEUMONIA  / BRONCHITIS


CHF.


TRANSAMINITIS ?DRUGS.


ANEMIA.


DM





PLAN.


IV STEROIDS ADDED BY pmd AS NOTED.


CONTINUE iv ROCEPHIN 1 G ONCE A DAY DAILY


decreased ZITHROMAX 250 MG iv PIGGYBACK ONCE A DAY DAILY.


nystatin 5 mL by mouth swish and swallow twice a day for 5 days


NEBULIZER THERAPY.


.


.MONITOR lftS.


MONITOR RENAL FUNCTIONS CLOSELY


PATIENT ON lASIX 40 MG ONCE A DAY DAILY.





Objective





- Vital Signs/Intake and Output


Vital Signs (last 24 hours): 


 











Temp Pulse Resp BP Pulse Ox


 


 98.2 F   85   20   104/55 L  99 


 


 02/01/18 15:00  02/01/18 15:00  02/01/18 15:00  02/01/18 15:00  02/01/18 15:00








Intake and Output: 


 











 02/01/18 02/02/18





 18:59 06:59


 


Intake Total 630 


 


Balance 630 














- Medications


Medications: 


 Current Medications





Acetaminophen (Tylenol 325mg Tab)  650 mg PO Q6 PRN


   PRN Reason: Pain, Mild (1-3)


Albuterol/Ipratropium (Duoneb 3 Mg/0.5 Mg (3 Ml) Ud)  3 ml INH RQ4 PRN


   PRN Reason: Shortness of Breath


   Last Admin: 02/01/18 13:09 Dose:  3 ml


Aspirin (Ecotrin)  325 mg PO DAILY [facility]


   Last Admin: 02/01/18 10:01 Dose:  325 mg


Ergocalciferol (Drisdol 50,000 Intl Units Cap)  1 cap PO QWK [facility]


Furosemide (Lasix)  40 mg IVP DAILY [facility]


   Last Admin: 02/01/18 09:59 Dose:  Not Given


Ceftriaxone Sodium 1 gm/ (Sodium Chloride)  100 mls @ 100 mls/hr IVPB DAILY [facility]


   Last Admin: 02/01/18 09:58 Dose:  100 mls/hr


Azithromycin 250 mg/ Sodium (Chloride)  250 mls @ 250 mls/hr IVPB Q24H [facility]


   Last Admin: 02/01/18 17:53 Dose:  250 mls/hr


Insulin Glargine (Lantus)  20 unit SC HS [facility]


   Last Admin: 02/01/18 22:04 Dose:  20 unit


Insulin Human Regular (Novolin R)  0 unit SC [facility]


   PRN Reason: Protocol


   Last Admin: 02/01/18 22:05 Dose:  2 unit


Methylprednisolone (Solu-Medrol)  20 mg IVP Q12H [facility]


   Last Admin: 02/01/18 14:41 Dose:  20 mg


Metoprolol Tartrate (Lopressor)  25 mg PO BID [facility]


   Last Admin: 02/01/18 18:51 Dose:  Not Given


Nystatin (Nystatin Oral Susp)  5 ml PO QID [facility]


   Last Admin: 02/01/18 17:54 Dose:  5 ml


Ranolazine (Ranexa)  500 mg PO BID [facility]


   Last Admin: 02/01/18 17:54 Dose:  500 mg


Repaglinide (Prandin)  2 mg PO DAILY [facility]


   Last Admin: 02/01/18 10:01 Dose:  2 mg


Rosuvastatin Calcium (Crestor)  10 mg PO HS [facility]


   Last Admin: 02/01/18 22:05 Dose:  10 mg


Sacubitril/Valsartan (Entresto 24 Mg-26 Mg)  1 tab PO DAILY [facility]


   Last Admin: 02/01/18 10:02 Dose:  1 tab


Ticagrelor (Brilinta)  90 mg PO BID [facility]


   Last Admin: 02/01/18 17:54 Dose:  90 mg











- Labs


Labs: 


 





 01/31/18 08:13 





 01/31/18 08:13 











Assessment and Plan


(1) Respiratory distress


Status: Acute   





(2) Pneumonia


Status: Acute   





(3) CHF exacerbation


Status: Acute   





(4) Anemia


Status: Acute   





(5) Diabetes mellitus, new onset


Status: Acute

## 2018-02-01 NOTE — CP.PCM.PN
Subjective





- Date & Time of Evaluation


Date of Evaluation: 02/01/18


Time of Evaluation: 13:58





- Subjective


Subjective: 





CHIEF COMPLAINTS TODAY :


SOB/WHEEZING A/W COUGH IMPROVING 





ROS.


HEENT :  N.


Resp :       No c hemoptysis 


Cardio :     No anginal  CP,palpitation 


GI :           No abd.pain, n/v ,diarrhea or GI bleeding .


CNS : No headache, vertigo, focal deficit.


Musculoskel :  No joint swelling ,


Derm :        No rash 


Psych :     Normal affect.


Ext :  No  swelling ,calf pain 





PE.


Pt. is alert awake in no distress.


V.S  As noted in the chart 


Head ,ear nose,throat and eyes : Normal.


Neck : Supple with normal carotids.


Lungs: EVELIA RONCHI 


Heart : S1 & S2 normal with S4. MENDEZ 


Abd : Soft non tender with normal bowel sounds.


Neuro : Moves all ext. with no localized deficit.


Ext : No edema with intact pulses.Non tender calves 


Derm : No rashes or decubitus ulcer.





LABS/RADIOLOGY: SEPTIC W/P NEG SO FAR CXR  CH. CHANGES 





ASSESSMENT/PLAN : 


NEB/IVAB 


TAPER STEROIDS 








Objective





- Vital Signs/Intake and Output


Vital Signs (last 24 hours): 


 











Temp Pulse Resp BP Pulse Ox


 


 98.0 F   84   20   92/52 L  98 


 


 02/01/18 07:40  02/01/18 07:40  02/01/18 07:40  02/01/18 10:01  02/01/18 07:40











- Medications


Medications: 


 Current Medications





Acetaminophen (Tylenol 325mg Tab)  650 mg PO Q6 PRN


   PRN Reason: Pain, Mild (1-3)


Albuterol/Ipratropium (Duoneb 3 Mg/0.5 Mg (3 Ml) Ud)  3 ml INH RQ4 PRN


   PRN Reason: Shortness of Breath


   Last Admin: 02/01/18 13:09 Dose:  3 ml


Aspirin (Ecotrin)  325 mg PO DAILY [facility]


   Last Admin: 02/01/18 10:01 Dose:  325 mg


Ergocalciferol (Drisdol 50,000 Intl Units Cap)  1 cap PO QWK [facility]


Furosemide (Lasix)  40 mg IVP DAILY [facility]


   Last Admin: 02/01/18 09:59 Dose:  Not Given


Ceftriaxone Sodium 1 gm/ (Sodium Chloride)  100 mls @ 100 mls/hr IVPB DAILY [facility]


   Last Admin: 02/01/18 09:58 Dose:  100 mls/hr


Azithromycin 250 mg/ Sodium (Chloride)  250 mls @ 250 mls/hr IVPB Q24H [facility]


   Last Admin: 01/31/18 17:45 Dose:  250 mls/hr


Insulin Glargine (Lantus)  20 unit SC [facility]


   Last Admin: 01/31/18 21:43 Dose:  20 unit


Ins[facility]  [facility]


   PRN Reason: Protocol


   Last Admin: 02/01/18 07:43 Dose:  Not Given


Methylprednisolone (Solu-Medrol)  50 mg IVP Q8H [facility]


   Last Admin: 02/01/18 09:59 Dose:  50 mg


Metoprolol Tartrate (Lopressor)  25 mg PO BID [facility]


   Last Admin: 02/01/18 10:01 Dose:  Not Given


Nystatin (Nystatin Oral Susp)  5 ml PO QID [facility]


   Last Admin: 02/01/18 13:38 Dose:  5 ml


Ranolazine (Ranexa)  500 mg PO BID [facility]


   Last Admin: 02/01/18 10:00 Dose:  500 mg


Repaglinide (Prandin)  2 mg PO DAILY [facility]


   Last Admin: 02/01/18 10:01 Dose:  2 mg


Rosuvastatin Calcium (Crestor)  10 mg PO [facility]


   Last Admin: 01/31/18 21:43 Dose:  10 mg


Sacubitril/Valsartan (Entresto 24 Mg-26 Mg)  1 tab PO DAILY [facility]


   Last Admin: 02/01/18 10:02 Dose:  1 tab


Ticagrelor (Brilinta)  90 mg PO BID [facility]


   Last Admin: 02/01/18 10:00 Dose:  90 mg











- Labs


Labs: 


 





 01/31/18 08:13 





 01/31/18 08:13

## 2018-02-01 NOTE — CARD
--------------- APPROVED REPORT --------------





EKG Measurement

Heart [id number]

VA [id number]

[id number]

[id number]

[id number]



<Conclusion>

Sinus tachycardia with premature supraventricular complexes

Left bundle branch block

Abnormal ECG

## 2018-02-02 LAB
ALBUMIN SERPL-MCNC: 2.8 G/DL (ref 3.5–5)
ALBUMIN/GLOB SERPL: 1 {RATIO} (ref 1–2.1)
ALT SERPL-CCNC: 50 U/L (ref 9–52)
AST SERPL-CCNC: 33 U/L (ref 14–36)
BILIRUB DIRECT SERPL-MCNC: 0.2 MG/DL (ref 0–0.4)
BUN SERPL-MCNC: 41 MG/DL (ref 7–17)
CALCIUM SERPL-MCNC: 8.5 MG/DL (ref 8.6–10.4)
GFR NON-AFRICAN AMERICAN: 36
MAGNESIUM SERPL-MCNC: 2.1 MG/DL (ref 1.6–2.3)

## 2018-02-02 RX ADMIN — RANOLAZINE SCH MG: 500 TABLET, FILM COATED, EXTENDED RELEASE ORAL at 09:38

## 2018-02-02 RX ADMIN — METHYLPREDNISOLONE SODIUM SUCCINATE SCH MG: 40 INJECTION, POWDER, FOR SOLUTION INTRAMUSCULAR; INTRAVENOUS at 17:49

## 2018-02-02 RX ADMIN — NYSTATIN SCH ML: 100000 SUSPENSION ORAL at 13:20

## 2018-02-02 RX ADMIN — SACUBITRIL AND VALSARTAN SCH TAB: 24; 26 TABLET, FILM COATED ORAL at 09:40

## 2018-02-02 RX ADMIN — HUMAN INSULIN SCH UNIT: 100 INJECTION, SOLUTION SUBCUTANEOUS at 21:44

## 2018-02-02 RX ADMIN — NYSTATIN SCH ML: 100000 SUSPENSION ORAL at 21:44

## 2018-02-02 RX ADMIN — HUMAN INSULIN SCH UNIT: 100 INJECTION, SOLUTION SUBCUTANEOUS at 17:49

## 2018-02-02 RX ADMIN — NYSTATIN SCH ML: 100000 SUSPENSION ORAL at 09:38

## 2018-02-02 RX ADMIN — HUMAN INSULIN SCH UNIT: 100 INJECTION, SOLUTION SUBCUTANEOUS at 13:20

## 2018-02-02 RX ADMIN — IPRATROPIUM BROMIDE AND ALBUTEROL SULFATE PRN ML: .5; 3 SOLUTION RESPIRATORY (INHALATION) at 13:30

## 2018-02-02 RX ADMIN — METHYLPREDNISOLONE SODIUM SUCCINATE SCH MG: 40 INJECTION, POWDER, FOR SOLUTION INTRAMUSCULAR; INTRAVENOUS at 13:20

## 2018-02-02 RX ADMIN — INSULIN GLARGINE SCH UNIT: 100 INJECTION, SOLUTION SUBCUTANEOUS at 21:44

## 2018-02-02 RX ADMIN — METHYLPREDNISOLONE SODIUM SUCCINATE SCH MG: 40 INJECTION, POWDER, FOR SOLUTION INTRAMUSCULAR; INTRAVENOUS at 02:21

## 2018-02-02 RX ADMIN — ASPIRIN SCH MG: 325 TABLET, DELAYED RELEASE ORAL at 09:38

## 2018-02-02 RX ADMIN — RANOLAZINE SCH MG: 500 TABLET, FILM COATED, EXTENDED RELEASE ORAL at 17:50

## 2018-02-02 RX ADMIN — NYSTATIN SCH ML: 100000 SUSPENSION ORAL at 17:49

## 2018-02-02 RX ADMIN — HUMAN INSULIN SCH UNIT: 100 INJECTION, SOLUTION SUBCUTANEOUS at 08:53

## 2018-02-02 NOTE — CP.PCM.PN
Subjective





- Date & Time of Evaluation


Date of Evaluation: 02/02/18


Time of Evaluation: 22:35





- Subjective


Subjective: 





CHIEF COMPLAINTS TODAY :


AFEBRILE.


c/o cough and shortness of breath


productive cough.


INCREASED WHEEZING TODAY.











ROS.


HEENT :  N.


Resp :      +ve  cough, wheezing ,pleuritic CP ,or hemoptysis 


Cardio :     No anginal  CP, PND, orthopnea, palpitation 


GI :           No abd.pain, n/v ,diarrhea or GI bleeding .


CNS : No headache, vertigo, focal deficit.


Musculoskel :  No joint swelling ,


Derm :        No rash 


Psych :     Normal affect.


Ext :  No  swelling ,calf pain 





PE.


Pt. is alert awake in no distress.


V.S  As noted in the chart 


Head ,ear nose,throat and eyes : Normal.


Neck : Supple with normal carotids.


Lungs: BILATERAL EXPIRATORY WHEEZE./AND RHONCHI.


Heart : S1 & S2 normal with S4. No murmur.


Abd : Soft non tender with normal bowel sounds.


Neuro : Moves all ext. with no localized deficit.


Ext : No edema with intact pulses.Non tender calves 


Derm : No rashes or decubitus ulcer.





LABS/RADIOLOGY:


CREATININE 1.4/bun 4


lftS OKAY





CHEST X-RAY 1/31/18 NO FOCAL CONSOLIDATION OR EFFUSION.  CHRONIC INTERSTITIAL 

MARKINGS.


CHEST X-RAY 1/29/18 MODERATE VENOUS CONGESTION WITH RIGHT HILAR PROMINENCE,


trace left pleural effusion.  Left pacemaker in place


SPUTUM +VE YEAST








ASSESSMENT/PLAN : 


RESPIRATORY INSUFFICIENCY


PNEUMONIA  / ACUTE BRONCHITIS


CHF.


TRANSAMINITIS ?DRUGS.


ANEMIA.


DM





PLAN.


IV STEROIDS INCREASED TO 40 MG EVERY 12 HOURLY BY pmd AS NOTED.


CONTINUE iv ROCEPHIN 1 G ONCE A DAY DAILY


decreased ZITHROMAX 250 MG iv PIGGYBACK ONCE A DAY DAILY.


nystatin 5 mL by mouth swish and swallow twice a day for 5 days


NEBULIZER THERAPY.


.


.MONITOR lftS.


MONITOR RENAL FUNCTIONS CLOSELY


PATIENT ON lASIX 40 MG ONCE A DAY DAILY.








Objective





- Vital Signs/Intake and Output


Vital Signs (last 24 hours): 


 











Temp Pulse Resp BP Pulse Ox


 


 97.5 F L  84   20   95/58 L  99 


 


 02/02/18 16:39  02/02/18 16:39  02/02/18 16:39  02/02/18 17:28  02/02/18 16:39








Intake and Output: 


 











 02/02/18 02/03/18





 18:59 06:59


 


Intake Total 600 


 


Balance 600 














- Medications


Medications: 


 Current Medications





Acetaminophen (Tylenol 325mg Tab)  650 mg PO Q6 PRN


   PRN Reason: Pain, Mild (1-3)


Albuterol/Ipratropium (Duoneb 3 Mg/0.5 Mg (3 Ml) Ud)  3 ml INH RQ4 PRN


   PRN Reason: Shortness of Breath


   Last Admin: 02/02/18 13:30 Dose:  3 ml


Aspirin (Ecotrin)  325 mg PO DAILY [facility]


   Last Admin: 02/02/18 09:38 Dose:  325 mg


Ergocalciferol (Drisdol 50,000 Intl Units Cap)  1 cap PO QWK [facility]


Furosemide (Lasix)  40 mg IVP DAILY [facility]


   Last Admin: 02/02/18 09:38 Dose:  40 mg


Ceftriaxone Sodium 1 gm/ (Sodium Chloride)  100 mls @ 100 mls/hr IVPB DAILY [facility]


   Last Admin: 02/02/18 09:38 Dose:  100 mls/hr


Azithromycin 250 mg/ Sodium (Chloride)  250 mls @ 250 mls/hr IVPB Q24H [facility]


   Last Admin: 02/02/18 17:49 Dose:  250 mls/hr


Insulin Glargine (Lantus)  20 unit SC HS [facility]


   Last Admin: 02/02/18 21:44 Dose:  20 unit


Insulin Human Regular (Novolin R)  0 unit SC ACHS [facility]


   PRN Reason: Protocol


   Last Admin: 02/02/18 21:44 Dose:  3 unit


Methylprednisolone (Solu-Medrol)  40 mg IVP Q12H [facility]


   Last Admin: 02/02/18 17:49 Dose:  40 mg


Metoprolol Tartrate (Lopressor)  25 mg PO BID [facility]


   Last Admin: 02/02/18 17:28 Dose:  Not Given


Nystatin (Nystatin Oral Susp)  5 ml PO QID [facility]


   Last Admin: 02/02/18 21:44 Dose:  5 ml


Ranolazine (Ranexa)  500 mg PO BID [facility]


   Last Admin: 02/02/18 17:50 Dose:  500 mg


Repaglinide (Prandin)  2 mg PO DAILY [facility]


   Last Admin: 02/02/18 09:38 Dose:  2 mg


Rosuvastatin Calcium (Crestor)  10 mg PO HS [facility]


   Last Admin: 02/02/18 21:48 Dose:  10 mg


Sacubitril/Valsartan (Entresto 24 Mg-26 Mg)  1 tab PO DAILY [facility]


   Last Admin: 02/02/18 09:40 Dose:  1 tab


Ticagrelor (Brilinta)  90 mg PO BID [facility]


   Last Admin: 02/02/18 17:50 Dose:  90 mg











- Labs


Labs: 


 





 01/31/18 08:13 





 02/02/18 07:12 











Assessment and Plan


(1) Respiratory distress


Status: Acute   





(2) Pneumonia


Status: Acute   





(3) CHF exacerbation


Status: Acute   





(4) Anemia


Status: Acute   





(5) Diabetes mellitus, new onset


Status: Acute

## 2018-02-03 LAB
ALBUMIN SERPL-MCNC: 2.8 G/DL (ref 3.5–5)
ALBUMIN/GLOB SERPL: 1 {RATIO} (ref 1–2.1)
ALT SERPL-CCNC: 46 U/L (ref 9–52)
ANISOCYTOSIS BLD QL SMEAR: SLIGHT
AST SERPL-CCNC: 27 U/L (ref 14–36)
BASOPHILS # BLD AUTO: 0 K/UL (ref 0–0.2)
BASOPHILS NFR BLD: 0.1 % (ref 0–2)
BUN SERPL-MCNC: 44 MG/DL (ref 7–17)
CALCIUM SERPL-MCNC: 8.4 MG/DL (ref 8.6–10.4)
EOSINOPHIL # BLD AUTO: 0 K/UL (ref 0–0.7)
EOSINOPHIL NFR BLD: 0 % (ref 0–4)
ERYTHROCYTE [DISTWIDTH] IN BLOOD BY AUTOMATED COUNT: 16.6 % (ref 11.5–14.5)
GFR NON-AFRICAN AMERICAN: 33
HGB BLD-MCNC: 9.6 G/DL (ref 11–16)
HYPOCHROMIC: SLIGHT
LYMPHOCYTE: 1 % (ref 20–40)
LYMPHOCYTES # BLD AUTO: 0.2 K/UL (ref 1–4.3)
LYMPHOCYTES NFR BLD AUTO: 2.3 % (ref 20–40)
MCH RBC QN AUTO: 28.2 PG (ref 27–31)
MCHC RBC AUTO-ENTMCNC: 34 G/DL (ref 33–37)
MCV RBC AUTO: 82.8 FL (ref 81–99)
MONOCYTE: 4 % (ref 0–10)
MONOCYTES # BLD: 0.5 K/UL (ref 0–0.8)
MONOCYTES NFR BLD: 5.4 % (ref 0–10)
NEUTROPHILS # BLD: 9.1 K/UL (ref 1.8–7)
NEUTROPHILS NFR BLD AUTO: 92.2 % (ref 50–75)
NEUTROPHILS NFR BLD AUTO: 94 % (ref 50–75)
NEUTS BAND NFR BLD: 1 % (ref 0–2)
NRBC BLD AUTO-RTO: 0.2 % (ref 0–2)
OVALOCYTES BLD QL SMEAR: SLIGHT
PLATELET # BLD EST: NORMAL 10*3/UL
PLATELET # BLD: 172 K/UL (ref 130–400)
PMV BLD AUTO: 11.6 FL (ref 7.2–11.7)
RBC # BLD AUTO: 3.41 MIL/UL (ref 3.8–5.2)
TOTAL CELLS COUNTED BLD: 100
WBC # BLD AUTO: 9.9 K/UL (ref 4.8–10.8)

## 2018-02-03 RX ADMIN — CEFTRIAXONE SCH MLS/HR: 1 INJECTION, SOLUTION INTRAVENOUS at 09:35

## 2018-02-03 RX ADMIN — NYSTATIN SCH ML: 100000 SUSPENSION ORAL at 21:21

## 2018-02-03 RX ADMIN — HUMAN INSULIN SCH: 100 INJECTION, SOLUTION SUBCUTANEOUS at 21:17

## 2018-02-03 RX ADMIN — NYSTATIN SCH ML: 100000 SUSPENSION ORAL at 13:03

## 2018-02-03 RX ADMIN — SACUBITRIL AND VALSARTAN SCH TAB: 24; 26 TABLET, FILM COATED ORAL at 09:35

## 2018-02-03 RX ADMIN — NYSTATIN SCH ML: 100000 SUSPENSION ORAL at 17:13

## 2018-02-03 RX ADMIN — NYSTATIN SCH ML: 100000 SUSPENSION ORAL at 09:34

## 2018-02-03 RX ADMIN — HUMAN INSULIN SCH UNIT: 100 INJECTION, SOLUTION SUBCUTANEOUS at 13:03

## 2018-02-03 RX ADMIN — ASPIRIN SCH MG: 325 TABLET, DELAYED RELEASE ORAL at 09:35

## 2018-02-03 RX ADMIN — RANOLAZINE SCH MG: 500 TABLET, FILM COATED, EXTENDED RELEASE ORAL at 09:35

## 2018-02-03 RX ADMIN — METHYLPREDNISOLONE SODIUM SUCCINATE SCH MG: 40 INJECTION, POWDER, FOR SOLUTION INTRAMUSCULAR; INTRAVENOUS at 16:11

## 2018-02-03 RX ADMIN — HUMAN INSULIN SCH UNIT: 100 INJECTION, SOLUTION SUBCUTANEOUS at 16:23

## 2018-02-03 RX ADMIN — METHYLPREDNISOLONE SODIUM SUCCINATE SCH MG: 40 INJECTION, POWDER, FOR SOLUTION INTRAMUSCULAR; INTRAVENOUS at 05:21

## 2018-02-03 RX ADMIN — RANOLAZINE SCH MG: 500 TABLET, FILM COATED, EXTENDED RELEASE ORAL at 17:13

## 2018-02-03 RX ADMIN — INSULIN GLARGINE SCH UNIT: 100 INJECTION, SOLUTION SUBCUTANEOUS at 21:21

## 2018-02-03 RX ADMIN — HUMAN INSULIN SCH UNIT: 100 INJECTION, SOLUTION SUBCUTANEOUS at 09:36

## 2018-02-03 NOTE — CP.PCM.PN
Subjective





- Date & Time of Evaluation


Date of Evaluation: 02/03/18


Time of Evaluation: 14:33





- Subjective


Subjective: 





CHIEF COMPLAINTS TODAY :


SOB/WHEEZING A/W COUGH  GOT WORSE 





ROS.


HEENT :  N.


Resp :       No c hemoptysis 


Cardio :     No anginal  CP,palpitation 


GI :           No abd.pain, n/v ,diarrhea or GI bleeding .


CNS : No headache, vertigo, focal deficit.


Musculoskel :  No joint swelling ,


Derm :        No rash 


Psych :     Normal affect.


Ext :  No  swelling ,calf pain 





PE.


Pt. is alert awake in no distress.


V.S  As noted in the chart 


Head ,ear nose,throat and eyes : Normal.


Neck : Supple with normal carotids.


Lungs: EVELIA RONCHI 


Heart : S1 & S2 normal with S4. MENDEZ 


Abd : Soft non tender with normal bowel sounds.


Neuro : Moves all ext. with no localized deficit.


Ext : No edema with intact pulses.Non tender calves 


Derm : No rashes or decubitus ulcer.





LABS/RADIOLOGY: SEPTIC W/P NEG SO FAR CXR  CH. CHANGES 





ASSESSMENT/PLAN : 


NEB/IVAB 


INCREASE STEROIDS 





Objective





- Vital Signs/Intake and Output


Vital Signs (last 24 hours): 


 











Temp Pulse Resp BP Pulse Ox


 


 97.8 F   87   18   123/61   100 


 


 02/03/18 08:19  02/03/18 08:19  02/03/18 08:19  02/03/18 09:35  02/03/18 08:19








Intake and Output: 


 











 02/03/18 02/03/18





 11:59 23:59


 


Intake Total 200 


 


Balance 200 














- Medications


Medications: 


 Current Medications





Acetaminophen (Tylenol 325mg Tab)  650 mg PO Q6 PRN


   PRN Reason: Pain, Mild (1-3)


Albuterol/Ipratropium (Duoneb 3 Mg/0.5 Mg (3 Ml) Ud)  3 ml INH RQ4 PRN


   PRN Reason: Shortness of Breath


   Last Admin: 02/02/18 13:30 Dose:  3 ml


Aspirin (Ecotrin)  [facility] mg [facility]ILY [facility]


   Last Admin: 02/03/18 09:35 Dose:  325 mg


Azithromycin (Zithromax)  500 mg PO Q24H [facility]


Ergocalciferol (Drisdol 50,000 Intl Units Cap)  1 cap PO QWK [facility]


Furosemide (Lasix)  40 mg IVP DAILY [facility]


   Last Admin: 02/03/18 09:35 Dose:  40 mg


Ceftriaxone Sodium (Rocephin Iv 1 Gm Duplex)  50 mls @ 100 mls/hr IVPB DAILY [facility]


   Last Admin: 02/03/18 09:35 Dose:  100 mls/hr


Insulin Glargine (Lantus)  20 unit SC HS [facility]


   Last Admin: 02/02/18 21:44 Dose:  20 unit


Insulin Human Regular (Novolin R)  0 unit SC [facility]


   PRN Reason: Protocol


   Last Admin: 02/03/18 13:03 Dose:  8 unit


Methylprednisolone (Solu-Medrol)  40 mg IVP Q12H [facility]


   Last Admin: 02/03/18 05:21 Dose:  40 mg


Metoprolol Tartrate (Lopressor)  25 mg PO BID [facility]


   Last Admin: 02/03/18 09:35 Dose:  25 mg


Nystatin (Nystatin Oral Susp)  5 ml PO QID [facility]


   Last Admin: 02/03/18 13:03 Dose:  5 ml


Ranolazine (Ranexa)  500 mg PO BID [facility]


   Last Admin: 02/03/18 09:35 Dose:  500 mg


Repaglinide (Prandin)  2 mg PO DAILY [facility]


   Last Admin: 02/03/18 09:35 Dose:  2 mg


Rosuvastatin Calcium (Crestor)  10 mg PO HS [facility]


   Last Admin: 02/02/18 21:48 Dose:  10 mg


Sacubitril/Valsartan (Entresto 24 Mg-26 Mg)  1 tab PO DAILY [facility]


   Last Admin: 02/03/18 09:35 Dose:  1 tab


Ticagrelor (Brilinta)  90 mg PO BID [facility]


   Last Admin: 02/03/18 09:35 Dose:  90 mg











- Labs


Labs: 


 





 02/03/18 06:39 





 02/03/18 06:39

## 2018-02-03 NOTE — CP.PCM.PN
Subjective





- Date & Time of Evaluation


Date of Evaluation: 02/03/18


Time of Evaluation: 20:42





- Subjective


Subjective: 





CHIEF COMPLAINTS TODAY :


AFEBRILE.


STILLWHEEZING


productive cough.


STEROIDS INCREASED AS PER PMD.


DISCHARGE HELD AS NOTED











ROS.


HEENT :  N.


Resp :      +ve  cough, wheezing ,pleuritic CP ,or hemoptysis 


Cardio :     No anginal  CP, PND, orthopnea, palpitation 


GI :           No abd.pain, n/v ,diarrhea or GI bleeding .


CNS : No headache, vertigo, focal deficit.


Musculoskel :  No joint swelling ,


Derm :        No rash 


Psych :     Normal affect.


Ext :  No  swelling ,calf pain 





PE.


Pt. is alert awake in no distress.


V.S  As noted in the chart 


Head ,ear nose,throat and eyes : Normal.


Neck : Supple with normal carotids.


Lungs: BILATERAL EXPIRATORY WHEEZE./AND RHONCHI.


Heart : S1 & S2 normal with S4. No murmur.


Abd : Soft non tender with normal bowel sounds.


Neuro : Moves all ext. with no localized deficit.


Ext : No edema with intact pulses.Non tender calves 


Derm : No rashes or decubitus ulcer.





LABS/RADIOLOGY:


CREATININE 1.5/BUN 47


lftS OKAY


WBC 9.9 H/H 9.6/28.3 


CHEST X-RAY 1/31/18 NO FOCAL CONSOLIDATION OR EFFUSION.  CHRONIC INTERSTITIAL 

MARKINGS.





SPUTUM +VE YEAST








ASSESSMENT/PLAN : 


RESPIRATORY INSUFFICIENCY


PNEUMONIA  / ACUTE BRONCHITIS


CHF.


TRANSAMINITIS ?DRUGS.


ANEMIA.


DM





PLAN.


IV STEROIDS INCREASED TO 40 MG EVERY 12 HOURLY BY pmd AS NOTED.


CONTINUE iv ROCEPHIN 1 G ONCE A DAY DAILY


ON PO ZITHROMAX 500 MG  ONCE A DAY DAILY.


nystatin 5 mL by mouth swish and swallow twice a day for 5 days


NEBULIZER THERAPY.


.


.MONITOR lftS.


MONITOR RENAL FUNCTIONS CLOSELY


PATIENT ON LASIX 40 MG ONCE A DAY DAILY.











Objective





- Vital Signs/Intake and Output


Vital Signs (last 24 hours): 


 











Temp Pulse Resp BP Pulse Ox


 


 98.0 F   94 H  20   105/65   96 


 


 02/03/18 15:37  02/03/18 15:37  02/03/18 15:37  02/03/18 17:12  02/03/18 15:37








Intake and Output: 


 











 02/03/18 02/04/18





 18:59 06:59


 


Intake Total 700 


 


Balance 700 














- Medications


Medications: 


 Current Medications





Acetaminophen (Tylenol 325mg Tab)  650 mg PO Q6 PRN


   PRN Reason: Pain, Mild (1-3)


Albuterol/Ipratropium (Duoneb 3 Mg/0.5 Mg (3 Ml) Ud)  3 ml INH RQ4 PRN


   PRN Reason: Shortness of Breath


   Last Admin: 02/02/18 13:30 Dose:  3 ml


Aspirin (Ecotrin)  325 mg PO DAILY [facility]


   Last Admin: 02/03/18 09:35 Dose:  325 mg


Azithromycin (Zithromax)  500 mg PO Q24H [facility]


   Last Admin: 02/03/18 17:13 Dose:  500 mg


Ergocalciferol (Drisdol 50,000 Intl Units Cap)  1 cap PO QWK [facility]


Furosemide (Lasix)  40 mg IVP DAILY [facility]


   Last Admin: 02/03/18 09:35 Dose:  40 mg


Ceftriaxone Sodium (Rocephin Iv 1 Gm Duplex)  50 mls @ 100 mls/hr IVPB DAILY [facility]


   Last Admin: 02/03/18 09:35 Dose:  100 mls/hr


Insulin Glargine (Lantus)  20 unit SC [facility]


   Last Admin: 02/02/18 21:44 Dose:  20 unit


Insulin Human Regular (Novolin R)  0 unit SC ACHS [facility]


   PRN Reason: Protocol


   Last Admin: 02/03/18 16:23 Dose:  8 unit


Methylprednisolone (Solu-Medrol)  40 mg IVP Q12H [facility]


   Last Admin: 02/03/18 16:11 Dose:  40 mg


Metoprolol Tartrate (Lopressor)  25 mg PO BID [facility]


   Last Admin: 02/03/18 17:12 Dose:  25 mg


Nystatin (Nystatin Oral Susp)  5 ml PO QID [facility]


   Last Admin: 02/03/18 17:13 Dose:  5 ml


Ranolazine (Ranexa)  500 mg PO BID [facility]


   Last Admin: 02/03/18 17:13 Dose:  500 mg


Repaglinide (Prandin)  2 mg PO DAILY [facility]


   Last Admin: 02/03/18 09:35 Dose:  2 mg


Rosuvastatin Calcium (Crestor)  10 mg PO HS [facility]


   Last Admin: 02/02/18 21:48 Dose:  10 mg


Sacubitril/Valsartan (Entresto 24 Mg-26 Mg)  1 tab PO DAILY [facility]


   Last Admin: 02/03/18 09:35 Dose:  1 tab


Ticagrelor (Brilinta)  90 mg PO BID [facility]


   Last Admin: 02/03/18 17:12 Dose:  90 mg











- Labs


Labs: 


 





 02/03/18 06:39 





 02/03/18 06:39 











Assessment and Plan


(1) Respiratory distress


Status: Acute   





(2) Pneumonia


Status: Acute   





(3) CHF exacerbation


Status: Acute   





(4) Anemia


Status: Acute   





(5) Diabetes mellitus, new onset


Status: Acute

## 2018-02-03 NOTE — CP.PCM.PN
Subjective





- Date & Time of Evaluation


Date of Evaluation: 02/02/18


Time of Evaluation: 16:00





- Subjective


Subjective: 








PROGRESS NOTE OF 2/2/18





PT INCREASING SO WITH WHEEZING 


ON P/E EVELIA INSP+EXP WHEEZING 


HOLD DISCHARGE AND INCREASE STEROIDS 





Objective





- Vital Signs/Intake and Output


Vital Signs (last 24 hours): 


 











Temp Pulse Resp BP Pulse Ox


 


 97.8 F   87   18   123/61   100 


 


 02/03/18 08:19  02/03/18 08:19  02/03/18 08:19  02/03/18 09:35  02/03/18 08:19








Intake and Output: 


 











 02/03/18 02/03/18





 11:59 23:59


 


Intake Total 200 


 


Balance 200 














- Medications


Medications: 


 Current Medications





Acetaminophen (Tylenol 325mg Tab)  650 mg PO Q6 PRN


   PRN Reason: Pain, Mild (1-3)


Albuterol/Ipratropium (Duoneb 3 Mg/0.5 Mg (3 Ml) Ud)  3 ml INH RQ4 PRN


   PRN Reason: Shortness of Breath


   Last Admin: 02/02/18 13:30 Dose:  3 ml


Aspirin (Ecotrin)  325 mg PO DAILY [facility]


   Last Admin: 02/03/18 09:35 Dose:  325 mg


Azithromycin (Zithromax)  500 mg PO Q24H [facility]


Ergocalciferol (Drisdol 50,000 Intl Units Cap)  1 cap PO QWK [facility]


Furosemide (Lasix)  40 mg IVP DAILY [facility]


   Last Admin: 02/03/18 09:35 Dose:  40 mg


Ceftriaxone Sodium (Rocephin Iv 1 Gm Duplex)  50 mls @ 100 mls/hr IVPB DAILY [facility]


   Last Admin: 02/03/18 09:35 Dose:  100 mls/hr


Insulin Glargine (Lantus)  20 unit SC HS [facility]


   Last Admin: 02/02/18 21:44 Dose:  20 unit


Insulin Human Regular (Novolin R)  0 unit SC [facility]


   PRN Reason: Protocol


   Last Admin: 02/03/18 13:03 Dose:  8 unit


Methylprednisolone (Solu-Medrol)  40 mg IVP Q12H [facility]


   Last Admin: 02/03/18 05:21 Dose:  40 mg


Metoprolol Tartrate (Lopressor)  25 mg PO BID [facility]


   Last Admin: 02/03/18 09:35 Dose:  25 mg


Nystatin (Nystatin Oral Susp)  5 ml PO QID [facility]


   Last Admin: 02/03/18 13:03 Dose:  5 ml


Ranolazine (Ranexa)  500 mg PO BID [facility]


   Last Admin: 02/03/18 09:35 Dose:  500 mg


Repaglinide (Prandin)  2 mg PO DAILY [facility]


   Last Admin: 02/03/18 09:35 Dose:  2 mg


Rosuvastatin Calcium (Crestor)  10 mg PO HS [facility]


   Last Admin: 02/02/18 21:48 Dose:  10 mg


Sacubitril/Valsartan (Entresto 24 Mg-26 Mg)  1 tab PO DAILY [facility]


   Last Admin: 02/03/18 09:35 Dose:  1 tab


Ticagrelor (Brilinta)  90 mg PO BID [facility]


   Last Admin: 02/03/18 09:35 Dose:  90 mg











- Labs


Labs: 


 





 02/03/18 06:39 





 02/03/18 06:39

## 2018-02-04 VITALS — RESPIRATION RATE: 20 BRPM

## 2018-02-04 RX ADMIN — ASPIRIN SCH MG: 325 TABLET, DELAYED RELEASE ORAL at 09:36

## 2018-02-04 RX ADMIN — CEFTRIAXONE SCH MLS/HR: 1 INJECTION, SOLUTION INTRAVENOUS at 09:37

## 2018-02-04 RX ADMIN — NYSTATIN SCH ML: 100000 SUSPENSION ORAL at 17:30

## 2018-02-04 RX ADMIN — METHYLPREDNISOLONE SODIUM SUCCINATE SCH MG: 40 INJECTION, POWDER, FOR SOLUTION INTRAMUSCULAR; INTRAVENOUS at 16:09

## 2018-02-04 RX ADMIN — HUMAN INSULIN SCH UNIT: 100 INJECTION, SOLUTION SUBCUTANEOUS at 13:11

## 2018-02-04 RX ADMIN — INSULIN GLARGINE SCH UNIT: 100 INJECTION, SOLUTION SUBCUTANEOUS at 21:26

## 2018-02-04 RX ADMIN — HUMAN INSULIN SCH UNIT: 100 INJECTION, SOLUTION SUBCUTANEOUS at 08:57

## 2018-02-04 RX ADMIN — RANOLAZINE SCH MG: 500 TABLET, FILM COATED, EXTENDED RELEASE ORAL at 17:31

## 2018-02-04 RX ADMIN — HUMAN INSULIN SCH: 100 INJECTION, SOLUTION SUBCUTANEOUS at 21:22

## 2018-02-04 RX ADMIN — RANOLAZINE SCH MG: 500 TABLET, FILM COATED, EXTENDED RELEASE ORAL at 09:36

## 2018-02-04 RX ADMIN — HUMAN INSULIN SCH UNIT: 100 INJECTION, SOLUTION SUBCUTANEOUS at 16:13

## 2018-02-04 RX ADMIN — NYSTATIN SCH ML: 100000 SUSPENSION ORAL at 13:12

## 2018-02-04 RX ADMIN — NYSTATIN SCH ML: 100000 SUSPENSION ORAL at 09:36

## 2018-02-04 RX ADMIN — NYSTATIN SCH ML: 100000 SUSPENSION ORAL at 21:25

## 2018-02-04 RX ADMIN — SACUBITRIL AND VALSARTAN SCH TAB: 24; 26 TABLET, FILM COATED ORAL at 09:36

## 2018-02-04 RX ADMIN — METHYLPREDNISOLONE SODIUM SUCCINATE SCH MG: 40 INJECTION, POWDER, FOR SOLUTION INTRAMUSCULAR; INTRAVENOUS at 03:52

## 2018-02-04 NOTE — CP.PCM.PN
Subjective





- Date & Time of Evaluation


Date of Evaluation: 02/04/18


Time of Evaluation: 15:25





- Subjective


Subjective: 





CHIEF COMPLAINTS TODAY :


SOB/WHEEZING A/W COUGH  GOT WORSE 





ROS.


HEENT :  N.


Resp :       No c hemoptysis 


Cardio :     No anginal  CP,palpitation 


GI :           No abd.pain, n/v ,diarrhea or GI bleeding .


CNS : No headache, vertigo, focal deficit.


Musculoskel :  No joint swelling ,


Derm :        No rash 


Psych :     Normal affect.


Ext :  No  swelling ,calf pain 





PE.


Pt. is alert awake in no distress.


V.S  As noted in the chart 


Head ,ear nose,throat and eyes : Normal.


Neck : Supple with normal carotids.


Lungs: EVELIA RONCHI 


Heart : S1 & S2 normal with S4. MENDEZ 


Abd : Soft non tender with normal bowel sounds.


Neuro : Moves all ext. with no localized deficit.


Ext : No edema with intact pulses.Non tender calves 


Derm : No rashes or decubitus ulcer.





LABS/RADIOLOGY: SEPTIC W/P NEG SO FAR CXR  CH. CHANGES 





ASSESSMENT/PLAN : 


NEB/IVAB 


INCREASE STEROIDS 





Objective





- Vital Signs/Intake and Output


Vital Signs (last 24 hours): 


 











Temp Pulse Resp BP Pulse Ox


 


 97.5 F L  94 H  18   112/69   100 


 


 02/04/18 07:35  02/04/18 12:00  02/04/18 07:35  02/04/18 09:37  02/04/18 07:35








Intake and Output: 


 











 02/04/18 02/04/18





 11:59 23:59


 


Intake Total  500


 


Balance  500














- Medications


Medications: 


 Current Medications





Acetaminophen (Tylenol 325mg Tab)  650 mg PO Q6 PRN


   PRN Reason: Pain, Mild (1-3)


Aspirin (Ecotrin)  325 mg PO DAILY [facility]


   Last Admin: 02/04/18 09:36 Dose:  325 mg


Azithromycin (Zithromax)  500 mg PO Q24H [facility]


   Last Admin: 02/03/18 17:13 Dose:  500 mg


Ergocalciferol (Drisdol 50,000 Intl Units Cap)  1 cap PO QWK [facility]


Furosemide (Lasix)  40 mg IVP DAILY [facility]


   Last Admin: 02/04/18 09:37 Dose:  40 mg


Ceftriaxone Sodium (Rocephin Iv 1 Gm Duplex)  50 mls @ 100 mls/hr IVPB DAILY [facility]


   Last Admin: 02/04/18 09:37 Dose:  100 mls/hr


Insulin Glargine (Lantus)  20 unit SC [facility]


   Last Admin: 02/03/18 21:21 Dose:  20 unit


Insulin Human Regular (Novolin R)  0 unit SC [facility]


   PRN Reason: Protocol


   Last Admin: 02/04/18 13:11 Dose:  8 unit


Methylprednisolone (Solu-Medrol)  40 mg IVP Q12H [facility]


   Last Admin: 02/04/18 03:52 Dose:  40 mg


Metoprolol Tartrate (Lopressor)  25 mg PO BID [facility]


   Last Admin: 02/04/18 09:36 Dose:  25 mg


Nystatin (Nystatin Oral Susp)  5 ml PO QID [facility]


   Last Admin: 02/04/18 13:12 Dose:  5 ml


Ranolazine (Ranexa)  500 mg PO BID [facility]


   Last Admin: 02/04/18 09:36 Dose:  500 mg


Repaglinide (Prandin)  2 mg PO DAILY [facility]


   Last Admin: 02/04/18 09:36 Dose:  2 mg


Rosuvastatin Calcium (Crestor)  10 mg PO [facility]


   Last Admin: 02/03/18 21:21 Dose:  10 mg


Sacubitril/Valsartan (Entresto 24 Mg-26 Mg)  1 tab PO DAILY [facility]


   Last Admin: 02/04/18 09:36 Dose:  1 tab


Ticagrelor (Brilinta)  90 mg PO BID [facility]


   Last Admin: 02/04/18 09:36 Dose:  90 mg











- Labs


Labs: 


 





 02/03/18 06:39 





 02/03/18 06:39

## 2018-02-05 RX ADMIN — NYSTATIN SCH ML: 100000 SUSPENSION ORAL at 09:49

## 2018-02-05 RX ADMIN — METHYLPREDNISOLONE SODIUM SUCCINATE SCH MG: 40 INJECTION, POWDER, FOR SOLUTION INTRAMUSCULAR; INTRAVENOUS at 04:20

## 2018-02-05 RX ADMIN — HUMAN INSULIN SCH: 100 INJECTION, SOLUTION SUBCUTANEOUS at 21:48

## 2018-02-05 RX ADMIN — INSULIN GLARGINE SCH UNIT: 100 INJECTION, SOLUTION SUBCUTANEOUS at 21:47

## 2018-02-05 RX ADMIN — RANOLAZINE SCH MG: 500 TABLET, FILM COATED, EXTENDED RELEASE ORAL at 17:43

## 2018-02-05 RX ADMIN — SACUBITRIL AND VALSARTAN SCH TAB: 24; 26 TABLET, FILM COATED ORAL at 09:48

## 2018-02-05 RX ADMIN — NYSTATIN SCH ML: 100000 SUSPENSION ORAL at 21:48

## 2018-02-05 RX ADMIN — NYSTATIN SCH ML: 100000 SUSPENSION ORAL at 17:51

## 2018-02-05 RX ADMIN — RANOLAZINE SCH MG: 500 TABLET, FILM COATED, EXTENDED RELEASE ORAL at 09:49

## 2018-02-05 RX ADMIN — HUMAN INSULIN SCH UNIT: 100 INJECTION, SOLUTION SUBCUTANEOUS at 14:22

## 2018-02-05 RX ADMIN — ASPIRIN SCH MG: 325 TABLET, DELAYED RELEASE ORAL at 09:47

## 2018-02-05 RX ADMIN — METHYLPREDNISOLONE SODIUM SUCCINATE SCH MG: 40 INJECTION, POWDER, FOR SOLUTION INTRAMUSCULAR; INTRAVENOUS at 17:40

## 2018-02-05 RX ADMIN — HUMAN INSULIN SCH UNIT: 100 INJECTION, SOLUTION SUBCUTANEOUS at 17:40

## 2018-02-05 RX ADMIN — NYSTATIN SCH ML: 100000 SUSPENSION ORAL at 14:22

## 2018-02-05 RX ADMIN — HUMAN INSULIN SCH: 100 INJECTION, SOLUTION SUBCUTANEOUS at 08:19

## 2018-02-05 RX ADMIN — CEFTRIAXONE SCH MLS/HR: 1 INJECTION, SOLUTION INTRAVENOUS at 09:49

## 2018-02-05 NOTE — CP.PCM.PN
Subjective





- Date & Time of Evaluation


Date of Evaluation: 02/05/18


Time of Evaluation: 14:06





- Subjective


Subjective: 





CHIEF COMPLAINTS TODAY :


AFEBRILE.


WHEEZING LESS


IMPROVING COUGH














ROS.


HEENT :  N.


Resp :      +ve  cough, wheezing ,pleuritic CP ,or hemoptysis 


Cardio :     No anginal  CP, PND, orthopnea, palpitation 


GI :           No abd.pain, n/v ,diarrhea or GI bleeding .


CNS : No headache, vertigo, focal deficit.


Musculoskel :  No joint swelling ,


Derm :        No rash 


Psych :     Normal affect.


Ext :  No  swelling ,calf pain 





PE.


Pt. is alert awake in no distress.


V.S  As noted in the chart 


Head ,ear nose,throat and eyes : Normal.


Neck : Supple with normal carotids.


Lungs: BILATERAL EXPIRATORY WHEEZE./AND RHONCHI.


Heart : S1 & S2 normal with S4. No murmur.


Abd : Soft non tender with normal bowel sounds.


Neuro : Moves all ext. with no localized deficit.


Ext : No edema with intact pulses.Non tender calves 


Derm : No rashes or decubitus ulcer.





LABS/RADIOLOGY:


CREATININE 1.5/BUN 47


lftS OKAY


WBC 9.9 H/H 9.6/28.3 


CHEST X-RAY 1/31/18 NO FOCAL CONSOLIDATION OR EFFUSION.  CHRONIC INTERSTITIAL 

MARKINGS.





SPUTUM +VE YEAST








ASSESSMENT/PLAN : 


RESPIRATORY INSUFFICIENCY


PNEUMONIA  / ACUTE BRONCHITIS


CHF.


TRANSAMINITIS ?DRUGS.


ANEMIA.


DM





PLAN.


IV SOLUMEDROL  40 MG EVERY 12 HOURLY 


DC  iv ROCEPHIN 1 G ONCE A DAY DAILY IN AM


DC PO ZITHROMAX 500 MG  ONCE A DAY DAILY.IN AM


nystatin 5 mL by mouth swish and swallow twice a day for 5 days


NEBULIZER THERAPY.


.BMP,CBC IN AM


MONITOR RENAL FUNCTIONS CLOSELY


PATIENT ON LASIX 40 MG ONCE A DAY DAILY.





Objective





- Vital Signs/Intake and Output


Vital Signs (last 24 hours): 


 











Temp Pulse Resp BP Pulse Ox


 


 97.7 F   83   20   112/58 L  100 


 


 02/05/18 09:24  02/05/18 09:24  02/05/18 09:24  02/05/18 09:48  02/05/18 09:24











- Medications


Medications: 


 Current Medications





Acetaminophen (Tylenol 325mg Tab)  650 mg PO Q6 PRN


   PRN Reason: Pain, Mild (1-3)


Aspirin (Ecotrin)  325 mg PO DAILY [facility]


   Last Admin: 02/05/18 09:47 Dose:  325 mg


Azithromycin (Zithromax)  500 mg PO Q24H [facility]


   Last Admin: 02/04/18 16:10 Dose:  500 mg


Ergocalciferol (Drisdol 50,000 Intl Units Cap)  1 cap PO QWK [facility]


   Last Admin: 02/05/18 09:48 Dose:  1 cap


Furosemide (Lasix)  40 mg IVP DAILY [facility]


   Last Admin: 02/05/18 09:48 Dose:  40 mg


Ceftriaxone Sodium (Rocephin Iv 1 Gm Duplex)  50 mls @ 100 mls/hr IVPB DAILY [facility]


   Last Admin: 02/05/18 09:49 Dose:  100 mls/hr


Insulin Glargine (Lantus)  20 unit SC [facility]


   Last Admin: 02/04/18 21:26 Dose:  20 unit


Insulin Human Regular (Novolin R)  0 unit SC ACHS [facility]


   PRN Reason: Protocol


   Last Admin: 02/05/18 08:19 Dose:  Not Given


Methylprednisolone (Solu-Medrol)  40 mg IVP Q12H [facility]


   Last Admin: 02/05/18 04:20 Dose:  40 mg


Metoprolol Tartrate (Lopressor)  25 mg PO BID [facility]


   Last Admin: 02/05/18 09:48 Dose:  25 mg


Nystatin (Nystatin Oral Susp)  5 ml PO QID [facility]


   Last Admin: 02/05/18 09:49 Dose:  5 ml


Ranolazine (Ranexa)  500 mg PO BID [facility]


   Last Admin: 02/05/18 09:49 Dose:  500 mg


Repaglinide (Prandin)  2 mg PO DAILY [facility]


   Last Admin: 02/05/18 09:47 Dose:  2 mg


Rosuvastatin Calcium (Crestor)  10 mg PO HS [facility]


   Last Admin: 02/04/18 21:25 Dose:  10 mg


Sacubitril/Valsartan (Entresto 24 Mg-26 Mg)  1 tab PO DAILY [facility]


   Last Admin: 02/05/18 09:48 Dose:  1 tab


Ticagrelor (Brilinta)  90 mg PO BID [facility]


   Last Admin: 02/05/18 09:48 Dose:  90 mg











- Labs


Labs: 


 





 02/03/18 06:39 





 02/03/18 06:39 











Assessment and Plan


(1) Respiratory distress


Status: Acute   





(2) Pneumonia


Status: Acute   





(3) CHF exacerbation


Status: Acute   





(4) Anemia


Status: Acute   





(5) Diabetes mellitus, new onset


Status: Acute

## 2018-02-05 NOTE — CP.PCM.PN
Subjective





- Date & Time of Evaluation


Date of Evaluation: 02/05/18


Time of Evaluation: 13:22





- Subjective


Subjective: 





CHIEF COMPLAINTS TODAY :


SOB/WHEEZING A/W COUGH  GOT WORSE 





ROS.


HEENT :  N.


Resp :       No c hemoptysis 


Cardio :     No anginal  CP,palpitation 


GI :           No abd.pain, n/v ,diarrhea or GI bleeding .


CNS : No headache, vertigo, focal deficit.


Musculoskel :  No joint swelling ,


Derm :        No rash 


Psych :     Normal affect.


Ext :  No  swelling ,calf pain 





PE.


Pt. is alert awake in no distress.


V.S  As noted in the chart 


Head ,ear nose,throat and eyes : Normal.


Neck : Supple with normal carotids.


Lungs: EVELIA RONCHI 


Heart : S1 & S2 normal with S4. MENDEZ 


Abd : Soft non tender with normal bowel sounds.


Neuro : Moves all ext. with no localized deficit.


Ext : No edema with intact pulses.Non tender calves 


Derm : No rashes or decubitus ulcer.





LABS/RADIOLOGY: SEPTIC W/P NEG SO FAR CXR  CH. CHANGES 





ASSESSMENT/PLAN : 


NEB/IVAB 


INCREASE STEROIDS 





Objective





- Vital Signs/Intake and Output


Vital Signs (last 24 hours): 


 











Temp Pulse Resp BP Pulse Ox


 


 97.7 F   83   20   112/58 L  100 


 


 02/05/18 09:24  02/05/18 09:24  02/05/18 09:24  02/05/18 09:48  02/05/18 09:24











- Medications


Medications: 


 Current Medications





Acetaminophen (Tylenol 325mg Tab)  650 mg PO Q6 PRN


   PRN Reason: Pain, Mild (1-3)


Aspirin (Ecotrin)  325 mg PO DAILY [facility]


   Last Admin: 02/05/18 09:47 Dose:  325 mg


Azithromycin (Zithromax)  500 mg PO Q24H [facility]


   Last Admin: 02/04/18 16:10 Dose:  500 mg


Ergocalciferol (Drisdol 50,000 Intl Units Cap)  1 cap PO QWK [facility]


   Last Admin: 02/05/18 09:48 Dose:  1 cap


Furosemide (Lasix)  40 mg IVP DAILY [facility]


   Last Admin: 02/05/18 09:48 Dose:  40 mg


Ceftriaxone Sodium (Rocephin Iv 1 Gm Duplex)  50 mls @ 100 mls/hr IVPB DAILY [facility]


   Last Admin: 02/05/18 09:49 Dose:  100 mls/hr


Insulin Glargine (Lantus)  20 unit SC [facility]


   Last Admin: 02/04/18 21:26 Dose:  20 unit


[facility]  [facility]


   PRN Reason: Protocol


   Last Admin: 02/05/18 08:19 Dose:  Not Given


Methylprednisolone (Solu-Medrol)  40 mg IVP Q12H [facility]


   Last Admin: 02/05/18 04:20 Dose:  40 mg


Metoprolol Tartrate (Lopressor)  25 mg PO BID [facility]


   Last Admin: 02/05/18 09:48 Dose:  25 mg


Nystatin (Nystatin Oral Susp)  5 ml PO QID [facility]


   Last Admin: 02/05/18 09:49 Dose:  5 ml


Ranolazine (Ranexa)  500 mg PO BID [facility]


   Last Admin: 02/05/18 09:49 Dose:  500 mg


Repaglinide (Prandin)  2 mg PO DAILY [facility]


   Last Admin: 02/05/18 09:47 Dose:  2 mg


Rosuvastatin Calcium (Crestor)  10 mg PO [facility]


   Last Admin: 02/04/18 21:25 Dose:  10 mg


Sacubitril/Valsartan (Entresto 24 Mg-26 Mg)  1 tab PO DAILY [facility]


   Last Admin: 02/05/18 09:48 Dose:  1 tab


Ticagrelor (Brilinta)  90 mg PO BID [facility]


   Last Admin: 02/05/18 09:48 Dose:  90 mg











- Labs


Labs: 


 





 02/03/18 06:39 





 02/03/18 06:39

## 2018-02-06 VITALS — DIASTOLIC BLOOD PRESSURE: 72 MMHG | SYSTOLIC BLOOD PRESSURE: 109 MMHG

## 2018-02-06 VITALS — HEART RATE: 78 BPM

## 2018-02-06 VITALS — OXYGEN SATURATION: 97 %

## 2018-02-06 VITALS — TEMPERATURE: 97.4 F

## 2018-02-06 LAB
ALBUMIN SERPL-MCNC: 3 G/DL (ref 3.5–5)
ALBUMIN/GLOB SERPL: 1.1 {RATIO} (ref 1–2.1)
ALT SERPL-CCNC: 41 U/L (ref 9–52)
AST SERPL-CCNC: 29 U/L (ref 14–36)
BACTERIA #/AREA URNS HPF: (no result) /[HPF]
BILIRUB DIRECT SERPL-MCNC: 0.2 MG/DL (ref 0–0.4)
BILIRUB UR-MCNC: NEGATIVE MG/DL
BUN SERPL-MCNC: 70 MG/DL (ref 7–17)
CALCIUM SERPL-MCNC: 8.6 MG/DL (ref 8.6–10.4)
ERYTHROCYTE [DISTWIDTH] IN BLOOD BY AUTOMATED COUNT: 16.3 % (ref 11.5–14.5)
GFR NON-AFRICAN AMERICAN: 21
GLUCOSE UR STRIP-MCNC: NORMAL MG/DL
HGB BLD-MCNC: 11 G/DL (ref 11–16)
HYALINE CASTS #/AREA URNS LPF: (no result) /LPF (ref 0–2)
LEUKOCYTE ESTERASE UR-ACNC: (no result) LEU/UL
MCH RBC QN AUTO: 26.7 PG (ref 27–31)
MCHC RBC AUTO-ENTMCNC: 32.9 G/DL (ref 33–37)
MCV RBC AUTO: 81.4 FL (ref 81–99)
PH UR STRIP: 5 [PH] (ref 5–8)
PLATELET # BLD: 220 K/UL (ref 130–400)
PMV BLD AUTO: 10.9 FL (ref 7.2–11.7)
PROT UR STRIP-MCNC: NEGATIVE MG/DL
RBC # BLD AUTO: 4.11 MIL/UL (ref 3.8–5.2)
RBC # UR STRIP: NEGATIVE /UL
SP GR UR STRIP: 1.01 (ref 1–1.03)
SQUAMOUS EPITHIAL: 3 /HPF (ref 0–5)
URINE NITRATE: NEGATIVE
UROBILINOGEN UR-MCNC: NORMAL MG/DL (ref 0.2–1)
WBC # BLD AUTO: 14 K/UL (ref 4.8–10.8)

## 2018-02-06 RX ADMIN — NYSTATIN SCH ML: 100000 SUSPENSION ORAL at 09:52

## 2018-02-06 RX ADMIN — ASPIRIN SCH MG: 325 TABLET, DELAYED RELEASE ORAL at 09:52

## 2018-02-06 RX ADMIN — NYSTATIN SCH ML: 100000 SUSPENSION ORAL at 13:27

## 2018-02-06 RX ADMIN — HUMAN INSULIN SCH UNIT: 100 INJECTION, SOLUTION SUBCUTANEOUS at 08:53

## 2018-02-06 RX ADMIN — RANOLAZINE SCH MG: 500 TABLET, FILM COATED, EXTENDED RELEASE ORAL at 09:52

## 2018-02-06 RX ADMIN — CEFTRIAXONE SCH MLS/HR: 1 INJECTION, SOLUTION INTRAVENOUS at 09:55

## 2018-02-06 RX ADMIN — METHYLPREDNISOLONE SODIUM SUCCINATE SCH MG: 40 INJECTION, POWDER, FOR SOLUTION INTRAMUSCULAR; INTRAVENOUS at 03:58

## 2018-02-06 RX ADMIN — HUMAN INSULIN SCH UNIT: 100 INJECTION, SOLUTION SUBCUTANEOUS at 12:54

## 2018-02-06 RX ADMIN — SACUBITRIL AND VALSARTAN SCH TAB: 24; 26 TABLET, FILM COATED ORAL at 09:52

## 2018-02-06 NOTE — PCM.HF
Heart Failure Core Measure





- Heart Failure


Ejection Fraction: Less Than 40 %


ACE Inhibitor Prescribed: No


Contraindication/Reason for not providing: arb


Beta-Blocker Prescribed: Metoprolol Succinate


Angiotensin II Receptor Blocker Prescribed: Yes


AnticoagulationTherapy for Atrial Fibrillation/Atrialflutter: No


Contraindication/Reason for not providing: no hx of afib


Aldosterone Antagonist Prescribed: No


Contraindication/Reason for not providing: arf


Hydralazine Nitrate Prescribed: No


Contraindication/Reason for not providing: on renexa


Implantable Cardioverter Defibrillator Therapy: No


Contraindication/Reason for not providing: patient has a pacemaker


Cardiac Resynchronization Therapy Prescribed: No


Contraindication/Reason for not providing: patient has a pacemaker





- Follow up


Will be discharged to: Home


Follow Up Date (must be within 7 days from discharge): 02/12/18


Follow Up Time: 09:00

## 2018-02-06 NOTE — CP.PCM.DIS
Provider





- Provider


Date of Admission: 


01/29/18 18:44





Attending physician: 


Osmar Fierro MD





Time Spent in preparation of Discharge (in minutes): 30





Hospital Course





- Lab Results


Lab Results: 


 Micro Results





01/29/18 17:00   Blood   Blood Culture - Final


                            NO GROWTH AFTER 5 DAYS


01/29/18 17:00   Blood   Gram Stain - Final


                            TEST NOT PERFORMED


01/29/18 18:30   Blood   Blood Culture - Final


                            NO GROWTH AFTER 5 DAYS


01/29/18 18:30   Blood   Gram Stain - Final


                            TEST NOT PERFORMED


01/30/18 09:27   Sputum   Gram Stain - Final


01/30/18 09:27   Sputum   Sputum Culture - Final


                            Yeast Species





 Most Recent Lab Values











WBC  14.0 K/uL (4.8-10.8)  H  02/06/18  06:52    


 


RBC  4.11 Mil/uL (3.80-5.20)   02/06/18  06:52    


 


Hgb  11.0 g/dL (11.0-16.0)   02/06/18  06:52    


 


Hct  33.4 % (34.0-47.0)  L  02/06/18  06:52    


 


MCV  81.4 fL (81.0-99.0)   02/06/18  06:52    


 


MCH  26.7 pg (27.0-31.0)  L  02/06/18  06:52    


 


MCHC  32.9 g/dL (33.0-37.0)  L  02/06/18  06:52    


 


RDW  16.3 % (11.5-14.5)  H  02/06/18  06:52    


 


Plt Count  220 K/uL (130-400)   02/06/18  06:52    


 


MPV  10.9 fL (7.2-11.7)   02/06/18  06:52    


 


Neut % (Auto)  92.2 % (50.0-75.0)  H  02/03/18  06:39    


 


Lymph % (Auto)  2.3 % (20.0-40.0)  L  02/03/18  06:39    


 


Mono % (Auto)  5.4 % (0.0-10.0)   02/03/18  06:39    


 


Eos % (Auto)  0.0 % (0.0-4.0)   02/03/18  06:39    


 


Baso % (Auto)  0.1 % (0.0-2.0)   02/03/18  06:39    


 


Neut # (Auto)  9.1 K/uL (1.8-7.0)  H  02/03/18  06:39    


 


Lymph # (Auto)  0.2 K/uL (1.0-4.3)  L  02/03/18  06:39    


 


Mono # (Auto)  0.5 K/uL (0.0-0.8)   02/03/18  06:39    


 


Eos # (Auto)  0.0 K/uL (0.0-0.7)   02/03/18  06:39    


 


Baso # (Auto)  0.0 K/uL (0.0-0.2)   02/03/18  06:39    


 


Neutrophils % (Manual)  94 % (50-75)  H  02/03/18  06:39    


 


Band Neutrophils %  1 % (0-2)   02/03/18  06:39    


 


Lymphocytes % (Manual)  1 % (20-40)  L  02/03/18  06:39    


 


Nucleated RBC %  1 % (0-0)  H  01/30/18  07:32    


 


Monocytes % (Manual)  4 % (0-10)   02/03/18  06:39    


 


Platelet Estimate  Normal  (NORMAL)   02/03/18  06:39    


 


Polychromasia  Slight   01/30/18  07:32    


 


Tear Drop Cells  Slight   01/29/18  17:10    


 


Hypochromasia (manual)  Slight   02/03/18  06:39    


 


Anisocytosis (manual)  Slight   02/03/18  06:39    


 


Ovalocytes  Slight   02/03/18  06:39    


 


ESR  26 mm/hr (0-20)  H  01/30/18  07:32    


 


Sodium  131 mmol/L (132-148)  L  02/06/18  06:52    


 


Potassium  5.0 mmol/L (3.6-5.2)   02/06/18  06:52    


 


Chloride  91 mmol/L ()  L  02/06/18  06:52    


 


Carbon Dioxide  31 mmol/L (22-30)  H  02/06/18  06:52    


 


Anion Gap  14  (10-20)   02/06/18  06:52    


 


BUN  70 mg/dL (7-17)  H  02/06/18  06:52    


 


Creatinine  2.2 mg/dL (0.7-1.2)  H  02/06/18  06:52    


 


Est GFR ( Amer)  26   02/06/18  06:52    


 


Est GFR (Non-Af Amer)  21   02/06/18  06:52    


 


POC Glucose (mg/dL)  359 mg/dL ()  H  02/06/18  11:08    


 


Random Glucose  182 mg/dL ()  H  02/06/18  06:52    


 


Calcium  8.6 mg/dl (8.6-10.4)   02/06/18  06:52    


 


Phosphorus  3.4 mg/dL (2.5-4.5)   02/02/18  07:12    


 


Magnesium  2.1 mg/dL (1.6-2.3)   02/02/18  07:12    


 


Total Bilirubin  0.3 mg/dL (0.2-1.3)   02/06/18  06:52    


 


Direct Bilirubin  0.2 mg/dL (0.0-0.4)   02/06/18  06:52    


 


AST  29 U/L (14-36)   02/06/18  06:52    


 


ALT  41 U/L (9-52)   02/06/18  06:52    


 


Alkaline Phosphatase  55 U/L ()   02/06/18  06:52    


 


Troponin I  0.0430 ng/mL (0.00-0.120)   01/29/18  17:10    


 


C-React Prot High Sens  2.27 mg/L (1.00-3.00)   01/30/18  07:32    


 


NT-Pro-B Natriuret Pep  69661 pg/mL (0-900)  H  01/29/18  17:10    


 


Total Protein  5.8 g/dL (6.3-8.3)  L  02/06/18  06:52    


 


Albumin  3.0 g/dL (3.5-5.0)  L  02/06/18  06:52    


 


Globulin  2.8 gm/dL (2.2-3.9)   02/06/18  06:52    


 


Albumin/Globulin Ratio  1.1  (1.0-2.1)   02/06/18  06:52    


 


Urine Color  Yellow  (YELLOW)   02/06/18  01:50    


 


Urine Clarity  Hazy  (Clear)   02/06/18  01:50    


 


Urine pH  5.0  (5.0-8.0)   02/06/18  01:50    


 


Ur Specific Gravity  1.015  (1.003-1.030)   02/06/18  01:50    


 


Urine Protein  Negative mg/dL (NEGATIVE)   02/06/18  01:50    


 


Urine Glucose (UA)  Normal mg/dL (Normal)   02/06/18  01:50    


 


Urine Ketones  Negative mg/dL (NEGATIVE)   02/06/18  01:50    


 


Urine Blood  Negative  (NEGATIVE)   02/06/18  01:50    


 


Urine Nitrate  Negative  (NEGATIVE)   02/06/18  01:50    


 


Urine Bilirubin  Negative  (NEGATIVE)   02/06/18  01:50    


 


Urine Urobilinogen  Normal mg/dL (0.2-1.0)   02/06/18  01:50    


 


Ur Leukocyte Esterase  2+ Hunter/uL (Negative)  H  02/06/18  01:50    


 


Urine WBC (Auto)  14 /hpf (0-5)  H  02/06/18  01:50    


 


Urine RBC (Auto)  5 /hpf (0-3)  H  02/06/18  01:50    


 


Ur Squamous Epith Cells  3 /hpf (0-5)   02/06/18  01:50    


 


Urine Bacteria  Rare  (<OCC)   02/06/18  01:50    


 


Hyaline Casts  6-10 /lpf (0-2)  H  02/06/18  01:50    


 


Influenza Typ A,B (EIA)  Negative for flu a/b  (NEGATIVE)   01/29/18  23:19    


 


Influenza Type A Ab  <1:8 titer (<1:8)   01/30/18  07:32    


 


Influenza Type B Ab  <1:8 titer (<1:8)   01/30/18  07:32    


 


Ur L.pneumophila Ag  Negative  (NEGATIVE)   01/29/18  22:23    


 


Mycoplasma pneumon IgM  Negative  (NEGATIVE)   01/30/18  07:32    














- Hospital Course


Hospital Course: 





DMITTED WITH PNEUMONIA AND SOB 


PT FOR FEW DAYS HAS BEEN C/O SOB AND SEVERE NITE TIME COUGHING WITH NO 

IMPROVEMENT WITH OUTPT PO LEVAQUINE 


EVALUATED IN ER AND HAS CHF WITH EVELIA PNEUMONIA 





PAST HIST.


CAD/STENT/CARDIOMYOPATHY


AICD


T2DM/HTN/COPD





PT IMPROVED ON IV LASIX/AB AND STEROIDS 


CURRENTLY STABLE FOR D/C 


F/U KIDNEY FUNCTION 





Discharge Exam





- Head Exam


Head Exam: NORMAL INSPECTION





Discharge Plan





- Discharge Medications


Prescriptions: 


predniSONE [predniSONE Tab] 10 mg PO DAILY #30 tab





- Follow Up Plan


Condition: FAIR


Disposition: HOME/ ROUTINE


Instructions:  Prednisone (By mouth), Heart Failure (DC), Meal Planning with 

Diabetes Exchanges (GEN), Managing Diabetes During Sick Days (DC), Pneumonia (DC

)


Additional Instructions: 


FOLLOW UP WITH DR FIERRO IN HIS OFFICE ----CALL FOR APPOINTMENT


CONTINUE ALL YOUR HOME MEDICATION AS DIRECTED


NEW PRESCRIPTION GIVEN


   PREDNISOLONE 40 MG (4 TABS) BY MOUTH DAILY FOR 3 DAYS THEN


                 30 MG (3 TABS) BY MOUTH DAILY FOR 3 DAYS THEN


                 20 MG (2 TABS) BY MOUTH DAILY FOR 3 DAYS THEN


                 10 MG (1 TAB) BY MOUTH DAILY FOR 3 DAYS 


ACTIVITY AS TOLERATED


CALL DR FIERRO OR GO TO THE EMERGENCY ROOM IF SYMPTOMS RETURN OR WORSENING 


Referrals: 


Ruth Baugh MD [Staff Provider] - 


Osmar Fierro MD [Staff Provider] -

## 2018-02-06 NOTE — CP.PCM.PN
Subjective





- Date & Time of Evaluation


Date of Evaluation: 02/06/18


Time of Evaluation: 13:16





- Subjective


Subjective: 


PATIENT IS ADMITTED FOR CHF AND PNA; LYING DOWN ON ROOM AIR NO SIGN OF DISTRESS 

NOTED; DENIES CHEST PAIN OR SOB








Objective





- Vital Signs/Intake and Output


Vital Signs (last 24 hours): 


 











Temp Pulse Resp BP Pulse Ox


 


 97.4 F L  72   20   109/72   97 


 


 02/06/18 07:40  02/06/18 08:28  02/06/18 07:40  02/06/18 09:53  02/06/18 07:40








Intake and Output: 


 











 02/06/18 02/06/18





 06:59 18:59


 


Intake Total 520 


 


Balance 520 














- Medications


Medications: 


 Current Medications





Acetaminophen (Tylenol 325mg Tab)  650 mg PO Q6 PRN


   PRN Reason: Pain, Mild (1-3)


   Last Admin: 02/06/18 00:50 Dose:  650 mg


Aspirin (Ecotrin)  325 mg PO DAILY [facility]


   Last Admin: 02/06/18 09:52 Dose:  325 mg


Azithromycin (Zithromax)  500 mg PO Q24H [facility]


   Last Admin: 02/05/18 17:40 Dose:  500 mg


Ergocalciferol (Drisdol 50,000 Intl Units Cap)  1 cap PO QWK [facility]


   Last Admin: 02/05/18 09:48 Dose:  1 cap


Furosemide (Lasix)  40 mg IVP DAILY [facility]


   Last Admin: 02/06/18 09:52 Dose:  40 mg


Ceftriaxone Sodium (Rocephin Iv 1 Gm Duplex)  50 mls @ 100 mls/hr IVPB DAILY [facility]


   Last Admin: 02/06/18 09:55 Dose:  100 mls/hr


Insulin Glargine (Lantus)  20 unit SC HS [facility]


   Last Admin: 02/05/18 21:47 Dose:  20 unit


Insulin Human Regular (Novolin R)  0 unit SC [facility]


   PRN Reason: Protocol


   Last Admin: 02/06/18 08:53 Dose:  3 unit


Methylprednisolone (Solu-Medrol)  40 mg IVP Q12H [facility]


   Last Admin: 02/06/18 03:58 Dose:  40 mg


Metoprolol Tartrate (Lopressor)  25 mg PO BID [facility]


   Last Admin: 02/06/18 09:53 Dose:  25 mg


Nystatin (Nystatin Oral Susp)  5 ml PO QID [facility]


   Last Admin: 02/06/18 09:52 Dose:  5 ml


Ranolazine (Ranexa)  500 mg PO BID [facility]


   Last Admin: 02/06/18 09:52 Dose:  500 mg


Repaglinide (Prandin)  2 mg PO DAILY [facility]


   Last Admin: 02/06/18 09:52 Dose:  2 mg


Rosuvastatin Calcium (Crestor)  10 mg PO HS [facility]


   Last Admin: 02/05/18 21:47 Dose:  10 mg


Sacubitril/Valsartan (Entresto 24 Mg-26 Mg)  1 tab PO DAILY [facility]


   Last Admin: 02/06/18 09:52 Dose:  1 tab


Ticagrelor (Brilinta)  90 mg PO BID [facility]


   Last Admin: 02/06/18 09:52 Dose:  90 mg











- Labs


Labs: 


 





 02/06/18 06:52 





 02/06/18 06:52 











Assessment and Plan





- Assessment and Plan (Free Text)


Assessment: 


PATIENT IS SEEN AND EXAMINED AT THE BEDSIDE 


LUNG SOUND CLEAR NO WHEEZING NOTED


PATIENT GETTING SOLUMEDROL IV WHICH WILL BE TAPE OFF ON DISCHARGE


AFEBRILE AND NORMAL BLOOD PRESSURE


CR IS 2.2 AND PATIENT WAS GETTING LASIX 


WBC IS SLIGHTLY ELEVATED POSS SECONDARY TO STEROIDS


DISCUSS WITH MAKENNA LIU AND DR NIEVES WHO AGREE AND CLEAR PATIENT FOR DC


FOLLOW UP WITH DR NIEVES IN HIS OFFICE ----CALL FOR APPOINTMENT


CONTINUE ALL YOUR HOME MEDICATION AS DIRECTED


NEW PRESCRIPTION GIVEN


PREDNISOLONE 40 MG (4 TABS) BY MOUTH DAILY FOR 3 DAYS THEN


           30 MG (3 TABS) BY MOUTH DAILY FOR 3 DAYS THEN


           20 MG (2 TABS) BY MOUTH DAILY FOR 3 DAYS THEN


           10 MG (1 TAB) BY MOUTH DAILY FOR 3 DAYS 


ACTIVITY AS TOLERATED


CALL DR NIEVES OR GO TO THE EMERGENCY ROOM IF SYMPTOMS RETURN OR WORSENING 


DISCUSS WITH PATIENT WHO AGREE AND VERBALIZED UNDERSTANDING

## 2018-02-06 NOTE — CP.PCM.PN
Subjective





- Date & Time of Evaluation


Date of Evaluation: 02/06/18


Time of Evaluation: 13:31





- Subjective


Subjective: 





CHIEF COMPLAINTS TODAY :


AFEBRILE.


OFFERS NO NEW COMPLAINTS.  fEELING BETTER














ROS.


HEENT :  N.


Resp :      +ve  cough, wheezing ,pleuritic CP ,or hemoptysis 


Cardio :     No anginal  CP, PND, orthopnea, palpitation 


GI :           No abd.pain, n/v ,diarrhea or GI bleeding .


CNS : No headache, vertigo, focal deficit.


Musculoskel :  No joint swelling ,


Derm :        No rash 


Psych :     Normal affect.


Ext :  No  swelling ,calf pain 





PE.


Pt. is alert awake in no distress.


V.S  As noted in the chart 


Head ,ear nose,throat and eyes : Normal.


Neck : Supple with normal carotids.


Lungs: FEW EXPIRATORY WHEEZE OTHERWISE CLEAR


Heart : S1 & S2 normal with S4. No murmur.


Abd : Soft non tender with normal bowel sounds.


Neuro : Moves all ext. with no localized deficit.


Ext : No edema with intact pulses.Non tender calves 


Derm : No rashes or decubitus ulcer.





LABS/RADIOLOGY:


CREATININE 1.5/BUN 47


lftS OKAY


WBC 9.9 H/H 9.6/28.3 


CHEST X-RAY 1/31/18 NO FOCAL CONSOLIDATION OR EFFUSION.  CHRONIC INTERSTITIAL 

MARKINGS.





SPUTUM +VE YEAST








ASSESSMENT/PLAN : 


RESPIRATORY INSUFFICIENCY


PNEUMONIA  / ACUTE BRONCHITIS


CHF.


TRANSAMINITIS ?DRUGS.


ANEMIA.


DM





PLAN.


Taper steroids as per PMD.


IV SOLUMEDROL  40 MG EVERY 12 HOURLY 


off antibiotics


NEBULIZER THERAPY.


.BMP,CBC IN AM


MONITOR RENAL FUNCTIONS CLOSELY


PATIENT ON LASIX 40 MG ONCE A DAY DAILY.





Objective





- Vital Signs/Intake and Output


Vital Signs (last 24 hours): 


 











Temp Pulse Resp BP Pulse Ox


 


 97.4 F L  72   20   109/72   97 


 


 02/06/18 07:40  02/06/18 08:28  02/06/18 07:40  02/06/18 09:53  02/06/18 07:40








Intake and Output: 


 











 02/06/18 02/06/18





 06:59 18:59


 


Intake Total 520 


 


Balance 520 














- Medications


Medications: 


 Current Medications





Acetaminophen (Tylenol 325mg Tab)  650 mg PO Q6 PRN


   PRN Reason: Pain, Mild (1-3)


   Last Admin: 02/06/18 00:50 Dose:  650 mg


Aspirin (Ecotrin)  325 mg PO DAILY [facility]


   Last Admin: 02/06/18 09:52 Dose:  325 mg


Azithromycin (Zithromax)  500 mg PO Q24H [facility]


   Last Admin: 02/05/18 17:40 Dose:  500 mg


Ergocalciferol (Drisdol 50,000 Intl Units Cap)  1 cap PO QWK [facility]


   Last Admin: 02/05/18 09:48 Dose:  1 cap


Furosemide (Lasix)  40 mg IVP DAILY [facility]


   Last Admin: 02/06/18 09:52 Dose:  40 mg


Ceftriaxone Sodium (Rocephin Iv 1 Gm Duplex)  50 mls @ 100 mls/hr IVPB DAILY [facility]


   Last Admin: 02/06/18 09:55 Dose:  100 mls/hr


Insulin Glargine (Lantus)  20 unit SC [facility]


   Last Admin: 02/05/18 21:47 Dose:  20 unit


Insulin Human Regular (Novolin R)  0 unit SC ACHS [facility]


   PRN Reason: Protocol


   Last Admin: 02/06/18 12:54 Dose:  8 unit


Methylprednisolone (Solu-Medrol)  40 mg IVP Q12H [facility]


   Last Admin: 02/06/18 03:58 Dose:  40 mg


Metoprolol Tartrate (Lopressor)  25 mg PO BID [facility]


   Last Admin: 02/06/18 09:53 Dose:  25 mg


Nystatin (Nystatin Oral Susp)  5 ml PO QID [facility]


   Last Admin: 02/06/18 13:27 Dose:  5 ml


Ranolazine (Ranexa)  500 mg PO BID [facility]


   Last Admin: 02/06/18 09:52 Dose:  500 mg


Repaglinide (Prandin)  2 mg PO DAILY [facility]


   Last Admin: 02/06/18 09:52 Dose:  2 mg


Rosuvastatin Calcium (Crestor)  10 mg PO HS [facility]


   Last Admin: 02/05/18 21:47 Dose:  10 mg


Sacubitril/Valsartan (Entresto 24 Mg-26 Mg)  1 tab PO DAILY [facility]


   Last Admin: 02/06/18 09:52 Dose:  1 tab


Ticagrelor (Brilinta)  90 mg PO BID [facility]


   Last Admin: 02/06/18 09:52 Dose:  90 mg











- Labs


Labs: 


 





 02/06/18 06:52 





 02/06/18 06:52 











Assessment and Plan


(1) Respiratory distress


Status: Acute   





(2) Pneumonia


Status: Acute   





(3) CHF exacerbation


Status: Acute   





(4) Anemia


Status: Acute   





(5) Diabetes mellitus, new onset


Status: Acute

## 2018-06-06 ENCOUNTER — HOSPITAL ENCOUNTER (INPATIENT)
Dept: HOSPITAL 31 - C.ER | Age: 83
LOS: 9 days | Discharge: HOME | DRG: 292 | End: 2018-06-15
Attending: INTERNAL MEDICINE | Admitting: INTERNAL MEDICINE
Payer: MEDICARE

## 2018-06-06 VITALS — BODY MASS INDEX: 25.5 KG/M2

## 2018-06-06 DIAGNOSIS — Z95.5: ICD-10-CM

## 2018-06-06 DIAGNOSIS — I50.23: ICD-10-CM

## 2018-06-06 DIAGNOSIS — I25.5: ICD-10-CM

## 2018-06-06 DIAGNOSIS — I48.91: ICD-10-CM

## 2018-06-06 DIAGNOSIS — E11.9: ICD-10-CM

## 2018-06-06 DIAGNOSIS — J45.901: ICD-10-CM

## 2018-06-06 DIAGNOSIS — I25.10: ICD-10-CM

## 2018-06-06 DIAGNOSIS — D50.9: ICD-10-CM

## 2018-06-06 DIAGNOSIS — Z86.73: ICD-10-CM

## 2018-06-06 DIAGNOSIS — J44.1: ICD-10-CM

## 2018-06-06 DIAGNOSIS — I11.0: Primary | ICD-10-CM

## 2018-06-06 DIAGNOSIS — N39.0: ICD-10-CM

## 2018-06-06 LAB
ALBUMIN SERPL-MCNC: 3.7 G/DL (ref 3.5–5)
ALBUMIN/GLOB SERPL: 1.1 {RATIO} (ref 1–2.1)
ALT SERPL-CCNC: 6 U/L (ref 9–52)
ANISOCYTOSIS BLD QL SMEAR: SLIGHT
APTT BLD: 33 SECONDS (ref 21–34)
AST SERPL-CCNC: 35 U/L (ref 14–36)
BASOPHILS # BLD AUTO: 0 K/UL (ref 0–0.2)
BASOPHILS NFR BLD: 0.7 % (ref 0–2)
BILIRUB UR-MCNC: NEGATIVE MG/DL
BUN SERPL-MCNC: 20 MG/DL (ref 7–17)
CALCIUM SERPL-MCNC: 9.1 MG/DL (ref 8.6–10.4)
CK MB SERPL-MCNC: 1.57 NG/ML (ref 0–3.38)
EOSINOPHIL # BLD AUTO: 0.1 K/UL (ref 0–0.7)
EOSINOPHIL NFR BLD: 2 % (ref 0–4)
EOSINOPHIL NFR BLD: 2.1 % (ref 0–4)
ERYTHROCYTE [DISTWIDTH] IN BLOOD BY AUTOMATED COUNT: 22 % (ref 11.5–14.5)
GFR NON-AFRICAN AMERICAN: 36
GLUCOSE UR STRIP-MCNC: (no result) MG/DL
GRAN CASTS #/AREA URNS LPF: 3 /LPF (ref 0–1)
HGB BLD-MCNC: 10.6 G/DL (ref 11–16)
HYALINE CASTS #/AREA URNS LPF: (no result) /LPF (ref 0–2)
HYPOCHROMIC: SLIGHT
INR PPP: 1.1
LEUKOCYTE ESTERASE UR-ACNC: (no result) LEU/UL
LYMPHOCYTE: 5 % (ref 20–40)
LYMPHOCYTES # BLD AUTO: 0.6 K/UL (ref 1–4.3)
LYMPHOCYTES NFR BLD AUTO: 8.7 % (ref 20–40)
MCH RBC QN AUTO: 26.3 PG (ref 27–31)
MCHC RBC AUTO-ENTMCNC: 32.6 G/DL (ref 33–37)
MCV RBC AUTO: 80.7 FL (ref 81–99)
MICROCYTES BLD QL SMEAR: SLIGHT
MONOCYTE: 1 % (ref 0–10)
MONOCYTES # BLD: 0.6 K/UL (ref 0–0.8)
MONOCYTES NFR BLD: 9.3 % (ref 0–10)
NEUTROPHILS # BLD: 5 K/UL (ref 1.8–7)
NEUTROPHILS NFR BLD AUTO: 79.2 % (ref 50–75)
NEUTROPHILS NFR BLD AUTO: 92 % (ref 50–75)
NRBC BLD AUTO-RTO: 0 % (ref 0–2)
OVALOCYTES BLD QL SMEAR: SLIGHT
PH UR STRIP: 5 [PH] (ref 5–8)
PLATELET # BLD EST: (no result) 10*3/UL
PLATELET # BLD: 88 K/UL (ref 130–400)
PMV BLD AUTO: 10.9 FL (ref 7.2–11.7)
POLYCHROMIC: SLIGHT
PROT UR STRIP-MCNC: (no result) MG/DL
PROTHROMBIN TIME: 12.3 SECONDS (ref 9.7–12.2)
RBC # BLD AUTO: 4.01 MIL/UL (ref 3.8–5.2)
RBC # UR STRIP: (no result) /UL
SP GR UR STRIP: 1.02 (ref 1–1.03)
SQUAMOUS EPITHIAL: 4 /HPF (ref 0–5)
TOTAL CELLS COUNTED BLD: 100
TROPONIN I SERPL-MCNC: 0.03 NG/ML (ref 0–0.12)
UROBILINOGEN UR-MCNC: NORMAL MG/DL (ref 0.2–1)
WBC # BLD AUTO: 6.4 K/UL (ref 4.8–10.8)

## 2018-06-06 RX ADMIN — INSULIN GLARGINE SCH U: 100 INJECTION, SOLUTION SUBCUTANEOUS at 21:34

## 2018-06-06 RX ADMIN — HUMAN INSULIN SCH U: 100 INJECTION, SOLUTION SUBCUTANEOUS at 21:31

## 2018-06-06 NOTE — C.PDOC
History Of Present Illness


86-year-old female presents to the ER complaining of left-sided chest pain 

associated with SOB since last night.  The pain radiates to her left back and 

worsens with deep inspiration. She admits to having a mild nonproductive cough.

   Patient denies fever, diaphoresis, palpitations, nausea, vomiting, dizziness

, numbness, weakness, or other complaints. PMHx is significant for asthma, COPD

, HTN, diabetes, and CHF. 





Time Seen by Provider: 06/06/18 13:36


Chief Complaint (Nursing): Chest Pain


History Per: Patient


History/Exam Limitations: no limitations


Onset/Duration Of Symptoms: Days


Current Symptoms Are (Timing): Still Present


Severity: Mild


Quality: "Pain"


Exacerbating Factors: Deep Breathing





Past Medical History


Reviewed: Historical Data, Nursing Documentation, Vital Signs


Vital Signs: 


 Last Vital Signs











Temp  97.7 F   06/10/18 07:35


 


Pulse  96 H  06/10/18 07:35


 


Resp  20   06/10/18 07:35


 


BP  96/59 L  06/10/18 10:31


 


Pulse Ox  97   06/10/18 12:44














- Medical History


PMH: Anemia, Anxiety, Arthritis (BACK AND KNEES AND R SHOULDER), Asthma, Cardia 

Arrhythmia, CHF, COPD, Diabetes, HTN, Hypercholesterolemia, TIA


Surgical History: Appendectomy, Coronary Stent, Endoscopy, Pacemaker (2016)





- [facility] Procedures








ASSISTANCE WITH RESPIRATORY VENTILATION, 24-96 HRS, CPAP (11/28/17)


ASSISTANCE WITH RESPIRATORY VENTILATION, <24 HRS, CPAP (07/21/16)


ESOPHAGOGASTRODUODENOSCOPY [EGD] W/CLOSED BIOPSY (06/14/15)


LEFT HEART CARDIAC CATH (04/03/14)


LT HEART ANGIOCARDIOGRAM (04/03/14)


MEASURE OF CARDIAC SAMPL & PRESSURE, L HEART, PERC APPROACH (11/03/15)


PACKED CELL TRANSFUSION (06/14/15)


PLAIN RADIOGRAPHY OF MULT COR ART USING OTH CONTRAST (11/03/15)


TRANSFUSE NONAUT RED BLOOD CELLS IN PERIPH VEIN, PERC (07/21/16)








Family History: States: No Known Family Hx





- Social History


Hx Tobacco Use: No


Hx Alcohol Use: No


Hx Substance Use: No





- Immunization History


Hx Tetanus Toxoid Vaccination: No


Hx Influenza Vaccination: Yes


Hx Pneumococcal Vaccination: Yes





Review Of Systems


Constitutional: Negative for: Fever, Sweats


Cardiovascular: Positive for: Chest Pain.  Negative for: Palpitations


Respiratory: Positive for: Cough, Shortness of Breath.  Negative for: Sputum


Gastrointestinal: Negative for: Nausea, Vomiting, Abdominal Pain, Diarrhea


Genitourinary: Negative for: Dysuria


Skin: Negative for: Rash


Neurological: Negative for: Weakness, Numbness, Headache, Dizziness





Physical Exam





- Physical Exam


Appears: Well, Non-toxic, No Acute Distress, Other (Mild dyspnea, but speaking 

in full sentences)


Skin: Warm, Dry, No Diaphoretic


Head: Normacephalic


Eye(s): bilateral: Normal Inspection


Oral Mucosa: Moist


Neck: Supple


Chest: Symmetrical


Cardiovascular: Rhythm Irregular (occasionally irregular ), Other (Tachycardic)


Respiratory: No Accessory Muscle Use, Rales (mild at bases B/L), Wheezing (

scattered expiratory wheezing bilaterally)


Gastrointestinal/Abdominal: Normal Exam, Bowel Sounds, Soft, No Tenderness


Back: Normal Inspection, No Vertebral Tenderness


Extremity: Normal ROM, No Tenderness, No Calf Tenderness, Swelling (Trace 

pitting edema of the lower extremities)


Pulses: Left Dorsalis Pedis: Normal, Right Dorsalis Pedis: Normal


Neurological/Psych: Oriented x3, Normal Speech, Normal Cognition


Gait: Steady





ED Course And Treatment





- Laboratory Results


Result Diagrams: 


 06/10/18 06:38





 06/10/18 06:38


ECG: Interpreted By Me, Viewed By Me


ECG Rhythm: Sinus Tachycardia (at 111 bpm, with occasional PVCs, (+) L axis 

deviation, LBBB (seen on prior EKGs), ST depressions in leads v5 and v6, no ST 

elevations. No T wave changes)


ECG Interpretation: Abnormal


O2 Sat by Pulse Oximetry: 97 (NC)


Pulse Ox Interpretation: Normal





- Other Rad


  ** Chest X-Ray


X-Ray: Read By Radiologist


Interpretation: FINDINGS:  LUNGS:  Stable chronic prominence of the bilateral 

interstitial markings with superimposed pulmonary vascular congestion.  

Bibasilar atelectasis.  PLEURA:  Questionable small bilateral pleural 

effusions.  CARDIOVASCULAR:  Left subclavian access AICD/ pacemaker 

redemonstrated.  Atherosclerotic aortic calcifications.  Cardiomediastinal 

silhouette stably enlarged.  OSSEOUS STRUCTURES:  Unchanged.  VISUALIZED UPPER 

ABDOMEN:  Normal.  OTHER FINDINGS:  None.  IMPRESSION:  Stable chronic 

prominence of the bilateral interstitial markings with superimposed pulmonary 

vascular congestion. Questionable small bilateral pleural effusions.


Progress Note: Blood work, EKG, chest x-ray ordered and reviewed.  Patient 

given duoneb x1, 325 mg Aspirin PO, and 125 mg solu-medrol IV.   IV lasix given 

for fluid overload/CHF.


Reevaluation Time: 16:30


Reassessment Condition: Improved (Patent states she feels improved - on exam, 

she has decreased wheezing and improved air entry B/L, (+) mild rales at bases B

/L.)





- Physician Consult Information


Time Consulting Physician Contacted: 16:40


Physician Contacted: Osmar Fierro


Outcome Of Conversation: Discussed patient with PMD, agrees with admission for  

COPD and CHF exacerbation, dyspnea, chest pain





Disposition


Counseled Patient/Family Regarding: Studies Performed, Diagnosis





- Disposition


Disposition: HOSPITALIZED


Disposition Time: 16:40


Condition: STABLE





- Clinical Impression


Clinical Impression: 


 Chest pain, Dyspnea, CHF exacerbation, COPD exacerbation








- Scribe Statement


The provider has reviewed the documentation as recorded by the Danilo Laboy





Provider Attestation: 





All medical record entries made by the Yanaibmary were at my direction and 

personally dictated by me. I have reviewed the chart and agree that the record 

accurately reflects my personal performance of the history, physical exam, 

medical decision making, and the department course for this patient. I have 

also personally directed, reviewed, and agree with the discharge instructions 

and disposition.





Decision To Admit





- Pt Status Changed To:


Hospital Disposition Of: Inpatient





- Admit Certification


Admit to Inpatient:: After my assessment, the patient will require 

hospitalization for at least two midnights.  This is because of the severity of 

symptoms shown, intensity of services needed, and/or the medical risk in this 

patient being treated as an outpatient.





- InPatient:


Physician Admission Certification: I certify that this patient requires 2 or 

more midnights of care for the following reason:: Dyspnea, chest pain, 

exacerbation of COPD and CHF





- .


Bed Request Type: Telemetry


Admitting Physician: Osmar Fierro


Patient Diagnosis: 


 Chest pain, Dyspnea, CHF exacerbation, COPD exacerbation

## 2018-06-06 NOTE — RAD
PROCEDURE:  CHEST RADIOGRAPH, 1 VIEW



HISTORY:

cp, sob



COMPARISON:

Chest radiograph dated 01/31/2018.



FINDINGS:



LUNGS:

Stable chronic prominence of the bilateral interstitial markings with 

superimposed pulmonary vascular congestion.  Bibasilar atelectasis.



PLEURA:

Questionable small bilateral pleural effusions.



CARDIOVASCULAR:

Left subclavian access AICD/ pacemaker redemonstrated.  

Atherosclerotic aortic calcifications.  Cardiomediastinal silhouette 

stably enlarged.



OSSEOUS STRUCTURES:

Unchanged.



VISUALIZED UPPER ABDOMEN:

Normal.



OTHER FINDINGS:

None. 



IMPRESSION:

Stable chronic prominence of the bilateral interstitial markings with 

superimposed pulmonary vascular congestion. Questionable small 

bilateral pleural effusions.

## 2018-06-06 NOTE — CP.PCM.HP
History of Present Illness





- History of Present Illness


History of Present Illness: 





COMPREHENSIVE   HISTORY & PHYSICAL EXAM 


   


HPI


Patient was admitted from [facility] emergency room for acute shortness of 

breath and congestive heart failure.


Patient has history of ischemic cardiomyopathy with left ventricle ejection 

fraction of 25-30% with coronary artery disease and stent.  Apparently patient 

was seen outpatient a few days ago apparently in no distress or any symptoms.  

Prior to admission patient was started getting wheezing shortness of breath 

mild cough and the symptoms persisted at rest.  Patient was evaluated in ER was 

found to have a high BNP CHF on chest x-ray and responded to IV Lasix.


Recently patient was admitted in [facility] for similar complaints and 

also had received blood transfusion.  Currently full detailed history and 

investigation done at [place] is not available.








PAST HIST.


History of type II diabetes.  History of iron deficiency anemia requiring blood 

transfusion on previous admissions and iron therapy.  COPD.


PERSONAL HIST:   Smoking.   N   Alcohol.   N      Allergy N            Travel_-

      .


FAMILY HIST : 


ROS :


Constitutional: Negative for weight change, chills 


  Eyes: Negative for redness, swelling, itching, discharge, vision changes, 

blurry vision, double vision, glaucoma, cataracts, 


  Ears: Negative for hearing loss, ringing, , tinnitus, vertigo


 Nose: Negative for rhinorrhea, stuffiness, sniffing, itching, postnasal drip, 

discoloration, nasal congestion and epistaxis. 


 Throat: Negative for throat clearing, sore throat, hoarseness, difficulty 

swallowing and difficulty speaking. 


  Respiratory: Negative for cough, , sputum production, chest tightness, 

pleuritic chest pain ,daytime somnolence, chronic cough, hemoptysis, snoring at 

night,


 Cardiovascular: Negative for chest pain, Edema of legs, leg cramps, angina, 

claudication, , irregular heartbeat,


 Neurology: Negative for irritability, muscle weakness, numbness and tingling, 

seizures, tremors, migraines, slurred speech, syncope, memory loss, mood changes

, recurrent headaches 


Gastrointestinal: Negative for difficulty swallowing, diarrhea, constipation, 

black stools, rectal bleeding, nausea, flatulence, reflux, poor appetite, 

changes in bowel habits, abdominal pain


  Genitourinary: Negative for frequent urination, hematuria, discharge, 

incontinence, urinary retention, frequent UTI, Psychiatric: Negative for 

depression, anxiety/panic, suicidal tendencies, 


Musculoskeletal: Negative for swollen joints, back pain, , neck pain, morning 

stiffness of joints, . 


 Skin: Negative for rash, ulcers, itching, dry skin and pigmented lesions.


P/E: 


  Constitutional: Appears stated age and in no apparent distress. 


 Head: Normocephalic. 


  Ears: External ear canals patent without inflammation. Tympanic membranes 

intact with normal light reflex and landmark. 


  Eyes: Pupils are central, bilaterally equal, symmetrical and reacts to light 

with normal movements and no icterus or pallor. 


 Nose: External nares are patent. Mucosa is pink  Mouth-Throat: Good general 

appearance and condition. No post-pharyngeal/oropharyngeal erythema and 

tonsillar hypertrophy. Good dental hygiene. 


  Neck-Lymphatic: Neck is supple with normal ROM, no thyromegaly, lymph nodes 

or masses. JVD is normal with no carotid bruit. 


  Lungs: Bilateral basal rales


  Cardiovascular: S1 and S2 are normal with no murmurs, gallops and rub. 


  GI Exam: No hepatomegaly. Abdomen is soft and non-tender. No Organomegaly , 

masses or hernias are evident and bowel sounds are normal and active. 


  Neurology: Higher function and all cranial nerves intact, with no gross motor 

or sensory deficit. Superficial and deep reflexes are normal with downwards 

planters. No cerebellar deficit with normal gait. 


  Musculoskeletal: No tender spots with normal curvature of the spine with no 

swelling or restricted ROM of the small and large joints. 


  Extremities: Homans sign absent. Intact pulses with no pitting edema, calf 

tenderness or skin color changes. 


  Skin: No rash, eruptions or abnormal skin pigmentation





LAB/RADIOLOGY:


ASSESMENT :


Acute on chronic systolic heart failure with reduced ejection fraction


Coronary artery disease with 2 stents.


Iron deficiency anemia.


COPD





PLAN: 


See orders








Present on Admission





- Present on Admission


Any Indicators Present on Admission: No





Past Patient History





- Infectious Disease


Hx of Infectious Diseases: None





- Tetanus Immunizations


Tetanus Immunization: Unknown





- Past Medical History & Family History


Past Medical History?: Yes





- Past Social History


Smoking Status: Never Smoked





- CARDIAC


Hx Cardia Arrhythmia: Yes


Hx Congestive Heart Failure: Yes


Hx Hypercholesterolemia: Yes


Hx Hypertension: Yes


Hx Pacemaker: Yes (2016)





- PULMONARY


Hx Asthma: Yes


Hx Chronic Obstructive Pulmonary Disease (COPD): Yes





- NEUROLOGICAL


Hx Transient Ischemic Attacks (TIA): Yes





- HEENT


Hx HEENT Problems: No





- RENAL


Hx Chronic Kidney Disease: No





- ENDOCRINE/METABOLIC


Hx Diabetes Mellitus Type 2: Yes





- HEMATOLOGICAL/ONCOLOGICAL


Hx Anemia: Yes





- INTEGUMENTARY


Hx Dermatological Problems: No





- MUSCULOSKELETAL/RHEUMATOLOGICAL


Hx Arthritis: Yes (BACK AND KNEES AND R SHOULDER)





- GASTROINTESTINAL


Hx Gastrointestinal Disorders: No





- GENITOURINARY/GYNECOLOGICAL


Hx Genitourinary Disorders: No





- PSYCHIATRIC


Hx Anxiety: Yes


Hx Substance Use: No





- SURGICAL HISTORY


Hx Appendectomy: Yes


Hx Coronary Stent: Yes





- ANESTHESIA


Hx Anesthesia: Yes


Hx Anesthesia Reactions: No


Hx Malignant Hyperthermia: No





Meds


Allergies/Adverse Reactions: 


 Allergies











Allergy/AdvReac Type Severity Reaction Status Date / Time


 


clopidogrel Allergy Mild SWELLING Verified 06/06/18 13:34














Results





- Vital Signs


Recent Vital Signs: 





 Last Vital Signs











Temp  98.4 F   06/06/18 13:33


 


Pulse  104 H  06/06/18 15:36


 


Resp  20   06/06/18 15:36


 


BP  141/61   06/06/18 15:36


 


Pulse Ox  97   06/06/18 16:15














- Labs


Result Diagrams: 


 06/07/18 07:41





 06/07/18 07:41


Labs: 





 Laboratory Results - last 24 hr











  06/06/18 06/06/18 06/06/18





  14:03 14:06 14:06


 


WBC   6.4  D 


 


RBC   4.01 


 


Hgb   10.6 L 


 


Hct   32.4 L 


 


MCV   80.7 L 


 


MCH   26.3 L 


 


MCHC   32.6 L 


 


RDW   22.0 H 


 


Plt Count   88 L D 


 


MPV   10.9 


 


Neut % (Auto)   79.2 H 


 


Lymph % (Auto)   8.7 L 


 


Mono % (Auto)   9.3 


 


Eos % (Auto)   2.1 


 


Baso % (Auto)   0.7 


 


Neut # (Auto)   5.0 


 


Lymph # (Auto)   0.6 L 


 


Mono # (Auto)   0.6 


 


Eos # (Auto)   0.1 


 


Baso # (Auto)   0.0 


 


Neutrophils % (Manual)   92 H 


 


Lymphocytes % (Manual)   5 L 


 


Monocytes % (Manual)   1 


 


Eosinophils % (Manual)   2 


 


Platelet Estimate   Decreased L 


 


Polychromasia   Slight 


 


Hypochromasia (manual)   Slight 


 


Anisocytosis (manual)   Slight 


 


Microcytosis (manual)   Slight 


 


Ovalocytes   Slight 


 


PT    12.3 H


 


INR    1.1


 


APTT    33


 


Sodium   


 


Potassium   


 


Chloride   


 


Carbon Dioxide   


 


Anion Gap   


 


BUN   


 


Creatinine   


 


Est GFR ( Amer)   


 


Est GFR (Non-Af Amer)   


 


POC Glucose (mg/dL)  269 H  


 


Random Glucose   


 


Calcium   


 


Total Bilirubin   


 


AST   


 


ALT   


 


Alkaline Phosphatase   


 


Total Creatine Kinase   


 


CK-MB (Mass)   


 


Troponin I   


 


NT-Pro-B Natriuret Pep   


 


Total Protein   


 


Albumin   


 


Globulin   


 


Albumin/Globulin Ratio   


 


Urine Color   


 


Urine Clarity   


 


Urine pH   


 


Ur Specific Gravity   


 


Urine Protein   


 


Urine Glucose (UA)   


 


Urine Ketones   


 


Urine Blood   


 


Urine Nitrate   


 


Urine Bilirubin   


 


Urine Urobilinogen   


 


Ur Leukocyte Esterase   


 


Urine WBC (Auto)   


 


Urine RBC (Auto)   


 


Ur Squamous Epith Cells   


 


Hyaline Casts   


 


Granular Casts (Auto)   














  06/06/18 06/06/18





  14:06 15:11


 


WBC  


 


RBC  


 


Hgb  


 


Hct  


 


MCV  


 


MCH  


 


MCHC  


 


RDW  


 


Plt Count  


 


MPV  


 


Neut % (Auto)  


 


Lymph % (Auto)  


 


Mono % (Auto)  


 


Eos % (Auto)  


 


Baso % (Auto)  


 


Neut # (Auto)  


 


Lymph # (Auto)  


 


Mono # (Auto)  


 


Eos # (Auto)  


 


Baso # (Auto)  


 


Neutrophils % (Manual)  


 


Lymphocytes % (Manual)  


 


Monocytes % (Manual)  


 


Eosinophils % (Manual)  


 


Platelet Estimate  


 


Polychromasia  


 


Hypochromasia (manual)  


 


Anisocytosis (manual)  


 


Microcytosis (manual)  


 


Ovalocytes  


 


PT  


 


INR  


 


APTT  


 


Sodium  141 


 


Potassium  5.1 


 


Chloride  108 H 


 


Carbon Dioxide  22 


 


Anion Gap  15 


 


BUN  20 H 


 


Creatinine  1.4 H 


 


Est GFR ( Amer)  43 


 


Est GFR (Non-Af Amer)  36 


 


POC Glucose (mg/dL)  


 


Random Glucose  273 H 


 


Calcium  9.1 


 


Total Bilirubin  0.6 


 


AST  35 


 


ALT  6 L D 


 


Alkaline Phosphatase  71 


 


Total Creatine Kinase  71 


 


CK-MB (Mass)  1.57 


 


Troponin I  0.0270 


 


NT-Pro-B Natriuret Pep  20826 H 


 


Total Protein  6.9 


 


Albumin  3.7 


 


Globulin  3.2 


 


Albumin/Globulin Ratio  1.1 


 


Urine Color   Odilia


 


Urine Clarity   Hazy


 


Urine pH   5.0


 


Ur Specific Gravity   1.023


 


Urine Protein   2+ H


 


Urine Glucose (UA)   1+


 


Urine Ketones   Negative


 


Urine Blood   2+ H


 


Urine Nitrate   Negative


 


Urine Bilirubin   Negative


 


Urine Urobilinogen   Normal


 


Ur Leukocyte Esterase   1+ H


 


Urine WBC (Auto)   5


 


Urine RBC (Auto)   40 H


 


Ur Squamous Epith Cells   4


 


Hyaline Casts   3-5 H


 


Granular Casts (Auto)   3

## 2018-06-07 LAB
ALBUMIN SERPL-MCNC: 3.3 G/DL (ref 3.5–5)
ALBUMIN/GLOB SERPL: 1.2 {RATIO} (ref 1–2.1)
ALT SERPL-CCNC: 13 U/L (ref 9–52)
AST SERPL-CCNC: 28 U/L (ref 14–36)
BUN SERPL-MCNC: 29 MG/DL (ref 7–17)
CALCIUM SERPL-MCNC: 9.3 MG/DL (ref 8.6–10.4)
ERYTHROCYTE [DISTWIDTH] IN BLOOD BY AUTOMATED COUNT: 22.1 % (ref 11.5–14.5)
GFR NON-AFRICAN AMERICAN: 31
HGB BLD-MCNC: 10.4 G/DL (ref 11–16)
MCH RBC QN AUTO: 26.4 PG (ref 27–31)
MCHC RBC AUTO-ENTMCNC: 32.4 G/DL (ref 33–37)
MCV RBC AUTO: 81.4 FL (ref 81–99)
PLATELET # BLD: 92 K/UL (ref 130–400)
PMV BLD AUTO: 11.7 FL (ref 7.2–11.7)
RBC # BLD AUTO: 3.95 MIL/UL (ref 3.8–5.2)
WBC # BLD AUTO: 8.3 K/UL (ref 4.8–10.8)

## 2018-06-07 RX ADMIN — INSULIN GLARGINE SCH: 100 INJECTION, SOLUTION SUBCUTANEOUS at 22:21

## 2018-06-07 RX ADMIN — SACUBITRIL AND VALSARTAN SCH TAB: 24; 26 TABLET, FILM COATED ORAL at 10:10

## 2018-06-07 RX ADMIN — HUMAN INSULIN SCH: 100 INJECTION, SOLUTION SUBCUTANEOUS at 21:20

## 2018-06-07 RX ADMIN — HUMAN INSULIN SCH: 100 INJECTION, SOLUTION SUBCUTANEOUS at 16:33

## 2018-06-07 RX ADMIN — RANOLAZINE SCH MG: 500 TABLET, FILM COATED, EXTENDED RELEASE ORAL at 10:10

## 2018-06-07 RX ADMIN — RANOLAZINE SCH MG: 500 TABLET, FILM COATED, EXTENDED RELEASE ORAL at 18:04

## 2018-06-07 RX ADMIN — HUMAN INSULIN SCH U: 100 INJECTION, SOLUTION SUBCUTANEOUS at 10:05

## 2018-06-07 RX ADMIN — HUMAN INSULIN SCH U: 100 INJECTION, SOLUTION SUBCUTANEOUS at 13:08

## 2018-06-07 NOTE — CP.PCM.PN
Subjective





- Date & Time of Evaluation


Date of Evaluation: 06/07/18


Time of Evaluation: 14:04





- Subjective


Subjective: 





CHIEF COMPLAINTS TODAY :


Patient has mild cough wheezing and shortness of breath.  No chest pain


Cardiac enzymes so far negative





ROS.


HEENT :  N.


Resp :       No,pleuritic CP ,or hemoptysis 


Cardio :     No anginal  CP, , palpitation 


GI :           No abd.pain, n/v ,diarrhea or GI bleeding .


CNS : No headache, vertigo, focal deficit.


Musculoskel :  No joint swelling ,


Derm :        No rash 


Psych :     Normal affect.


Ext :  No  swelling ,calf pain 





PE.


Pt. is alert awake in no distress.


V.S  As noted in the chart 


Head ,ear nose,throat and eyes : Normal.


Neck : Supple with normal carotids.


Lungs: Bilateral rales 


Heart : S1 & S2 normal with S4. No murmur.


Abd : Soft non tender with normal bowel sounds.


Neuro : Moves all ext. with no localized deficit.


Ext : No edema with intact pulses.Non tender calves 


Derm : No rashes or decubitus ulcer.





LABS/RADIOLOGY:





ASSESSMENT/PLAN : 


Congestive heart failure responding to IV Lasix.  Etiology of precipitating 

factors for CHF is unclear could be ischemia.  Creatinine is 1.6, defer cardiac 

catheterization


Continue antianginal and cardiomyopathy medication and anticoagulation.


Discussed with the family








Objective





- Vital Signs/Intake and Output


Vital Signs (last 24 hours): 


 











Temp Pulse Resp BP Pulse Ox


 


 97.6 F   95 H  20   114/49 L  99 


 


 06/07/18 07:42  06/07/18 10:07  06/07/18 10:07  06/07/18 10:09  06/07/18 10:07








Intake and Output: 


 











 06/07/18 06/07/18





 11:59 23:59


 


Intake Total 240 


 


Balance 240 














- Medications


Medications: 


 Current Medications





Aspirin (Ecotrin)  325 mg PO DAILY [facility]


   Last Admin: 06/07/18 10:10 Dose:  325 mg


Ergocalciferol (Drisdol 50,000 Intl Units Cap)  1 cap PO QWK [facility]


Furosemide (Lasix)  40 mg IVP DAILY [facility]


Insulin Glargine (Lantus)  20 unit SC HS [facility]


   Last Admin: 06/06/18 21:34 Dose:  20 u


Insulin Human Regular (Novolin R)  0 unit SC ACHS [facility]


   PRN Reason: Protocol


   Last Admin: 06/07/18 13:08 Dose:  8 u


Metoprolol Tartrate (Lopressor)  25 mg PO BID [facility]


   Last Admin: 06/07/18 10:23 Dose:  Not Given


Nitroglycerin (Nitrostat Sl Tab)  0.6 mg SL Q5MIN PRN


   PRN Reason: CHEST PAIN


Ranolazine (Ranexa)  500 mg PO BID [facility]


   Last Admin: 06/07/18 10:10 Dose:  500 mg


Repaglinide (Prandin)  2 mg PO DAILY [facility]


   Last Admin: 06/07/18 10:10 Dose:  2 mg


Rosuvastatin Calcium (Crestor)  20 mg PO HS [facility]


   Last Admin: 06/06/18 21:27 Dose:  20 mg


Sacubitril/Valsartan (Entresto 24 Mg-26 Mg)  1 tab PO DAILY [facility]


   Last Admin: 06/07/18 10:10 Dose:  1 tab


Ticagrelor (Brilinta)  90 mg PO QOD6 [facility]











- Labs


Labs: 


 





 06/07/18 07:41 





 06/07/18 07:41 





 











PT  12.3 SECONDS (9.7-12.2)  H  06/06/18  14:06    


 


INR  1.1   06/06/18  14:06    


 


APTT  33 SECONDS (21-34)   06/06/18  14:06

## 2018-06-07 NOTE — CARD
--------------- APPROVED REPORT --------------





EKG Measurement

Heart [id number]

AL [id number]

[id number]

[id number]

[id number]



<Conclusion>

Sinus tachycardia with frequent premature atrial and ventricular 

complexes

Possible Left atrial enlargement

Incomplete LBBB

Abnormal ECG

## 2018-06-08 LAB
ALBUMIN SERPL-MCNC: 2.8 G/DL (ref 3.5–5)
ALBUMIN/GLOB SERPL: 1.1 {RATIO} (ref 1–2.1)
ALT SERPL-CCNC: 11 U/L (ref 9–52)
AST SERPL-CCNC: 27 U/L (ref 14–36)
BASOPHILS # BLD AUTO: 0 K/UL (ref 0–0.2)
BASOPHILS NFR BLD: 0.2 % (ref 0–2)
BUN SERPL-MCNC: 35 MG/DL (ref 7–17)
CALCIUM SERPL-MCNC: 8.8 MG/DL (ref 8.6–10.4)
EOSINOPHIL # BLD AUTO: 0.2 K/UL (ref 0–0.7)
EOSINOPHIL NFR BLD: 2.4 % (ref 0–4)
ERYTHROCYTE [DISTWIDTH] IN BLOOD BY AUTOMATED COUNT: 22.8 % (ref 11.5–14.5)
GFR NON-AFRICAN AMERICAN: 28
HGB BLD-MCNC: 9.8 G/DL (ref 11–16)
LYMPHOCYTES # BLD AUTO: 1 K/UL (ref 1–4.3)
LYMPHOCYTES NFR BLD AUTO: 10.8 % (ref 20–40)
MCH RBC QN AUTO: 26.4 PG (ref 27–31)
MCHC RBC AUTO-ENTMCNC: 33.2 G/DL (ref 33–37)
MCV RBC AUTO: 79.6 FL (ref 81–99)
MONOCYTES # BLD: 0.8 K/UL (ref 0–0.8)
MONOCYTES NFR BLD: 8.2 % (ref 0–10)
NEUTROPHILS # BLD: 7.6 K/UL (ref 1.8–7)
NEUTROPHILS NFR BLD AUTO: 78.4 % (ref 50–75)
NRBC BLD AUTO-RTO: 0 % (ref 0–2)
PLATELET # BLD: 96 K/UL (ref 130–400)
PMV BLD AUTO: 11.2 FL (ref 7.2–11.7)
RBC # BLD AUTO: 3.72 MIL/UL (ref 3.8–5.2)
WBC # BLD AUTO: 9.6 K/UL (ref 4.8–10.8)

## 2018-06-08 RX ADMIN — RANOLAZINE SCH MG: 500 TABLET, FILM COATED, EXTENDED RELEASE ORAL at 10:29

## 2018-06-08 RX ADMIN — IPRATROPIUM BROMIDE AND ALBUTEROL SULFATE SCH ML: .5; 3 SOLUTION RESPIRATORY (INHALATION) at 20:25

## 2018-06-08 RX ADMIN — SACUBITRIL AND VALSARTAN SCH TAB: 24; 26 TABLET, FILM COATED ORAL at 10:29

## 2018-06-08 RX ADMIN — INSULIN GLARGINE SCH U: 100 INJECTION, SOLUTION SUBCUTANEOUS at 22:06

## 2018-06-08 RX ADMIN — HUMAN INSULIN SCH: 100 INJECTION, SOLUTION SUBCUTANEOUS at 08:01

## 2018-06-08 RX ADMIN — HUMAN INSULIN SCH: 100 INJECTION, SOLUTION SUBCUTANEOUS at 16:26

## 2018-06-08 RX ADMIN — RANOLAZINE SCH MG: 500 TABLET, FILM COATED, EXTENDED RELEASE ORAL at 18:00

## 2018-06-08 RX ADMIN — IPRATROPIUM BROMIDE AND ALBUTEROL SULFATE SCH ML: .5; 3 SOLUTION RESPIRATORY (INHALATION) at 01:39

## 2018-06-08 RX ADMIN — HUMAN INSULIN SCH U: 100 INJECTION, SOLUTION SUBCUTANEOUS at 12:59

## 2018-06-08 RX ADMIN — HUMAN INSULIN SCH: 100 INJECTION, SOLUTION SUBCUTANEOUS at 22:04

## 2018-06-08 RX ADMIN — IPRATROPIUM BROMIDE AND ALBUTEROL SULFATE SCH ML: .5; 3 SOLUTION RESPIRATORY (INHALATION) at 13:12

## 2018-06-08 RX ADMIN — IPRATROPIUM BROMIDE AND ALBUTEROL SULFATE SCH ML: .5; 3 SOLUTION RESPIRATORY (INHALATION) at 07:21

## 2018-06-08 NOTE — CP.PCM.PN
Subjective





- Date & Time of Evaluation


Date of Evaluation: 06/08/18


Time of Evaluation: 13:56





- Subjective


Subjective: 





CHIEF COMPLAINTS TODAY :


Patient has mild cough wheezing and shortness of breath.  No chest pain


Cardiac enzymes so far negative





ROS.


HEENT :  N.


Resp :       No,pleuritic CP ,or hemoptysis 


Cardio :     No anginal  CP, , palpitation 


GI :           No abd.pain, n/v ,diarrhea or GI bleeding .


CNS : No headache, vertigo, focal deficit.


Musculoskel :  No joint swelling ,


Derm :        No rash 


Psych :     Normal affect.


Ext :  No  swelling ,calf pain 





PE.


Pt. is alert awake in no distress.


V.S  As noted in the chart 


Head ,ear nose,throat and eyes : Normal.


Neck : Supple with normal carotids.


Lungs: Bilateral rales 


Heart : S1 & S2 normal with S4. No murmur.


Abd : Soft non tender with normal bowel sounds.


Neuro : Moves all ext. with no localized deficit.


Ext : No edema with intact pulses.Non tender calves 


Derm : No rashes or decubitus ulcer.





LABS/RADIOLOGY:





ASSESSMENT/PLAN : 


Patient is improving on IV Lasix.


There is no any further chest pain decreased shortness of breath.


Creatinine is 1.7, cardiac catheterization is not an option currently.


Hemoglobin is stable.





Objective





- Vital Signs/Intake and Output


Vital Signs (last 24 hours): 


 











Temp Pulse Resp BP Pulse Ox


 


 97.5 F L  81   20   120/73   100 


 


 06/08/18 08:00  06/08/18 08:00  06/08/18 08:00  06/08/18 10:29  06/08/18 08:00











- Medications


Medications: 


 Current Medications





Albuterol/Ipratropium (Duoneb 3 Mg/0.5 Mg (3 Ml) Ud)  3 ml INH RQ6 [facility]


   Last Admin: 06/08/18 13:12 Dose:  3 ml


Aspirin (Ecotrin)  81 mg PO DAILY [facility]


   Last Admin: 06/08/18 10:29 Dose:  81 mg


Cyproheptadine HCl (Periactin)  4 mg PO TID [facility]


   Last Admin: 06/08/18 10:29 Dose:  4 mg


Ergocalciferol (Drisdol 50,000 Intl Units Cap)  1 cap PO QWK [facility]


Furosemide (Lasix)  40 mg IVP DAILY [facility]


   Last Admin: 06/08/18 10:29 Dose:  40 mg


Insulin Glargine (Lantus)  20 unit SC HS [facility]


   Last Admin: 06/07/18 22:21 Dose:  Not Given


Insulin Human Regular (Novolin R)  0 unit SC ACHS [facility]


   PRN Reason: Protocol


   Last Admin: 06/08/18 08:01 Dose:  Not Given


Metoprolol Tartrate (Lopressor)  25 mg PO BID [facility]


   Last Admin: 06/08/18 10:29 Dose:  25 mg


Nitroglycerin (Nitrostat Sl Tab)  0.6 mg SL Q5MIN PRN


   PRN Reason: CHEST PAIN


Ranolazine (Ranexa)  500 mg PO BID [facility]


   Last Admin: 06/08/18 10:29 Dose:  500 mg


Repaglinide (Prandin)  2 mg PO DAILY [facility]


   Last Admin: 06/08/18 10:28 Dose:  2 mg


Rosuvastatin Calcium (Crestor)  20 mg PO HS [facility]


   Last Admin: 06/07/18 21:36 Dose:  20 mg


Sacubitril/Valsartan (Entresto 24 Mg-26 Mg)  1 tab PO DAILY [facility]


   Last Admin: 06/08/18 10:29 Dose:  1 tab


Ticagrelor (Brilinta)  60 mg PO BID [facility]


   Last Admin: 06/08/18 10:28 Dose:  60 mg











- Labs


Labs: 


 





 06/08/18 05:58 





 06/08/18 05:58 





 











PT  12.3 SECONDS (9.7-12.2)  H  06/06/18  14:06    


 


INR  1.1   06/06/18  14:06    


 


APTT  33 SECONDS (21-34)   06/06/18  14:06

## 2018-06-09 LAB
ALBUMIN SERPL-MCNC: 2.8 G/DL (ref 3.5–5)
ALBUMIN/GLOB SERPL: 1 {RATIO} (ref 1–2.1)
ALT SERPL-CCNC: 20 U/L (ref 9–52)
AST SERPL-CCNC: 28 U/L (ref 14–36)
BASOPHILS # BLD AUTO: 0.1 K/UL (ref 0–0.2)
BASOPHILS NFR BLD: 0.7 % (ref 0–2)
BUN SERPL-MCNC: 40 MG/DL (ref 7–17)
CALCIUM SERPL-MCNC: 8.7 MG/DL (ref 8.6–10.4)
EOSINOPHIL # BLD AUTO: 0.4 K/UL (ref 0–0.7)
EOSINOPHIL NFR BLD: 5.3 % (ref 0–4)
ERYTHROCYTE [DISTWIDTH] IN BLOOD BY AUTOMATED COUNT: 22.8 % (ref 11.5–14.5)
GFR NON-AFRICAN AMERICAN: 27
HGB BLD-MCNC: 10.5 G/DL (ref 11–16)
LYMPHOCYTES # BLD AUTO: 1.1 K/UL (ref 1–4.3)
LYMPHOCYTES NFR BLD AUTO: 14.5 % (ref 20–40)
MCH RBC QN AUTO: 26.3 PG (ref 27–31)
MCHC RBC AUTO-ENTMCNC: 32.9 G/DL (ref 33–37)
MCV RBC AUTO: 80.2 FL (ref 81–99)
MONOCYTES # BLD: 0.8 K/UL (ref 0–0.8)
MONOCYTES NFR BLD: 10.1 % (ref 0–10)
NEUTROPHILS # BLD: 5.4 K/UL (ref 1.8–7)
NEUTROPHILS NFR BLD AUTO: 69.4 % (ref 50–75)
NRBC BLD AUTO-RTO: 0.1 % (ref 0–2)
PLATELET # BLD: 113 K/UL (ref 130–400)
PMV BLD AUTO: 11.6 FL (ref 7.2–11.7)
RBC # BLD AUTO: 4 MIL/UL (ref 3.8–5.2)
WBC # BLD AUTO: 7.8 K/UL (ref 4.8–10.8)

## 2018-06-09 RX ADMIN — SACUBITRIL AND VALSARTAN SCH TAB: 24; 26 TABLET, FILM COATED ORAL at 10:47

## 2018-06-09 RX ADMIN — IPRATROPIUM BROMIDE AND ALBUTEROL SULFATE SCH ML: .5; 3 SOLUTION RESPIRATORY (INHALATION) at 07:17

## 2018-06-09 RX ADMIN — HUMAN INSULIN SCH: 100 INJECTION, SOLUTION SUBCUTANEOUS at 08:09

## 2018-06-09 RX ADMIN — HUMAN INSULIN SCH U: 100 INJECTION, SOLUTION SUBCUTANEOUS at 17:22

## 2018-06-09 RX ADMIN — HUMAN INSULIN SCH U: 100 INJECTION, SOLUTION SUBCUTANEOUS at 12:35

## 2018-06-09 RX ADMIN — IPRATROPIUM BROMIDE AND ALBUTEROL SULFATE SCH ML: .5; 3 SOLUTION RESPIRATORY (INHALATION) at 19:00

## 2018-06-09 RX ADMIN — IPRATROPIUM BROMIDE AND ALBUTEROL SULFATE SCH ML: .5; 3 SOLUTION RESPIRATORY (INHALATION) at 01:37

## 2018-06-09 RX ADMIN — RANOLAZINE SCH MG: 500 TABLET, FILM COATED, EXTENDED RELEASE ORAL at 10:46

## 2018-06-09 RX ADMIN — INSULIN GLARGINE SCH U: 100 INJECTION, SOLUTION SUBCUTANEOUS at 22:16

## 2018-06-09 RX ADMIN — IPRATROPIUM BROMIDE AND ALBUTEROL SULFATE SCH ML: .5; 3 SOLUTION RESPIRATORY (INHALATION) at 14:08

## 2018-06-09 RX ADMIN — HUMAN INSULIN SCH: 100 INJECTION, SOLUTION SUBCUTANEOUS at 22:18

## 2018-06-09 RX ADMIN — RANOLAZINE SCH MG: 500 TABLET, FILM COATED, EXTENDED RELEASE ORAL at 17:25

## 2018-06-09 NOTE — CP.PCM.PN
Subjective





- Date & Time of Evaluation


Date of Evaluation: 06/09/18


Time of Evaluation: 14:41





- Subjective


Subjective: 





CHIEF COMPLAINTS TODAY :


Patient has mild cough wheezing and shortness of breath.  No chest pain


Cardiac enzymes so far negative





ROS.


HEENT :  N.


Resp :       No,pleuritic CP ,or hemoptysis 


Cardio :     No anginal  CP, , palpitation 


GI :           No abd.pain, n/v ,diarrhea or GI bleeding .


CNS : No headache, vertigo, focal deficit.


Musculoskel :  No joint swelling ,


Derm :        No rash 


Psych :     Normal affect.


Ext :  No  swelling ,calf pain 





PE.


Pt. is alert awake in no distress.


V.S  As noted in the chart 


Head ,ear nose,throat and eyes : Normal.


Neck : Supple with normal carotids.


Lungs: Bilateral rales 


Heart : S1 & S2 normal with S4. No murmur.


Abd : Soft non tender with normal bowel sounds.


Neuro : Moves all ext. with no localized deficit.


Ext : No edema with intact pulses.Non tender calves 


Derm : No rashes or decubitus ulcer.





LABS/RADIOLOGY:





ASSESSMENT/PLAN : 


Patient is improving on IV Lasix.


There is no any further chest pain decreased shortness of breath.


Creatinine is 1.8, cardiac catheterization is not an option currently.


Hemoglobin is stable.








Objective





- Vital Signs/Intake and Output


Vital Signs (last 24 hours): 


 











Temp Pulse Resp BP Pulse Ox


 


 97.8 F   96 H  18   98/52 L  100 


 


 06/09/18 07:00  06/09/18 10:43  06/09/18 07:00  06/09/18 10:43  06/09/18 07:00











- Medications


Medications: 


 Current Medications





Albuterol/Ipratropium (Duoneb 3 Mg/0.5 Mg (3 Ml) Ud)  3 ml INH RQ6 [facility]


   Last Admin: 06/09/18 14:08 Dose:  3 ml


Aspirin (Ecotrin)  81 mg PO DAILY [facility]


   Last Admin: 06/09/18 10:46 Dose:  81 mg


Cyproheptadine HCl (Periactin)  4 mg PO TID [facility]


   Last Admin: 06/09/18 13:06 Dose:  4 mg


Ergocalciferol (Drisdol 50,000 Intl Units Cap)  1 cap PO QWK [facility]


Furosemide (Lasix)  40 mg IVP DAILY [facility]


   Last Admin: 06/09/18 10:51 Dose:  Not Given


Insulin Glargine (Lantus)  20 unit SC HS [facility]


   Last Admin: 06/08/18 22:06 Dose:  20 u


[facility]  [facility]


   PRN Reason: Protocol


   Last Admin: 06/09/18 12:35 Dose:  3 u


Metoprolol Tartrate (Lopressor)  25 mg PO BID [facility]


   Last Admin: 06/09/18 10:50 Dose:  Not Given


Nitroglycerin (Nitrostat Sl Tab)  0.6 mg SL Q5MIN PRN


   PRN Reason: CHEST PAIN


Ranolazine (Ranexa)  500 mg PO BID [facility]


   Last Admin: 06/09/18 10:46 Dose:  500 mg


Repaglinide (Prandin)  2 mg PO DAILY [facility]


   Last Admin: 06/09/18 10:48 Dose:  2 mg


Rosuvastatin Calcium (Crestor)  20 mg PO HS [facility]


   Last Admin: 06/08/18 22:07 Dose:  20 mg


Sacubitril/Valsartan (Entresto 24 Mg-26 Mg)  1 tab PO DAILY [facility]


   Last Admin: 06/09/18 10:47 Dose:  1 tab


Ticagrelor (Brilinta)  60 mg PO BID [facility]


   Last Admin: 06/09/18 11:15 Dose:  60 mg











- Labs


Labs: 


 





 06/09/18 08:31 





 06/09/18 08:31 





 











PT  12.3 SECONDS (9.7-12.2)  H  06/06/18  14:06    


 


INR  1.1   06/06/18  14:06    


 


APTT  33 SECONDS (21-34)   06/06/18  14:06

## 2018-06-09 NOTE — CP.PCM.PN
Subjective





- Date & Time of Evaluation


Date of Evaluation: 06/09/18


Time of Evaluation: 16:00





- Subjective


Subjective: 





CC- hypotenstion 


RN  called to evaluate pt bp low =- 74/43 


Patient seen and examined  at bed side awake alert, oriented, denies any chest 

pain, sob, dizziness, palpitations, N/V 


bp re checked - 83/47. hr - 105 , repeat 73/43 again 


as per RN pt hasn't received any lasix or bp meds this am  





Objective





- Vital Signs/Intake and Output


Vital Signs (last 24 hours): 


 











Temp Pulse Resp BP Pulse Ox


 


 97.8 F   96 H  18   98/52 L  100 


 


 06/09/18 07:00  06/09/18 10:43  06/09/18 07:00  06/09/18 10:43  06/09/18 07:00








Intake and Output: 


 











 06/09/18 06/09/18





 06:59 18:59


 


Intake Total 300 400


 


Balance 300 400














- Medications


Medications: 


 Current Medications





Albuterol/Ipratropium (Duoneb 3 Mg/0.5 Mg (3 Ml) Ud)  3 ml INH RQ6 [facility]


   Last Admin: 06/09/18 14:08 Dose:  3 ml


Aspirin (Ecotrin)  81 mg PO DAILY [facility]


   Last Admin: 06/09/18 10:46 Dose:  81 mg


Cyproheptadine HCl (Periactin)  4 mg PO TID [facility]


   Last Admin: 06/09/18 13:06 Dose:  4 mg


Ergocalciferol (Drisdol 50,000 Intl Units Cap)  1 cap PO QWK [facility]


Furosemide (Lasix)  40 mg IVP DAILY [facility]


   Last Admin: 06/09/18 10:51 Dose:  Not Given


Sodium Chloride (Sodium Chloride 0.9%)  250 mls @ 250 mls/hr IV .Q1H [facility]


   Stop: 06/09/18 17:08


   Last Admin: 06/09/18 16:13 Dose:  250 mls/hr


Insulin Glargine (Lantus)  20 unit SC HS [facility]


   Last Admin: 06/08/18 22:06 Dose:  20 u


Insulin Human Regular (Novolin R)  0 unit SC ACHS [facility]


   PRN Reason: Protocol


   Last Admin: 06/09/18 12:35 Dose:  3 u


Metoprolol Tartrate (Lopressor)  25 mg PO BID [facility]


   Last Admin: 06/09/18 10:50 Dose:  Not Given


Nitroglycerin (Nitrostat Sl Tab)  0.6 mg SL Q5MIN PRN


   PRN Reason: CHEST PAIN


Ranolazine (Ranexa)  500 mg PO BID [facility]


   Last Admin: 06/09/18 10:46 Dose:  500 mg


Repaglinide (Prandin)  2 mg PO DAILY [facility]


   Last Admin: 06/09/18 10:48 Dose:  2 mg


Rosuvastatin Calcium (Crestor)  20 mg PO HS [facility]


   Last Admin: 06/08/18 22:07 Dose:  20 mg


Sacubitril/Valsartan (Entresto 24 Mg-26 Mg)  1 tab PO DAILY [facility]


   Last Admin: 06/09/18 10:47 Dose:  1 tab


Ticagrelor (Brilinta)  60 mg PO BID [facility]


   Last Admin: 06/09/18 11:15 Dose:  60 mg











- Labs


Labs: 


 





 06/09/18 08:31 





 06/09/18 08:31 





 











PT  12.3 SECONDS (9.7-12.2)  H  06/06/18  14:06    


 


INR  1.1   06/06/18  14:06    


 


APTT  33 SECONDS (21-34)   06/06/18  14:06    














- Constitutional


Appears: Well, No Acute Distress





- Respiratory Exam


Respiratory Exam: Clear to Ausculation Bilateral, NORMAL BREATHING PATTERN





- Cardiovascular Exam


Cardiovascular Exam: Tachycardia, Irregular Rhythm, +S1, +S2





- Neurological Exam


Neurological Exam: Alert, Awake, Oriented x3





Assessment and Plan





- Assessment and Plan (Free Text)


Assessment: 





A/P 


86 yr old female with pmhx Asthma, Cardiac Arrhythmia, CHF, COPD, Diabetes, HTN

, Hypercholesterolemia, admitted with chest pIN/ SOB/ EXC. CHF 


now with hypotension


Patient asymptomatic 


possible dehydration - will give 250 NSS x1 and repeat bp after bolus  and will 

repeat EKG


BP IMPROVED AFTER BOLUS 90/45


will continue to monitor 


the above plan discussed with Dr. Fierro  and  Dr. Fierro will come and 

evaluate the patient

## 2018-06-10 LAB
ALBUMIN SERPL-MCNC: 3.3 G/DL (ref 3.5–5)
ALBUMIN/GLOB SERPL: 1.3 {RATIO} (ref 1–2.1)
ALT SERPL-CCNC: 23 U/L (ref 9–52)
AST SERPL-CCNC: 31 U/L (ref 14–36)
BASOPHILS # BLD AUTO: 0 K/UL (ref 0–0.2)
BASOPHILS NFR BLD: 0.6 % (ref 0–2)
BUN SERPL-MCNC: 46 MG/DL (ref 7–17)
CALCIUM SERPL-MCNC: 8.8 MG/DL (ref 8.6–10.4)
EOSINOPHIL # BLD AUTO: 0.3 K/UL (ref 0–0.7)
EOSINOPHIL NFR BLD: 4.3 % (ref 0–4)
ERYTHROCYTE [DISTWIDTH] IN BLOOD BY AUTOMATED COUNT: 22.4 % (ref 11.5–14.5)
GFR NON-AFRICAN AMERICAN: 25
HGB BLD-MCNC: 10.2 G/DL (ref 11–16)
LYMPHOCYTES # BLD AUTO: 0.8 K/UL (ref 1–4.3)
LYMPHOCYTES NFR BLD AUTO: 10.7 % (ref 20–40)
MCH RBC QN AUTO: 26.7 PG (ref 27–31)
MCHC RBC AUTO-ENTMCNC: 33.4 G/DL (ref 33–37)
MCV RBC AUTO: 80 FL (ref 81–99)
MONOCYTES # BLD: 0.8 K/UL (ref 0–0.8)
MONOCYTES NFR BLD: 10.7 % (ref 0–10)
NEUTROPHILS # BLD: 5.2 K/UL (ref 1.8–7)
NEUTROPHILS NFR BLD AUTO: 73.7 % (ref 50–75)
NRBC BLD AUTO-RTO: 0 % (ref 0–2)
PLATELET # BLD: 109 K/UL (ref 130–400)
PMV BLD AUTO: 11.5 FL (ref 7.2–11.7)
RBC # BLD AUTO: 3.83 MIL/UL (ref 3.8–5.2)
WBC # BLD AUTO: 7 K/UL (ref 4.8–10.8)

## 2018-06-10 RX ADMIN — RANOLAZINE SCH MG: 500 TABLET, FILM COATED, EXTENDED RELEASE ORAL at 10:29

## 2018-06-10 RX ADMIN — IPRATROPIUM BROMIDE AND ALBUTEROL SULFATE SCH: .5; 3 SOLUTION RESPIRATORY (INHALATION) at 13:02

## 2018-06-10 RX ADMIN — HUMAN INSULIN SCH: 100 INJECTION, SOLUTION SUBCUTANEOUS at 21:11

## 2018-06-10 RX ADMIN — HUMAN INSULIN SCH: 100 INJECTION, SOLUTION SUBCUTANEOUS at 12:54

## 2018-06-10 RX ADMIN — HUMAN INSULIN SCH: 100 INJECTION, SOLUTION SUBCUTANEOUS at 16:40

## 2018-06-10 RX ADMIN — IPRATROPIUM BROMIDE AND ALBUTEROL SULFATE SCH ML: .5; 3 SOLUTION RESPIRATORY (INHALATION) at 19:10

## 2018-06-10 RX ADMIN — RANOLAZINE SCH MG: 500 TABLET, FILM COATED, EXTENDED RELEASE ORAL at 17:44

## 2018-06-10 RX ADMIN — IPRATROPIUM BROMIDE AND ALBUTEROL SULFATE SCH: .5; 3 SOLUTION RESPIRATORY (INHALATION) at 07:15

## 2018-06-10 RX ADMIN — SACUBITRIL AND VALSARTAN SCH: 24; 26 TABLET, FILM COATED ORAL at 10:30

## 2018-06-10 RX ADMIN — IPRATROPIUM BROMIDE AND ALBUTEROL SULFATE SCH ML: .5; 3 SOLUTION RESPIRATORY (INHALATION) at 01:26

## 2018-06-10 RX ADMIN — HUMAN INSULIN SCH: 100 INJECTION, SOLUTION SUBCUTANEOUS at 08:04

## 2018-06-10 RX ADMIN — INSULIN GLARGINE SCH: 100 INJECTION, SOLUTION SUBCUTANEOUS at 21:11

## 2018-06-10 NOTE — CP.PCM.PN
Subjective





- Date & Time of Evaluation


Date of Evaluation: 06/10/18


Time of Evaluation: 15:24





- Subjective


Subjective: 





Patient's blood pressure is fluctuating between  systolic with atrial 

fibrillation with moderate ventricular rate


Currently patient has only wheezing scattered rales.


BUN and creatinine is trending up, creatinine of 1.9, BUN of 46.  We will 

decrease the Lasix to 20 mg by mouth daily.


Other afterload reducing agents are on hold due to low blood pressure.


Septic workup done


Discussed with the  on the phone.





Objective





- Vital Signs/Intake and Output


Vital Signs (last 24 hours): 


 











Temp Pulse Resp BP Pulse Ox


 


 97.7 F   96 H  20   96/59 L  97 


 


 06/10/18 07:35  06/10/18 07:35  06/10/18 07:35  06/10/18 10:31  06/10/18 12:44











- Medications


Medications: 


 Current Medications





Albuterol/Ipratropium (Duoneb 3 Mg/0.5 Mg (3 Ml) Ud)  3 ml INH RQ6 [facility]


   Last Admin: 06/10/18 13:02 Dose:  Not Given


Aspirin (Ecotrin)  81 mg PO DAILY [facility]


   Last Admin: 06/10/18 10:29 Dose:  81 mg


Cyproheptadine HCl (Periactin)  4 mg PO TID [facility]


   Last Admin: 06/10/18 13:29 Dose:  4 mg


Ergocalciferol (Drisdol 50,000 Intl Units Cap)  1 cap PO QWK [facility]


Famotidine (Pepcid)  20 mg PO DAILY [facility]


   Last Admin: 06/10/18 10:29 Dose:  20 mg


Furosemide (Lasix)  20 mg PO DAILY [facility]


Insulin Glargine (Lantus)  20 unit SC HS [facility]


   Last Admin: 06/09/18 22:16 Dose:  20 u


Insulin Human Regular (Novolin R)  0 unit SC ACHS [facility]


   PRN Reason: Protocol


   Last Admin: 06/10/18 12:54 Dose:  Not Given


Metoprolol Tartrate (Lopressor)  25 mg PO BID [facility]


   Last Admin: 06/10/18 10:31 Dose:  Not Given


Nitroglycerin (Nitrostat Sl Tab)  0.6 mg SL Q5MIN PRN


   PRN Reason: CHEST PAIN


Ranolazine (Ranexa)  500 mg PO BID [facility]


   Last Admin: 06/10/18 10:29 Dose:  500 mg


Repaglinide (Prandin)  2 mg PO DAILY [facility]


   Last Admin: 06/10/18 10:29 Dose:  2 mg


Rosuvastatin Calcium (Crestor)  20 mg PO HS [facility]


   Last Admin: 06/09/18 22:16 Dose:  20 mg


Sacubitril/Valsartan (Entresto 24 Mg-26 Mg)  1 tab PO DAILY [facility]


   Last Admin: 06/10/18 10:30 Dose:  Not Given


Ticagrelor (Brilinta)  60 mg PO BID [facility]


   Last Admin: 06/10/18 10:29 Dose:  60 mg











- Labs


Labs: 


 





 06/10/18 06:38 





 06/10/18 06:38 





 











PT  12.3 SECONDS (9.7-12.2)  H  06/06/18  14:06    


 


INR  1.1   06/06/18  14:06    


 


APTT  33 SECONDS (21-34)   06/06/18  14:06

## 2018-06-11 VITALS — RESPIRATION RATE: 20 BRPM

## 2018-06-11 LAB
ALBUMIN SERPL-MCNC: 3.2 G/DL (ref 3.5–5)
ALBUMIN/GLOB SERPL: 1 {RATIO} (ref 1–2.1)
ALT SERPL-CCNC: 28 U/L (ref 9–52)
AST SERPL-CCNC: 42 U/L (ref 14–36)
BASOPHILS # BLD AUTO: 0 K/UL (ref 0–0.2)
BASOPHILS NFR BLD: 0.6 % (ref 0–2)
BUN SERPL-MCNC: 34 MG/DL (ref 7–17)
CALCIUM SERPL-MCNC: 8.7 MG/DL (ref 8.6–10.4)
EOSINOPHIL # BLD AUTO: 0.3 K/UL (ref 0–0.7)
EOSINOPHIL NFR BLD: 4.9 % (ref 0–4)
ERYTHROCYTE [DISTWIDTH] IN BLOOD BY AUTOMATED COUNT: 22.2 % (ref 11.5–14.5)
GFR NON-AFRICAN AMERICAN: 25
HGB BLD-MCNC: 9.8 G/DL (ref 11–16)
LYMPHOCYTES # BLD AUTO: 0.7 K/UL (ref 1–4.3)
LYMPHOCYTES NFR BLD AUTO: 10.4 % (ref 20–40)
MCH RBC QN AUTO: 26.1 PG (ref 27–31)
MCHC RBC AUTO-ENTMCNC: 32.2 G/DL (ref 33–37)
MCV RBC AUTO: 80.9 FL (ref 81–99)
MONOCYTES # BLD: 0.8 K/UL (ref 0–0.8)
MONOCYTES NFR BLD: 10.8 % (ref 0–10)
NEUTROPHILS # BLD: 5.2 K/UL (ref 1.8–7)
NEUTROPHILS NFR BLD AUTO: 73.3 % (ref 50–75)
NRBC BLD AUTO-RTO: 0.1 % (ref 0–2)
PLATELET # BLD: 106 K/UL (ref 130–400)
PMV BLD AUTO: 11.2 FL (ref 7.2–11.7)
RBC # BLD AUTO: 3.74 MIL/UL (ref 3.8–5.2)
WBC # BLD AUTO: 7.1 K/UL (ref 4.8–10.8)

## 2018-06-11 RX ADMIN — HUMAN INSULIN SCH: 100 INJECTION, SOLUTION SUBCUTANEOUS at 21:38

## 2018-06-11 RX ADMIN — SACUBITRIL AND VALSARTAN SCH: 24; 26 TABLET, FILM COATED ORAL at 10:45

## 2018-06-11 RX ADMIN — IPRATROPIUM BROMIDE AND ALBUTEROL SULFATE SCH ML: .5; 3 SOLUTION RESPIRATORY (INHALATION) at 13:34

## 2018-06-11 RX ADMIN — INSULIN GLARGINE SCH UNITS: 100 INJECTION, SOLUTION SUBCUTANEOUS at 21:51

## 2018-06-11 RX ADMIN — IPRATROPIUM BROMIDE AND ALBUTEROL SULFATE SCH ML: .5; 3 SOLUTION RESPIRATORY (INHALATION) at 07:08

## 2018-06-11 RX ADMIN — RANOLAZINE SCH MG: 500 TABLET, FILM COATED, EXTENDED RELEASE ORAL at 10:40

## 2018-06-11 RX ADMIN — IPRATROPIUM BROMIDE AND ALBUTEROL SULFATE SCH ML: .5; 3 SOLUTION RESPIRATORY (INHALATION) at 19:58

## 2018-06-11 RX ADMIN — HUMAN INSULIN SCH U: 100 INJECTION, SOLUTION SUBCUTANEOUS at 08:43

## 2018-06-11 RX ADMIN — IPRATROPIUM BROMIDE AND ALBUTEROL SULFATE SCH ML: .5; 3 SOLUTION RESPIRATORY (INHALATION) at 01:23

## 2018-06-11 RX ADMIN — HUMAN INSULIN SCH: 100 INJECTION, SOLUTION SUBCUTANEOUS at 16:53

## 2018-06-11 RX ADMIN — RANOLAZINE SCH MG: 500 TABLET, FILM COATED, EXTENDED RELEASE ORAL at 18:55

## 2018-06-11 RX ADMIN — HUMAN INSULIN SCH U: 100 INJECTION, SOLUTION SUBCUTANEOUS at 12:41

## 2018-06-11 RX ADMIN — METHYLPREDNISOLONE SODIUM SUCCINATE SCH MG: 40 INJECTION, POWDER, FOR SOLUTION INTRAMUSCULAR; INTRAVENOUS at 18:55

## 2018-06-11 NOTE — US
EXAM:

  US Retroperitoneal Complete, Renal



EXAM DATE/TIME:

  6/11/2018 4:41 PM



CLINICAL HISTORY:

  86 years old, female; Signs and symptoms; Other: Uti



TECHNIQUE:

  Real-time ultrasound of the retroperitoneum (complete) with image 

documentation.



COMPARISON:

  No relevant prior studies available.



FINDINGS:

  Right kidney: Small in size, measuring 7.2 cm. Demonstrates mild cortical 

thinning/atrophy. Otherwise unremarkable in appearance. No evidence of 

hydronephrosis.



  Left kidney: Contains 3 lesions, which have ultrasound features most 

suggestive of cysts. The largest of these, located in the lower pole, measures 

3.5 x 2.5 cm. Small in size, measuring 8.2 cm in length. Otherwise within 

normal limits in appearance. No evidence of hydronephrosis.



  Bladder:  Within normal limits in appearance.



  Other findings:  Multiple gallstones and sludge incidentally noted in the 

gallbladder. The common bile duct measures 7.4 mm maximally, within normal 

limits in a patient of this age.



IMPRESSION:   

  No evidence of hydronephrosis or other acute abnormality of the kidneys.



  Gallstones and sludge incidentally noted.



  Small size of both kidneys, most likely secondary to chronic atrophy.



  Left renal cysts.



  See above for remaining findings.

## 2018-06-11 NOTE — CP.PCM.CON
History of Present Illness





- History of Present Illness


History of Present Illness: 


INFECTIOUS DISEASE CONSULT;


HPI;





86-year-old female, with multiple medical problems including asthma, CHF, COPD, 

diabetes, HTN hypercholesterolemia and history of TIA in the past who was 

admitted to [facility] via emergency room because of acute shortness of 

breath and congestive heart failure.  Patient responded to IV Lasix and she was 

found to have high BNP AND CHF on chest x-ray.


Patient remained hypotensive during the hospitalization and tachycardic. So 

septic workup was done.  Patient was found to have a positive urine culture for 

beta-hemolytic strep group B.


Infectious disease consultation was requested by PMD for appropriate therapy 

and further workup.


Patient denies any abdominal pain or lower pelvic discomfort.  Complains of 

frequency of urination but denies any hematuria.


Patient also reported that she was recently hospitalized in [place] for CHF 

and received blood transfusion.











PMH: Anemia, Anxiety, Arthritis (BACK AND KNEES AND R SHOULDER), Asthma, Cardia 

Arrhythmia, CHF, COPD, Diabetes, HTN, Hypercholesterolemia, TIA


Surgical History: Appendectomy, Coronary Stent, Endoscopy, Pacemaker (2016)





- [facility] Procedures








ASSISTANCE WITH RESPIRATORY VENTILATION, 24-96 HRS, CPAP (11/28/17)


ASSISTANCE WITH RESPIRATORY VENTILATION, <24 HRS, CPAP (07/21/16)


ESOPHAGOGASTRODUODENOSCOPY [EGD] W/CLOSED BIOPSY (06/14/15)


LEFT HEART CARDIAC CATH (04/03/14)


LT HEART ANGIOCARDIOGRAM (04/03/14)


MEASURE OF CARDIAC SAMPL & PRESSURE, L HEART, PERC APPROACH (11/03/15)


PACKED CELL TRANSFUSION (06/14/15)


PLAIN RADIOGRAPHY OF MULT COR ART USING OTH CONTRAST (11/03/15)


TRANSFUSE NONAUT RED BLOOD CELLS IN PERIPH VEIN, PERC (07/21/16)








Family History: States: No Known Family Hx





- Social History


Hx Tobacco Use: No


Hx Alcohol Use: No


Hx Substance Use: No





- Immunization History


Hx Tetanus Toxoid Vaccination: No


Hx Influenza Vaccination: Yes


Hx Pneumococcal Vaccination: Yes














Review of Systems





- Constitutional


Constitutional: absent: Chills, Fever





- EENT


Eyes: absent: Change in Vision


Nose/Mouth/Throat: absent: Mouth Lesions, Sore Throat





- Cardiovascular


Cardiovascular: Dyspnea on Exertion.  absent: Chest Pain





- Respiratory


Respiratory: Cough, Wheezing





- Gastrointestinal


Gastrointestinal: absent: Abdominal Pain





- Genitourinary


Genitourinary: Urinary Frequency.  absent: Difficulty Urinating, Dysuria





- Neurological


Neurological: absent: Headaches





- Hematologic/Lymphatic


Hematologic: As Per HPI.  absent: Easy Bleeding, Easy Bruising





Past Patient History





- Infectious Disease


Hx of Infectious Diseases: None





- Tetanus Immunizations


Tetanus Immunization: Unknown





- Past Medical History & Family History


Past Medical History?: Yes





- Past Social History


Smoking Status: Never Smoked





- CARDIAC


Hx Cardia Arrhythmia: Yes


Hx Congestive Heart Failure: Yes


Hx Hypercholesterolemia: Yes


Hx Hypertension: Yes


Hx Pacemaker: Yes (2016)





- PULMONARY


Hx Asthma: Yes


Hx Chronic Obstructive Pulmonary Disease (COPD): Yes





- NEUROLOGICAL


Hx Transient Ischemic Attacks (TIA): Yes





- HEENT


Hx HEENT Problems: No





- RENAL


Hx Chronic Kidney Disease: No





- ENDOCRINE/METABOLIC


Hx Diabetes Mellitus Type 2: Yes





- HEMATOLOGICAL/ONCOLOGICAL


Hx Anemia: Yes





- INTEGUMENTARY


Hx Dermatological Problems: No





- MUSCULOSKELETAL/RHEUMATOLOGICAL


Hx Arthritis: Yes (BACK AND KNEES AND R SHOULDER)





- GASTROINTESTINAL


Hx Gastrointestinal Disorders: No





- GENITOURINARY/GYNECOLOGICAL


Hx Genitourinary Disorders: No





- PSYCHIATRIC


Hx Anxiety: Yes


Hx Substance Use: No





- SURGICAL HISTORY


Hx Appendectomy: Yes


Hx Coronary Stent: Yes





- ANESTHESIA


Hx Anesthesia: Yes


Hx Anesthesia Reactions: No


Hx Malignant Hyperthermia: No





Meds


Allergies/Adverse Reactions: 


 Allergies











Allergy/AdvReac Type Severity Reaction Status Date / Time


 


clopidogrel Allergy Mild SWELLING Verified 06/06/18 13:34














- Medications


Medications: 


 Current Medications





Albuterol/Ipratropium (Duoneb 3 Mg/0.5 Mg (3 Ml) Ud)  3 ml INH RQ6 [facility]


   Last Admin: 06/11/18 13:34 Dose:  3 ml


Aspirin (Ecotrin)  81 mg PO DAILY [facility]


   Last Admin: 06/11/18 10:40 Dose:  81 mg


Cyproheptadine HCl (Periactin)  4 mg PO TID [facility]


   Last Admin: 06/11/18 13:46 Dose:  4 mg


Ergocalciferol (Drisdol 50,000 Intl Units Cap)  1 cap PO QWK [facility]


Famotidine (Pepcid)  20 mg PO DAILY [facility]


   Last Admin: 06/11/18 10:40 Dose:  20 mg


Furosemide (Lasix)  20 mg PO DAILY [facility]


Insulin Glargine (Lantus)  20 unit SC [facility]


   Last Admin: 06/10/18 21:11 Dose:  Not Given


Insulin Human Regular (Novolin R)  0 unit SC ACHS [facility]


   PRN Reason: Protocol


   Last Admin: 06/11/18 12:41 Dose:  2 u


Metoprolol Tartrate (Lopressor)  25 mg PO BID [facility]


   Last Admin: 06/11/18 10:41 Dose:  25 mg


Nitroglycerin (Nitrostat Sl Tab)  0.6 mg SL Q5MIN PRN


   PRN Reason: CHEST PAIN


Ranolazine (Ranexa)  500 mg PO BID [facility]


   Last Admin: 06/11/18 10:40 Dose:  500 mg


Repaglinide (Prandin)  2 mg PO DAILY [facility]


   Last Admin: 06/11/18 10:40 Dose:  2 mg


Rosuvastatin Calcium (Crestor)  20 mg PO HS [facility]


   Last Admin: 06/10/18 21:10 Dose:  20 mg


Sacubitril/Valsartan (Entresto 24 Mg-26 Mg)  1 tab PO DAILY [facility]


   Last Admin: 06/11/18 10:45 Dose:  Not Given


Ticagrelor (Brilinta)  60 mg PO BID [facility]


   Last Admin: 06/11/18 10:40 Dose:  60 mg











Physical Exam





- Constitutional


Appears: No Acute Distress





- Head Exam


Head Exam: NORMAL INSPECTION





- Eye Exam


Eye Exam: EOMI, PERRL





- ENT Exam


ENT Exam: Normal Oropharynx





- Neck Exam


Neck exam: Positive for: Normal Inspection





- Respiratory Exam


Respiratory Exam: Wheezes (B/L )





- Cardiovascular Exam


Cardiovascular Exam: REGULAR RHYTHM, +S1, +S2





- GI/Abdominal Exam


GI & Abdominal Exam: Normal Bowel Sounds, Soft





- Extremities Exam


Extremities exam: Positive for: pedal pulses present.  Negative for: calf 

tenderness, pedal edema





- Neurological Exam


Neurological exam: Alert, CN II-XII Intact, Oriented x3, Reflexes Normal





- Psychiatric Exam


Psychiatric exam: Normal Mood





- Skin


Skin Exam: Normal Color, Warm





Results





- Vital Signs


Recent Vital Signs: 


 Last Vital Signs











Temp  97.8 F   06/11/18 07:30


 


Pulse  93 H  06/11/18 15:46


 


Resp  20   06/11/18 07:30


 


BP  99/55 L  06/11/18 13:44


 


Pulse Ox  100   06/11/18 14:33














- Labs


Result Diagrams: 


 06/12/18 08:32





 06/11/18 06:48


Labs: 


 Laboratory Results - last 24 hr











  06/10/18 06/10/18 06/11/18





  16:35 21:00 06:48


 


WBC    7.1


 


RBC    3.74 L


 


Hgb    9.8 L


 


Hct    30.3 L


 


MCV    80.9 L


 


MCH    26.1 L


 


MCHC    32.2 L


 


RDW    22.2 H


 


Plt Count    106 L


 


MPV    11.2


 


Neut % (Auto)    73.3


 


Lymph % (Auto)    10.4 L


 


Mono % (Auto)    10.8 H


 


Eos % (Auto)    4.9 H


 


Baso % (Auto)    0.6


 


Neut # (Auto)    5.2


 


Lymph # (Auto)    0.7 L


 


Mono # (Auto)    0.8


 


Eos # (Auto)    0.3


 


Baso # (Auto)    0.0


 


Sodium   


 


Potassium   


 


Chloride   


 


Carbon Dioxide   


 


Anion Gap   


 


BUN   


 


Creatinine   


 


Est GFR ( Amer)   


 


Est GFR (Non-Af Amer)   


 


POC Glucose (mg/dL)  178 H  212 H 


 


Random Glucose   


 


Calcium   


 


Total Bilirubin   


 


AST   


 


ALT   


 


Alkaline Phosphatase   


 


Total Protein   


 


Albumin   


 


Globulin   


 


Albumin/Globulin Ratio   














  06/11/18 06/11/18 06/11/18





  06:48 11:06 16:18


 


WBC   


 


RBC   


 


Hgb   


 


Hct   


 


MCV   


 


MCH   


 


MCHC   


 


RDW   


 


Plt Count   


 


MPV   


 


Neut % (Auto)   


 


Lymph % (Auto)   


 


Mono % (Auto)   


 


Eos % (Auto)   


 


Baso % (Auto)   


 


Neut # (Auto)   


 


Lymph # (Auto)   


 


Mono # (Auto)   


 


Eos # (Auto)   


 


Baso # (Auto)   


 


Sodium  141  


 


Potassium  4.7  


 


Chloride  102  


 


Carbon Dioxide  30  


 


Anion Gap  14  


 


BUN  34 H  


 


Creatinine  1.9 H  


 


Est GFR ( Amer)  30  


 


Est GFR (Non-Af Amer)  25  


 


POC Glucose (mg/dL)   171 H  73


 


Random Glucose  165 H  


 


Calcium  8.7  


 


Total Bilirubin  0.4  


 


AST  42 H D  


 


ALT  28  


 


Alkaline Phosphatase  67  


 


Total Protein  6.3  


 


Albumin  3.2 L  


 


Globulin  3.1  


 


Albumin/Globulin Ratio  1.0  














- Imaging and Cardiology


  ** Chest x-ray


Status: Report reviewed by me ( PULMONARY VASCULAR CONGESTION, SMALL BILATERAL 

PLEURAL EFFUSIONS.)





Assessment & Plan





- Assessment and Plan (Free Text)


Assessment: 


IMPRESSION;


UROSEPSIS- BETA-HEMOLYTIC STREP GROUP B.


EXACERBATION OF COPD/ BRONCHIAL ASTHMA


CONGESTIVE HEART FAILURE WITH BILATERAL PLEURAL EFFUSIONS.


CAD X 2 STENTS.


ANEMIA.


RENAL INSUFFICIENCY. 





PLAN.


PANCULTURES


ESR


CRP


RENAL ULTRASOUND RULE OUT NEPHROLITHIASIS VS HYDRONEPHROSIS


START iv ROCEPHIN 1 G EVERY 24 HOURLY.  6/11/18.


START sOLU-mEDROL 40 MG EVERY 8 HOURLY IN VIEW OF EXACERBATION OF COPD AND 

WHEEZING.


MONITOR RENAL FUNCTIONS CLOSELY.


MONITOR lftS CLOSELY.


PATIENT ON DIURESIS AS PER PMD.





WILL FOLLOW PATIENT ALONG WITH YOU AND MAKE RECOMMENDATIONS AS NEEDED.

## 2018-06-11 NOTE — CP.PCM.PN
Subjective





- Date & Time of Evaluation


Date of Evaluation: 06/11/18


Time of Evaluation: 14:35





- Subjective


Subjective: 





AFEBRILE 


BP STABLE , OCCASIONALLY LOW SYSTOLIC 90'S 


LUNGS RONCHI


HEART  MENDEZ IN LLSB 2/6 


EXT NO EDEMA 


 


URINE ,GROUP B STREP. B 


PLAN 


ID EVAL 


CONT LASIX  





Objective





- Vital Signs/Intake and Output


Vital Signs (last 24 hours): 


 











Temp Pulse Resp BP Pulse Ox


 


 97.8 F   95 H  20   99/55 L  100 


 


 06/11/18 07:30  06/11/18 13:44  06/11/18 07:30  06/11/18 13:44  06/11/18 07:30











- Medications


Medications: 


 Current Medications





Albuterol/Ipratropium (Duoneb 3 Mg/0.5 Mg (3 Ml) Ud)  3 ml INH RQ6 [facility]


   Last Admin: 06/11/18 13:34 Dose:  3 ml


Aspirin (Ecotrin)  81 mg PO DAILY [facility]


   Last Admin: 06/11/18 10:40 Dose:  81 mg


Cyproheptadine HCl (Periactin)  4 mg PO TID [facility]


   Last Admin: 06/11/18 13:46 Dose:  4 mg


Ergocalciferol (Drisdol 50,000 Intl Units Cap)  1 cap PO QWK [facility]


Famotidine (Pepcid)  20 mg PO DAILY [facility]


   Last Admin: 06/11/18 10:40 Dose:  20 mg


Furosemide (Lasix)  20 mg PO DAILY [facility]


Insulin Glargine (Lantus)  20 unit SC [facility]


   Last Admin: 06/10/18 21:11 Dose:  Not Given


Ins[facility] Human [facility] ([facility] R)  [facility]


   PRN Reason: Protocol


   Last Admin: 06/11/18 12:41 Dose:  2 u


Metoprolol Tartrate (Lopressor)  25 mg PO BID [facility]


   Last Admin: 06/11/18 10:41 Dose:  25 mg


Nitroglycerin (Nitrostat Sl Tab)  0.6 mg SL Q5MIN PRN


   PRN Reason: CHEST PAIN


Ranolazine (Ranexa)  500 mg PO BID [facility]


   Last Admin: 06/11/18 10:40 Dose:  500 mg


Repaglinide (Prandin)  2 mg PO DAILY [facility]


   Last Admin: 06/11/18 10:40 Dose:  2 mg


Rosuvastatin Calcium (Crestor)  20 mg PO HS [facility]


   Last Admin: 06/10/18 21:10 Dose:  20 mg


Sacubitril/Valsartan (Entresto 24 Mg-26 Mg)  1 tab PO DAILY [facility]


   Last Admin: 06/11/18 10:45 Dose:  Not Given


Ticagrelor (Brilinta)  60 mg PO BID [facility]


   Last Admin: 06/11/18 10:40 Dose:  60 mg











- Labs


Labs: 


 





 06/11/18 06:48 





 06/11/18 06:48 





 











PT  12.3 SECONDS (9.7-12.2)  H  06/06/18  14:06    


 


INR  1.1   06/06/18  14:06    


 


APTT  33 SECONDS (21-34)   06/06/18  14:06

## 2018-06-12 LAB
ALBUMIN SERPL-MCNC: 3.2 G/DL (ref 3.5–5)
ALBUMIN/GLOB SERPL: 1.1 {RATIO} (ref 1–2.1)
ALT SERPL-CCNC: 30 U/L (ref 9–52)
ANISOCYTOSIS BLD QL SMEAR: SLIGHT
AST SERPL-CCNC: 39 U/L (ref 14–36)
BASOPHILS # BLD AUTO: 0 K/UL (ref 0–0.2)
BASOPHILS NFR BLD: 0.1 % (ref 0–2)
BILIRUB DIRECT SERPL-MCNC: 0.4 MG/DL (ref 0–0.4)
EOSINOPHIL # BLD AUTO: 0 K/UL (ref 0–0.7)
EOSINOPHIL NFR BLD: 0 % (ref 0–4)
ERYTHROCYTE [DISTWIDTH] IN BLOOD BY AUTOMATED COUNT: 22 % (ref 11.5–14.5)
HGB BLD-MCNC: 9.5 G/DL (ref 11–16)
HYPOCHROMIC: SLIGHT
LYMPHOCYTE: 3 % (ref 20–40)
LYMPHOCYTES # BLD AUTO: 0.3 K/UL (ref 1–4.3)
LYMPHOCYTES NFR BLD AUTO: 4.7 % (ref 20–40)
MCH RBC QN AUTO: 26.2 PG (ref 27–31)
MCHC RBC AUTO-ENTMCNC: 32.8 G/DL (ref 33–37)
MCV RBC AUTO: 80 FL (ref 81–99)
MONOCYTES # BLD: 0.1 K/UL (ref 0–0.8)
MONOCYTES NFR BLD: 0.8 % (ref 0–10)
NEUTROPHILS # BLD: 6.5 K/UL (ref 1.8–7)
NEUTROPHILS NFR BLD AUTO: 94.4 % (ref 50–75)
NEUTROPHILS NFR BLD AUTO: 95 % (ref 50–75)
NEUTS BAND NFR BLD: 2 % (ref 0–2)
NRBC BLD AUTO-RTO: 0 % (ref 0–2)
PLATELET # BLD EST: (no result) 10*3/UL
PLATELET # BLD: 110 K/UL (ref 130–400)
PMV BLD AUTO: 11.5 FL (ref 7.2–11.7)
POIKILOCYTOSIS BLD QL SMEAR: SLIGHT
POLYCHROMIC: SLIGHT
RBC # BLD AUTO: 3.64 MIL/UL (ref 3.8–5.2)
TARGETS BLD QL SMEAR: SLIGHT
TOTAL CELLS COUNTED BLD: 100
WBC # BLD AUTO: 6.9 K/UL (ref 4.8–10.8)

## 2018-06-12 RX ADMIN — SACUBITRIL AND VALSARTAN SCH TAB: 24; 26 TABLET, FILM COATED ORAL at 10:37

## 2018-06-12 RX ADMIN — RANOLAZINE SCH MG: 500 TABLET, FILM COATED, EXTENDED RELEASE ORAL at 10:36

## 2018-06-12 RX ADMIN — HUMAN INSULIN SCH U: 100 INJECTION, SOLUTION SUBCUTANEOUS at 12:06

## 2018-06-12 RX ADMIN — IPRATROPIUM BROMIDE AND ALBUTEROL SULFATE SCH ML: .5; 3 SOLUTION RESPIRATORY (INHALATION) at 01:21

## 2018-06-12 RX ADMIN — IPRATROPIUM BROMIDE AND ALBUTEROL SULFATE SCH ML: .5; 3 SOLUTION RESPIRATORY (INHALATION) at 13:31

## 2018-06-12 RX ADMIN — METHYLPREDNISOLONE SODIUM SUCCINATE SCH MG: 40 INJECTION, POWDER, FOR SOLUTION INTRAMUSCULAR; INTRAVENOUS at 10:36

## 2018-06-12 RX ADMIN — INSULIN GLARGINE SCH UNITS: 100 INJECTION, SOLUTION SUBCUTANEOUS at 21:52

## 2018-06-12 RX ADMIN — HUMAN INSULIN SCH: 100 INJECTION, SOLUTION SUBCUTANEOUS at 22:16

## 2018-06-12 RX ADMIN — METHYLPREDNISOLONE SODIUM SUCCINATE SCH MG: 40 INJECTION, POWDER, FOR SOLUTION INTRAMUSCULAR; INTRAVENOUS at 17:32

## 2018-06-12 RX ADMIN — METHYLPREDNISOLONE SODIUM SUCCINATE SCH MG: 40 INJECTION, POWDER, FOR SOLUTION INTRAMUSCULAR; INTRAVENOUS at 02:58

## 2018-06-12 RX ADMIN — IPRATROPIUM BROMIDE AND ALBUTEROL SULFATE SCH ML: .5; 3 SOLUTION RESPIRATORY (INHALATION) at 19:43

## 2018-06-12 RX ADMIN — IPRATROPIUM BROMIDE AND ALBUTEROL SULFATE SCH ML: .5; 3 SOLUTION RESPIRATORY (INHALATION) at 08:16

## 2018-06-12 RX ADMIN — HUMAN INSULIN SCH U: 100 INJECTION, SOLUTION SUBCUTANEOUS at 17:30

## 2018-06-12 RX ADMIN — HUMAN INSULIN SCH U: 100 INJECTION, SOLUTION SUBCUTANEOUS at 08:41

## 2018-06-12 RX ADMIN — RANOLAZINE SCH MG: 500 TABLET, FILM COATED, EXTENDED RELEASE ORAL at 17:34

## 2018-06-12 NOTE — CP.PCM.PN
Subjective





- Date & Time of Evaluation


Date of Evaluation: 06/12/18


Time of Evaluation: 14:12





- Subjective


Subjective: 





CHIEF COMPLAINTS TODAY :


AFEBRILE,


C/O SOB  ON EXERCISE.


DENIES DYSURIA OR HEMATURIA





ROS.


HEENT :  N.


Resp :       No,pleuritic CP ,or hemoptysis 


Cardio :     No anginal  CP, , palpitation 


GI :           No abd.pain, n/v ,diarrhea or GI bleeding .


CNS : No headache, vertigo, focal deficit.


Musculoskel :  No joint swelling ,


Derm :        No rash 


Psych :     Normal affect.


Ext :  No  swelling ,calf pain 





PE.


Pt. is alert awake in no distress.


V.S  As noted in the chart 


Head ,ear nose,throat and eyes : Normal.


Neck : Supple with normal carotids.


Lungs: Bilateral rales 


Heart : S1 & S2 normal with S4. No murmur.


Abd : Soft non tender with normal bowel sounds.


Neuro : Moves all ext. with no localized deficit.


Ext : No edema with intact pulses.Non tender calves 


Derm : No rashes or decubitus ulcer.





LABS/RADIOLOGY: 


Urine culture and  sensitivity shows strep group B.





ASSESSMENT/PLAN : 


Continue IV antibiotics.


Add small dose of steroids as patient is continuously wheezing.


Creatinine is 1.9; deferred cardiac catheterization.


watch renal functions closely.





Objective





- Vital Signs/Intake and Output


Vital Signs (last 24 hours): 


 











Temp Pulse Resp BP Pulse Ox


 


 97.5 F L  92 H  20   100/74   100 


 


 06/12/18 07:30  06/12/18 10:00  06/12/18 07:30  06/12/18 10:10  06/12/18 07:30











- Medications


Medications: 


 Current Medications





Albuterol/Ipratropium (Duoneb 3 Mg/0.5 Mg (3 Ml) Ud)  3 ml INH RQ6 [facility]


   Last Admin: 06/12/18 13:31 Dose:  3 ml


Aspirin (Ecotrin)  81 mg PO DAILY [facility]


   Last Admin: 06/12/18 10:36 Dose:  81 mg


Cyproheptadine HCl (Periactin)  4 mg PO TID [facility]


   Last Admin: 06/12/18 10:36 Dose:  4 mg


Ergocalciferol (Drisdol 50,000 Intl Units Cap)  1 cap PO QWK [facility]


Famotidine (Pepcid)  20 mg PO DAILY [facility]


   Last Admin: 06/12/18 10:36 Dose:  20 mg


Furosemide (Lasix)  20 mg PO DAILY [facility]


   Last Admin: 06/12/18 10:10 Dose:  Not Given


Ceftriaxone Sodium 1 gm/ (Sodium Chloride)  100 mls @ 100 mls/hr IVPB Q24H [facility]


   PRN Reason: Protocol


   Last Admin: 06/11/18 19:03 Dose:  100 mls/hr


Insulin Glargine (Lantus)  20 unit SC HS [facility]


   Last Admin: 06/11/18 21:51 Dose:  20 units


Insulin Human Regular (Novolin R)  0 unit SC ACHS [facility]


   PRN Reason: Protocol


   Last Admin: 06/12/18 12:06 Dose:  6 u


Methylprednisolone (Solu-Medrol)  40 mg IV Q8H [facility]


   Last Admin: 06/12/18 10:36 Dose:  40 mg


Metoprolol Tartrate (Lopressor)  25 mg PO BID [facility]


   Last Admin: 06/12/18 10:10 Dose:  Not Given


Nitroglycerin (Nitrostat Sl Tab)  0.6 mg SL Q5MIN PRN


   PRN Reason: CHEST PAIN


Ranolazine (Ranexa)  500 mg PO BID [facility]


   Last Admin: 06/12/18 10:36 Dose:  500 mg


Repaglinide (Prandin)  2 mg PO DAILY [facility]


   Last Admin: 06/12/18 10:37 Dose:  2 mg


Rosuvastatin Calcium (Crestor)  20 mg PO HS [facility]


   Last Admin: 06/11/18 21:52 Dose:  20 mg


Sacubitril/Valsartan (Entresto 24 Mg-26 Mg)  1 tab PO DAILY [facility]


   Last Admin: 06/12/18 10:37 Dose:  1 tab


Ticagrelor (Brilinta)  60 mg PO BID [facility]


   Last Admin: 06/12/18 10:37 Dose:  60 mg











- Labs


Labs: 


 





 06/12/18 08:32 





 06/11/18 06:48 





 











PT  12.3 SECONDS (9.7-12.2)  H  06/06/18  14:06    


 


INR  1.1   06/06/18  14:06    


 


APTT  33 SECONDS (21-34)   06/06/18  14:06

## 2018-06-12 NOTE — CP.PCM.PN
Subjective





- Date & Time of Evaluation


Date of Evaluation: 06/12/18


Time of Evaluation: 13:47





- Subjective


Subjective: 





CHIEF COMPLAINTS TODAY :


Patient is complaining of shortness of breath walking to the bathroom mostly 

wheezing.


Patient's blood pressure is stabilized now.  Heart rate is in 80s and 90s.


There is no definite dysuria or hematuria.





ROS.


HEENT :  N.


Resp :       No,pleuritic CP ,or hemoptysis 


Cardio :     No anginal  CP, , palpitation 


GI :           No abd.pain, n/v ,diarrhea or GI bleeding .


CNS : No headache, vertigo, focal deficit.


Musculoskel :  No joint swelling ,


Derm :        No rash 


Psych :     Normal affect.


Ext :  No  swelling ,calf pain 





PE.


Pt. is alert awake in no distress.


V.S  As noted in the chart 


Head ,ear nose,throat and eyes : Normal.


Neck : Supple with normal carotids.


Lungs: Bilateral rales 


Heart : S1 & S2 normal with S4. No murmur.


Abd : Soft non tender with normal bowel sounds.


Neuro : Moves all ext. with no localized deficit.


Ext : No edema with intact pulses.Non tender calves 


Derm : No rashes or decubitus ulcer.





LABS/RADIOLOGY: Urine culture and  sensitivity shows strep group B.





ASSESSMENT/PLAN : 


Continue IV antibiotics.


Add small dose of steroids as patient is continuously wheezing.


Creatinine is 1.9; deferred cardiac catheterization.





Objective





- Vital Signs/Intake and Output


Vital Signs (last 24 hours): 


 











Temp Pulse Resp BP Pulse Ox


 


 97.5 F L  92 H  20   100/74   100 


 


 06/12/18 07:30  06/12/18 10:00  06/12/18 07:30  06/12/18 10:10  06/12/18 07:30











- Medications


Medications: 


 Current Medications





Albuterol/Ipratropium (Duoneb 3 Mg/0.5 Mg (3 Ml) Ud)  3 ml INH RQ6 Replaced by [facility]


   Last Admin: 06/12/18 13:31 Dose:  3 ml


Aspirin (Ecotrin)  81 mg PO DAILY Replaced by [facility]


   Last Admin: 06/12/18 10:36 Dose:  81 mg


Cyproheptadine HCl (Periactin)  4 mg PO TID Replaced by [facility]


   Last Admin: 06/12/18 10:36 Dose:  4 mg


Ergocalciferol (Drisdol 50,000 Intl Units Cap)  1 cap PO QWK Replaced by [facility]


Famotidine (Pepcid)  20 mg PO DAILY Replaced by [facility]


   Last Admin: 06/12/18 10:36 Dose:  20 mg


Furosemide (Lasix)  20 mg PO DAILY Replaced by [facility]


   Last Admin: 06/12/18 10:10 Dose:  Not Given


Ceftriaxone Sodium 1 gm/ (Sodium Chloride)  100 mls @ 100 mls/hr IVPB Q24H Replaced by [facility]


   PRN Reason: Protocol


   Last Admin: 06/11/18 19:03 Dose:  100 mls/hr


Insulin Glargine (Lantus)  20 unit SC HS Replaced by [facility]


   Last Admin: 06/11/18 21:51 Dose:  20 units


Insulin Human Regular (Novolin R)  0 unit SC ACHS Replaced by [facility]


   PRN Reason: Protocol


   Last Admin: 06/12/18 12:06 Dose:  6 u


Methylprednisolone (Solu-Medrol)  40 mg IV Q8H Replaced by [facility]


   Last Admin: 06/12/18 10:36 Dose:  40 mg


Metoprolol Tartrate (Lopressor)  25 mg PO BID Replaced by [facility]


   Last Admin: 06/12/18 10:10 Dose:  Not Given


Nitroglycerin (Nitrostat Sl Tab)  0.6 mg SL Q5MIN PRN


   PRN Reason: CHEST PAIN


Ranolazine (Ranexa)  500 mg PO BID Replaced by [facility]


   Last Admin: 06/12/18 10:36 Dose:  500 mg


Repaglinide (Prandin)  2 mg PO DAILY Replaced by [facility]


   Last Admin: 06/12/18 10:37 Dose:  2 mg


Rosuvastatin Calcium (Crestor)  20 mg PO HS Replaced by [facility]


   Last Admin: 06/11/18 21:52 Dose:  20 mg


Sacubitril/Valsartan (Entresto 24 Mg-26 Mg)  1 tab PO DAILY Replaced by [facility]


   Last Admin: 06/12/18 10:37 Dose:  1 tab


Ticagrelor (Brilinta)  60 mg PO BID Replaced by [facility]


   Last Admin: 06/12/18 10:37 Dose:  60 mg











- Labs


Labs: 


 





 06/12/18 08:32 





 06/11/18 06:48 





 











PT  12.3 SECONDS (9.7-12.2)  H  06/06/18  14:06    


 


INR  1.1   06/06/18  14:06    


 


APTT  33 SECONDS (21-34)   06/06/18  14:06

## 2018-06-13 LAB
ALBUMIN SERPL-MCNC: 3.4 G/DL (ref 3.5–5)
ALBUMIN/GLOB SERPL: 1.3 {RATIO} (ref 1–2.1)
ALT SERPL-CCNC: 29 U/L (ref 9–52)
ANISOCYTOSIS BLD QL SMEAR: SLIGHT
AST SERPL-CCNC: 28 U/L (ref 14–36)
BASOPHILS # BLD AUTO: 0 K/UL (ref 0–0.2)
BASOPHILS NFR BLD: 0 % (ref 0–2)
BUN SERPL-MCNC: 39 MG/DL (ref 7–17)
CALCIUM SERPL-MCNC: 8.8 MG/DL (ref 8.6–10.4)
EOSINOPHIL # BLD AUTO: 0 K/UL (ref 0–0.7)
EOSINOPHIL NFR BLD: 0 % (ref 0–4)
ERYTHROCYTE [DISTWIDTH] IN BLOOD BY AUTOMATED COUNT: 21.8 % (ref 11.5–14.5)
GFR NON-AFRICAN AMERICAN: 28
HGB BLD-MCNC: 8.4 G/DL (ref 11–16)
HYPOCHROMIC: SLIGHT
LYMPHOCYTE: 5 % (ref 20–40)
LYMPHOCYTES # BLD AUTO: 0.3 K/UL (ref 1–4.3)
LYMPHOCYTES NFR BLD AUTO: 3 % (ref 20–40)
MCH RBC QN AUTO: 26.5 PG (ref 27–31)
MCHC RBC AUTO-ENTMCNC: 33.1 G/DL (ref 33–37)
MCV RBC AUTO: 80 FL (ref 81–99)
MICROCYTES BLD QL SMEAR: SLIGHT
MONOCYTE: 3 % (ref 0–10)
MONOCYTES # BLD: 0.4 K/UL (ref 0–0.8)
MONOCYTES NFR BLD: 3.9 % (ref 0–10)
NEUTROPHILS # BLD: 10.3 K/UL (ref 1.8–7)
NEUTROPHILS NFR BLD AUTO: 89 % (ref 50–75)
NEUTROPHILS NFR BLD AUTO: 93.1 % (ref 50–75)
NEUTS BAND NFR BLD: 3 % (ref 0–2)
NRBC BLD AUTO-RTO: 0 % (ref 0–2)
PLATELET # BLD EST: (no result) 10*3/UL
PLATELET # BLD: 109 K/UL (ref 130–400)
PMV BLD AUTO: 10.8 FL (ref 7.2–11.7)
POIKILOCYTOSIS BLD QL SMEAR: SLIGHT
RBC # BLD AUTO: 3.18 MIL/UL (ref 3.8–5.2)
TOTAL CELLS COUNTED BLD: 100
WBC # BLD AUTO: 11 K/UL (ref 4.8–10.8)

## 2018-06-13 RX ADMIN — METHYLPREDNISOLONE SODIUM SUCCINATE SCH MG: 40 INJECTION, POWDER, FOR SOLUTION INTRAMUSCULAR; INTRAVENOUS at 17:50

## 2018-06-13 RX ADMIN — RANOLAZINE SCH MG: 500 TABLET, FILM COATED, EXTENDED RELEASE ORAL at 17:52

## 2018-06-13 RX ADMIN — RANOLAZINE SCH MG: 500 TABLET, FILM COATED, EXTENDED RELEASE ORAL at 10:12

## 2018-06-13 RX ADMIN — INSULIN GLARGINE SCH: 100 INJECTION, SOLUTION SUBCUTANEOUS at 22:50

## 2018-06-13 RX ADMIN — METHYLPREDNISOLONE SODIUM SUCCINATE SCH MG: 40 INJECTION, POWDER, FOR SOLUTION INTRAMUSCULAR; INTRAVENOUS at 10:11

## 2018-06-13 RX ADMIN — HUMAN INSULIN SCH U: 100 INJECTION, SOLUTION SUBCUTANEOUS at 12:34

## 2018-06-13 RX ADMIN — HUMAN INSULIN SCH U: 100 INJECTION, SOLUTION SUBCUTANEOUS at 08:32

## 2018-06-13 RX ADMIN — METHYLPREDNISOLONE SODIUM SUCCINATE SCH MG: 40 INJECTION, POWDER, FOR SOLUTION INTRAMUSCULAR; INTRAVENOUS at 01:22

## 2018-06-13 RX ADMIN — HUMAN INSULIN SCH U: 100 INJECTION, SOLUTION SUBCUTANEOUS at 17:51

## 2018-06-13 RX ADMIN — HUMAN INSULIN SCH: 100 INJECTION, SOLUTION SUBCUTANEOUS at 22:45

## 2018-06-13 NOTE — CARD
--------------- APPROVED REPORT --------------





EKG Measurement

Heart [id number]

TX [id number]

[id number]

[id number]

[id number]



<Conclusion>

Sinus tachycardia with premature supraventricular complexes

Possible Left atrial enlargement

Left bundle branch block

Abnormal ECG

## 2018-06-13 NOTE — CP.PCM.PN
Subjective





- Date & Time of Evaluation


Date of Evaluation: 06/13/18


Time of Evaluation: 14:07





- Subjective


Subjective: 





CHIEF COMPLAINTS TODAY :


Patient is complaining of shortness of breath walking to the bathroom mostly 

wheezing.


Patient's blood pressure is stabilized now.  Heart rate is in 80s and 90s.








ROS.


HEENT :  N.


Resp :       No,pleuritic CP ,or hemoptysis 


Cardio :     No anginal  CP, , palpitation 


GI :           No abd.pain, n/v ,diarrhea or GI bleeding .


CNS : No headache, vertigo, focal deficit.


Musculoskel :  No joint swelling ,


Derm :        No rash 


Psych :     Normal affect.


Ext :  No  swelling ,calf pain 





PE.


Pt. is alert awake in no distress.


V.S  As noted in the chart 


Head ,ear nose,throat and eyes : Normal.


Neck : Supple with normal carotids.


Lungs: Bilateral rales Inspiratory and expiratory wheeze


Heart : S1 & S2 normal with S4. No murmur.


Abd : Soft non tender with normal bowel sounds.


Neuro : Moves all ext. with no localized deficit.


Ext : No edema with intact pulses.Non tender calves 


Derm : No rashes or decubitus ulcer.





LABS/RADIOLOGY: Urine culture and  sensitivity shows strep group B.





ASSESSMENT/PLAN : 


Continue IV antibiotics.


Add small dose of steroids as patient is continuously wheezing.





Objective





- Vital Signs/Intake and Output


Vital Signs (last 24 hours): 


 











Temp Pulse Resp BP Pulse Ox


 


 97.5 F L  86   20   112/57 L  100 


 


 06/13/18 07:25  06/13/18 12:00  06/13/18 07:25  06/13/18 10:10  06/13/18 07:25











- Medications


Medications: 


 Current Medications





Aspirin (Ecotrin)  81 mg PO DAILY [facility]


   Last Admin: 06/13/18 10:11 Dose:  81 mg


Cyproheptadine HCl (Periactin)  4 mg PO TID [facility]


   Last Admin: 06/13/18 13:10 Dose:  4 mg


Ergocalciferol (Drisdol 50,000 Intl Units Cap)  1 cap PO QWK [facility]


   Last Admin: 06/13/18 10:12 Dose:  1 cap


Famotidine (Pepcid)  20 mg PO DAILY [facility]


   Last Admin: 06/13/18 10:12 Dose:  20 mg


Furosemide (Lasix)  20 mg PO DAILY [facility]


   Last Admin: 06/13/18 10:12 Dose:  20 mg


Ceftriaxone Sodium 1 gm/ (Sodium Chloride)  100 mls @ 100 mls/hr IVPB Q24H [facility]


   PRN Reason: Protocol


   Last Admin: 06/12/18 18:39 Dose:  100 mls/hr


Insulin Glargine (Lantus)  20 unit SC HS [facility]


   Last Admin: 06/12/18 21:52 Dose:  20 units


Insulin Human Regular (Novolin R)  0 unit SC ACHS [facility]


   PRN Reason: Protocol


   Last Admin: 06/13/18 12:34 Dose:  10 u


Methylprednisolone (Solu-Medrol)  40 mg IV Q8H [facility]


   Last Admin: 06/13/18 10:11 Dose:  40 mg


Metoprolol Tartrate (Lopressor)  25 mg PO BID [facility]


   Last Admin: 06/12/18 17:35 Dose:  Not Given


Nitroglycerin (Nitrostat Sl Tab)  0.6 mg SL Q5MIN PRN


   PRN Reason: CHEST PAIN


Ranolazine (Ranexa)  500 mg PO BID [facility]


   Last Admin: 06/13/18 10:12 Dose:  500 mg


Repaglinide (Prandin)  2 mg PO DAILY [facility]


   Last Admin: 06/13/18 10:11 Dose:  2 mg


Rosuvastatin Calcium (Crestor)  10 mg PO [facility]


Sacubitril/Valsartan (Entresto 24 Mg-26 Mg)  1 tab PO DAILY [facility]


   Last Admin: 06/12/18 10:37 Dose:  1 tab


Ticagrelor (Brilinta)  60 mg PO BID [facility]


   Last Admin: 06/13/18 10:11 Dose:  60 mg











- Labs


Labs: 


 





 06/13/18 07:36 





 06/13/18 07:36 





 











PT  12.3 SECONDS (9.7-12.2)  H  06/06/18  14:06    


 


INR  1.1   06/06/18  14:06    


 


APTT  33 SECONDS (21-34)   06/06/18  14:06

## 2018-06-14 LAB
ALBUMIN SERPL-MCNC: 3.4 G/DL (ref 3.5–5)
ALBUMIN/GLOB SERPL: 1.3 {RATIO} (ref 1–2.1)
ALT SERPL-CCNC: 46 U/L (ref 9–52)
ANISOCYTOSIS BLD QL SMEAR: SLIGHT
AST SERPL-CCNC: 62 U/L (ref 14–36)
BASOPHILS # BLD AUTO: 0 K/UL (ref 0–0.2)
BASOPHILS NFR BLD: 0.1 % (ref 0–2)
BUN SERPL-MCNC: 40 MG/DL (ref 7–17)
CALCIUM SERPL-MCNC: 8.8 MG/DL (ref 8.6–10.4)
EOSINOPHIL # BLD AUTO: 0 K/UL (ref 0–0.7)
EOSINOPHIL NFR BLD: 0 % (ref 0–4)
ERYTHROCYTE [DISTWIDTH] IN BLOOD BY AUTOMATED COUNT: 22.6 % (ref 11.5–14.5)
GFR NON-AFRICAN AMERICAN: 25
HGB BLD-MCNC: 9.5 G/DL (ref 11–16)
HYPOCHROMIC: SLIGHT
LYMPHOCYTE: 1 % (ref 20–40)
LYMPHOCYTES # BLD AUTO: 0.3 K/UL (ref 1–4.3)
LYMPHOCYTES NFR BLD AUTO: 2.6 % (ref 20–40)
MCH RBC QN AUTO: 26.6 PG (ref 27–31)
MCHC RBC AUTO-ENTMCNC: 33.1 G/DL (ref 33–37)
MCV RBC AUTO: 80.4 FL (ref 81–99)
MONOCYTE: 3 % (ref 0–10)
MONOCYTES # BLD: 0.3 K/UL (ref 0–0.8)
MONOCYTES NFR BLD: 2.5 % (ref 0–10)
NEUTROPHILS # BLD: 10.3 K/UL (ref 1.8–7)
NEUTROPHILS NFR BLD AUTO: 94.8 % (ref 50–75)
NEUTROPHILS NFR BLD AUTO: 96 % (ref 50–75)
NRBC BLD AUTO-RTO: 0.1 % (ref 0–2)
PLATELET # BLD EST: (no result) 10*3/UL
PLATELET # BLD: 128 K/UL (ref 130–400)
PMV BLD AUTO: 11.2 FL (ref 7.2–11.7)
POIKILOCYTOSIS BLD QL SMEAR: SLIGHT
RBC # BLD AUTO: 3.56 MIL/UL (ref 3.8–5.2)
TOTAL CELLS COUNTED BLD: 100
WBC # BLD AUTO: 10.9 K/UL (ref 4.8–10.8)

## 2018-06-14 RX ADMIN — METHYLPREDNISOLONE SODIUM SUCCINATE SCH MG: 40 INJECTION, POWDER, FOR SOLUTION INTRAMUSCULAR; INTRAVENOUS at 01:45

## 2018-06-14 RX ADMIN — INSULIN GLARGINE SCH UNITS: 100 INJECTION, SOLUTION SUBCUTANEOUS at 22:07

## 2018-06-14 RX ADMIN — HUMAN INSULIN SCH U: 100 INJECTION, SOLUTION SUBCUTANEOUS at 17:53

## 2018-06-14 RX ADMIN — RANOLAZINE SCH MG: 500 TABLET, FILM COATED, EXTENDED RELEASE ORAL at 09:21

## 2018-06-14 RX ADMIN — SACUBITRIL AND VALSARTAN SCH TAB: 24; 26 TABLET, FILM COATED ORAL at 09:21

## 2018-06-14 RX ADMIN — METHYLPREDNISOLONE SODIUM SUCCINATE SCH MG: 40 INJECTION, POWDER, FOR SOLUTION INTRAMUSCULAR; INTRAVENOUS at 17:55

## 2018-06-14 RX ADMIN — HUMAN INSULIN SCH U: 100 INJECTION, SOLUTION SUBCUTANEOUS at 12:06

## 2018-06-14 RX ADMIN — RANOLAZINE SCH MG: 500 TABLET, FILM COATED, EXTENDED RELEASE ORAL at 17:53

## 2018-06-14 RX ADMIN — METHYLPREDNISOLONE SODIUM SUCCINATE SCH MG: 40 INJECTION, POWDER, FOR SOLUTION INTRAMUSCULAR; INTRAVENOUS at 09:21

## 2018-06-14 RX ADMIN — HUMAN INSULIN SCH U: 100 INJECTION, SOLUTION SUBCUTANEOUS at 08:30

## 2018-06-14 RX ADMIN — HUMAN INSULIN SCH U: 100 INJECTION, SOLUTION SUBCUTANEOUS at 22:07

## 2018-06-14 NOTE — CP.PCM.PN
Subjective





- Date & Time of Evaluation


Date of Evaluation: 06/14/18


Time of Evaluation: 13:50





- Subjective


Subjective: 





CHIEF COMPLAINTS TODAY :


Patient's breathing is much easier less wheezing and cough.


No further epigastric or abdominal pain.











ROS.


HEENT :  N.


Resp :       No,pleuritic CP ,or hemoptysis 


Cardio :     No anginal  CP, , palpitation 


GI :           No abd.pain, n/v ,diarrhea or GI bleeding .


CNS : No headache, vertigo, focal deficit.


Musculoskel :  No joint swelling ,


Derm :        No rash 


Psych :     Normal affect.


Ext :  No  swelling ,calf pain 





PE.


Pt. is alert awake in no distress.


V.S  As noted in the chart 


Head ,ear nose,throat and eyes : Normal.


Neck : Supple with normal carotids.


Lungs: Bilateral rales Inspiratory and expiratory wheeze


Heart : S1 & S2 normal with S4. No murmur.


Abd : Soft non tender with normal bowel sounds.


Neuro : Moves all ext. with no localized deficit.


Ext : No edema with intact pulses.Non tender calves 


Derm : No rashes or decubitus ulcer.





LABS/RADIOLOGY: Urine culture and  sensitivity shows strep group B.





ASSESSMENT/PLAN : 


Continue IV antibiotics.For UTI.


Decrease steroid.


Patient and family not very anxious for rehab.  Will taper of the steroids and 

plan discharge





Objective





- Vital Signs/Intake and Output


Vital Signs (last 24 hours): 


 











Temp Pulse Resp BP Pulse Ox


 


 98.0 F   97 H  20   100/64   99 


 


 06/14/18 07:57  06/14/18 08:15  06/14/18 07:57  06/14/18 09:24  06/14/18 07:57











- Medications


Medications: 


 Current Medications





Aspirin (Ecotrin)  81 mg PO DAILY [facility]


   Last Admin: 06/14/18 09:21 Dose:  81 mg


Cyproheptadine HCl (Periactin)  4 mg PO TID [facility]


   Last Admin: 06/14/18 09:20 Dose:  4 mg


Ergocalciferol (Drisdol 50,000 Intl Units Cap)  1 cap PO QWK [facility]


   Last Admin: 06/13/18 10:12 Dose:  1 cap


Famotidine (Pepcid)  20 mg PO DAILY [facility]


   Last Admin: 06/14/18 09:21 Dose:  20 mg


Furosemide (Lasix)  20 mg PO DAILY [facility]


   Last Admin: 06/14/18 09:24 Dose:  Not Given


Ceftriaxone Sodium 1 gm/ (Sodium Chloride)  100 mls @ 100 mls/hr IVPB Q24H [facility]


   PRN Reason: Protocol


   Last Admin: 06/13/18 19:26 Dose:  100 mls/hr


Insulin Glargine (Lantus)  20 unit SC HS [facility]


   Last Admin: 06/13/18 22:50 Dose:  Not Given


Insulin Human Regular (Novolin R)  0 unit SC ACHS [facility]


   PRN Reason: Protocol


   Last Admin: 06/14/18 12:06 Dose:  10 u


Methylprednisolone (Solu-Medrol)  40 mg IV Q8H [facility]


   Last Admin: 06/14/18 09:21 Dose:  40 mg


Metoprolol Tartrate (Lopressor)  25 mg PO BID [facility]


   Last Admin: 06/14/18 09:24 Dose:  Not Given


Nitroglycerin (Nitrostat Sl Tab)  0.6 mg SL Q5MIN PRN


   PRN Reason: CHEST PAIN


Ranolazine (Ranexa)  500 mg PO BID [facility]


   Last Admin: 06/14/18 09:21 Dose:  500 mg


Repaglinide (Prandin)  2 mg PO DAILY [facility]


   Last Admin: 06/14/18 09:20 Dose:  2 mg


Rosuvastatin Calcium (Crestor)  10 mg PO [facility]


   Last Admin: 06/13/18 22:48 Dose:  10 mg


Sacubitril/Valsartan (Entresto 24 Mg-26 Mg)  1 tab PO DAILY [facility]


   Last Admin: 06/14/18 09:21 Dose:  1 tab


Ticagrelor (Brilinta)  60 mg PO BID [facility]


   Last Admin: 06/14/18 09:20 Dose:  60 mg











- Labs


Labs: 


 





 06/14/18 07:51 





 06/14/18 07:51 





 











PT  12.3 SECONDS (9.7-12.2)  H  06/06/18  14:06    


 


INR  1.1   06/06/18  14:06    


 


APTT  33 SECONDS (21-34)   06/06/18  14:06

## 2018-06-14 NOTE — CP.PCM.PN
Subjective





- Date & Time of Evaluation


Date of Evaluation: 06/14/18


Time of Evaluation: 12:16





- Subjective


Subjective: 





CHIEF COMPLAINTS TODAY :


AFEBRILE,


LESS DYSPNOEC 


DENIES DYSURIA 





ROS.


HEENT :  N.


Resp :       No,pleuritic CP ,or hemoptysis 


Cardio :     No anginal  CP, , palpitation 


GI :           No abd.pain, n/v ,diarrhea or GI bleeding .


CNS : No headache, vertigo, focal deficit.


Musculoskel :  No joint swelling ,


Derm :        No rash 


Psych :     Normal affect.


Ext :  No  swelling ,calf pain 





PE.


Pt. is alert awake in no distress.


V.S  As noted in the chart 


Head ,ear nose,throat and eyes : Normal.


Neck : Supple with normal carotids.


Lungs: BILATERAL RALES/WHEEZE


Heart : S1 & S2 normal with S4. No murmur.


Abd : Soft non tender with normal bowel sounds.


Neuro : Moves all ext. with no localized deficit.


Ext : No edema with intact pulses.Non tender calves 


Derm : No rashes or decubitus ulcer.





LABS/RADIOLOGY: 


WBC 10.9 


  BETTER


CREAT 1.9/BUN 40


Urine culture and  sensitivity shows strep group B.





ASSESSMENT/PLAN : 





IMPRESSION;


UROSEPSIS- BETA-HEMOLYTIC STREP GROUP B.


EXACERBATION OF COPD/ BRONCHIAL ASTHMA


CONGESTIVE HEART FAILURE WITH BILATERAL PLEURAL EFFUSIONS.


CAD X 2 STENTS.


ANEMIA.


RENAL INSUFFICIENCY. 





PLAN.


REPEAT CLEAN CATCH UA/URINE CULTURE


ON iv ROCEPHIN 1 G EVERY 24 HOURLY.  6/11/18.


ON SOLU-mEDROL 40 MG EVERY 8 HOURLY IN VIEW OF EXACERBATION OF COPD AND 

WHEEZING.


MONITOR RENAL FUNCTIONS CLOSELY.


MONITOR lftS CLOSELY.





Objective





- Vital Signs/Intake and Output


Vital Signs (last 24 hours): 


 











Temp Pulse Resp BP Pulse Ox


 


 98.0 F   97 H  20   100/64   99 


 


 06/14/18 07:57  06/14/18 08:15  06/14/18 07:57  06/14/18 09:24  06/14/18 07:57








Intake and Output: 


 











 06/14/18 06/14/18





 06:59 18:59


 


Intake Total 580 


 


Balance 580 














- Medications


Medications: 


 Current Medications





Aspirin (Ecotrin)  81 mg PO DAILY [facility]


   Last Admin: 06/14/18 09:21 Dose:  81 mg


Cyproheptadine HCl (Periactin)  4 mg PO TID [facility]


   Last Admin: 06/14/18 09:20 Dose:  4 mg


Ergocalciferol (Drisdol 50,000 Intl Units Cap)  1 cap PO QWK [facility]


   Last Admin: 06/13/18 10:12 Dose:  1 cap


Famotidine (Pepcid)  20 mg PO DAILY [facility]


   Last Admin: 06/14/18 09:21 Dose:  20 mg


Furosemide (Lasix)  20 mg PO DAILY [facility]


   Last Admin: 06/14/18 09:24 Dose:  Not Given


Ceftriaxone Sodium 1 gm/ (Sodium Chloride)  100 mls @ 100 mls/hr IVPB Q24H [facility]


   PRN Reason: Protocol


   Last Admin: 06/13/18 19:26 Dose:  100 mls/hr


Insulin Glargine (Lantus)  20 unit SC [facility]


   Last Admin: 06/13/18 22:50 Dose:  Not Given


Insulin Human Regular (Novolin R)  0 unit SC ACHS [facility]


   PRN Reason: Protocol


   Last Admin: 06/14/18 12:06 Dose:  10 u


Methylprednisolone (Solu-Medrol)  40 mg IV Q8H [facility]


   Last Admin: 06/14/18 09:21 Dose:  40 mg


Metoprolol Tartrate (Lopressor)  25 mg PO BID [facility]


   Last Admin: 06/14/18 09:24 Dose:  Not Given


Nitroglycerin (Nitrostat Sl Tab)  0.6 mg SL Q5MIN PRN


   PRN Reason: CHEST PAIN


Ranolazine (Ranexa)  500 mg PO BID [facility]


   Last Admin: 06/14/18 09:21 Dose:  500 mg


Repaglinide (Prandin)  2 mg PO DAILY [facility]


   Last Admin: 06/14/18 09:20 Dose:  2 mg


Rosuvastatin Calcium (Crestor)  10 mg PO [facility]


   Last Admin: 06/13/18 22:48 Dose:  10 mg


Sacubitril/Valsartan (Entresto 24 Mg-26 Mg)  1 tab PO DAILY [facility]


   Last Admin: 06/14/18 09:21 Dose:  1 tab


Ticagrelor (Brilinta)  60 mg PO BID [facility]


   Last Admin: 06/14/18 09:20 Dose:  60 mg











- Labs


Labs: 


 





 06/14/18 07:51 





 06/14/18 07:51 





 











PT  12.3 SECONDS (9.7-12.2)  H  06/06/18  14:06    


 


INR  1.1   06/06/18  14:06    


 


APTT  33 SECONDS (21-34)   06/06/18  14:06

## 2018-06-15 VITALS — DIASTOLIC BLOOD PRESSURE: 57 MMHG | SYSTOLIC BLOOD PRESSURE: 92 MMHG

## 2018-06-15 VITALS — OXYGEN SATURATION: 98 %

## 2018-06-15 VITALS — TEMPERATURE: 97.6 F

## 2018-06-15 VITALS — HEART RATE: 101 BPM

## 2018-06-15 LAB
ALBUMIN SERPL-MCNC: 3.3 G/DL (ref 3.5–5)
ALBUMIN/GLOB SERPL: 1.3 {RATIO} (ref 1–2.1)
ALT SERPL-CCNC: 48 U/L (ref 9–52)
ANISOCYTOSIS BLD QL SMEAR: SLIGHT
AST SERPL-CCNC: 45 U/L (ref 14–36)
BASOPHILS # BLD AUTO: 0 K/UL (ref 0–0.2)
BASOPHILS NFR BLD: 0.1 % (ref 0–2)
BILIRUB UR-MCNC: NEGATIVE MG/DL
BUN SERPL-MCNC: 45 MG/DL (ref 7–17)
CALCIUM SERPL-MCNC: 8.6 MG/DL (ref 8.6–10.4)
EOSINOPHIL # BLD AUTO: 0 K/UL (ref 0–0.7)
EOSINOPHIL NFR BLD: 0 % (ref 0–4)
ERYTHROCYTE [DISTWIDTH] IN BLOOD BY AUTOMATED COUNT: 22.1 % (ref 11.5–14.5)
GFR NON-AFRICAN AMERICAN: 25
GLUCOSE UR STRIP-MCNC: (no result) MG/DL
HGB BLD-MCNC: 9.3 G/DL (ref 11–16)
HYPOCHROMIC: SLIGHT
LEUKOCYTE ESTERASE UR-ACNC: (no result) LEU/UL
LYMPHOCYTE: 1 % (ref 20–40)
LYMPHOCYTES # BLD AUTO: 0.3 K/UL (ref 1–4.3)
LYMPHOCYTES NFR BLD AUTO: 3.6 % (ref 20–40)
MCH RBC QN AUTO: 26 PG (ref 27–31)
MCHC RBC AUTO-ENTMCNC: 32.9 G/DL (ref 33–37)
MCV RBC AUTO: 79 FL (ref 81–99)
MONOCYTE: 4 % (ref 0–10)
MONOCYTES # BLD: 0.4 K/UL (ref 0–0.8)
MONOCYTES NFR BLD: 4.6 % (ref 0–10)
NEUTROPHILS # BLD: 8.6 K/UL (ref 1.8–7)
NEUTROPHILS NFR BLD AUTO: 91.7 % (ref 50–75)
NEUTROPHILS NFR BLD AUTO: 95 % (ref 50–75)
NRBC BLD AUTO-RTO: 0 % (ref 0–2)
OVALOCYTES BLD QL SMEAR: SLIGHT
PH UR STRIP: 5 [PH] (ref 5–8)
PLATELET # BLD EST: (no result) 10*3/UL
PLATELET # BLD: 128 K/UL (ref 130–400)
PMV BLD AUTO: 11 FL (ref 7.2–11.7)
POIKILOCYTOSIS BLD QL SMEAR: SLIGHT
PROT UR STRIP-MCNC: NEGATIVE MG/DL
RBC # BLD AUTO: 3.59 MIL/UL (ref 3.8–5.2)
RBC # UR STRIP: NEGATIVE /UL
SP GR UR STRIP: 1.01 (ref 1–1.03)
SQUAMOUS EPITHIAL: 3 /HPF (ref 0–5)
TARGETS BLD QL SMEAR: SLIGHT
TEARDROP CELLS: SLIGHT
TOTAL CELLS COUNTED BLD: 100
UROBILINOGEN UR-MCNC: NORMAL MG/DL (ref 0.2–1)
WBC # BLD AUTO: 9.3 K/UL (ref 4.8–10.8)

## 2018-06-15 RX ADMIN — HUMAN INSULIN SCH U: 100 INJECTION, SOLUTION SUBCUTANEOUS at 09:34

## 2018-06-15 RX ADMIN — HUMAN INSULIN SCH U: 100 INJECTION, SOLUTION SUBCUTANEOUS at 12:30

## 2018-06-15 RX ADMIN — SACUBITRIL AND VALSARTAN SCH: 24; 26 TABLET, FILM COATED ORAL at 10:52

## 2018-06-15 RX ADMIN — METHYLPREDNISOLONE SODIUM SUCCINATE SCH MG: 40 INJECTION, POWDER, FOR SOLUTION INTRAMUSCULAR; INTRAVENOUS at 01:13

## 2018-06-15 RX ADMIN — METHYLPREDNISOLONE SODIUM SUCCINATE SCH MG: 40 INJECTION, POWDER, FOR SOLUTION INTRAMUSCULAR; INTRAVENOUS at 09:32

## 2018-06-15 RX ADMIN — RANOLAZINE SCH MG: 500 TABLET, FILM COATED, EXTENDED RELEASE ORAL at 09:32

## 2018-06-15 NOTE — PCM.HF
Heart Failure Core Measure





- Heart Failure


Ejection Fraction: Less Than 40 %


ACE Inhibitor Prescribed: No


Contraindication/Reason for not providing: ARB


Beta-Blocker Prescribed: Metoprolol Succinate


Angiotensin II Receptor Blocker Prescribed: Yes


AnticoagulationTherapy for Atrial Fibrillation/Atrialflutter: No


Contraindication/Reason for not providing: ON HX OF AFIB


Aldosterone Antagonist Prescribed: No


Contraindication/Reason for not providing: ACUTE RENAL FAILURE


Hydralazine Nitrate Prescribed: No


Contraindication/Reason for not providing: ON RENEXA


Implantable Cardioverter Defibrillator Therapy: No


Contraindication/Reason for not providing: PT HAS A PACEMAKER 


Contraindication/Reason for not providing: PT HAS A PACEMAKER 





- Follow up


Will be discharged to: Home


Follow Up Date (must be within 7 days from discharge): 06/20/18


Follow Up Time: 09:00

## 2018-06-15 NOTE — CP.PCM.DIS
Provider





- Provider


Date of Admission: 


06/06/18 16:40





Attending physician: 


Osmar Fierro MD





Time Spent in preparation of Discharge (in minutes): 35





Hospital Course





- Lab Results


Lab Results: 


 Micro Results





06/10/18 10:35   Blood   Blood Culture - Final


                            NO GROWTH AFTER 5 DAYS


06/10/18 10:35   Blood   Gram Stain - Final


                            TEST NOT PERFORMED


06/10/18 06:38   Blood   Blood Culture - Final


                            NO GROWTH AFTER 5 DAYS


06/10/18 06:38   Blood   Gram Stain - Final


                            TEST NOT PERFORMED


06/10/18 10:44   Urine   Urine Culture - Final


                            Beta Hemolytic Strep Group B





 Most Recent Lab Values











WBC  9.3 K/uL (4.8-10.8)   06/15/18  07:09    


 


RBC  3.59 Mil/uL (3.80-5.20)  L  06/15/18  07:09    


 


Hgb  9.3 g/dL (11.0-16.0)  L  06/15/18  07:09    


 


Hct  28.4 % (34.0-47.0)  L  06/15/18  07:09    


 


MCV  79.0 fL (81.0-99.0)  L  06/15/18  07:09    


 


MCH  26.0 pg (27.0-31.0)  L  06/15/18  07:09    


 


MCHC  32.9 g/dL (33.0-37.0)  L  06/15/18  07:09    


 


RDW  22.1 % (11.5-14.5)  H  06/15/18  07:09    


 


Plt Count  128 K/uL (130-400)  L  06/15/18  07:09    


 


MPV  11.0 fL (7.2-11.7)   06/15/18  07:09    


 


Neut % (Auto)  91.7 % (50.0-75.0)  H  06/15/18  07:09    


 


Lymph % (Auto)  3.6 % (20.0-40.0)  L  06/15/18  07:09    


 


Mono % (Auto)  4.6 % (0.0-10.0)   06/15/18  07:09    


 


Eos % (Auto)  0.0 % (0.0-4.0)   06/15/18  07:09    


 


Baso % (Auto)  0.1 % (0.0-2.0)   06/15/18  07:09    


 


Neut # (Auto)  8.6 K/uL (1.8-7.0)  H  06/15/18  07:09    


 


Lymph # (Auto)  0.3 K/uL (1.0-4.3)  L  06/15/18  07:09    


 


Mono # (Auto)  0.4 K/uL (0.0-0.8)   06/15/18  07:09    


 


Eos # (Auto)  0.0 K/uL (0.0-0.7)   06/15/18  07:09    


 


Baso # (Auto)  0.0 K/uL (0.0-0.2)   06/15/18  07:09    


 


Neutrophils % (Manual)  95 % (50-75)  H  06/15/18  07:09    


 


Band Neutrophils %  3 % (0-2)  H  06/13/18  07:36    


 


Lymphocytes % (Manual)  1 % (20-40)  L  06/15/18  07:09    


 


Monocytes % (Manual)  4 % (0-10)   06/15/18  07:09    


 


Eosinophils % (Manual)  2 % (0-4)   06/06/18  14:06    


 


Platelet Estimate  Slightly decreased  (NORMAL)  L  06/15/18  07:09    


 


Polychromasia  Slight   06/12/18  08:32    


 


Hypochromasia (manual)  Slight   06/15/18  07:09    


 


Poikilocytosis (manual  Slight   06/15/18  07:09    


 


Anisocytosis (manual)  Slight   06/15/18  07:09    


 


Microcytosis (manual)  Slight   06/13/18  07:36    


 


Target Cells  Slight   06/15/18  07:09    


 


Tear Drop Cells  Slight   06/15/18  07:09    


 


Ovalocytes  Slight   06/15/18  07:09    


 


ESR  40 mm/hr (0-20)  H  06/12/18  08:32    


 


PT  12.3 SECONDS (9.7-12.2)  H  06/06/18  14:06    


 


INR  1.1   06/06/18  14:06    


 


APTT  33 SECONDS (21-34)   06/06/18  14:06    


 


Sodium  140 mmol/L (132-148)   06/15/18  07:09    


 


Potassium  4.7 mmol/L (3.6-5.2)   06/15/18  07:09    


 


Chloride  102 mmol/L ()   06/15/18  07:09    


 


Carbon Dioxide  30 mmol/L (22-30)   06/15/18  07:09    


 


Anion Gap  13  (10-20)   06/15/18  07:09    


 


BUN  45 mg/dL (7-17)  H  06/15/18  07:09    


 


Creatinine  1.9 mg/dL (0.7-1.2)  H  06/15/18  07:09    


 


Est GFR ( Amer)  30   06/15/18  07:09    


 


Est GFR (Non-Af Amer)  25   06/15/18  07:09    


 


POC Glucose (mg/dL)  439 mg/dL ()  H*  06/15/18  11:14    


 


Random Glucose  251 mg/dL ()  H  06/15/18  07:09    


 


Calcium  8.6 mg/dl (8.6-10.4)   06/15/18  07:09    


 


Total Bilirubin  0.5 mg/dL (0.2-1.3)   06/15/18  07:09    


 


Direct Bilirubin  0.4 mg/dL (0.0-0.4)   06/12/18  08:32    


 


AST  45 U/L (14-36)  H D 06/15/18  07:09    


 


ALT  48 U/L (9-52)   06/15/18  07:09    


 


Alkaline Phosphatase  63 U/L ()   06/15/18  07:09    


 


Total Creatine Kinase  71 U/L ()   06/06/18  14:06    


 


CK-MB (Mass)  1.57 ng/mL (0.0-3.38)   06/06/18  14:06    


 


Troponin I  0.0270 ng/mL (0.00-0.120)   06/06/18  14:06    


 


C-Reactive Protein  7.60 mg/L (0.0-9.9)   06/12/18  08:32    


 


NT-Pro-B Natriuret Pep  98778 pg/mL (0-900)  H  06/06/18  14:06    


 


Total Protein  5.8 g/dL (6.3-8.3)  L  06/15/18  07:09    


 


Albumin  3.3 g/dL (3.5-5.0)  L  06/15/18  07:09    


 


Globulin  2.5 gm/dL (2.2-3.9)   06/15/18  07:09    


 


Albumin/Globulin Ratio  1.3  (1.0-2.1)   06/15/18  07:09    


 


Urine Color  Odilia  (YELLOW)   06/06/18  15:11    


 


Urine Clarity  Hazy  (Clear)   06/06/18  15:11    


 


Urine pH  5.0  (5.0-8.0)   06/06/18  15:11    


 


Ur Specific Gravity  1.023  (1.003-1.030)   06/06/18  15:11    


 


Urine Protein  2+ mg/dL (NEGATIVE)  H  06/06/18  15:11    


 


Urine Glucose (UA)  1+ mg/dL (Normal)   06/06/18  15:11    


 


Urine Ketones  Negative mg/dL (NEGATIVE)   06/06/18  15:11    


 


Urine Blood  2+  (NEGATIVE)  H  06/06/18  15:11    


 


Urine Nitrate  Negative  (NEGATIVE)   06/06/18  15:11    


 


Urine Bilirubin  Negative  (NEGATIVE)   06/06/18  15:11    


 


Urine Urobilinogen  Normal mg/dL (0.2-1.0)   06/06/18  15:11    


 


Ur Leukocyte Esterase  1+ Hunter/uL (Negative)  H  06/06/18  15:11    


 


Urine WBC (Auto)  5 /hpf (0-5)   06/06/18  15:11    


 


Urine RBC (Auto)  40 /hpf (0-3)  H  06/06/18  15:11    


 


Ur Squamous Epith Cells  4 /hpf (0-5)   06/06/18  15:11    


 


Hyaline Casts  3-5 /lpf (0-2)  H  06/06/18  15:11    


 


Granular Casts (Auto)  3 /lpf (0-1)   06/06/18  15:11    














- Hospital Course


Hospital Course: 





Patient was admitted from [facility] emergency room for acute shortness of 

breath and congestive heart failure.


Patient has history of ischemic cardiomyopathy with left ventricle ejection 

fraction of 25-30% with coronary artery disease and stent.  Apparently patient 

was seen outpatient a few days ago apparently in no distress or any symptoms.  

Prior to admission patient was started getting wheezing shortness of breath 

mild cough and the symptoms persisted at rest.  Patient was evaluated in ER was 

found to have a high BNP CHF on chest x-ray and responded to IV Lasix.


Recently patient was admitted in [facility] for similar complaints and 

also had received blood transfusion.  Currently full detailed history and 

investigation done at [facility] is not available.





Patient was admitted on telemetry.  Patient had periods of atrial fibrillation 

with rapid ventricular rate which responded with by mouth medication.


3 sets of cardiac enzymes were negative for MI.


Patient continued to have low blood pressure and a septic workup showed the 

patient had urine infection with group B strep.  Patient was given IV 

antibiotics with improvement in blood pressure and symptoms.


CHF also improved with IV Lasix.  Prior to discharge the creatinine was 1.9 and 

BUN 45.


Patient also had increased amount of wheezing which required small dose of Solu-

Medrol 40 mg twice a day.


Patient currently stable will discharge her on her home medication, short 

course of steroids, prednisone 5 mg once a day for 7 days, and by mouth 

antibiotic as per ID.


Patient was educated on diet of low salt and patient will be monitored 

outpatient.





Discharge Exam





- Head Exam


Head Exam: NORMAL INSPECTION





Discharge Plan





- Follow Up Plan


Condition: STABLE


Disposition: HOME/ ROUTINE

## 2018-06-15 NOTE — CP.PCM.PN
Subjective





- Date & Time of Evaluation


Date of Evaluation: 06/15/18


Time of Evaluation: 13:13





- Subjective


Subjective: 





CHIEF COMPLAINTS TODAY :


AFEBRILE,


NO NEW COMPLAINTS


DENIES DYSUREA





ROS.


HEENT :  N.


Resp :       No,pleuritic CP ,or hemoptysis 


Cardio :     No anginal  CP, , palpitation 


GI :           No abd.pain, n/v ,diarrhea or GI bleeding .


CNS : No headache, vertigo, focal deficit.


Musculoskel :  No joint swelling ,


Derm :        No rash 


Psych :     Normal affect.


Ext :  No  swelling ,calf pain 





PE.


Pt. is alert awake in no distress.


V.S  As noted in the chart 


Head ,ear nose,throat and eyes : Normal.


Neck : Supple with normal carotids.


Lungs: BILATERAL RALES/WHEEZE


Heart : S1 & S2 normal with S4. No murmur.


Abd : Soft non tender with normal bowel sounds.


Neuro : Moves all ext. with no localized deficit.


Ext : No edema with intact pulses.Non tender calves 


Derm : No rashes or decubitus ulcer.





LABS/RADIOLOGY: 


WBC 10.9 


  BETTER


CREAT 1.9/BUN 40


Urine culture and  sensitivity shows strep group B.





ASSESSMENT/PLAN : 





IMPRESSION;


UROSEPSIS- BETA-HEMOLYTIC STREP GROUP B.


EXACERBATION OF COPD/ BRONCHIAL ASTHMA


CONGESTIVE HEART FAILURE WITH BILATERAL PLEURAL EFFUSIONS.


CAD X 2 STENTS.


ANEMIA.


RENAL INSUFFICIENCY. 





PLAN.


REPEAT CLEAN CATCH UA/URINE CULTURE


DC  iv ROCEPHIN 1 G EVERY 24 HOURLY.  6/11/18.


START PO KEFLEX 500MG PO BID X3DAYS.


PREDNISONE PO AS PER ATTENDING





DC SOLU-mEDROL 40 MG EVERY 8 HOURLY IN VIEW OF EXACERBATION OF COPD AND 

WHEEZING.


MONITOR RENAL FUNCTIONS.


MONITOR lftS CLOSELY.


F/U AS OPD BY PMD.











Objective





- Vital Signs/Intake and Output


Vital Signs (last 24 hours): 


 











Temp Pulse Resp BP Pulse Ox


 


 97.6 F   101 H  20   92/57 L  98 


 


 06/15/18 07:20  06/15/18 12:00  06/15/18 07:20  06/15/18 09:32  06/15/18 07:20











- Medications


Medications: 


 Current Medications





Aspirin (Ecotrin)  81 mg PO DAILY [facility]


   Last Admin: 06/15/18 09:32 Dose:  81 mg


Cyproheptadine HCl (Periactin)  4 mg PO TID [facility]


   Last Admin: 06/15/18 09:33 Dose:  4 mg


Ergocalciferol (Drisdol 50,000 Intl Units Cap)  1 cap PO QWK [facility]


   Last Admin: 06/13/18 10:12 Dose:  1 cap


Famotidine (Pepcid)  20 mg PO DAILY [facility]


   Last Admin: 06/15/18 09:32 Dose:  20 mg


Furosemide (Lasix)  20 mg PO DAILY [facility]


   Last Admin: 06/15/18 09:32 Dose:  20 mg


Ceftriaxone Sodium 1 gm/ (Sodium Chloride)  100 mls @ 100 mls/hr IVPB Q24H [facility]


   PRN Reason: Protocol


   Last Admin: 06/14/18 18:06 Dose:  100 mls/hr


Insulin Glargine (Lantus)  20 unit SC [facility]


   Last Admin: 06/14/18 22:07 Dose:  20 units


Insulin Human Regular (Novolin R)  0 unit SC [facility]


   PRN Reason: Protocol


   Last Admin: 06/15/18 12:30 Dose:  10 u


Methylprednisolone (Solu-Medrol)  40 mg IV Q8H [facility]


   Last Admin: 06/15/18 09:32 Dose:  40 mg


Metoprolol Tartrate (Lopressor)  25 mg PO BID [facility]


   Last Admin: 06/15/18 10:52 Dose:  Not Given


Nitroglycerin (Nitrostat Sl Tab)  0.6 mg SL Q5MIN PRN


   PRN Reason: CHEST PAIN


Ranolazine (Ranexa)  500 mg PO BID [facility]


   Last Admin: 06/15/18 09:32 Dose:  500 mg


Repaglinide (Prandin)  2 mg PO DAILY [facility]


   Last Admin: 06/15/18 09:33 Dose:  2 mg


Rosuvastatin Calcium (Crestor)  10 mg PO HS [facility]


   Last Admin: 06/14/18 22:07 Dose:  10 mg


Sacubitril/Valsartan (Entresto 24 Mg-26 Mg)  1 tab PO DAILY [facility]


   Last Admin: 06/15/18 10:52 Dose:  Not Given


Ticagrelor (Brilinta)  60 mg PO BID [facility]


   Last Admin: 06/15/18 09:33 Dose:  60 mg











- Labs


Labs: 


 





 06/15/18 07:09 





 06/15/18 07:09 





 











PT  12.3 SECONDS (9.7-12.2)  H  06/06/18  14:06    


 


INR  1.1   06/06/18  14:06    


 


APTT  33 SECONDS (21-34)   06/06/18  14:06

## 2018-06-15 NOTE — CP.PCM.PN
Subjective





- Date & Time of Evaluation


Date of Evaluation: 06/15/18


Time of Evaluation: 13:40





- Subjective


Subjective: 





PATIENT IS EATING/ SITTING AT THE BEDSIDE /AAOX3 / DINIES CHEST PAIN / NAUSEA 

OR VOMITING





Objective





- Vital Signs/Intake and Output


Vital Signs (last 24 hours): 


 











Temp Pulse Resp BP Pulse Ox


 


 97.6 F   101 H  20   92/57 L  98 


 


 06/15/18 07:20  06/15/18 12:00  06/15/18 07:20  06/15/18 09:32  06/15/18 07:20











- Medications


Medications: 


 Current Medications





Aspirin (Ecotrin)  [facility] mg PO [facility]ILY [facility]


   Last Admin: 06/15/18 09:32 Dose:  81 mg


Cyproheptadine HCl (Periactin)  4 mg PO TID [facility]


   Last Admin: 06/15/18 09:33 Dose:  4 mg


Ergocalciferol (Drisdol 50,000 Intl Units Cap)  1 cap PO QWK [facility]


   Last Admin: 06/13/18 10:12 Dose:  1 cap


Famotidine (Pepcid)  20 mg PO DAILY [facility]


   Last Admin: 06/15/18 09:32 Dose:  20 mg


Furosemide (Lasix)  20 mg PO DAILY [facility]


   Last Admin: 06/15/18 09:32 Dose:  20 mg


Ceftriaxone Sodium 1 gm/ (Sodium Chloride)  100 mls @ 100 mls/hr IVPB Q24H [facility]


   PRN Reason: Protocol


   Last Admin: 06/14/18 18:06 Dose:  100 mls/hr


Insulin Glargine (Lantus)  20 unit SC [facility]


   Last Admin: 06/14/18 22:07 Dose:  20 units


Insulin Human Regular (Novolin R)  0 unit SC [facility]


   PRN Reason: Protocol


   Last Admin: 06/15/18 12:30 Dose:  10 u


Methylprednisolone (Solu-Medrol)  40 mg IV Q8H [facility]


   Last Admin: 06/15/18 09:32 Dose:  40 mg


Metoprolol Tartrate (Lopressor)  25 mg PO BID [facility]


   Last Admin: 06/15/18 10:52 Dose:  Not Given


Nitroglycerin (Nitrostat Sl Tab)  0.6 mg SL Q5MIN PRN


   PRN Reason: CHEST PAIN


Ranolazine (Ranexa)  500 mg PO BID [facility]


   Last Admin: 06/15/18 09:32 Dose:  500 mg


Repaglinide (Prandin)  2 mg PO DAILY [facility]


   Last Admin: 06/15/18 09:33 Dose:  2 mg


Rosuvastatin Calcium (Crestor)  10 mg PO HS [facility]


   Last Admin: 06/14/18 22:07 Dose:  10 mg


Sacubitril/Valsartan (Entresto 24 Mg-26 Mg)  1 tab PO DAILY [facility]


   Last Admin: 06/15/18 10:52 Dose:  Not Given


Ticagrelor (Brilinta)  60 mg PO BID [facility]


   Last Admin: 06/15/18 09:33 Dose:  60 mg











- Labs


Labs: 


 





 06/15/18 07:09 





 06/15/18 07:09 





 











PT  12.3 SECONDS (9.7-12.2)  H  06/06/18  14:06    


 


INR  1.1   06/06/18  14:06    


 


APTT  33 SECONDS (21-34)   06/06/18  14:06    














Assessment and Plan





- Assessment and Plan (Free Text)


Assessment: 


PATIENT SEEN AND EXAMINED AT THE BEDSIDE


LUNG SOUND CLEAR EVELIA


REPEAT URINE WAS SENT PEDING FOR RESULT/ PER DR LIU PATIENT CAN F/U IN HER 

OFFICE NEXT WEEK


RENAL US SHOW NO HYDRONEPHROSIS


NO WBC /AFEBRILE 


DISCUSS WITH DR NIEVES AND DR LIU WHO AGREE AND CLEAR PATIENT FOR DC


FOLLOW UP WITH DR NIEVES AND DR LIU NEXT WEEK ---CALL FOR APPOINTMENT


ADDRESS YOUR URINE CULT RESULT AND BLOOD PRESSURE MEDICATION AT YOUR VISIT


CONTINUE ALL YOUR HOME MEDICATION 


NEW PRESCRIPTION GIVEN


   ASPIRIN 81 MG PO DAILY


   KEFLEX 500 MG PO BID FOR 3 DAYS


   BRILINTA CHANGE TO 60 MG PO BID 


   PREDNISOLONE 5 MG PO DAILY FOR 10 DAYS


   LASIX CHANGE TO 20 MG PO DAILY (TAKE EARLY 6 AM)


   PERIACTIN 4 MG TIB PO 


TAKE THE METROPOLOL Q12H AT (10AM AND 10 PM)


CLOSELY MONITOR FOR BP IF DROP BELOW 90 SBP CALL DR NIEVES OR HOLD FOR BP MED 

AND LASIX 


ACTIVITY AS TOLERATED 


CALL DR NIEVES OR GO TO THE EMERGENCY ROOM IF SYMPTOM RETURN OR WORSENING 


DISCUSS WITH PATIENT AND PATIENT'S  WHO AGREE AND VERBALIZED 

UNDERSTANDING

## 2018-08-17 ENCOUNTER — HOSPITAL ENCOUNTER (INPATIENT)
Dept: HOSPITAL 31 - C.ER | Age: 83
LOS: 6 days | Discharge: NURSING, REHAB OR LONG-TERM CARE | DRG: 871 | End: 2018-08-23
Attending: INTERNAL MEDICINE | Admitting: INTERNAL MEDICINE
Payer: MEDICARE

## 2018-08-17 VITALS — BODY MASS INDEX: 25.5 KG/M2

## 2018-08-17 DIAGNOSIS — Z87.440: ICD-10-CM

## 2018-08-17 DIAGNOSIS — E11.9: ICD-10-CM

## 2018-08-17 DIAGNOSIS — A41.9: Primary | ICD-10-CM

## 2018-08-17 DIAGNOSIS — Z90.49: ICD-10-CM

## 2018-08-17 DIAGNOSIS — Z86.73: ICD-10-CM

## 2018-08-17 DIAGNOSIS — I50.23: ICD-10-CM

## 2018-08-17 DIAGNOSIS — I25.5: ICD-10-CM

## 2018-08-17 DIAGNOSIS — Z87.01: ICD-10-CM

## 2018-08-17 DIAGNOSIS — E78.00: ICD-10-CM

## 2018-08-17 DIAGNOSIS — I25.10: ICD-10-CM

## 2018-08-17 DIAGNOSIS — Z95.810: ICD-10-CM

## 2018-08-17 DIAGNOSIS — D50.9: ICD-10-CM

## 2018-08-17 DIAGNOSIS — D53.9: ICD-10-CM

## 2018-08-17 DIAGNOSIS — I11.0: ICD-10-CM

## 2018-08-17 DIAGNOSIS — J44.9: ICD-10-CM

## 2018-08-17 DIAGNOSIS — Z79.84: ICD-10-CM

## 2018-08-17 DIAGNOSIS — Z95.5: ICD-10-CM

## 2018-08-17 LAB
ALBUMIN SERPL-MCNC: 3.4 G/DL (ref 3.5–5)
ALBUMIN/GLOB SERPL: 1.3 {RATIO} (ref 1–2.1)
ALT SERPL-CCNC: 23 U/L (ref 9–52)
APTT BLD: 30 SECONDS (ref 21–34)
AST SERPL-CCNC: 29 U/L (ref 14–36)
BASOPHILS # BLD AUTO: 0 K/UL (ref 0–0.2)
BASOPHILS NFR BLD: 0.8 % (ref 0–2)
BILIRUB UR-MCNC: NEGATIVE MG/DL
BNP SERPL-MCNC: 6840 PG/ML (ref 0–900)
BUN SERPL-MCNC: 13 MG/DL (ref 7–17)
CALCIUM SERPL-MCNC: 9.5 MG/DL (ref 8.6–10.4)
CK MB SERPL-MCNC: 0.98 NG/ML (ref 0–3.38)
EOSINOPHIL # BLD AUTO: 0.1 K/UL (ref 0–0.7)
EOSINOPHIL NFR BLD: 2.4 % (ref 0–4)
ERYTHROCYTE [DISTWIDTH] IN BLOOD BY AUTOMATED COUNT: 19 % (ref 11.5–14.5)
GFR NON-AFRICAN AMERICAN: 39
GLUCOSE UR STRIP-MCNC: NORMAL MG/DL
HGB BLD-MCNC: 8.8 G/DL (ref 11–16)
INR PPP: 1.2
LEUKOCYTE ESTERASE UR-ACNC: (no result) LEU/UL
LYMPHOCYTES # BLD AUTO: 0.7 K/UL (ref 1–4.3)
LYMPHOCYTES NFR BLD AUTO: 13.6 % (ref 20–40)
MCH RBC QN AUTO: 23.9 PG (ref 27–31)
MCHC RBC AUTO-ENTMCNC: 31.3 G/DL (ref 33–37)
MCV RBC AUTO: 76.3 FL (ref 81–99)
MONOCYTES # BLD: 0.7 K/UL (ref 0–0.8)
MONOCYTES NFR BLD: 13.6 % (ref 0–10)
NEUTROPHILS # BLD: 3.4 K/UL (ref 1.8–7)
NEUTROPHILS NFR BLD AUTO: 69.6 % (ref 50–75)
NRBC BLD AUTO-RTO: 0.1 % (ref 0–2)
PH UR STRIP: 8 [PH] (ref 5–8)
PLATELET # BLD: 152 K/UL (ref 130–400)
PMV BLD AUTO: 10.7 FL (ref 7.2–11.7)
PROT UR STRIP-MCNC: NEGATIVE MG/DL
PROTHROMBIN TIME: 13.4 SECONDS (ref 9.7–12.2)
RBC # BLD AUTO: 3.7 MIL/UL (ref 3.8–5.2)
RBC # UR STRIP: (no result) /UL
SP GR UR STRIP: 1.01 (ref 1–1.03)
SQUAMOUS EPITHIAL: 5 /HPF (ref 0–5)
TROPONIN I SERPL-MCNC: 0.03 NG/ML (ref 0–0.12)
UROBILINOGEN UR-MCNC: NORMAL MG/DL (ref 0.2–1)
WBC # BLD AUTO: 4.9 K/UL (ref 4.8–10.8)

## 2018-08-17 RX ADMIN — INSULIN GLARGINE SCH UNITS: 100 INJECTION, SOLUTION SUBCUTANEOUS at 22:01

## 2018-08-17 RX ADMIN — HUMAN INSULIN SCH: 100 INJECTION, SOLUTION SUBCUTANEOUS at 22:02

## 2018-08-17 NOTE — CP.PCM.HP
History of Present Illness





- History of Present Illness


History of Present Illness: 





COMPREHENSIVE   HISTORY & PHYSICAL EXAM 


Patient is admitted from emergency room with severe fatigue and congestive 

heart failure


   


HPI


Patient has  ischemic cardiomyopathy with recurrent congestive heart failure.  

The last few days patient has been complaining of shortness of breath severe 

fatigue was evaluated in outpatient with severe fatigue shortness of breath.  

Patient was evaluated in emergency room was found to be in congestive heart 

failure with severe anemia of hemoglobin 8.0 g/dL.





PAST HIST.


Patient has history of coronary artery disease with stents in 2 arteries.  

Ischemic heart cardiomyopathy with left ventricle ejection fraction less than 30

%.  Type II diabetes COPD and AICD.  Patient also has a nutritional anemia 

requiring frequently IN therapy and blood transfusion.


PERSONAL HIST:   Smoking.   N   Alcohol.   N      Allergy N            Travel_-

      .


FAMILY HIST : 


ROS :


Constitutional:. 


  Eyes: Negative for redness, swelling, itching, discharge, vision changes, 

blurry vision, double vision, glaucoma, cataracts, 


  Ears: Negative for hearing loss, ringing, , tinnitus, vertigo


 Nose: Negative for rhinorrhea, stuffiness, sniffing, itching, postnasal drip, 

discoloration, nasal congestion and epistaxis. 


 Throat: Negative for throat clearing, sore throat, hoarseness, difficulty 

swallowing and difficulty speaking. 


  Respiratory: Negative for cough, chest tightness, sputum or phlegm, chronic 

cough, hemoptysis, wheezing, snoring at night, pleuritic chest pain and daytime 

somnolence. 


 Cardiovascular: Negative, angina, claudication, , irregular heartbeat,


 Neurology: Negative for irritability, muscle weakness, numbness and tingling, 

seizures, tremors, migraines, slurred speech, syncope, memory loss, mood changes

, recurrent headaches 


Gastrointestinal: Negative for difficulty swallowing, diarrhea, constipation, 

black stools, rectal bleeding, nausea, flatulence, reflux, poor appetite, 

changes in bowel habits, abdominal pain


  Genitourinary: Negative for frequent urination, hematuria, discharge, 

incontinence, urinary retention, frequent UTI, Psychiatric: Negative for 

depression, anxiety/panic, suicidal tendencies, 


Musculoskeletal: Negative for swollen joints, back pain, , neck pain, morning 

stiffness of joints, . 


 Skin: Negative for rash, ulcers, itching, dry skin and pigmented lesions.


P/E: 


  Constitutional: Appears stated age and in no apparent distress. 


 Head: Normocephalic. 


  Ears: External ear canals patent without inflammation. Tympanic membranes 

intact with normal light reflex and landmark. 


  Eyes: Pupils are central, bilaterally equal, symmetrical and reacts to light 

with normal movements and no icterus or pallor. 


 Nose: External nares are patent. Mucosa is pink  Mouth-Throat: Good general 

appearance and condition. No post-pharyngeal/oropharyngeal erythema and 

tonsillar hypertrophy. Good dental hygiene. 


  Neck-Lymphatic: Neck is supple with normal ROM, no thyromegaly, lymph nodes 

or masses. JVD is normal with no carotid bruit. 


  Lungs: bilateral basal rales. 


  Cardiovascular: S1 and S2 are normal with no murmurs, gallops and rub. 


  GI Exam: No hepatomegaly. Abdomen is soft and non-tender. No Organomegaly , 

masses or hernias are evident and bowel sounds are normal and active. 


  Neurology: Higher function and all cranial nerves intact, with no gross motor 

or sensory deficit. Superficial and deep reflexes are normal with downwards 

planters. No cerebellar deficit with normal gait. 


  Musculoskeletal: No tender spots with normal curvature of the spine with no 

swelling or restricted ROM of the small and large joints. 


  Extremities: Homans sign absent. Intact pulses with no pitting edema, calf 

tenderness or skin color changes. 


  Skin: No rash, eruptions or abnormal skin pigmentation





LAB/RADIOLOGY:


ASSESMENT :


Iron deficiency anemia.  Patient will need 1 unit of packed cell


Acute on chronic systolic congestive heart failure with reduced ejection 

fraction


Rule out sepsis


Type II diabetes and COPD


rule out pneumonia





Present on Admission





- Present on Admission


Any Indicators Present on Admission: No





Past Patient History





- Infectious Disease


Hx of Infectious Diseases: None





- Tetanus Immunizations


Tetanus Immunization: Unknown





- Past Medical History & Family History


Past Medical History?: Yes





- Past Social History


Smoking Status: Never Smoked





- CARDIAC


Hx Cardia Arrhythmia: Yes


Hx Congestive Heart Failure: Yes


Hx Hypercholesterolemia: Yes


Hx Hypertension: Yes


Hx Pacemaker: Yes (2016)





- PULMONARY


Hx Asthma: Yes


Hx Chronic Obstructive Pulmonary Disease (COPD): Yes





- NEUROLOGICAL


Hx Transient Ischemic Attacks (TIA): Yes





- HEENT


Hx HEENT Problems: No





- RENAL


Hx Chronic Kidney Disease: No





- ENDOCRINE/METABOLIC


Hx Diabetes Mellitus Type 2: Yes





- HEMATOLOGICAL/ONCOLOGICAL


Hx Anemia: Yes





- INTEGUMENTARY


Hx Dermatological Problems: No





- MUSCULOSKELETAL/RHEUMATOLOGICAL


Hx Arthritis: Yes


Hx Falls: No





- GASTROINTESTINAL


Hx Gastrointestinal Disorders: No





- GENITOURINARY/GYNECOLOGICAL


Hx Genitourinary Disorders: No





- PSYCHIATRIC


Hx Substance Use: No





- SURGICAL HISTORY


Hx Appendectomy: Yes


Hx Coronary Stent: Yes





- ANESTHESIA


Hx Anesthesia: Yes


Hx Anesthesia Reactions: No


Hx Malignant Hyperthermia: No





Meds


Allergies/Adverse Reactions: 


 Allergies











Allergy/AdvReac Type Severity Reaction Status Date / Time


 


clopidogrel Allergy Mild SWELLING Verified 06/06/18 13:34














Results





- Vital Signs


Recent Vital Signs: 





 Last Vital Signs











Temp  97.5 F L  08/17/18 19:08


 


Pulse  93 H  08/17/18 19:08


 


Resp  20   08/17/18 19:08


 


BP  114/55 L  08/17/18 19:08


 


Pulse Ox  99   08/17/18 17:15














- Labs


Result Diagrams: 


 08/18/18 06:36





 08/18/18 06:36


Labs: 





 Laboratory Results - last 24 hr











  08/17/18 08/17/18 08/17/18





  13:23 13:23 13:23


 


WBC  4.9  


 


RBC  3.70 L  


 


Hgb  8.8 L  


 


Hct  28.3 L  


 


MCV  76.3 L D  


 


MCH  23.9 L  


 


MCHC  31.3 L  


 


RDW  19.0 H  


 


Plt Count  152  


 


MPV  10.7  


 


Neut % (Auto)  69.6  


 


Lymph % (Auto)  13.6 L  


 


Mono % (Auto)  13.6 H  


 


Eos % (Auto)  2.4  


 


Baso % (Auto)  0.8  


 


Neut # (Auto)  3.4  


 


Lymph # (Auto)  0.7 L  


 


Mono # (Auto)  0.7  


 


Eos # (Auto)  0.1  


 


Baso # (Auto)  0.0  


 


PT   13.4 H 


 


INR   1.2 


 


APTT   30 


 


Sodium    138


 


Potassium    3.8


 


Chloride    100


 


Carbon Dioxide    29


 


Anion Gap    13


 


BUN    13


 


Creatinine    1.3 H


 


Est GFR ( Amer)    47


 


Est GFR (Non-Af Amer)    39


 


POC Glucose (mg/dL)   


 


Random Glucose    168 H


 


Calcium    9.5


 


Phosphorus    2.7


 


Magnesium    1.8


 


Total Bilirubin    0.6


 


AST    29


 


ALT    23


 


Alkaline Phosphatase    47


 


Total Creatine Kinase    73


 


CK-MB (Mass)    0.98


 


Troponin I    0.0260


 


NT-Pro-B Natriuret Pep    6840 H


 


Total Protein    6.0 L


 


Albumin    3.4 L


 


Globulin    2.6


 


Albumin/Globulin Ratio    1.3


 


Urine Color   


 


Urine Clarity   


 


Urine pH   


 


Ur Specific Gravity   


 


Urine Protein   


 


Urine Glucose (UA)   


 


Urine Ketones   


 


Urine Blood   


 


Urine Nitrate   


 


Urine Bilirubin   


 


Urine Urobilinogen   


 


Ur Leukocyte Esterase   


 


Urine WBC (Auto)   


 


Urine RBC (Auto)   


 


Ur Squamous Epith Cells   


 


Blood Type   


 


Antibody Screen   


 


Crossmatch   














  08/17/18 08/17/18 08/17/18





  13:57 15:58 17:13


 


WBC   


 


RBC   


 


Hgb   


 


Hct   


 


MCV   


 


MCH   


 


MCHC   


 


RDW   


 


Plt Count   


 


MPV   


 


Neut % (Auto)   


 


Lymph % (Auto)   


 


Mono % (Auto)   


 


Eos % (Auto)   


 


Baso % (Auto)   


 


Neut # (Auto)   


 


Lymph # (Auto)   


 


Mono # (Auto)   


 


Eos # (Auto)   


 


Baso # (Auto)   


 


PT   


 


INR   


 


APTT   


 


Sodium   


 


Potassium   


 


Chloride   


 


Carbon Dioxide   


 


Anion Gap   


 


BUN   


 


Creatinine   


 


Est GFR ( Amer)   


 


Est GFR (Non-Af Amer)   


 


POC Glucose (mg/dL)    201 H


 


Random Glucose   


 


Calcium   


 


Phosphorus   


 


Magnesium   


 


Total Bilirubin   


 


AST   


 


ALT   


 


Alkaline Phosphatase   


 


Total Creatine Kinase   


 


CK-MB (Mass)   


 


Troponin I   


 


NT-Pro-B Natriuret Pep   


 


Total Protein   


 


Albumin   


 


Globulin   


 


Albumin/Globulin Ratio   


 


Urine Color  Yellow  


 


Urine Clarity  Clear  


 


Urine pH  8.0  


 


Ur Specific Gravity  1.009  


 


Urine Protein  Negative  


 


Urine Glucose (UA)  Normal  


 


Urine Ketones  Negative  


 


Urine Blood  1+ H  


 


Urine Nitrate  Negative  


 


Urine Bilirubin  Negative  


 


Urine Urobilinogen  Normal  


 


Ur Leukocyte Esterase  Trace  


 


Urine WBC (Auto)  2  


 


Urine RBC (Auto)  1  


 


Ur Squamous Epith Cells  5  


 


Blood Type   A POSITIVE 


 


Antibody Screen   Negative 


 


Crossmatch   See Detail

## 2018-08-17 NOTE — RAD
HISTORY:



COMPARISON:

No prior.



TECHNIQUE:

Chest PA and lateral



FINDINGS:



LINES AND TUBES:

None. 



LUNG AND PLEURA:

The lungs are well inflated.  There is redemonstration of moderate 

pulmonary venous congestion. 



HEART AND MEDIASTINUM:

Persistent moderate cardiomegaly and stable position of left-sided 

pacemaker. The hilar and mediastinal contours are within normal 

limits.



SKELETAL STRUCTURES:

Diffuse bone demineralization and chronic osteoporotic compression 

deformity in the T12 vertebral body. Exaggerated thoracic kyphosis. 



VISUALIZED UPPER ABDOMEN:

Normal.



OTHER FINDINGS:

There is a large hiatal hernia.



IMPRESSION:

Persistent moderate cardiomegaly and pulmonary venous congestion.



Large hiatal hernia.

## 2018-08-17 NOTE — C.PDOC
History Of Present Illness


87 y/o F p/w intermittent chest pain x 1 week, last occurring at 4am this 

morning, intermittent dyspnea, bilateral leg edema, and lightheadedness. Chest 

pain is midchest, radiates to jaw and shoulder. Denies fever, chills, nausea, 

vomiting, diarrhea, dysuria, numbness, motor weakness, vertigo.


Time Seen by Provider: 08/17/18 12:52


Chief Complaint (Nursing): Dizziness/Lightheaded





Past Medical History


Vital Signs: 


 Last Vital Signs











Temp  98 F   08/17/18 16:23


 


Pulse  93 H  08/17/18 16:23


 


Resp  18   08/17/18 16:23


 


BP  112/56 L  08/17/18 16:23


 


Pulse Ox  100   08/17/18 17:01














- Medical History


PMH: Anemia, Anxiety, Arthritis (BACK AND KNEES AND R SHOULDER), Asthma, Cardia 

Arrhythmia, CHF, COPD, Diabetes, HTN, Hypercholesterolemia, TIA


   Denies: Chronic Kidney Disease


Surgical History: Appendectomy, Coronary Stent, Endoscopy, Pacemaker (2016)





- [employer] Procedures








ASSISTANCE WITH RESPIRATORY VENTILATION, 24-96 HRS, CPAP (11/28/17)


ASSISTANCE WITH RESPIRATORY VENTILATION, <24 HRS, CPAP (07/21/16)


ESOPHAGOGASTRODUODENOSCOPY [EGD] W/CLOSED BIOPSY (06/14/15)


LEFT HEART CARDIAC CATH (04/03/14)


LT HEART ANGIOCARDIOGRAM (04/03/14)


MEASURE OF CARDIAC SAMPL & PRESSURE, L HEART, PERC APPROACH (11/03/15)


PACKED CELL TRANSFUSION (06/14/15)


PLAIN RADIOGRAPHY OF MULT COR ART USING OTH CONTRAST (11/03/15)


TRANSFUSE NONAUT RED BLOOD CELLS IN PERIPH VEIN, PERC (07/21/16)








Family History: States: No Known Family Hx





- Social History


Hx Tobacco Use: No


Hx Alcohol Use: No


Hx Substance Use: No





- Immunization History


Hx Tetanus Toxoid Vaccination: No


Hx Influenza Vaccination: Yes


Hx Pneumococcal Vaccination: Yes





Review Of Systems


Except As Marked, All Systems Reviewed And Found Negative.


Constitutional: Negative for: Fever


Gastrointestinal: Negative for: Vomiting





Physical Exam





- Physical Exam


Additional Physical Exam Comments: 


Constitutional: No acute distress.


Head: Normocephalic. Atraumatic.


Eyes: PERRL.


ENT: Moist mucous membranes.


Neck: Supple.


Cardiovascular: Regular rate. Radial pulse 2+ bilaterally.


Chest: No tenderness.


Respiratory: Crackles at bases.


GI: Soft. Nontender. Nondistended.


Back: No CVA tenderness.


Musculoskeletal: No tenderness or swelling of extremities.


Skin: No rash.


Neurologic: Alert, no focal deficit








ED Course And Treatment





- Laboratory Results


Result Diagrams: 


 08/17/18 13:23





 08/17/18 13:23


O2 Sat by Pulse Oximetry: 100





Medical Decision Making


Medical Decision Making: 


CXR shows cardiomegaly with pulmonary venous congestion.





Patient with symptomatic anemia and CHF, Dr. Fierro accepts to his service. 

Transfusion ordered with Lasix to be given afterwards.





Disposition


Discussed With : Osmar Fierro


Doctor Will See Patient In The: Hospital





- Disposition


Disposition: HOSPITALIZED


Disposition Time: 15:00


Condition: GUARDED





- Clinical Impression


Clinical Impression: 


 CHF exacerbation, Symptomatic anemia

## 2018-08-17 NOTE — CP.PCM.HP
Present on Admission





- Present on Admission


Any Indicators Present on Admission: No





Past Patient History





- Infectious Disease


Hx of Infectious Diseases: None





- Tetanus Immunizations


Tetanus Immunization: Unknown





- Past Medical History & Family History


Past Medical History?: Yes





- Past Social History


Smoking Status: Never Smoked





- CARDIAC


Hx Cardia Arrhythmia: Yes


Hx Congestive Heart Failure: Yes


Hx Hypercholesterolemia: Yes


Hx Hypertension: Yes


Hx Pacemaker: Yes (2016)





- PULMONARY


Hx Asthma: Yes


Hx Chronic Obstructive Pulmonary Disease (COPD): Yes





- NEUROLOGICAL


Hx Transient Ischemic Attacks (TIA): Yes





- HEENT


Hx HEENT Problems: No





- RENAL


Hx Chronic Kidney Disease: No





- ENDOCRINE/METABOLIC


Hx Diabetes Mellitus Type 2: Yes





- HEMATOLOGICAL/ONCOLOGICAL


Hx Anemia: Yes





- INTEGUMENTARY


Hx Dermatological Problems: No





- MUSCULOSKELETAL/RHEUMATOLOGICAL


Hx Arthritis: Yes (BACK AND KNEES AND R SHOULDER)





- GASTROINTESTINAL


Hx Gastrointestinal Disorders: No





- GENITOURINARY/GYNECOLOGICAL


Hx Genitourinary Disorders: No





- PSYCHIATRIC


Hx Anxiety: Yes


Hx Substance Use: No





- SURGICAL HISTORY


Hx Appendectomy: Yes


Hx Coronary Stent: Yes





- ANESTHESIA


Hx Anesthesia: Yes


Hx Anesthesia Reactions: No


Hx Malignant Hyperthermia: No





Meds


Allergies/Adverse Reactions: 


 Allergies











Allergy/AdvReac Type Severity Reaction Status Date / Time


 


clopidogrel Allergy Mild SWELLING Verified 06/06/18 13:34














Results





- Vital Signs


Recent Vital Signs: 





 Last Vital Signs











Temp  97.7 F   08/17/18 16:50


 


Pulse  97 H  08/17/18 16:50


 


Resp  20   08/17/18 16:50


 


BP  116/52 L  08/17/18 16:50


 


Pulse Ox  100   08/17/18 17:01














- Labs


Result Diagrams: 


 08/17/18 13:23





 08/17/18 13:23


Labs: 





 Laboratory Results - last 24 hr











  08/17/18 08/17/18 08/17/18





  13:23 13:23 13:23


 


WBC  4.9  


 


RBC  3.70 L  


 


Hgb  8.8 L  


 


Hct  28.3 L  


 


MCV  76.3 L D  


 


MCH  23.9 L  


 


MCHC  31.3 L  


 


RDW  19.0 H  


 


Plt Count  152  


 


MPV  10.7  


 


Neut % (Auto)  69.6  


 


Lymph % (Auto)  13.6 L  


 


Mono % (Auto)  13.6 H  


 


Eos % (Auto)  2.4  


 


Baso % (Auto)  0.8  


 


Neut # (Auto)  3.4  


 


Lymph # (Auto)  0.7 L  


 


Mono # (Auto)  0.7  


 


Eos # (Auto)  0.1  


 


Baso # (Auto)  0.0  


 


PT   13.4 H 


 


INR   1.2 


 


APTT   30 


 


Sodium    138


 


Potassium    3.8


 


Chloride    100


 


Carbon Dioxide    29


 


Anion Gap    13


 


BUN    13


 


Creatinine    1.3 H


 


Est GFR ( Amer)    47


 


Est GFR (Non-Af Amer)    39


 


Random Glucose    168 H


 


Calcium    9.5


 


Phosphorus    2.7


 


Magnesium    1.8


 


Total Bilirubin    0.6


 


AST    29


 


ALT    23


 


Alkaline Phosphatase    47


 


Total Creatine Kinase    73


 


CK-MB (Mass)    0.98


 


Troponin I    0.0260


 


NT-Pro-B Natriuret Pep    6840 H


 


Total Protein    6.0 L


 


Albumin    3.4 L


 


Globulin    2.6


 


Albumin/Globulin Ratio    1.3


 


Urine Color   


 


Urine Clarity   


 


Urine pH   


 


Ur Specific Gravity   


 


Urine Protein   


 


Urine Glucose (UA)   


 


Urine Ketones   


 


Urine Blood   


 


Urine Nitrate   


 


Urine Bilirubin   


 


Urine Urobilinogen   


 


Ur Leukocyte Esterase   


 


Urine WBC (Auto)   


 


Urine RBC (Auto)   


 


Ur Squamous Epith Cells   


 


Blood Type   


 


Antibody Screen   


 


Crossmatch   














  08/17/18 08/17/18





  13:57 15:58


 


WBC  


 


RBC  


 


Hgb  


 


Hct  


 


MCV  


 


MCH  


 


MCHC  


 


RDW  


 


Plt Count  


 


MPV  


 


Neut % (Auto)  


 


Lymph % (Auto)  


 


Mono % (Auto)  


 


Eos % (Auto)  


 


Baso % (Auto)  


 


Neut # (Auto)  


 


Lymph # (Auto)  


 


Mono # (Auto)  


 


Eos # (Auto)  


 


Baso # (Auto)  


 


PT  


 


INR  


 


APTT  


 


Sodium  


 


Potassium  


 


Chloride  


 


Carbon Dioxide  


 


Anion Gap  


 


BUN  


 


Creatinine  


 


Est GFR ( Amer)  


 


Est GFR (Non-Af Amer)  


 


Random Glucose  


 


Calcium  


 


Phosphorus  


 


Magnesium  


 


Total Bilirubin  


 


AST  


 


ALT  


 


Alkaline Phosphatase  


 


Total Creatine Kinase  


 


CK-MB (Mass)  


 


Troponin I  


 


NT-Pro-B Natriuret Pep  


 


Total Protein  


 


Albumin  


 


Globulin  


 


Albumin/Globulin Ratio  


 


Urine Color  Yellow 


 


Urine Clarity  Clear 


 


Urine pH  8.0 


 


Ur Specific Gravity  1.009 


 


Urine Protein  Negative 


 


Urine Glucose (UA)  Normal 


 


Urine Ketones  Negative 


 


Urine Blood  1+ H 


 


Urine Nitrate  Negative 


 


Urine Bilirubin  Negative 


 


Urine Urobilinogen  Normal 


 


Ur Leukocyte Esterase  Trace 


 


Urine WBC (Auto)  2 


 


Urine RBC (Auto)  1 


 


Ur Squamous Epith Cells  5 


 


Blood Type   A POSITIVE


 


Antibody Screen   Negative


 


Crossmatch   See Detail

## 2018-08-17 NOTE — CP.PCM.CON
History of Present Illness





- History of Present Illness


History of Present Illness: 





Infectious disease consult. seen 8/17/18





dictated: 8/18 /18


dictation no;[id number]--NOT COMPLETE.





REDICTATED 8/21/18


DICT NO # [id number].





Past Patient History





- Infectious Disease


Hx of Infectious Diseases: None





- Tetanus Immunizations


Tetanus Immunization: Unknown





- Past Medical History & Family History


Past Medical History?: Yes





- Past Social History


Smoking Status: Never Smoked





- CARDIAC


Hx Cardia Arrhythmia: Yes


Hx Congestive Heart Failure: Yes


Hx Hypercholesterolemia: Yes


Hx Hypertension: Yes


Hx Pacemaker: Yes (2016)





- PULMONARY


Hx Asthma: Yes


Hx Chronic Obstructive Pulmonary Disease (COPD): Yes





- NEUROLOGICAL


Hx Transient Ischemic Attacks (TIA): Yes





- HEENT


Hx HEENT Problems: No





- RENAL


Hx Chronic Kidney Disease: No





- ENDOCRINE/METABOLIC


Hx Diabetes Mellitus Type 2: Yes





- HEMATOLOGICAL/ONCOLOGICAL


Hx Anemia: Yes





- INTEGUMENTARY


Hx Dermatological Problems: No





- MUSCULOSKELETAL/RHEUMATOLOGICAL


Hx Arthritis: Yes


Hx Falls: No





- GASTROINTESTINAL


Hx Gastrointestinal Disorders: No





- GENITOURINARY/GYNECOLOGICAL


Hx Genitourinary Disorders: No





- PSYCHIATRIC


Hx Substance Use: No





- SURGICAL HISTORY


Hx Appendectomy: Yes


Hx Coronary Stent: Yes





- ANESTHESIA


Hx Anesthesia: Yes


Hx Anesthesia Reactions: No


Hx Malignant Hyperthermia: No





Meds


Allergies/Adverse Reactions: 


 Allergies











Allergy/AdvReac Type Severity Reaction Status Date / Time


 


clopidogrel Allergy Mild SWELLING Verified 06/06/18 13:34














- Medications


Medications: 


 Current Medications





Albuterol/Ipratropium (Duoneb 3 Mg/0.5 Mg (3 Ml) Ud)  3 ml INH RQ4 PRN


   PRN Reason: Cough and congestion


Aspirin (Ecotrin)  81 mg PO DAILY PASQUALE


Cephalexin Monohydrate (Keflex)  500 mg PO BID PASQUALE


   PRN Reason: Protocol


Cyproheptadine HCl (Periactin)  4 mg PO TID PASQUALE


Ergocalciferol (Drisdol 50,000 Intl Units Cap)  1 cap PO QWK [facility]


Furosemide (Lasix)  40 mg IVP DAILY PASQUALE


Furosemide (Lasix)  20 mg IVP ONCE ONE


   Stop: 08/17/18 23:01


Insulin Glargine (Lantus)  20 unit SC HS [facility]QUALE


   Last Admin: 08/17/18 22:01 Dose:  20 units


Insulin Human Regular (Novolin R)  0 unit SC ACHS PASQUALE


   PRN Reason: Protocol


   Last Admin: 08/17/18 22:02 Dose:  Not Given


Metoprolol Tartrate (Lopressor)  25 mg PO BID PASQUALE


Repaglinide (Prandin)  2 mg PO DAILY PASQUALE


Rosuvastatin Calcium (Crestor)  20 mg PO HS [facility]


   Last Admin: 08/17/18 22:01 Dose:  20 mg


Sacubitril/Valsartan (Entresto 24 Mg-26 Mg)  1 tab PO DAILY PASQUALE


Ticagrelor (Brilinta)  90 mg PO DAILY [facility]











Results





- Vital Signs


Recent Vital Signs: 


 Last Vital Signs











Temp  97.7 F   08/17/18 19:53


 


Pulse  94 H  08/17/18 19:53


 


Resp  20   08/17/18 19:53


 


BP  113/52 L  08/17/18 19:53


 


Pulse Ox  99   08/17/18 17:15














- Labs


Result Diagrams: 


 08/20/18 06:16





 08/20/18 06:16


Labs: 


 Laboratory Results - last 24 hr











  08/17/18 08/17/18 08/17/18





  13:23 13:23 13:23


 


WBC  4.9  


 


RBC  3.70 L  


 


Hgb  8.8 L  


 


Hct  28.3 L  


 


MCV  76.3 L D  


 


MCH  23.9 L  


 


MCHC  31.3 L  


 


RDW  19.0 H  


 


Plt Count  152  


 


MPV  10.7  


 


Neut % (Auto)  69.6  


 


Lymph % (Auto)  13.6 L  


 


Mono % (Auto)  13.6 H  


 


Eos % (Auto)  2.4  


 


Baso % (Auto)  0.8  


 


Neut # (Auto)  3.4  


 


Lymph # (Auto)  0.7 L  


 


Mono # (Auto)  0.7  


 


Eos # (Auto)  0.1  


 


Baso # (Auto)  0.0  


 


PT   13.4 H 


 


INR   1.2 


 


APTT   30 


 


Sodium    138


 


Potassium    3.8


 


Chloride    100


 


Carbon Dioxide    29


 


Anion Gap    13


 


BUN    13


 


Creatinine    1.3 H


 


Est GFR ( Amer)    47


 


Est GFR (Non-Af Amer)    39


 


POC Glucose (mg/dL)   


 


Random Glucose    168 H


 


Calcium    9.5


 


Phosphorus    2.7


 


Magnesium    1.8


 


Total Bilirubin    0.6


 


AST    29


 


ALT    23


 


Alkaline Phosphatase    47


 


Total Creatine Kinase    73


 


CK-MB (Mass)    0.98


 


Troponin I    0.0260


 


NT-Pro-B Natriuret Pep    6840 H


 


Total Protein    6.0 L


 


Albumin    3.4 L


 


Globulin    2.6


 


Albumin/Globulin Ratio    1.3


 


Urine Color   


 


Urine Clarity   


 


Urine pH   


 


Ur Specific Gravity   


 


Urine Protein   


 


Urine Glucose (UA)   


 


Urine Ketones   


 


Urine Blood   


 


Urine Nitrate   


 


Urine Bilirubin   


 


Urine Urobilinogen   


 


Ur Leukocyte Esterase   


 


Urine WBC (Auto)   


 


Urine RBC (Auto)   


 


Ur Squamous Epith Cells   


 


Blood Type   


 


Antibody Screen   


 


Crossmatch   














  08/17/18 08/17/18 08/17/18





  13:57 15:58 17:13


 


WBC   


 


RBC   


 


Hgb   


 


Hct   


 


MCV   


 


MCH   


 


MCHC   


 


RDW   


 


Plt Count   


 


MPV   


 


Neut % (Auto)   


 


Lymph % (Auto)   


 


Mono % (Auto)   


 


Eos % (Auto)   


 


Baso % (Auto)   


 


Neut # (Auto)   


 


Lymph # (Auto)   


 


Mono # (Auto)   


 


Eos # (Auto)   


 


Baso # (Auto)   


 


PT   


 


INR   


 


APTT   


 


Sodium   


 


Potassium   


 


Chloride   


 


Carbon Dioxide   


 


Anion Gap   


 


BUN   


 


Creatinine   


 


Est GFR ( Amer)   


 


Est GFR (Non-Af Amer)   


 


POC Glucose (mg/dL)    201 H


 


Random Glucose   


 


Calcium   


 


Phosphorus   


 


Magnesium   


 


Total Bilirubin   


 


AST   


 


ALT   


 


Alkaline Phosphatase   


 


Total Creatine Kinase   


 


CK-MB (Mass)   


 


Troponin I   


 


NT-Pro-B Natriuret Pep   


 


Total Protein   


 


Albumin   


 


Globulin   


 


Albumin/Globulin Ratio   


 


Urine Color  Yellow  


 


Urine Clarity  Clear  


 


Urine pH  8.0  


 


Ur Specific Gravity  1.009  


 


Urine Protein  Negative  


 


Urine Glucose (UA)  Normal  


 


Urine Ketones  Negative  


 


Urine Blood  1+ H  


 


Urine Nitrate  Negative  


 


Urine Bilirubin  Negative  


 


Urine Urobilinogen  Normal  


 


Ur Leukocyte Esterase  Trace  


 


Urine WBC (Auto)  2  


 


Urine RBC (Auto)  1  


 


Ur Squamous Epith Cells  5  


 


Blood Type   A POSITIVE 


 


Antibody Screen   Negative 


 


Crossmatch   See Detail

## 2018-08-18 LAB
ALBUMIN SERPL-MCNC: 3.6 G/DL (ref 3.5–5)
ALBUMIN/GLOB SERPL: 1.5 {RATIO} (ref 1–2.1)
ALT SERPL-CCNC: 23 U/L (ref 9–52)
AST SERPL-CCNC: 17 U/L (ref 14–36)
BASOPHILS # BLD AUTO: 0 K/UL (ref 0–0.2)
BASOPHILS NFR BLD: 0.6 % (ref 0–2)
BUN SERPL-MCNC: 12 MG/DL (ref 7–17)
CALCIUM SERPL-MCNC: 9 MG/DL (ref 8.6–10.4)
EOSINOPHIL # BLD AUTO: 0.2 K/UL (ref 0–0.7)
EOSINOPHIL NFR BLD: 2.8 % (ref 0–4)
ERYTHROCYTE [DISTWIDTH] IN BLOOD BY AUTOMATED COUNT: 18 % (ref 11.5–14.5)
ERYTHROCYTE [SEDIMENTATION RATE] IN BLOOD: 13 MM/HR (ref 0–20)
GFR NON-AFRICAN AMERICAN: 39
HGB BLD-MCNC: 10.8 G/DL (ref 11–16)
LYMPHOCYTES # BLD AUTO: 1.3 K/UL (ref 1–4.3)
LYMPHOCYTES NFR BLD AUTO: 16.8 % (ref 20–40)
MCH RBC QN AUTO: 24.9 PG (ref 27–31)
MCHC RBC AUTO-ENTMCNC: 32.7 G/DL (ref 33–37)
MCV RBC AUTO: 76.4 FL (ref 81–99)
MONOCYTES # BLD: 0.9 K/UL (ref 0–0.8)
MONOCYTES NFR BLD: 12.1 % (ref 0–10)
NEUTROPHILS # BLD: 5.2 K/UL (ref 1.8–7)
NEUTROPHILS NFR BLD AUTO: 67.7 % (ref 50–75)
NRBC BLD AUTO-RTO: 0.1 % (ref 0–2)
PLATELET # BLD: 153 K/UL (ref 130–400)
PMV BLD AUTO: 10.5 FL (ref 7.2–11.7)
RBC # BLD AUTO: 4.35 MIL/UL (ref 3.8–5.2)
WBC # BLD AUTO: 7.7 K/UL (ref 4.8–10.8)

## 2018-08-18 RX ADMIN — NYSTATIN SCH ML: 100000 SUSPENSION ORAL at 19:20

## 2018-08-18 RX ADMIN — NYSTATIN SCH ML: 100000 SUSPENSION ORAL at 14:49

## 2018-08-18 RX ADMIN — NYSTATIN SCH ML: 100000 SUSPENSION ORAL at 10:06

## 2018-08-18 RX ADMIN — HUMAN INSULIN SCH: 100 INJECTION, SOLUTION SUBCUTANEOUS at 19:20

## 2018-08-18 RX ADMIN — HUMAN INSULIN SCH: 100 INJECTION, SOLUTION SUBCUTANEOUS at 11:57

## 2018-08-18 RX ADMIN — HUMAN INSULIN SCH: 100 INJECTION, SOLUTION SUBCUTANEOUS at 07:57

## 2018-08-18 RX ADMIN — SACUBITRIL AND VALSARTAN SCH TAB: 24; 26 TABLET, FILM COATED ORAL at 10:05

## 2018-08-18 RX ADMIN — ENOXAPARIN SODIUM SCH MG: 40 INJECTION SUBCUTANEOUS at 10:06

## 2018-08-18 RX ADMIN — INSULIN GLARGINE SCH UNITS: 100 INJECTION, SOLUTION SUBCUTANEOUS at 22:40

## 2018-08-18 RX ADMIN — NYSTATIN SCH ML: 100000 SUSPENSION ORAL at 21:23

## 2018-08-18 RX ADMIN — HUMAN INSULIN SCH: 100 INJECTION, SOLUTION SUBCUTANEOUS at 22:38

## 2018-08-18 NOTE — PN
Copied To:  Ruth Baugh MD

Attending MD:  Ruth Baugh MD



DATE:  08/18/2018



TIME:  08:45 p.m.



CHIEF COMPLAINT:  Shortness of breath, still complaining of tiredness and

wheezing.  Also complains of cough, unable to bring out any expectoration. 

The patient received one unit of packed red blood cells today.



REVIEW OF SYSTEMS:  As above.

RESPIRATORY:  The patient complains of shortness of breath, but denies any

chest pain or hemoptysis.

CARDIOVASCULAR:  Denies any orthopnea or palpitations.

GASTROINTESTINAL:  Unremarkable.

GENITOURINARY:  Unremarkable.

MUSCULOSKELETAL:  Denies any joint swelling.

SKIN:  No rashes.

EXTREMITIES:  1+ edema.



PHYSICAL EXAMINATION:

GENERAL:  The patient is awake and alert.  _____ is slightly better.

VITAL SIGNS:  Blood pressure 121/67, respirations 18, pulse of 100,

temperature 97.6, and pulse oximetry 98%.  Intake was 110 and balance was

110.



MEDICATIONS:  As per chart reviewed, the patient is on ceftriaxone 1 g once

a day daily.  Accu-Cheks and is having coverage.  Pulmonary toilet.



LABORATORY DATA:  WBC 7.7, H and H of 10.8 and 33.2, and platelets of 153.



IMPRESSION:

1.  Exacerbation of congestive heart failure, rule out pneumonia and rule

out sepsis.

2.  Ischemic cardiomyopathy.

3.  Automatic implantable cardioverter-defibrillator.

4.  Symptomatic anemia.

5.  Non-insulin dependent diabetes mellitus type 2.



SUGGESTIONS:  Continue IV ceftriaxone 1 g once a day daily.  Continue

diuresis as ordered by the PMD.  We will follow cultures.  We adjust the

antibiotics.  The patient previously was on p.o. Keflex, but not improving.

We will follow along with you.



Thank you very much.





__________________________________________

Ruth Baugh MD





DD:  08/18/2018 20:50:21

DT:  08/18/2018 21:43:17

Job # [id number]

## 2018-08-18 NOTE — RAD
Chest x-ray two views 



History: Chest pain. 



Comparison: 08/17/2018 



Findings: 



Biapical pleural thickening with upper lobe granulomatous changes. 



Linear atelectasis in the right midlung zone. 



Diffuse increased interstitial lung markings. 



Right hilar prominence. 



Few scattered nodular densities in both lung fields. 



Left-sided pacer in place. 



Calcification at the aortic knob. 



Cardiomegaly. 



Degenerative changes in the spine and shoulders. 



Impression: 



Biapical pleural thickening with upper lobe granulomatous changes. 



Linear atelectasis in the right midlung zone. 



Diffuse increased interstitial lung markings. 



Right hilar prominence. 



Few scattered nodular densities in both lung fields. 



Left-sided pacer in place. 



Calcification at the aortic knob. 



Cardiomegaly.

## 2018-08-18 NOTE — CP.PCM.PN
Subjective





- Date & Time of Evaluation


Date of Evaluation: 08/18/18


Time of Evaluation: 20:12





- Subjective


Subjective: 





DICTATED ; 8/18/18


SEE PROGRESS NOTE REPORTS;





DICTATION NO: [id number].--NOT COMPLETE.





CHIEF COMPLAINTS TODAY :


 afebrile,


c/o SOB  AT REST.


+VE DRY COUGH


DENIES CHEST PAIN








ROS.


HEENT :  N.


Resp :       +VE  cough,  NO wheezing ,pleuritic CP ,or hemoptysis 


Cardio :     No anginal  CP, PND, orthopnea, palpitation 


GI :           No abd.pain, n/v ,diarrhea or GI bleeding .


CNS : No headache, vertigo, focal deficit.


Musculoskel :  No joint swelling ,


Derm :        No rash 


Psych :     Normal affect.


Ext :  No  swelling ,calf pain 





PE.


Pt. is alert awake in no distress.


V.S  As noted in the chart MILDLY DYSPNEIC AT REST


Head ,ear nose,throat and eyes : Normal.


Neck : Supple with normal carotids.


Lungs: BASILAR RALES


Heart : S1 & S2 normal with S4. No murmur.


Abd : Soft non tender with normal bowel sounds.


Neuro : Moves all ext. with no localized deficit.


Ext : 1+EDEMA, with intact pulses.Non tender calves 


Derm : No rashes or decubitus ulcer.





LABS/RADIOLOGY:


BLOOD CULTURE 8/17/18 -VE FOR 24 HOURS.


uRINE CULTURE 8/17/18-P





ASSESSMENT.


SEPSIS  R/O PNEUMONIA.


R/O UTI.


EXACERBATION OF CHF.


ISCHEMIC CARDIOMYOPATHY


AICD.


RENAL INSUFFICIENCY.








/PLAN : 


CONTINUE iv ANTIBIOTICS.


DIURESE IS AS PER PMD.


.fOLLOW-UP CULTURES TO ADJUST ANTIBIOTICS.








Objective





- Vital Signs/Intake and Output


Vital Signs (last 24 hours): 


 











Temp Pulse Resp BP Pulse Ox


 


 97.7 F   97 H  20   99/59 L  100 


 


 08/18/18 15:00  08/18/18 17:00  08/18/18 15:00  08/18/18 19:19  08/18/18 15:00











- Medications


Medications: 


 Current Medications





Albuterol/Ipratropium (Duoneb 3 Mg/0.5 Mg (3 Ml) Ud)  3 ml INH RQ4 PRN


   PRN Reason: Cough and congestion


Aspirin (Ecotrin)  81 mg PO DAILY [facility]


   Last Admin: 08/18/18 10:05 Dose:  81 mg


Cyproheptadine HCl (Periactin)  4 mg PO TID [facility]


   Last Admin: 08/18/18 19:14 Dose:  4 mg


Enoxaparin Sodium (Lovenox)  40 mg SC DAILY [facility]


   Last Admin: 08/18/18 10:06 Dose:  40 mg


Ergocalciferol (Drisdol 50,000 Intl Units Cap)  1 cap PO QWK [facility]


Furosemide (Lasix)  40 mg IVP DAILY [facility]


   Last Admin: 08/18/18 10:04 Dose:  40 mg


Ceftriaxone Sodium 1 gm/ (Sodium Chloride)  100 mls @ 100 mls/hr IVPB Q24H [facility]


   PRN Reason: Protocol


   Last Admin: 08/18/18 01:09 Dose:  100 mls/hr


Insulin Glargine (Lantus)  20 unit SC HS [facility]


   Last Admin: 08/17/18 22:01 Dose:  20 units


Insulin Human Regular (Novolin R)  0 unit SC ACHS [facility]


   PRN Reason: Protocol


   Last Admin: 08/18/18 19:20 Dose:  Not Given


Metoprolol Tartrate (Lopressor)  25 mg PO BID [facility]


   Last Admin: 08/18/18 19:19 Dose:  Not Given


Nystatin (Nystatin Oral Susp)  5 ml PO QID [facility]


   Last Admin: 08/18/18 19:20 Dose:  5 ml


Repaglinide (Prandin)  2 mg PO DAILY [facility]


   Last Admin: 08/18/18 10:05 Dose:  2 mg


Rosuvastatin Calcium (Crestor)  20 mg PO HS [facility]


   Last Admin: 08/17/18 22:01 Dose:  20 mg


Sacubitril/Valsartan (Entresto 24 Mg-26 Mg)  1 tab PO DAILY [facility]


   Last Admin: 08/18/18 10:05 Dose:  1 tab


Ticagrelor (Brilinta)  90 mg PO DAILY [facility]


   Last Admin: 08/18/18 10:05 Dose:  90 mg











- Labs


Labs: 


 





 08/18/18 06:36 





 08/18/18 06:36 





 











PT  13.4 SECONDS (9.7-12.2)  H  08/17/18  13:23    


 


INR  1.2   08/17/18  13:23    


 


APTT  30 SECONDS (21-34)   08/17/18  13:23

## 2018-08-18 NOTE — CP.PCM.PN
Subjective





- Date & Time of Evaluation


Date of Evaluation: 08/18/18


Time of Evaluation: 14:21





- Subjective


Subjective: 





CHIEF COMPLAINTS TODAY :


Shortness of breath wheezing mild cough





ROS.


HEENT :  N.


Resp :       No,pleuritic CP ,or hemoptysis 


Cardio :     No anginal  CP, PND, orthopnea, palpitation 


GI :           No abd.pain, n/v ,diarrhea or GI bleeding .


CNS : No headache, vertigo, focal deficit.


Musculoskel :  No joint swelling ,


Derm :        No rash 


Psych :     Normal affect.


Ext :  No  swelling ,calf pain 





PE.


Pt. is alert awake in no distress.


V.S  As noted in the chart 


Head ,ear nose,throat and eyes : Normal.


Neck : Supple with normal carotids.


Lungs bilateral rhonchi and rales


Heart : S1 & S2 normal with S4. No murmur.


Abd : Soft non tender with normal bowel sounds.


Neuro : Moves all ext. with no localized deficit.


Ext : No edema with intact pulses.Non tender calves 


Derm : No rashes or decubitus ulcer.





LABS/RADIOLOGY:





ASSESSMENT/PLAN : 


After 1 unit of packed cells hemoglobin is now 10.8 g/dL


Continue IV Lasix.


ID evaluation for possible pneumonia.











Objective





- Vital Signs/Intake and Output


Vital Signs (last 24 hours): 


 











Temp Pulse Resp BP Pulse Ox


 


 97.6 F   100 H  18   121/67   98 


 


 08/18/18 07:00  08/18/18 07:00  08/18/18 07:00  08/18/18 10:04  08/18/18 07:00








Intake and Output: 


 











 08/18/18 08/18/18





 11:59 23:59


 


Intake Total 110 


 


Balance 110 














- Medications


Medications: 


 Current Medications





Albuterol/Ipratropium (Duoneb 3 Mg/0.5 Mg (3 Ml) Ud)  3 ml INH RQ4 PRN


   PRN Reason: Cough and congestion


Aspirin (Ecotrin)  81 mg PO DAILY [facility]


   Last Admin: 08/18/18 10:05 Dose:  81 mg


Cyproheptadine HCl (Periactin)  4 mg PO TID [facility]


   Last Admin: 08/18/18 10:05 Dose:  4 mg


Enoxaparin Sodium (Lovenox)  40 mg SC DAILY [facility]


   Last Admin: 08/18/18 10:06 Dose:  40 mg


Ergocalciferol (Drisdol 50,000 Intl Units Cap)  1 cap PO QWK [facility]


Furosemide (Lasix)  40 mg IVP DAILY [facility]


   Last Admin: 08/18/18 10:04 Dose:  40 mg


Ceftriaxone Sodium 1 gm/ (Sodium Chloride)  100 mls @ 100 mls/hr IVPB Q24H [facility]


   PRN Reason: Protocol


   Last Admin: 08/18/18 01:09 Dose:  100 mls/hr


Insulin Glargine (Lantus)  20 unit SC HS [facility]


   Last Admin: 08/17/18 22:01 Dose:  20 units


Insulin Human Regular (Novolin R)  0 unit SC ACHS [facility]


   PRN Reason: Protocol


   Last Admin: 08/17/18 22:02 Dose:  Not Given


Metoprolol Tartrate (Lopressor)  25 mg PO BID [facility]


   Last Admin: 08/18/18 10:03 Dose:  25 mg


Nystatin (Nystatin Oral Susp)  5 ml PO QID [facility]


   Last Admin: 08/18/18 10:06 Dose:  5 ml


Repaglinide (Prandin)  2 mg PO DAILY [facility]


   Last Admin: 08/18/18 10:05 Dose:  2 mg


Rosuvastatin Calcium (Crestor)  20 mg PO HS [facility]


   Last Admin: 08/17/18 22:01 Dose:  20 mg


Sacubitril/Valsartan (Entresto 24 Mg-26 Mg)  1 tab PO DAILY [facility]


   Last Admin: 08/18/18 10:05 Dose:  1 tab


Ticagrelor (Brilinta)  90 mg PO DAILY [facility]


   Last Admin: 08/18/18 10:05 Dose:  90 mg











- Labs


Labs: 


 





 08/18/18 06:36 





 08/18/18 06:36 





 











PT  13.4 SECONDS (9.7-12.2)  H  08/17/18  13:23    


 


INR  1.2   08/17/18  13:23    


 


APTT  30 SECONDS (21-34)   08/17/18  13:23

## 2018-08-19 LAB
ALBUMIN SERPL-MCNC: 3 G/DL (ref 3.5–5)
ALBUMIN/GLOB SERPL: 1.2 {RATIO} (ref 1–2.1)
ALT SERPL-CCNC: 22 U/L (ref 9–52)
AST SERPL-CCNC: 18 U/L (ref 14–36)
BASOPHILS # BLD AUTO: 0.1 K/UL (ref 0–0.2)
BASOPHILS NFR BLD: 0.7 % (ref 0–2)
BUN SERPL-MCNC: 17 MG/DL (ref 7–17)
CALCIUM SERPL-MCNC: 9.2 MG/DL (ref 8.6–10.4)
EOSINOPHIL # BLD AUTO: 0.3 K/UL (ref 0–0.7)
EOSINOPHIL NFR BLD: 4.1 % (ref 0–4)
ERYTHROCYTE [DISTWIDTH] IN BLOOD BY AUTOMATED COUNT: 18.6 % (ref 11.5–14.5)
GFR NON-AFRICAN AMERICAN: 31
HGB BLD-MCNC: 10.7 G/DL (ref 11–16)
LYMPHOCYTES # BLD AUTO: 1 K/UL (ref 1–4.3)
LYMPHOCYTES NFR BLD AUTO: 13.4 % (ref 20–40)
MCH RBC QN AUTO: 25.4 PG (ref 27–31)
MCHC RBC AUTO-ENTMCNC: 33.2 G/DL (ref 33–37)
MCV RBC AUTO: 76.5 FL (ref 81–99)
MONOCYTES # BLD: 0.8 K/UL (ref 0–0.8)
MONOCYTES NFR BLD: 10.7 % (ref 0–10)
NEUTROPHILS # BLD: 5.3 K/UL (ref 1.8–7)
NEUTROPHILS NFR BLD AUTO: 71.1 % (ref 50–75)
NRBC BLD AUTO-RTO: 0.1 % (ref 0–2)
PLATELET # BLD: 146 K/UL (ref 130–400)
PMV BLD AUTO: 10.9 FL (ref 7.2–11.7)
RBC # BLD AUTO: 4.2 MIL/UL (ref 3.8–5.2)
WBC # BLD AUTO: 7.4 K/UL (ref 4.8–10.8)

## 2018-08-19 RX ADMIN — HUMAN INSULIN SCH: 100 INJECTION, SOLUTION SUBCUTANEOUS at 21:35

## 2018-08-19 RX ADMIN — HUMAN INSULIN SCH U: 100 INJECTION, SOLUTION SUBCUTANEOUS at 12:53

## 2018-08-19 RX ADMIN — INSULIN GLARGINE SCH: 100 INJECTION, SOLUTION SUBCUTANEOUS at 21:35

## 2018-08-19 RX ADMIN — NYSTATIN SCH ML: 100000 SUSPENSION ORAL at 21:35

## 2018-08-19 RX ADMIN — SACUBITRIL AND VALSARTAN SCH: 24; 26 TABLET, FILM COATED ORAL at 10:53

## 2018-08-19 RX ADMIN — NYSTATIN SCH ML: 100000 SUSPENSION ORAL at 10:50

## 2018-08-19 RX ADMIN — HUMAN INSULIN SCH: 100 INJECTION, SOLUTION SUBCUTANEOUS at 07:56

## 2018-08-19 RX ADMIN — NYSTATIN SCH ML: 100000 SUSPENSION ORAL at 17:37

## 2018-08-19 RX ADMIN — ENOXAPARIN SODIUM SCH MG: 40 INJECTION SUBCUTANEOUS at 12:53

## 2018-08-19 RX ADMIN — NYSTATIN SCH ML: 100000 SUSPENSION ORAL at 13:08

## 2018-08-19 NOTE — CON
Copied To:  Ruth Baugh MD

Attending MD:  Ruth Baugh MD



DATE:  08/17/2018



INFECTIOUS DISEASE CONSULTATION



The patient was seen on 08/17/2018.



REQUESTED BY:  Osmar Fierro MD



REASON FOR CONSULTATION:  Extreme fatigue and congestive heart failure,

rule out pneumonia.



HISTORY OF PRESENT ILLNESS:  The patient is an 86-year-old Chinese speaking

female well known to me with multiple medical problems including ischemic

cardiomyopathy; coronary artery disease with stents in two arteries, also

with ejection fraction 30%; type 2 diabetes mellitus; COPD; and AICD who is

admitted via the emergency room with severe fatigue and congestive heart

failure.  The patient has not been feeling well for the past two days and

complaining of shortness of breath and extreme fatigue and also with loss

of appetite.  The patient was evaluated in the emergency room and was found

to be in congestive heart failure with severe anemia with a hemoglobin of 8

g/dL.  The patient was therefore advised admission.  Infectious disease

consultation is requested by Dr. Fierro with reason for consultation to

rule out sepsis verus pneumonia.  The patient also has a history of

recurrent UTIs.



PAST MEDICAL HISTORY:  As above, history of ischemic cardiomyopathy,

recurrent CHF, history of pneumonia, history of coronary artery disease

with two stents and also with AICD, diabetes mellitus type 2 and COPD and

nutritional anemia.



SOCIAL HISTORY:  She is a nonsmoker and nonalcoholic.  She lives with her

 and family.



REVIEW OF SYSTEMS:

CONSTITUTIONAL:  The patient is extremely weak.

RESPIRATORY:  Complains of shortness of breath and difficulty ambulating

with dyspnea on exertion.  Also complains of cough, unable to bring out

expectoration.

CARDIOVASCULAR SYSTEM:  Complains of some chest discomfort, but it comes

and goes.  Chest pain is not radiating to the arms or to the neck or down

the throat.

GASTROINTESTINAL:  Unremarkable.  Poor appetite, not eating good for the

past few days as per her .

GENITOURINARY:  Unremarkable.  Denies any dysuria, hematuria, or hesitancy.

CENTRAL NERVOUS SYSTEM:  No headache.  No seizure.  Denies sinus problems.



MEDICATIONS:  As per chart reviewed.  Medications as noted:  The patient is

on Keflex 500 mg b.i.d., Lopressor 25 mg p.o. b.i.d., Prandin 2 mg p.o.

daily, also getting Lantus 20 units at bedtime and insulin human regular

Novolin R as needed according to the protocol, Lasix 40 mg once daily,

Periactin 4 mg p.o. t.i.d., aspirin 81 mg p.o. once a day daily.  The

patient is also on Crestor 20 mg p.o. at bedtime, Entresto 24 mg/26 mg one

tablet daily, Brilinta 90 mg p.o. daily.



PHYSICAL EXAMINATION:

GENERAL:  The patient is awake, alert, very weak and pale looking.

VITAL SIGNS:  Blood pressure 113/52, temperature 97.7, pulse is 94,

respirations 20, pulse ox is 99%.



LABORATORY DATA:  Chest x-ray shows pulmonary venous congestion, official

report pending.  Liver function tests are unremarkable.  WBC 4.9,

hemoglobin of 8.8, hematocrit of 28.3, platelets of 152.  PT is 13.4, INR

of 1.2, PTT is 30.  Potassium 3.8, creatinine 1.3, BUN of 13.  Liver

function tests:  Bilirubin is normal, alkaline phosphatase 47, AST normal

of 29, ALT 23.  ProBNP was 6840.  Troponins are negative, first set.  UA

shows 1+ blood, _____ leukocytes trace, wbc's 2.



IMPRESSION:

1.  Exacerbation of congestive heart failure.

2.  Ischemic cardiomyopathy.

3.  Rule out pneumonia.

4.  Type 2 diabetes mellitus.

5.  Hypertension.



PLAN:  Suggest pan cultures.  Discontinue p.o. Keflex.  We will initiate IV

Rocephin 1 g every 12 hourly.  Follow up repeat blood workups and liver

function tests.  We will follow up repeat chest x-rays and recommend any

further therapy.  Sputum Gram stain cultures and throat cultures also to be

obtained and MRSA screen.



We will follow along with you.  Thank you very much for allowing me to

participate in the care of your patient.  We will follow.





__________________________________________

Ruth Baugh MD





DD:  08/18/2018 20:41:29

DT:  08/18/2018 23:12:54

Job # [id number]

## 2018-08-20 LAB
ALBUMIN SERPL-MCNC: 3.3 G/DL (ref 3.5–5)
ALBUMIN/GLOB SERPL: 1.2 {RATIO} (ref 1–2.1)
ALT SERPL-CCNC: 20 U/L (ref 9–52)
AST SERPL-CCNC: 17 U/L (ref 14–36)
BASOPHILS # BLD AUTO: 0.1 K/UL (ref 0–0.2)
BASOPHILS NFR BLD: 1.2 % (ref 0–2)
BUN SERPL-MCNC: 22 MG/DL (ref 7–17)
CALCIUM SERPL-MCNC: 9.3 MG/DL (ref 8.6–10.4)
EOSINOPHIL # BLD AUTO: 0.4 K/UL (ref 0–0.7)
EOSINOPHIL NFR BLD: 6.2 % (ref 0–4)
ERYTHROCYTE [DISTWIDTH] IN BLOOD BY AUTOMATED COUNT: 18.5 % (ref 11.5–14.5)
GFR NON-AFRICAN AMERICAN: 31
HGB BLD-MCNC: 10.9 G/DL (ref 11–16)
LYMPHOCYTES # BLD AUTO: 0.9 K/UL (ref 1–4.3)
LYMPHOCYTES NFR BLD AUTO: 14 % (ref 20–40)
MCH RBC QN AUTO: 25.1 PG (ref 27–31)
MCHC RBC AUTO-ENTMCNC: 32.5 G/DL (ref 33–37)
MCV RBC AUTO: 77.4 FL (ref 81–99)
MONOCYTES # BLD: 0.7 K/UL (ref 0–0.8)
MONOCYTES NFR BLD: 11.1 % (ref 0–10)
NEUTROPHILS # BLD: 4.2 K/UL (ref 1.8–7)
NEUTROPHILS NFR BLD AUTO: 67.5 % (ref 50–75)
NRBC BLD AUTO-RTO: 0 % (ref 0–2)
PLATELET # BLD: 135 K/UL (ref 130–400)
PMV BLD AUTO: 11.1 FL (ref 7.2–11.7)
RBC # BLD AUTO: 4.35 MIL/UL (ref 3.8–5.2)
WBC # BLD AUTO: 6.2 K/UL (ref 4.8–10.8)

## 2018-08-20 RX ADMIN — INSULIN GLARGINE SCH: 100 INJECTION, SOLUTION SUBCUTANEOUS at 21:21

## 2018-08-20 RX ADMIN — HUMAN INSULIN SCH: 100 INJECTION, SOLUTION SUBCUTANEOUS at 21:21

## 2018-08-20 RX ADMIN — HUMAN INSULIN SCH: 100 INJECTION, SOLUTION SUBCUTANEOUS at 07:51

## 2018-08-20 RX ADMIN — ENOXAPARIN SODIUM SCH MG: 40 INJECTION SUBCUTANEOUS at 10:33

## 2018-08-20 RX ADMIN — NYSTATIN SCH ML: 100000 SUSPENSION ORAL at 18:27

## 2018-08-20 RX ADMIN — NYSTATIN SCH ML: 100000 SUSPENSION ORAL at 10:33

## 2018-08-20 RX ADMIN — SACUBITRIL AND VALSARTAN SCH TAB: 24; 26 TABLET, FILM COATED ORAL at 10:33

## 2018-08-20 RX ADMIN — NYSTATIN SCH ML: 100000 SUSPENSION ORAL at 13:29

## 2018-08-20 RX ADMIN — HUMAN INSULIN SCH U: 100 INJECTION, SOLUTION SUBCUTANEOUS at 17:30

## 2018-08-20 RX ADMIN — NYSTATIN SCH ML: 100000 SUSPENSION ORAL at 21:31

## 2018-08-20 RX ADMIN — HUMAN INSULIN SCH U: 100 INJECTION, SOLUTION SUBCUTANEOUS at 13:29

## 2018-08-20 NOTE — CP.PCM.PN
Subjective





- Date & Time of Evaluation


Date of Evaluation: 08/20/18


Time of Evaluation: 14:02





- Subjective


Subjective: 





CHIEF COMPLAINTS TODAY :


Shortness of breath wheezing mild cough





ROS.


HEENT :  N.


Resp :       No,pleuritic CP ,or hemoptysis 


Cardio :     No anginal  CP, PND, orthopnea, palpitation 


GI :           No abd.pain, n/v ,diarrhea or GI bleeding .


CNS : No headache, vertigo, focal deficit.


Musculoskel :  No joint swelling ,


Derm :        No rash 


Psych :     Normal affect.


Ext :  No  swelling ,calf pain 





PE.


Pt. is alert awake in no distress.


V.S  As noted in the chart 


Head ,ear nose,throat and eyes : Normal.


Neck : Supple with normal carotids.


Lungs bilateral rhonchi and rales


Heart : S1 & S2 normal with S4. No murmur.


Abd : Soft non tender with normal bowel sounds.


Neuro : Moves all ext. with no localized deficit.


Ext : No edema with intact pulses.Non tender calves 


Derm : No rashes or decubitus ulcer.





LABS/RADIOLOGY:





ASSESSMENT/PLAN : 


After 1 unit of packed cells hemoglobin is now 10.8 g/dL


Continue IV Lasix.





Objective





- Vital Signs/Intake and Output


Vital Signs (last 24 hours): 


 











Temp Pulse Resp BP Pulse Ox


 


 98.8 F   108 H  18   131/89   98 


 


 08/20/18 07:00  08/20/18 07:07  08/20/18 07:00  08/20/18 10:33  08/20/18 07:00











- Medications


Medications: 


 Current Medications





Albuterol/Ipratropium (Duoneb 3 Mg/0.5 Mg (3 Ml) Ud)  3 ml INH RQ4 PRN


   PRN Reason: Cough and congestion


Aspirin (Ecotrin)  81 mg PO DAILY [facility]


   Last Admin: 08/20/18 10:33 Dose:  81 mg


Cyproheptadine HCl (Periactin)  4 mg PO TID [facility]


   Last Admin: 08/20/18 13:29 Dose:  4 mg


Enoxaparin Sodium (Lovenox)  40 mg SC DAILY [facility]


   Last Admin: 08/20/18 10:33 Dose:  40 mg


Ergocalciferol (Drisdol 50,000 Intl Units Cap)  1 cap PO QWK [facility]


Furosemide (Lasix)  40 mg IVP DAILY [facility]


   Last Admin: 08/20/18 10:33 Dose:  40 mg


Ceftriaxone Sodium 1 gm/ (Sodium Chloride)  100 mls @ 100 mls/hr IVPB Q24H [facility]


   PRN Reason: Protocol


   Last Admin: 08/20/18 02:30 Dose:  100 mls/hr


Insulin Glargine (Lantus)  20 unit SC HS [facility]


   Last Admin: 08/19/18 21:35 Dose:  Not Given


Insulin Human Regular (Novolin R)  0 unit SC ACHS [facility]


   PRN Reason: Protocol


   Last Admin: 08/20/18 13:29 Dose:  2 u


Metoprolol Tartrate (Lopressor)  25 mg PO BID [facility]


   Last Admin: 08/20/18 10:33 Dose:  25 mg


Nystatin (Nystatin Oral Susp)  5 ml PO QID [facility]


   Last Admin: 08/20/18 13:29 Dose:  5 ml


Repaglinide (Prandin)  2 mg PO DAILY [facility]


   Last Admin: 08/20/18 10:33 Dose:  2 mg


Rosuvastatin Calcium (Crestor)  20 mg PO HS [facility]


   Last Admin: 08/19/18 21:34 Dose:  20 mg


Sacubitril/Valsartan (Entresto 24 Mg-26 Mg)  1 tab PO DAILY [facility]


   Last Admin: 08/20/18 10:33 Dose:  1 tab


Ticagrelor (Brilinta)  90 mg PO DAILY [facility]


   Last Admin: 08/20/18 10:33 Dose:  90 mg











- Labs


Labs: 


 





 08/20/18 06:16 





 08/20/18 06:16 





 











PT  13.4 SECONDS (9.7-12.2)  H  08/17/18  13:23    


 


INR  1.2   08/17/18  13:23    


 


APTT  30 SECONDS (21-34)   08/17/18  13:23

## 2018-08-20 NOTE — CP.PCM.PN
Subjective





- Date & Time of Evaluation


Date of Evaluation: 08/20/18


Time of Evaluation: 17:31





- Subjective


Subjective: 





CHIEF COMPLAINTS TODAY :


 afebrile,


PATIENT SITTING UP IN THE BED,


C/O SOB ON EXERTION











ROS.


HEENT :  N.


Resp :       +VE  cough,  NO wheezing ,pleuritic CP ,or hemoptysis 


Cardio :     No anginal  CP, PND, orthopnea, palpitation 


GI :           No abd.pain, n/v ,diarrhea or GI bleeding .


CNS : No headache, vertigo, focal deficit.


Musculoskel :  No joint swelling ,


Derm :        No rash 


Psych :     Normal affect.


Ext :  No  swelling ,calf pain 





PE.


Pt. is alert awake in no distress.


V.S  As noted in the chart MILDLY DYSPNEIC AT REST


Head ,ear nose,throat and eyes : Normal.


Neck : Supple with normal carotids.


Lungs: BASILAR RALES


Heart : S1 & S2 normal with S4. No murmur.


Abd : Soft non tender with normal bowel sounds.


Neuro : Moves all ext. with no localized deficit.


Ext : 1+EDEMA, with intact pulses.Non tender calves 


Derm : No rashes or decubitus ulcer.





LABS/RADIOLOGY:


wbc 7.4, H/H 10.7/32.2 .  pLATELETS 146


.CREAT 1.6/bun 17


LFTS N


BLOOD CULTURE 8/17/18 -VE FOR 48HRS..


uRINE CULTURE 8/17/18-NEGATIVE GROWTH.





ASSESSMENT.


SEPSIS  R/O PNEUMONIA.


EXACERBATION OF CHF.


ISCHEMIC CARDIOMYOPATHY


AICD.


RENAL INSUFFICIENCY.








/PLAN : 


CONTINUE iv ANTIBIOTICS.


DIURESE IS AS PER PMD.


.fOLLOW-UP CULTURES TO ADJUST ANTIBIOTICS.











Objective





- Vital Signs/Intake and Output


Vital Signs (last 24 hours): 


 











Temp Pulse Resp BP Pulse Ox


 


 97.5 F L  90   20   101/51 L  96 


 


 08/20/18 15:49  08/20/18 15:49  08/20/18 15:49  08/20/18 15:49  08/20/18 15:49











- Medications


Medications: 


 Current Medications





Albuterol/Ipratropium (Duoneb 3 Mg/0.5 Mg (3 Ml) Ud)  3 ml INH RQ4 PRN


   PRN Reason: Cough and congestion


Aspirin (Ecotrin)  81 mg PO DAILY [facility]


   Last Admin: 08/20/18 10:33 Dose:  81 mg


Cyproheptadine HCl (Periactin)  4 mg PO TID [facility]


   Last Admin: 08/20/18 13:29 Dose:  4 mg


Enoxaparin Sodium (Lovenox)  40 mg SC DAILY [facility]


   Last Admin: 08/20/18 10:33 Dose:  40 mg


Ergocalciferol (Drisdol 50,000 Intl Units Cap)  1 cap PO QWK [facility]


Furosemide (Lasix)  40 mg IVP DAILY [facility]


   Last Admin: 08/20/18 10:33 Dose:  40 mg


Ceftriaxone Sodium 1 gm/ (Sodium Chloride)  100 mls @ 100 mls/hr IVPB Q24H [facility]


   PRN Reason: Protocol


   Last Admin: 08/20/18 02:30 Dose:  100 mls/hr


Insulin Glargine (Lantus)  20 unit SC HS [facility]


   Last Admin: 08/19/18 21:35 Dose:  Not Given


Insulin Human Regular (Novolin R)  0 unit SC ACHS [facility]


   PRN Reason: Protocol


   Last Admin: 08/20/18 13:29 Dose:  2 u


Metoprolol Tartrate (Lopressor)  25 mg PO BID [facility]


   Last Admin: 08/20/18 10:33 Dose:  25 mg


Nystatin (Nystatin Oral Susp)  5 ml PO QID [facility]


   Last Admin: 08/20/18 13:29 Dose:  5 ml


Repaglinide (Prandin)  2 mg PO DAILY [facility]


   Last Admin: 08/20/18 10:33 Dose:  2 mg


Rosuvastatin Calcium (Crestor)  20 mg PO HS [facility]


   Last Admin: 08/19/18 21:34 Dose:  20 mg


Sacubitril/Valsartan (Entresto 24 Mg-26 Mg)  1 tab PO DAILY [facility]


   Last Admin: 08/20/18 10:33 Dose:  1 tab


Ticagrelor (Brilinta)  90 mg PO DAILY [facility]


   Last Admin: 08/20/18 10:33 Dose:  90 mg











- Labs


Labs: 


 





 08/20/18 06:16 





 08/20/18 06:16 





 











PT  13.4 SECONDS (9.7-12.2)  H  08/17/18  13:23    


 


INR  1.2   08/17/18  13:23    


 


APTT  30 SECONDS (21-34)   08/17/18  13:23

## 2018-08-20 NOTE — CARD
--------------- APPROVED REPORT --------------





Date of service: 08/17/2018



EKG Measurement

Heart [id number]

ME [id number]

[id number]

[id number]

[id number]



<Conclusion>

Sinus rhythm with frequent ventricular-paced complexes

Possible Left atrial enlargement

Rightward axis

Nonspecific intraventricular block

T wave abnormality, consider inferolateral ischemia

Abnormal ECG

## 2018-08-21 RX ADMIN — INSULIN GLARGINE SCH: 100 INJECTION, SOLUTION SUBCUTANEOUS at 21:53

## 2018-08-21 RX ADMIN — NYSTATIN SCH ML: 100000 SUSPENSION ORAL at 22:00

## 2018-08-21 RX ADMIN — HUMAN INSULIN SCH: 100 INJECTION, SOLUTION SUBCUTANEOUS at 07:15

## 2018-08-21 RX ADMIN — NYSTATIN SCH ML: 100000 SUSPENSION ORAL at 10:33

## 2018-08-21 RX ADMIN — NYSTATIN SCH ML: 100000 SUSPENSION ORAL at 13:14

## 2018-08-21 RX ADMIN — NYSTATIN SCH ML: 100000 SUSPENSION ORAL at 17:17

## 2018-08-21 RX ADMIN — HUMAN INSULIN SCH: 100 INJECTION, SOLUTION SUBCUTANEOUS at 21:54

## 2018-08-21 RX ADMIN — HUMAN INSULIN SCH U: 100 INJECTION, SOLUTION SUBCUTANEOUS at 12:51

## 2018-08-21 RX ADMIN — ENOXAPARIN SODIUM SCH MG: 40 INJECTION SUBCUTANEOUS at 10:33

## 2018-08-21 RX ADMIN — HUMAN INSULIN SCH: 100 INJECTION, SOLUTION SUBCUTANEOUS at 16:28

## 2018-08-21 RX ADMIN — SACUBITRIL AND VALSARTAN SCH TAB: 24; 26 TABLET, FILM COATED ORAL at 10:34

## 2018-08-21 NOTE — CP.PCM.PN
Subjective





- Date & Time of Evaluation


Date of Evaluation: 08/21/18


Time of Evaluation: 13:03





- Subjective


Subjective: 





CHIEF COMPLAINTS TODAY :


Shortness of breath wheezing mild cough





ROS.


HEENT :  N.


Resp :       No,pleuritic CP ,or hemoptysis 


Cardio :     No anginal  CP, PND, orthopnea, palpitation 


GI :           No abd.pain, n/v ,diarrhea or GI bleeding .


CNS : No headache, vertigo, focal deficit.


Musculoskel :  No joint swelling ,


Derm :        No rash 


Psych :     Normal affect.


Ext :  No  swelling ,calf pain 





PE.


Pt. is alert awake in no distress.


V.S  As noted in the chart 


Head ,ear nose,throat and eyes : Normal.


Neck : Supple with normal carotids.


Lungs bilateral rhonchi and rales


Heart : S1 & S2 normal with S4. No murmur.


Abd : Soft non tender with normal bowel sounds.


Neuro : Moves all ext. with no localized deficit.


Ext : No edema with intact pulses.Non tender calves 


Derm : No rashes or decubitus ulcer.





LABS/RADIOLOGY:





ASSESSMENT/PLAN : 


discussed with patient's  and patient.  Due to patient's mental 

condition with recurrent shortness of breath and generalized weakness patient 

will benefit for short-term rehab.  They are agreed for [facility] rehab.





Objective





- Vital Signs/Intake and Output


Vital Signs (last 24 hours): 


 











Temp Pulse Resp BP Pulse Ox


 


 97.9 F   96 H  20   106/68   98 


 


 08/21/18 07:00  08/21/18 12:07  08/21/18 07:00  08/21/18 12:07  08/21/18 07:00








Intake and Output: 


 











 08/21/18 08/21/18





 11:59 23:59


 


Intake Total 110 


 


Balance 110 














- Medications


Medications: 


 Current Medications





Albuterol/Ipratropium (Duoneb 3 Mg/0.5 Mg (3 Ml) Ud)  3 ml INH RQ4 PRN


   PRN Reason: Cough and congestion


Aspirin (Ecotrin)  81 mg PO DAILY [facility]


   Last Admin: 08/21/18 10:34 Dose:  81 mg


Cyproheptadine HCl (Periactin)  4 mg PO TID [facility]


   Last Admin: 08/21/18 10:34 Dose:  4 mg


Enoxaparin Sodium (Lovenox)  40 mg SC DAILY [facility]


   Last Admin: 08/21/18 10:33 Dose:  40 mg


Ergocalciferol (Drisdol 50,000 Intl Units Cap)  1 cap PO QWK [facility]


Furosemide (Lasix)  40 mg IVP DAILY [facility]


   Last Admin: 08/21/18 10:32 Dose:  Not Given


Gabapentin (Neurontin)  300 mg PO DAILY [facility]


   Last Admin: 08/21/18 10:33 Dose:  300 mg


Ceftriaxone Sodium 1 gm/ (Sodium Chloride)  100 mls @ 100 mls/hr IVPB Q24H [facility]


   PRN Reason: Protocol


   Last Admin: 08/21/18 01:37 Dose:  100 mls/hr


Insulin Glargine (Lantus)  20 unit SC HS [facility]


   Last Admin: 08/20/18 21:21 Dose:  Not Given


Insulin Human Regular (Novolin R)  0 unit SC ACHS [facility]


   PRN Reason: Protocol


   Last Admin: 08/21/18 12:51 Dose:  4 u


Metoprolol Tartrate (Lopressor)  25 mg PO BID [facility]


   Last Admin: 08/21/18 10:33 Dose:  Not Given


Nystatin (Nystatin Oral Susp)  5 ml PO QID [facility]


   Last Admin: 08/21/18 10:33 Dose:  5 ml


Repaglinide (Prandin)  2 mg PO DAILY [facility]


   Last Admin: 08/21/18 10:34 Dose:  2 mg


Rosuvastatin Calcium (Crestor)  20 mg PO HS [facility]


   Last Admin: 08/20/18 21:31 Dose:  20 mg


Sacubitril/Valsartan (Entresto 24 Mg-26 Mg)  1 tab PO DAILY [facility]


   Last Admin: 08/21/18 10:34 Dose:  1 tab


Ticagrelor (Brilinta)  90 mg PO DAILY [facility]


   Last Admin: 08/21/18 10:34 Dose:  90 mg











- Labs


Labs: 


 





 08/20/18 06:16 





 08/20/18 06:16 





 











PT  13.4 SECONDS (9.7-12.2)  H  08/17/18  13:23    


 


INR  1.2   08/17/18  13:23    


 


APTT  30 SECONDS (21-34)   08/17/18  13:23

## 2018-08-21 NOTE — CP.PCM.PN
Subjective





- Date & Time of Evaluation


Date of Evaluation: 08/21/18


Time of Evaluation: 15:33





- Subjective


Subjective: 





CHIEF COMPLAINTS TODAY :


 afebrile,


FEELS WEAK/AND TIRED.


LESS SOB


LESS COUGH











ROS.


HEENT :  N.


Resp :       +VE  cough,  NO wheezing ,pleuritic CP ,or hemoptysis 


Cardio :     No anginal  CP, PND, orthopnea, palpitation 


GI :           No abd.pain, n/v ,diarrhea or GI bleeding .


CNS : No headache, vertigo, focal deficit.


Musculoskel :  No joint swelling ,


Derm :        No rash 


Psych :     Normal affect.


Ext :  No  swelling ,calf pain 





PE.


Pt. is alert awake in no distress.


V.S  As noted in the chart MILDLY DYSPNEIC AT REST


Head ,ear nose,throat and eyes : Normal.


Neck : Supple with normal carotids.


Lungs: BASILAR RALES


Heart : S1 & S2 normal with S4. No murmur.


Abd : Soft non tender with normal bowel sounds.


Neuro : Moves all ext. with no localized deficit.


Ext : 1+EDEMA, with intact pulses.Non tender calves 


Derm : No rashes or decubitus ulcer.





LABS/RADIOLOGY:


wbc 6.2, h&h STABLE 10.9.


pLATELETS 135.


CREAT 1.6/bun 22


LFTS N


BLOOD CULTURE 8/17/18 -VE FOR 3 DAYS.


uRINE CULTURE 8/17/18-NEGATIVE GROWTH.





ASSESSMENT.


EXACERBATION OF CHF.


ISCHEMIC CARDIOMYOPATHY


AICD.


ANEMIA SYMPTOMATIC


RENAL INSUFFICIENCY.








/PLAN : 


CPM


DIURESIS  IS AS PER PMD.


DC ABX ON DC TO [facility].














Objective





- Vital Signs/Intake and Output


Vital Signs (last 24 hours): 


 











Temp Pulse Resp BP Pulse Ox


 


 97.9 F   96 H  20   108/64   98 


 


 08/21/18 07:00  08/21/18 12:07  08/21/18 07:00  08/21/18 13:14  08/21/18 07:00








Intake and Output: 


 











 08/21/18 08/21/18





 06:59 18:59


 


Intake Total 410 


 


Balance 410 














- Medications


Medications: 


 Current Medications





Albuterol/Ipratropium (Duoneb 3 Mg/0.5 Mg (3 Ml) Ud)  3 ml INH RQ4 PRN


   PRN Reason: Cough and congestion


Aspirin (Ecotrin)  81 mg PO DAILY [facility]


   Last Admin: 08/21/18 10:34 Dose:  81 mg


Cyproheptadine HCl (Periactin)  4 mg PO TID [facility]


   Last Admin: 08/21/18 13:14 Dose:  4 mg


Enoxaparin Sodium (Lovenox)  40 mg SC DAILY [facility]


   Last Admin: 08/21/18 10:33 Dose:  40 mg


Ergocalciferol (Drisdol 50,000 Intl Units Cap)  1 cap PO QWK [facility]


Furosemide (Lasix)  40 mg IVP DAILY [facility]


   Last Admin: 08/21/18 13:14 Dose:  40 mg


Gabapentin (Neurontin)  300 mg PO DAILY [facility]


   Last Admin: 08/21/18 10:33 Dose:  300 mg


Ceftriaxone Sodium 1 gm/ (Sodium Chloride)  100 mls @ 100 mls/hr IVPB Q24H [facility]


   PRN Reason: Protocol


   Last Admin: 08/21/18 01:37 Dose:  100 mls/hr


Insulin Glargine (Lantus)  20 unit SC [facility]


   Last Admin: 08/20/18 21:21 Dose:  Not Given


Insulin Human Regular (Novolin R)  0 unit SC ACHS [facility]


   PRN Reason: Protocol


   Last Admin: 08/21/18 12:51 Dose:  4 u


Metoprolol Tartrate (Lopressor)  25 mg PO BID [facility]


   Last Admin: 08/21/18 10:33 Dose:  Not Given


Nystatin (Nystatin Oral Susp)  5 ml PO QID [facility]


   Last Admin: 08/21/18 13:14 Dose:  5 ml


Repaglinide (Prandin)  2 mg PO DAILY [facility]


   Last Admin: 08/21/18 10:34 Dose:  2 mg


Rosuvastatin Calcium (Crestor)  20 mg PO HS [facility]


   Last Admin: 08/20/18 21:31 Dose:  20 mg


Sacubitril/Valsartan (Entresto 24 Mg-26 Mg)  1 tab PO DAILY [facility]


   Last Admin: 08/21/18 10:34 Dose:  1 tab


Ticagrelor (Brilinta)  90 mg PO DAILY [facility]


   Last Admin: 08/21/18 10:34 Dose:  90 mg











- Labs


Labs: 


 





 08/20/18 06:16 





 08/20/18 06:16 





 











PT  13.4 SECONDS (9.7-12.2)  H  08/17/18  13:23    


 


INR  1.2   08/17/18  13:23    


 


APTT  30 SECONDS (21-34)   08/17/18  13:23

## 2018-08-22 RX ADMIN — HUMAN INSULIN SCH UNITS: 100 INJECTION, SOLUTION SUBCUTANEOUS at 16:38

## 2018-08-22 RX ADMIN — HUMAN INSULIN SCH: 100 INJECTION, SOLUTION SUBCUTANEOUS at 22:10

## 2018-08-22 RX ADMIN — NYSTATIN SCH ML: 100000 SUSPENSION ORAL at 12:59

## 2018-08-22 RX ADMIN — ENOXAPARIN SODIUM SCH MG: 40 INJECTION SUBCUTANEOUS at 09:08

## 2018-08-22 RX ADMIN — HUMAN INSULIN SCH: 100 INJECTION, SOLUTION SUBCUTANEOUS at 07:52

## 2018-08-22 RX ADMIN — SACUBITRIL AND VALSARTAN SCH TAB: 24; 26 TABLET, FILM COATED ORAL at 09:10

## 2018-08-22 RX ADMIN — NYSTATIN SCH ML: 100000 SUSPENSION ORAL at 17:38

## 2018-08-22 RX ADMIN — NYSTATIN SCH ML: 100000 SUSPENSION ORAL at 22:00

## 2018-08-22 RX ADMIN — HUMAN INSULIN SCH: 100 INJECTION, SOLUTION SUBCUTANEOUS at 12:15

## 2018-08-22 RX ADMIN — NYSTATIN SCH ML: 100000 SUSPENSION ORAL at 09:07

## 2018-08-22 RX ADMIN — INSULIN GLARGINE SCH: 100 INJECTION, SOLUTION SUBCUTANEOUS at 22:09

## 2018-08-22 NOTE — CP.PCM.PN
Subjective





- Date & Time of Evaluation


Date of Evaluation: 08/22/18


Time of Evaluation: 15:21





- Subjective


Subjective: 





CHIEF COMPLAINTS TODAY :


 afebrile,


FEELS WEAK/AND TIRED.


DENIES SOB


LESS COUGH











ROS.


HEENT :  N.


Resp :       +VE  cough,  NO wheezing ,pleuritic CP ,or hemoptysis 


Cardio :     No anginal  CP, PND, orthopnea, palpitation 


GI :           No abd.pain, n/v ,diarrhea or GI bleeding .


CNS : No headache, vertigo, focal deficit.


Musculoskel :  No joint swelling ,


Derm :        No rash 


Psych :     Normal affect.


Ext :  No  swelling ,calf pain 





PE.


Pt. is alert awake in no distress.


V.S  As noted in the CHART


Head ,ear nose,throat and eyes : Normal.


Neck : Supple with normal carotids.


Lungs:  FEW  BASILAR RALES


Heart : S1 & S2 normal with S4. No murmur.


Abd : Soft non tender with normal bowel sounds.


Neuro : Moves all ext. with no localized deficit.


Ext : 1+EDEMA, with intact pulses.Non tender calves 


Derm : No rashes or decubitus ulcer.





LABS/RADIOLOGY:


wbc 6.2, h&h STABLE 10.9.


pLATELETS 135.


CREAT 1.6/bun 22


LFTS N


BLOOD CULTURE 8/17/18 -VE FOR 3 DAYS.


uRINE CULTURE 8/17/18-NEGATIVE GROWTH.





ASSESSMENT.


EXACERBATION OF CHF.


ISCHEMIC CARDIOMYOPATHY


AICD.


ANEMIA SYMPTOMATIC


RENAL INSUFFICIENCY.








/PLAN : 


CPM


DIURESIS  IS AS PER PMD.


DC ABX ON DC TO [facility].











Objective





- Vital Signs/Intake and Output


Vital Signs (last 24 hours): 


 











Temp Pulse Resp BP Pulse Ox


 


 98.4 F   107 H  18   109/58 L  95 


 


 08/22/18 07:00  08/22/18 07:40  08/22/18 07:00  08/22/18 09:12  08/22/18 07:00








Intake and Output: 


 











 08/22/18 08/22/18





 06:59 18:59


 


Intake Total 460 


 


Balance 460 














- Medications


Medications: 


 Current Medications





Albuterol/Ipratropium (Duoneb 3 Mg/0.5 Mg (3 Ml) Ud)  3 ml INH RQ4 PRN


   PRN Reason: Cough and congestion


   Last Admin: 08/22/18 07:57 Dose:  3 ml


Aspirin (Ecotrin)  81 mg PO DAILY [facility]


   Last Admin: 08/22/18 09:07 Dose:  81 mg


Cyproheptadine HCl (Periactin)  4 mg PO TID [facility]


   Last Admin: 08/22/18 12:59 Dose:  4 mg


Enoxaparin Sodium (Lovenox)  40 mg SC DAILY [facility]


   Last Admin: 08/22/18 09:08 Dose:  40 mg


Ergocalciferol (Drisdol 50,000 Intl Units Cap)  1 cap PO QWK [facility]


Furosemide (Lasix)  40 mg IVP DAILY [facility]


   Last Admin: 08/22/18 09:12 Dose:  Not Given


Gabapentin (Neurontin)  300 mg PO DAILY [facility]


   Last Admin: 08/22/18 09:06 Dose:  300 mg


Ceftriaxone Sodium 1 gm/ (Sodium Chloride)  100 mls @ 100 mls/hr IVPB Q24H [facility]


   PRN Reason: Protocol


   Last Admin: 08/22/18 02:24 Dose:  100 mls/hr


Insulin Glargine (Lantus)  20 unit SC [facility]


   Last Admin: 08/21/18 21:53 Dose:  Not Given


Insulin Human Regular (Novolin R)  0 unit SC ACHS [facility]


   PRN Reason: Protocol


   Last Admin: 08/22/18 12:15 Dose:  Not Given


Metoprolol Tartrate (Lopressor)  25 mg PO BID [facility]


   Last Admin: 08/22/18 09:11 Dose:  Not Given


Nystatin (Nystatin Oral Susp)  5 ml PO QID [facility]


   Last Admin: 08/22/18 12:59 Dose:  5 ml


Repaglinide (Prandin)  2 mg PO DAILY [facility]


   Last Admin: 08/22/18 09:10 Dose:  2 mg


Rosuvastatin Calcium (Crestor)  20 mg PO [facility]


   Last Admin: 08/21/18 22:00 Dose:  20 mg


Sacubitril/Valsartan (Entresto 24 Mg-26 Mg)  1 tab PO DAILY [facility]


   Last Admin: 08/22/18 09:10 Dose:  1 tab


Ticagrelor (Brilinta)  90 mg PO DAILY [facility]


   Last Admin: 08/22/18 09:10 Dose:  90 mg











- Labs


Labs: 


 





 08/20/18 06:16 





 08/20/18 06:16 





 











PT  13.4 SECONDS (9.7-12.2)  H  08/17/18  13:23    


 


INR  1.2   08/17/18  13:23    


 


APTT  30 SECONDS (21-34)   08/17/18  13:23

## 2018-08-22 NOTE — CON
Copied To:  Ruth Baugh MD

Attending MD:  Ruth Baugh MD



DATE:  08/17/2018



INFECTIOUS DISEASE CONSULTATION



REQUESTED BY:  Osmar Fierro MD



REASON FOR CONSULTATION:  Extreme fatigue, congestive heart failure, rule

out pneumonia, rule out sepsis.



HISTORY OF PRESENT ILLNESS:  The patient is an 86-year-old [place] speaking

female, well known to me, with multiple medical problems including ischemic

cardiomyopathy, coronary artery disease with stents in two arteries, also

with ejection fraction of 30%, type 2 diabetes mellitus, COPD, and AICD who

was admitted via the emergency room with severe fatigue and congestive

heart failure.  The patient has also been complaining of cough for the past

few days and shortness of breath and extreme fatigue, also with loss of

appetite.  The patient was evaluated in the emergency room and was found to

be in congestive heart failure with severe anemia with a hemoglobin of 8 g

per dL.  The patient was therefore advised admission.  Infectious disease

consultation is requested by Dr. Fierro with reason for consultation to

rule out sepsis verus pneumonia.  The patient also has history of recurrent

UTIs in the past and is well known to me.



PAST MEDICAL HISTORY:  As above, history of ischemic cardiomyopathy,

recurrent CHF, history of pneumonia, history of coronary artery disease and

AICD with two stents, diabetes mellitus type 2, COPD, and nutritional

anemia.



SOCIAL HISTORY:  She is a nonsmoker and nonalcoholic.  She lives with her

 and family.



REVIEW OF SYSTEMS:

RESPIRATORY:  Complains of shortness of breath and difficulty ambulating

with dyspnea on exertion.  Also complains of cough which is dry, unable to

bring out any expectoration.

CONSTITUTIONAL:  Extremely fatigued and weak.

CARDIOVASCULAR SYSTEM:  Complains of some chest pain and discomfort, but it

comes and goes.  Chest pain is not radiating to the arms or to the neck or

down the throat.

GASTROINTESTINAL:  Unremarkable except for poor appetite.  The patient

states she has not eaten for the past few days as per her .

GENITOURINARY:  Denies any dysuria, hematuria, or hesitancy at present.

CENTRAL NERVOUS SYSTEM:  Denies headache.  No seizures.  No history of any

sinus problems.



MEDICATIONS:  As per chart reviewed, the patient was on Keflex p.o. 500 mg

b.i.d., Lopressor 25 mg p.o. b.i.d., Prandin 2 mg p.o. daily, also getting

Lantus 20 units at bedtime, insulin regular Humulin, NovoLog R as needed

according to the protocol, Lasix 40 mg once daily, Periactin 4 mg p.o.

t.i.d., aspirin 81 mg p.o. once a day.  The patient also is on Crestor 20

mg p.o. at bedtime, Entresto 24 mg/26 mg one tablet daily, Brilinta 90 mg

p.o. daily.



VACCINATIONS:  The patient is not sure about her vaccinations.  States she

did get influenza vaccine last year.  Pneumovax not sure.



PHYSICAL EXAMINATION:

GENERAL:  The patient is awake, alert, very weak, and pale looking.

VITAL SIGNS:  Afebrile.  Blood pressure 113/52, respirations 20, pulse of

94, pulse ox is 99%.

NECK:  Supple.  No lymphadenopathy appreciated.  No carotid bruit.

LUNGS:  Fair basilar rales.

CARDIOVASCULAR SYSTEM:  S1, S2 regular.  No murmur or gallop.

ABDOMEN:  Soft.  Bowel sounds are present.  Nontender.  No suprapubic

tenderness elicited.

EXTREMITIES:  No cyanosis or clubbing.  1+ edema, bilateral lower

extremities.

CENTRAL NERVOUS SYSTEM:  No gross deficits, but difficulty ambulating

because of extreme fatigue and tiredness.



LABORATORY DATA:  Chest x-ray reviewed shows pulmonary venous congestion,

official report is pending.  Liver function tests are unremarkable.  WBC

4.9, hemoglobin of 8.8, hematocrit 28.3, platelets of 152.  PT is 13.4, INR

of 1.2, PTT is 30.  Potassium 3.8, creatinine 1.3, BUN of 30.  Liver

function tests:  Bilirubin is normal, alkaline phosphatase 47, AST is

normal at 29, ALT is 23.  ProBNP was 6840.  Troponins are negative, first

set.  Urinalysis shows 1+ blood, leukocytes trace, wbc's 2.



IMPRESSION:

1.  Sepsis with extreme fatigue.  Rule out pneumonia.  Rule out septicemia.

2.  Exacerbation of congestive heart failure.

3.  Ischemic cardiomyopathy.

4.  Automatic implantable cardioverter-defibrillator.

5.  Symptomatic anemia.

6.  Type 2 diabetes mellitus.

7.  Hypertension.



PLAN:  Suggest pan cultures.  We will discontinue p.o. Keflex.  We will

initiate Rocephin 1 g every 12 hourly for now for broad gram-negative and

gram-positive coverage.  Follow up repeat blood works and liver function

tests in the a.m.  We will follow up chest x-rays with final report, and we

will recommend any further therapy as needed.  Sputum Gram stain and

cultures to be induced.  Throat culture also to be obtained and MRSA

screen.



We will follow along with you.  Thank you very much for allowing me to

participate in the care of your patient.





__________________________________________

Ruth Baugh MD





DD:  08/21/2018 17:24:24

DT:  08/21/2018 22:30:18

Job # [id number]

## 2018-08-22 NOTE — CP.PCM.PN
Subjective





- Date & Time of Evaluation


Date of Evaluation: 08/22/18


Time of Evaluation: 14:02





- Subjective


Subjective: 





CHIEF COMPLAINTS TODAY :


Shortness of breath wheezing mild cough





ROS.


HEENT :  N.


Resp :       No,pleuritic CP ,or hemoptysis 


Cardio :     No anginal  CP, PND, orthopnea, palpitation 


GI :           No abd.pain, n/v ,diarrhea or GI bleeding .


CNS : No headache, vertigo, focal deficit.


Musculoskel :  No joint swelling ,


Derm :        No rash 


Psych :     Normal affect.


Ext :  No  swelling ,calf pain 





PE.


Pt. is alert awake in no distress.


V.S  As noted in the chart 


Head ,ear nose,throat and eyes : Normal.


Neck : Supple with normal carotids.


Lungs bilateral rhonchi and rales


Heart : S1 & S2 normal with S4. No murmur.


Abd : Soft non tender with normal bowel sounds.


Neuro : Moves all ext. with no localized deficit.


Ext : No edema with intact pulses.Non tender calves 


Derm : No rashes or decubitus ulcer.





LABS/RADIOLOGY:





ASSESSMENT/PLAN : 


discussed with patient's  and patient.  Due to patient's mental 

condition with recurrent shortness of breath and generalized weakness patient 

will benefit for short-term rehab.  They are agreed for [facility] rehab.








Objective





- Vital Signs/Intake and Output


Vital Signs (last 24 hours): 


 











Temp Pulse Resp BP Pulse Ox


 


 98.4 F   107 H  18   109/58 L  95 


 


 08/22/18 07:00  08/22/18 07:40  08/22/18 07:00  08/22/18 09:12  08/22/18 07:00








Intake and Output: 


 











 08/22/18 08/22/18





 11:59 23:59


 


Intake Total 220 


 


Balance 220 














- Medications


Medications: 


 Current Medications





Albuterol/Ipratropium (Duoneb 3 Mg/0.5 Mg (3 Ml) Ud)  3 ml INH RQ4 PRN


   PRN Reason: Cough and congestion


   Last Admin: 08/22/18 07:57 Dose:  3 ml


Aspirin (Ecotrin)  81 mg PO DAILY [facility]


   Last Admin: 08/22/18 09:07 Dose:  81 mg


Cyproheptadine HCl (Periactin)  4 mg PO TID [facility]


   Last Admin: 08/22/18 12:59 Dose:  4 mg


Enoxaparin Sodium (Lovenox)  40 mg SC DAILY [facility]


   Last Admin: 08/22/18 09:08 Dose:  40 mg


Ergocalciferol (Drisdol 50,000 Intl Units Cap)  1 cap PO QWK [facility]


Furosemide (Lasix)  40 mg IVP DAILY [facility]


   Last Admin: 08/22/18 09:12 Dose:  Not Given


Gabapentin (Neurontin)  300 mg PO DAILY [facility]


   Last Admin: 08/22/18 09:06 Dose:  300 mg


Ceftriaxone Sodium 1 gm/ (Sodium Chloride)  100 mls @ 100 mls/hr IVPB Q24H [facility]


   PRN Reason: Protocol


   Last Admin: 08/22/18 02:24 Dose:  100 mls/hr


Insulin Glargine (Lantus)  20 unit SC HS [facility]


   Last Admin: 08/21/18 21:53 Dose:  Not Given


Insulin Human Regular (Novolin R)  0 unit SC ACHS [facility]


   PRN Reason: Protocol


   Last Admin: 08/22/18 12:15 Dose:  Not Given


Metoprolol Tartrate (Lopressor)  25 mg PO BID [facility]


   Last Admin: 08/22/18 09:11 Dose:  Not Given


Nystatin (Nystatin Oral Susp)  5 ml PO QID [facility]


   Last Admin: 08/22/18 12:59 Dose:  5 ml


Repaglinide (Prandin)  2 mg PO DAILY [facility]


   Last Admin: 08/22/18 09:10 Dose:  2 mg


Rosuvastatin Calcium (Crestor)  20 mg PO HS [facility]


   Last Admin: 08/21/18 22:00 Dose:  20 mg


Sacubitril/Valsartan (Entresto 24 Mg-26 Mg)  1 tab PO DAILY [facility]


   Last Admin: 08/22/18 09:10 Dose:  1 tab


Ticagrelor (Brilinta)  90 mg PO DAILY [facility]


   Last Admin: 08/22/18 09:10 Dose:  90 mg











- Labs


Labs: 


 





 08/20/18 06:16 





 08/20/18 06:16 





 











PT  13.4 SECONDS (9.7-12.2)  H  08/17/18  13:23    


 


INR  1.2   08/17/18  13:23    


 


APTT  30 SECONDS (21-34)   08/17/18  13:23

## 2018-08-23 VITALS
DIASTOLIC BLOOD PRESSURE: 47 MMHG | TEMPERATURE: 97.8 F | OXYGEN SATURATION: 97 % | HEART RATE: 102 BPM | SYSTOLIC BLOOD PRESSURE: 98 MMHG

## 2018-08-23 VITALS — RESPIRATION RATE: 20 BRPM

## 2018-08-23 LAB
CK MB SERPL-MCNC: 0.98 NG/ML (ref 0–3.38)
TROPONIN I SERPL-MCNC: 0.04 NG/ML (ref 0–0.12)

## 2018-08-23 RX ADMIN — HUMAN INSULIN SCH: 100 INJECTION, SOLUTION SUBCUTANEOUS at 07:23

## 2018-08-23 RX ADMIN — SACUBITRIL AND VALSARTAN SCH TAB: 24; 26 TABLET, FILM COATED ORAL at 10:11

## 2018-08-23 RX ADMIN — ENOXAPARIN SODIUM SCH MG: 40 INJECTION SUBCUTANEOUS at 10:11

## 2018-08-23 RX ADMIN — NYSTATIN SCH ML: 100000 SUSPENSION ORAL at 10:10

## 2018-08-23 RX ADMIN — HUMAN INSULIN SCH UNITS: 100 INJECTION, SOLUTION SUBCUTANEOUS at 12:40

## 2018-08-23 NOTE — CP.PCM.DIS
Provider





- Provider


Date of Admission: 


08/17/18 15:12





Attending physician: 


Osmar Fierro MD





Time Spent in preparation of Discharge (in minutes): 35





Hospital Course





- Lab Results


Lab Results: 


 Micro Results





08/17/18 00:45   Blood-Venous   Blood Culture - Final


                            NO GROWTH AFTER 5 DAYS


08/17/18 00:45   Blood-Venous   Gram Stain - Final


                            TEST NOT PERFORMED


08/17/18 01:15   Blood-Venous   Blood Culture - Final


                            NO GROWTH AFTER 5 DAYS


08/17/18 01:15   Blood-Venous   Gram Stain - Final


                            TEST NOT PERFORMED


08/18/18 01:16   Throat   Group A Strep Throat Culture - Final


                            NO BETA STREP GROUP A ISOLATED.


08/17/18 13:57   Urine,Clean Catch   Urine Culture - Final


                            No Growth (<1,000 CFU/ML)





 Most Recent Lab Values











WBC  6.2 K/uL (4.8-10.8)   08/20/18  06:16    


 


RBC  4.35 Mil/uL (3.80-5.20)   08/20/18  06:16    


 


Hgb  10.9 g/dL (11.0-16.0)  L  08/20/18  06:16    


 


Hct  33.6 % (34.0-47.0)  L  08/20/18  06:16    


 


MCV  77.4 fL (81.0-99.0)  L  08/20/18  06:16    


 


MCH  25.1 pg (27.0-31.0)  L  08/20/18  06:16    


 


MCHC  32.5 g/dL (33.0-37.0)  L  08/20/18  06:16    


 


RDW  18.5 % (11.5-14.5)  H  08/20/18  06:16    


 


Plt Count  135 K/uL (130-400)   08/20/18  06:16    


 


MPV  11.1 fL (7.2-11.7)   08/20/18  06:16    


 


Neut % (Auto)  67.5 % (50.0-75.0)   08/20/18  06:16    


 


Lymph % (Auto)  14.0 % (20.0-40.0)  L  08/20/18  06:16    


 


Mono % (Auto)  11.1 % (0.0-10.0)  H  08/20/18  06:16    


 


Eos % (Auto)  6.2 % (0.0-4.0)  H  08/20/18  06:16    


 


Baso % (Auto)  1.2 % (0.0-2.0)   08/20/18  06:16    


 


Neut # (Auto)  4.2 K/uL (1.8-7.0)   08/20/18  06:16    


 


Lymph # (Auto)  0.9 K/uL (1.0-4.3)  L  08/20/18  06:16    


 


Mono # (Auto)  0.7 K/uL (0.0-0.8)   08/20/18  06:16    


 


Eos # (Auto)  0.4 K/uL (0.0-0.7)   08/20/18  06:16    


 


Baso # (Auto)  0.1 K/uL (0.0-0.2)   08/20/18  06:16    


 


ESR  13 mm/hr (0-20)   08/18/18  06:36    


 


PT  13.4 SECONDS (9.7-12.2)  H  08/17/18  13:23    


 


INR  1.2   08/17/18  13:23    


 


APTT  30 SECONDS (21-34)   08/17/18  13:23    


 


Sodium  142 mmol/L (132-148)   08/20/18  06:16    


 


Potassium  4.0 mmol/L (3.6-5.2)   08/20/18  06:16    


 


Chloride  103 mmol/L ()   08/20/18  06:16    


 


Carbon Dioxide  31 mmol/L (22-30)  H  08/20/18  06:16    


 


Anion Gap  12  (10-20)   08/20/18  06:16    


 


BUN  22 mg/dL (7-17)  H  08/20/18  06:16    


 


Creatinine  1.6 mg/dL (0.7-1.2)  H  08/20/18  06:16    


 


Est GFR ( Amer)  37   08/20/18  06:16    


 


Est GFR (Non-Af Amer)  31   08/20/18  06:16    


 


POC Glucose (mg/dL)  154 mg/dL ()  H  08/23/18  12:05    


 


Random Glucose  91 mg/dL ()   08/20/18  06:16    


 


Calcium  9.3 mg/dl (8.6-10.4)   08/20/18  06:16    


 


Phosphorus  3.6 mg/dL (2.5-4.5)   08/19/18  08:21    


 


Magnesium  1.8 mg/dL (1.6-2.3)   08/19/18  08:21    


 


Total Bilirubin  0.3 mg/dL (0.2-1.3)   08/20/18  06:16    


 


AST  17 U/L (14-36)   08/20/18  06:16    


 


ALT  20 U/L (9-52)   08/20/18  06:16    


 


Alkaline Phosphatase  60 U/L ()   08/20/18  06:16    


 


Total Creatine Kinase  55 U/L ()   08/23/18  02:10    


 


CK-MB (Mass)  0.98 ng/mL (0.0-3.38)   08/23/18  02:10    


 


Troponin I  0.0360 ng/mL (0.00-0.120)   08/23/18  02:10    


 


C-Reactive Protein  7.50 mg/L (0.0-9.9)   08/18/18  06:36    


 


NT-Pro-B Natriuret Pep  6840 pg/mL (0-900)  H  08/17/18  13:23    


 


Total Protein  6.0 g/dL (6.3-8.3)  L  08/20/18  06:16    


 


Albumin  3.3 g/dL (3.5-5.0)  L  08/20/18  06:16    


 


Globulin  2.7 gm/dL (2.2-3.9)   08/20/18  06:16    


 


Albumin/Globulin Ratio  1.2  (1.0-2.1)   08/20/18  06:16    


 


Urine Color  Yellow  (YELLOW)   08/17/18  13:57    


 


Urine Clarity  Clear  (Clear)   08/17/18  13:57    


 


Urine pH  8.0  (5.0-8.0)   08/17/18  13:57    


 


Ur Specific Gravity  1.009  (1.003-1.030)   08/17/18  13:57    


 


Urine Protein  Negative mg/dL (NEGATIVE)   08/17/18  13:57    


 


Urine Glucose (UA)  Normal mg/dL (Normal)   08/17/18  13:57    


 


Urine Ketones  Negative mg/dL (NEGATIVE)   08/17/18  13:57    


 


Urine Blood  1+  (NEGATIVE)  H  08/17/18  13:57    


 


Urine Nitrate  Negative  (NEGATIVE)   08/17/18  13:57    


 


Urine Bilirubin  Negative  (NEGATIVE)   08/17/18  13:57    


 


Urine Urobilinogen  Normal mg/dL (0.2-1.0)   08/17/18  13:57    


 


Ur Leukocyte Esterase  Trace Hunter/uL (Negative)   08/17/18  13:57    


 


Urine WBC (Auto)  2 /hpf (0-5)   08/17/18  13:57    


 


Urine RBC (Auto)  1 /hpf (0-3)   08/17/18  13:57    


 


Ur Squamous Epith Cells  5 /hpf (0-5)   08/17/18  13:57    


 


Blood Type  A POSITIVE   08/17/18  15:58    


 


Antibody Screen  Negative   08/17/18  15:58    


 


Crossmatch  See Detail   08/17/18  15:58    














- Hospital Course


Hospital Course: 





Patient is admitted from emergency room with severe fatigue and congestive 

heart failure


   


HPI


Patient has  ischemic cardiomyopathy with recurrent congestive heart failure.  

The last few days patient has been complaining of shortness of breath severe 

fatigue was evaluated in outpatient with severe fatigue shortness of breath.  

Patient was evaluated in emergency room was found to be in congestive heart 

failure with severe anemia of hemoglobin 8.0 g/dL.





PAST HIST.


Patient has history of coronary artery disease with stents in 2 arteries.  

Ischemic heart cardiomyopathy with left ventricle ejection fraction less than 30

%.  Type II diabetes COPD and AICD.  Patient also has a nutritional anemia 

requiring frequently IN therapy and blood transfusion.


Patient was admitted on the telemetry bed there was no any cardiac arrhythmias 

during the hospital stay.


Cardiac enzymes were negative for any myocardial injury.


Hemoglobin was 8.0.  Patient received 1 packed cells and hemoglobin stabilized 

to 10.6 g/dL.


Patient continued to have wheezing shortness of breath weakness.


In view of the patient's underlying medical conditions and generalized weakness 

patient was transferred to rehab for further treatment and physical therapy.











Discharge Plan





- Discharge Medications


Prescriptions: 


Furosemide [Lasix] 40 mg PO DAILY 999 Days  tab





- Follow Up Plan


Condition: GUARDED


Disposition: HOME/ ROUTINE


Instructions:  Heart Healthy Diet, Heart Failure, Adult (DC), Normocytic 

Normochromic Anemia (DC)


Additional Instructions: 


-PLACE UNDER THE SERVICE OF DR. FIERRO WHILE AT [facility]---CALL DR. FIERRO 

UPON ARRIVAL FOR ADMITTING ORDERS AND BED ASSIGNMENT.


-CONTINUE ALL MEDICATIONS ACCORDING TO THE MED REC FORM---CHANGES CAN BE MADE 

BY DR. FIERRO.


-PHYSICAL THERAPY AS TOLERATED.


-FALL RISK PER  FACILITY PROTOCOL.


-FOR FURTHER ORDERS, CONTACT DR. FIERRO'S OFFICE.


Referrals: 


Ruth Baugh MD [Staff Provider] - 


Osmar Fierro MD [Staff Provider] -

## 2018-08-23 NOTE — CP.PCM.PN
Subjective





- Date & Time of Evaluation


Date of Evaluation: 08/23/18


Time of Evaluation: 15:09





- Subjective


Subjective: 





CHIEF COMPLAINTS TODAY :


 afebrile,


FEELS WEAK/AND TIRED.


DENIES SOB


LESS COUGH











ROS.


HEENT :  N.


Resp :       +VE  cough,  NO wheezing ,pleuritic CP ,or hemoptysis 


Cardio :     No anginal  CP, PND, orthopnea, palpitation 


GI :           No abd.pain, n/v ,diarrhea or GI bleeding .


CNS : No headache, vertigo, focal deficit.


Musculoskel :  No joint swelling ,


Derm :        No rash 


Psych :     Normal affect.


Ext :  No  swelling ,calf pain 





PE.


Pt. is alert awake in no distress.


V.S  As noted in the CHART


Head ,ear nose,throat and eyes : Normal.


Neck : Supple with normal carotids.


Lungs:  FEW  BASILAR RALES


Heart : S1 & S2 normal with S4. No murmur.


Abd : Soft non tender with normal bowel sounds.


Neuro : Moves all ext. with no localized deficit.


Ext : 1+EDEMA, with intact pulses.Non tender calves 


Derm : No rashes or decubitus ulcer.





LABS/RADIOLOGY:


wbc 6.2, h&h STABLE 10.9.


pLATELETS 135.


CREAT 1.6/bun 22


LFTS N


BLOOD CULTURE 8/17/18 -VE FOR 3 DAYS.


uRINE CULTURE 8/17/18-NEGATIVE GROWTH.





ASSESSMENT.


EXACERBATION OF CHF.


BRONCHITIS


ISCHEMIC CARDIOMYOPATHY


AICD.


ANEMIA SYMPTOMATIC


RENAL INSUFFICIENCY.








/PLAN : 





CASE DISCUSSED WITH NP MS HOLLINGSWORTHLA.


CAN DC IV ROCEPHIN ON DC TO [facility].





DIURESIS  IS AS PER PMD.


PT FOR  [facility]. TODAY AS PER PMD








Objective





- Vital Signs/Intake and Output


Vital Signs (last 24 hours): 


 











Temp Pulse Resp BP Pulse Ox


 


 97.6 F   110 H  18   122/95 H  99 


 


 08/23/18 07:25  08/23/18 07:25  08/23/18 07:25  08/23/18 10:11  08/23/18 07:25








Intake and Output: 


 











 08/23/18 08/23/18





 06:59 18:59


 


Intake Total 700 500


 


Balance 700 500














- Medications


Medications: 


 Current Medications





Aspirin (Ecotrin)  81 mg PO DAILY [facility]


   Last Admin: 08/23/18 10:10 Dose:  81 mg


Cyproheptadine HCl (Periactin)  4 mg PO TID [facility]


   Last Admin: 08/23/18 13:57 Dose:  4 mg


Enoxaparin Sodium (Lovenox)  40 mg SC DAILY [facility]


   Last Admin: 08/23/18 10:11 Dose:  40 mg


Ergocalciferol (Drisdol 50,000 Intl Units Cap)  1 cap PO QWK [facility]


Furosemide (Lasix)  40 mg IVP DAILY [facility]


   Last Admin: 08/23/18 10:11 Dose:  40 mg


Gabapentin (Neurontin)  300 mg PO DAILY [facility]


   Last Admin: 08/23/18 10:10 Dose:  300 mg


Ceftriaxone Sodium 1 gm/ (Sodium Chloride)  100 mls @ 100 mls/hr IVPB Q24H [facility]


   PRN Reason: Protocol


   Last Admin: 08/23/18 01:20 Dose:  100 mls/hr


Insulin Glargine (Lantus)  20 unit SC HS [facility]


   Last Admin: 08/22/18 22:09 Dose:  Not Given


Insulin Human Regular (Novolin R)  0 unit SC ACHS [facility]


   PRN Reason: Protocol


   Last Admin: 08/23/18 12:40 Dose:  2 units


Metoprolol Tartrate (Lopressor)  25 mg PO BID [facility]


   Last Admin: 08/23/18 10:10 Dose:  25 mg


Repaglinide (Prandin)  2 mg PO DAILY [facility]


   Last Admin: 08/23/18 12:41 Dose:  2 mg


Rosuvastatin Calcium (Crestor)  20 mg PO HS [facility]


   Last Admin: 08/22/18 22:00 Dose:  20 mg


Sacubitril/Valsartan (Entresto 24 Mg-26 Mg)  1 tab PO DAILY [facility]


   Last Admin: 08/23/18 10:11 Dose:  1 tab


Ticagrelor (Brilinta)  90 mg PO DAILY [facility]


   Last Admin: 08/23/18 10:11 Dose:  90 mg











- Labs


Labs: 


 





 08/20/18 06:16 





 08/20/18 06:16 





 











PT  13.4 SECONDS (9.7-12.2)  H  08/17/18  13:23    


 


INR  1.2   08/17/18  13:23    


 


APTT  30 SECONDS (21-34)   08/17/18  13:23

## 2018-08-23 NOTE — IP.NPCORE
Heart Failure Core Measure





- Heart Failure


Ejection Fraction: Less Than 40 %


ACE Inhibitor Prescribed: No


Contraindication/Reason for not providing: ON ARB COMBINATION PILL


Beta-Blocker Prescribed: None


Contraindication/Reason for not providing: LOPRESSOR BID


Angiotensin II Receptor Blocker Prescribed: Yes


AnticoagulationTherapy for Atrial Fibrillation/Atrialflutter: No


Contraindication/Reason for not providing: NO AFIB


Aldosterone Antagonist Prescribed: No


Contraindication/Reason for not providing: RENAL INSUFFICIENCY


Hydralazine Nitrate Prescribed: No


Contraindication/Reason for not providing: RENAL INSUFFICIENCY


Implantable Cardioverter Defibrillator Therapy: No


Contraindication/Reason for not providing: HAS PPM


Cardiac Resynchronization Therapy Prescribed: No


Contraindication/Reason for not providing: HAS PPM





- Follow up


Will be discharged to: Skilled Nursing Facility ([facility])


Follow Up Date (must be within 7 days from discharge): 08/28/18


Follow Up Time: 09:00

## 2018-08-23 NOTE — CP.PCM.PN
Subjective





- Date & Time of Evaluation


Date of Evaluation: 18


Time of Evaluation: 13:13





- Subjective


Subjective: 


PT CLEARED FOR D/C TODAY TO [facility] PER DR. NIEVES.   HAS AUTH.  I 

DISCUSSED PLAN FOR D/C WITH DR. JANE LIU, WHO IS RECOMMENDING PT TO BE D/C WITH 

[facility] FOR 7 DAYS TOTAL OF ROCEPHIN 1 GM IV DAILY (STARTED ON 18)---PT 

NEEDS 2 MORE DAYS OF IV ABX ( AND ). PT TO BE PLACED UNDER THE SERVICE 

OF DR. NIEVES WHILE AT [facility].  SW TO ARRANGE TRANSPORTATION TO [facility] THIS 

EVENING. NO FURTHER ORDERS.








-PLACE UNDER THE SERVICE OF DR. NIEVES WHILE AT [facility]---CALL DR. NIEVES 

UPON ARRIVAL FOR ADMITTING ORDERS AND BED ASSIGNMENT.


-CONTINUE ALL MEDICATIONS ACCORDING TO THE MED REC FORM---CHANGES CAN BE MADE 

BY DR. NIEVES.


-[facility] CARE PER FACILITY PROTOCOL.  MAY DISCONTINUE [facility] ON SATURDAY, 

AFTER LAST ROCEPHIN DOSE.


-PER DR. SHANNON LIU, CONTINUE THE FOLLOWIN) ROCEPHIN 1 GM IV DAILY X2 MORE DAYS (GIVE ON  AND ).


-PHYSICAL THERAPY AS TOLERATED.


-FALL RISK PER  FACILITY PROTOCOL.


-FOR FURTHER ORDERS, CONTACT DR. NIEVES'S OFFICE.





Objective





- Vital Signs/Intake and Output


Vital Signs (last 24 hours): 


 











Temp Pulse Resp BP Pulse Ox


 


 97.6 F   110 H  18   122/95 H  99 


 


 18 07:25  18 07:25  18 07:25  18 10:11  18 07:25








Intake and Output: 


 











 18





 06:59 18:59


 


Intake Total 700 


 


Balance 700 














- Medications


Medications: 


 Current Medications





Aspirin (Ecotrin)  81 mg PO DAILY [facility]


   Last Admin: 18 10:10 Dose:  81 mg


Cyproheptadine HCl (Periactin)  4 mg PO TID [facility]


   Last Admin: 18 10:11 Dose:  4 mg


Enoxaparin Sodium (Lovenox)  40 mg SC DAILY [facility]


   Last Admin: 18 10:11 Dose:  40 mg


Ergocalciferol (Drisdol 50,000 Intl Units Cap)  1 cap PO QWK [facility]


Furosemide (Lasix)  40 mg IVP DAILY [facility]


   Last Admin: 18 10:11 Dose:  40 mg


Gabapentin (Neurontin)  300 mg PO DAILY [facility]


   Last Admin: 18 10:10 Dose:  300 mg


Ceftriaxone Sodium 1 gm/ (Sodium Chloride)  100 mls @ 100 mls/hr IVPB Q24H [facility]


   PRN Reason: Protocol


   Last Admin: 18 01:20 Dose:  100 mls/hr


Insulin Glargine (Lantus)  20 unit SC HS [facility]


   Last Admin: 18 22:09 Dose:  Not Given


Insulin Human Regular (Novolin R)  0 unit SC ACHS [facility]


   PRN Reason: Protocol


   Last Admin: 18 12:40 Dose:  2 units


Metoprolol Tartrate (Lopressor)  25 mg PO BID [facility]


   Last Admin: 18 10:10 Dose:  25 mg


Repaglinide (Prandin)  2 mg PO DAILY [facility]


   Last Admin: 18 12:41 Dose:  2 mg


Rosuvastatin Calcium (Crestor)  20 mg PO HS [facility]


   Last Admin: 18 22:00 Dose:  20 mg


Sacubitril/Valsartan (Entresto 24 Mg-26 Mg)  1 tab PO DAILY [facility]


   Last Admin: 18 10:11 Dose:  1 tab


Ticagrelor (Brilinta)  90 mg PO DAILY [facility]


   Last Admin: 18 10:11 Dose:  90 mg











- Labs


Labs: 


 





 18 06:16 





 18 06:16 





 











PT  13.4 SECONDS (9.7-12.2)  H  18  13:23    


 


INR  1.2   18  13:23    


 


APTT  30 SECONDS (21-34)   18  13:23

## 2018-08-23 NOTE — CARD
--------------- APPROVED REPORT --------------





Date of service: 08/23/2018



EKG Measurement

Heart [id number]

IA [id number]

[id number]

[id number]

[id number]



<Conclusion>

Sinus tachycardia with premature atrial complexes

Left ventricular hypertrophy with QRS widening and repolarization 

abnormality

Abnormal ECG

## 2018-09-20 ENCOUNTER — HOSPITAL ENCOUNTER (INPATIENT)
Dept: HOSPITAL 31 - C.ER | Age: 83
LOS: 11 days | Discharge: HOME | DRG: 190 | End: 2018-10-01
Attending: INTERNAL MEDICINE | Admitting: INTERNAL MEDICINE
Payer: MEDICARE

## 2018-09-20 VITALS — BODY MASS INDEX: 25.5 KG/M2

## 2018-09-20 DIAGNOSIS — J44.1: Primary | ICD-10-CM

## 2018-09-20 DIAGNOSIS — E09.9: ICD-10-CM

## 2018-09-20 DIAGNOSIS — Z79.4: ICD-10-CM

## 2018-09-20 DIAGNOSIS — I25.5: ICD-10-CM

## 2018-09-20 DIAGNOSIS — I11.0: ICD-10-CM

## 2018-09-20 DIAGNOSIS — I50.23: ICD-10-CM

## 2018-09-20 DIAGNOSIS — I25.10: ICD-10-CM

## 2018-09-20 DIAGNOSIS — D69.6: ICD-10-CM

## 2018-09-20 DIAGNOSIS — K57.90: ICD-10-CM

## 2018-09-20 DIAGNOSIS — E78.00: ICD-10-CM

## 2018-09-20 DIAGNOSIS — Z95.0: ICD-10-CM

## 2018-09-20 DIAGNOSIS — E11.9: ICD-10-CM

## 2018-09-20 DIAGNOSIS — A37.91: ICD-10-CM

## 2018-09-20 DIAGNOSIS — Z86.73: ICD-10-CM

## 2018-09-20 DIAGNOSIS — K52.9: ICD-10-CM

## 2018-09-20 DIAGNOSIS — Z95.5: ICD-10-CM

## 2018-09-20 LAB
ALBUMIN SERPL-MCNC: 3.5 G/DL (ref 3.5–5)
ALBUMIN/GLOB SERPL: 1.2 {RATIO} (ref 1–2.1)
ALT SERPL-CCNC: 19 U/L (ref 9–52)
APTT BLD: 34 SECONDS (ref 21–34)
AST SERPL-CCNC: 26 U/L (ref 14–36)
BACTERIA #/AREA URNS HPF: (no result) /[HPF]
BASOPHILS # BLD AUTO: 0.1 K/UL (ref 0–0.2)
BASOPHILS NFR BLD: 1.6 % (ref 0–2)
BILIRUB UR-MCNC: NEGATIVE MG/DL
BNP SERPL-MCNC: (no result) PG/ML (ref 0–900)
BUN SERPL-MCNC: 14 MG/DL (ref 7–17)
CALCIUM SERPL-MCNC: 9.2 MG/DL (ref 8.6–10.4)
EOSINOPHIL # BLD AUTO: 0.1 K/UL (ref 0–0.7)
EOSINOPHIL NFR BLD: 1.3 % (ref 0–4)
ERYTHROCYTE [DISTWIDTH] IN BLOOD BY AUTOMATED COUNT: 20.6 % (ref 11.5–14.5)
GFR NON-AFRICAN AMERICAN: 39
GLUCOSE UR STRIP-MCNC: NORMAL MG/DL
HGB BLD-MCNC: 9 G/DL (ref 11–16)
HYALINE CASTS #/AREA URNS LPF: (no result) /LPF (ref 0–2)
INR PPP: 1.2
LEUKOCYTE ESTERASE UR-ACNC: (no result) LEU/UL
LIPASE: 201 U/L (ref 23–300)
LYMPHOCYTES # BLD AUTO: 1.2 K/UL (ref 1–4.3)
LYMPHOCYTES NFR BLD AUTO: 16.8 % (ref 20–40)
MCH RBC QN AUTO: 24.9 PG (ref 27–31)
MCHC RBC AUTO-ENTMCNC: 32.7 G/DL (ref 33–37)
MCV RBC AUTO: 76.3 FL (ref 81–99)
MONOCYTES # BLD: 0.5 K/UL (ref 0–0.8)
MONOCYTES NFR BLD: 7.2 % (ref 0–10)
NEUTROPHILS # BLD: 5.2 K/UL (ref 1.8–7)
NEUTROPHILS NFR BLD AUTO: 73.1 % (ref 50–75)
NRBC BLD AUTO-RTO: 0 % (ref 0–2)
PH UR STRIP: 5 [PH] (ref 5–8)
PLATELET # BLD: 125 K/UL (ref 130–400)
PMV BLD AUTO: 10.5 FL (ref 7.2–11.7)
PROT UR STRIP-MCNC: NEGATIVE MG/DL
PROTHROMBIN TIME: 13 SECONDS (ref 9.7–12.2)
RBC # BLD AUTO: 3.6 MIL/UL (ref 3.8–5.2)
RBC # UR STRIP: (no result) /UL
SP GR UR STRIP: 1.01 (ref 1–1.03)
SQUAMOUS EPITHIAL: 1 /HPF (ref 0–5)
TROPONIN I SERPL-MCNC: 0.03 NG/ML (ref 0–0.12)
UROBILINOGEN UR-MCNC: NORMAL MG/DL (ref 0.2–1)
WBC # BLD AUTO: 7.2 K/UL (ref 4.8–10.8)

## 2018-09-20 NOTE — C.PDOC
History Of Present Illness


87 y/o F with PMHx of COPD, CAD with LAD stent and CHF with EF:20%, presents to 

the ED complaining of chest pain for 1 week, associated with SOB and productive 

cough. Cough is bringing up white sputum. Patient was also complaining of LLQ 

pain yesterday, was seen by Dr. TAVON Michaud, and started on doxycycline and flagyl. 

Denies any fever, chills, GI bleed, nausea, vomiting, or diarrhea. 





Time Seen by Provider: 09/20/18 14:17


Chief Complaint (Nursing): Shortness Of Breath


History Per: Patient


History/Exam Limitations: no limitations


Onset/Duration Of Symptoms: Days


Current Symptoms Are (Timing): Still Present





Past Medical History


Reviewed: Historical Data, Nursing Documentation, Vital Signs


Vital Signs: 


 Last Vital Signs











Temp  98.5 F   09/20/18 16:34


 


Pulse  99 H  09/20/18 16:34


 


Resp  22   09/20/18 16:34


 


BP  126/59 L  09/20/18 16:41


 


Pulse Ox  98   09/20/18 18:19














- Medical History


PMH: Anemia, Anxiety, Arthritis, Asthma, Cardia Arrhythmia, CHF, COPD, Diabetes

, HTN, Hypercholesterolemia, TIA


   Denies: Chronic Kidney Disease


Surgical History: Appendectomy, Coronary Stent, Endoscopy, Pacemaker (2016)





- [facility] Procedures








ASSISTANCE WITH RESPIRATORY VENTILATION, 24-96 HRS, CPAP (11/28/17)


ASSISTANCE WITH RESPIRATORY VENTILATION, <24 HRS, CPAP (07/21/16)


ESOPHAGOGASTRODUODENOSCOPY [EGD] W/CLOSED BIOPSY (06/14/15)


LEFT HEART CARDIAC CATH (04/03/14)


LT HEART ANGIOCARDIOGRAM (04/03/14)


MEASURE OF CARDIAC SAMPL & PRESSURE, L HEART, PERC APPROACH (11/03/15)


PACKED CELL TRANSFUSION (06/14/15)


PLAIN RADIOGRAPHY OF MULT COR ART USING OTH CONTRAST (11/03/15)


TRANSFUSE NONAUT RED BLOOD CELLS IN PERIPH VEIN, PERC (07/21/16)








Family History: States: No Known Family Hx





- Social History


Hx Tobacco Use: No


Hx Alcohol Use: No


Hx Substance Use: No





- Immunization History


Hx Tetanus Toxoid Vaccination: No


Hx Influenza Vaccination: Yes


Hx Pneumococcal Vaccination: Yes





Review Of Systems


Except As Marked, All Systems Reviewed And Found Negative.


Constitutional: Negative for: Fever, Sweats


Cardiovascular: Positive for: Chest Pain


Respiratory: Positive for: Cough, Shortness of Breath, Sputum


Gastrointestinal: Positive for: Abdominal Pain.  Negative for: Nausea, Vomiting

, Diarrhea





Physical Exam





- Physical Exam


Appears: Non-toxic, No Acute Distress


Skin: Normal Color, Warm, Dry


Head: Atraumatic, Normacephalic


Eye(s): bilateral: Normal Inspection, PERRL, EOMI


Oral Mucosa: Moist


Neck: Normal ROM


Chest: Symmetrical, No Deformity, No Tenderness


Cardiovascular: Rhythm Regular, No Murmur


Respiratory: No Rales, No Rhonchi, Wheezing (bilaterally), Other (No 

respiratory distress)


Gastrointestinal/Abdominal: Bowel Sounds (active), Soft, Tenderness (to the 

left lower quadrant), No Guarding, No Rebound


Extremity: Bilateral: Atraumatic, Normal Color And Temperature, Normal ROM


Neurological/Psych: Oriented x3, Normal Speech





ED Course And Treatment





- Laboratory Results


Result Diagrams: 


 09/20/18 15:24





 09/20/18 15:24


O2 Sat by Pulse Oximetry: 98 (on RA)


Pulse Ox Interpretation: Normal





Medical Decision Making


Medical Decision Making: 





Plan:


* EKG


* CMP


* Troponin I


* Pro-BNP


* CK


* Lipase


* CBC


* PTT/PT


* Urinalysis


* Chest x-ray


* Aspirin 324 mg PO


* Duoneb 3 ml INH


* Peak Flow pre/post neb


* CT Abd/Pelvis with IV contrast








EKG shows sinus tachycardia at 104bpm with pacs and LBBB. Unchanged from prior 

on 6/9/18





Cxray:


Cardiomegaly appears stable however inspiratory volume is diminished.  No 

pulmonary vascular congestion.  Chronic interstitial pulmonary changes 

reiterated.  AICD/pacemaker reiterated.





4:31


CT unable to be performed with contrast due to GFR.  Afebrile with normal wbc 

and already on antibiotics. Dr. Fierro requesting CT with po only.  BNP 

elevated.  Trop mildly elevated.  Already given aspirin. Lasix ordered. For COPD

- additional nebs ordered and steroids.








CT FINDINGS:





LOWER THORAX:


Large, stable hiatal hernia.





Stable cardiomegaly. 





Small bilateral pleural effusions and associated compressive atelectasis. 





GALLBLADDER AND BILE DUCTS:


Distended gallbladder without evidence of gallstones or other pathologic 

process. 





BOWEL:


Thickening of wall of the descending colon which is diffuse.  


Diverticulosis without an acute inflammatory component or other associated 

pathologic process.





PERITONEUM:


Thickening of the wall of the descending colon the overall pattern/appearance 

suggests colitis.





Diverticulosis without an acute inflammatory component or other associated 

pathologic process. 





Disposition





- Disposition


Disposition: HOSPITALIZED


Disposition Time: 16:12


Condition: FAIR





- Clinical Impression


Clinical Impression: 


 Chronic congestive heart failure, Chr obstructive pulmonary disease w/ acute 

lower respiratory infxn, CHF exacerbation, Anemia, Colitis, Hematuria, Pleural 

effusion








- Scribe Statement


The provider has reviewed the documentation as recorded by the Scribe (Renee Laboy)


Provider Attestation: 





All medical record entries made by the Yanaibe were at my direction and 

personally dictated by me. I have reviewed the chart and agree that the record 

accurately reflects my personal performance of the history, physical exam, 

medical decision making, and the department course for this patient. I have 

also personally directed, reviewed, and agree with the discharge instructions 

and disposition.

## 2018-09-20 NOTE — CP.PCM.HP
History of Present Illness





- History of Present Illness


History of Present Illness: 





COMPREHENSIVE   HISTORY & PHYSICAL EXAM 


Patient is admitted from [facility] emergency room with shortness of 

breath cough expectoration


   


HPI


For the last few days patient has been complaining of cough expectoration white 

to yellowish with increasing wheezing symptoms increase in intensity and 

severity patient became short of breath at rest.  Patient presented to [facility] Emergency Room found to have acute COPD with exacerbation and possible 

respiratory infection the proBNP was elevated at 29,000 chest x-ray did not 

show any acute infiltrate.


Few days ago patient had lower abdominal pain was seen by local MD and was 

prescribed Flagyl for possible diverticulitis.  CT of the abdomen with oral 

contrast did not  revealed any diverticulitis.





PAST HIST.


Patient has history of ischemic cardiomyopathy with left antegrade ejection 

fraction of less than 30%.  Patient also has AICD coronary artery disease with 

stent type II diabetes COPD and anemia.


PERSONAL HIST:   Smoking.   N   Alcohol.   N      Allergy N            Travel_-

      .


FAMILY HIST : 


ROS :


Constitutional: Negative for weight change, chills, night sweats,  Eyes: 

Negative for redness, swelling, itching, discharge, vision changes, blurry 

vision, double vision, glaucoma, cataracts, 


  Ears: Negative for hearing loss, ringing, , tinnitus, vertigo


 Nose: Negative for rhinorrhea, stuffiness, sniffing, itching, postnasal drip, 

discoloration, nasal congestion and epistaxis. 


 Throat: Negative for throat clearing, sore throat, hoarseness, difficulty 

swallowing and difficulty speaking. 


  Respiratory: Negative hemoptysis, 


Cardiovascular: Negative, Edema of legs, leg cramps, angina, claudication, , 

irregular heartbeat,


 Neurology: Negative for irritability, muscle weakness, numbness and tingling, 

seizures, tremors, migraines, slurred speech, syncope, memory loss, mood changes

, recurrent headaches 


Gastrointestinal: Negative for difficulty swallowing, diarrhea, constipation, 

black stools, rectal bleeding, nausea, flatulence, reflux, poor appetite, 

changes in bowel habits, abdominal pain


  Genitourinary: Negative for frequent urination, hematuria, discharge, 

incontinence, urinary retention, frequent UTI, Psychiatric: Negative for 

depression, anxiety/panic, suicidal tendencies, 


Musculoskeletal: Negative for swollen joints, back pain, , neck pain, morning 

stiffness of joints, . 


 Skin: Negative for rash, ulcers, itching, dry skin and pigmented lesions.


P/E: 


  Constitutional: Appears stated age and in no apparent distress. 


 Head: Normocephalic. 


  Ears: External ear canals patent without inflammation. Tympanic membranes 

intact with normal light reflex and landmark. 


  Eyes: Pupils are central, bilaterally equal, symmetrical and reacts to light 

with normal movements and no icterus or pallor. 


 Nose: External nares are patent. Mucosa is pink  Mouth-Throat: Good general 

appearance and condition. No post-pharyngeal/oropharyngeal erythema and 

tonsillar hypertrophy. Good dental hygiene. 


  Neck-Lymphatic: Neck is supple with normal ROM, no thyromegaly, lymph nodes 

or masses. JVD is normal with no carotid bruit. 


  Lungs poor air entry with basal crackles 


  Cardiovascular: S1 and S2 are normal with no murmurs, gallops and rub. 


  GI Exam: No hepatomegaly. Abdomen is soft and non-tender. No Organomegaly , 

masses or hernias are evident and bowel sounds are normal and active. 


  Neurology: Higher function and all cranial nerves intact, with no gross motor 

or sensory deficit. Superficial and deep reflexes are normal with downwards 

planters. No cerebellar deficit with normal gait. 


  Musculoskeletal: No tender spots with normal curvature of the spine with no 

swelling or restricted ROM of the small and large joints. 


  Extremities: Homans sign absent. Intact pulses with no pitting edema, calf 

tenderness or skin color changes. 


  Skin: No rash, eruptions or abnormal skin pigmentation





LAB/RADIOLOGY:


ASSESMENT :


Patient of COPD with respiratory infection


Acute on chronic systolic heart failure with reduced ejection fraction


Ischemic cardiomyopathy with coronary artery disease with stent


Type II diabetes and anemia.


Plan


Monitor on telemetry IV Lasix steroids IV antibiotics and ID evaluation.





Present on Admission





- Present on Admission


Any Indicators Present on Admission: No





Past Patient History





- Infectious Disease


Hx of Infectious Diseases: None





- Tetanus Immunizations


Tetanus Immunization: Unknown





- Past Medical History & Family History


Past Medical History?: Yes





- Past Social History


Smoking Status: Never Smoked





- CARDIAC


Hx Cardia Arrhythmia: Yes


Hx Congestive Heart Failure: Yes


Hx Hypercholesterolemia: Yes


Hx Hypertension: Yes


Hx Pacemaker: Yes (2016)





- PULMONARY


Hx Asthma: Yes


Hx Chronic Obstructive Pulmonary Disease (COPD): Yes





- NEUROLOGICAL


Hx Transient Ischemic Attacks (TIA): Yes





- HEENT


Hx HEENT Problems: No





- RENAL


Hx Chronic Kidney Disease: No





- ENDOCRINE/METABOLIC


Hx Diabetes Mellitus Type 2: Yes





- HEMATOLOGICAL/ONCOLOGICAL


Hx Anemia: Yes





- INTEGUMENTARY


Hx Dermatological Problems: No





- MUSCULOSKELETAL/RHEUMATOLOGICAL


Hx Arthritis: Yes





- GASTROINTESTINAL


Hx Gastrointestinal Disorders: No





- GENITOURINARY/GYNECOLOGICAL


Hx Genitourinary Disorders: No





- PSYCHIATRIC


Hx Anxiety: Yes


Hx Substance Use: No





- SURGICAL HISTORY


Hx Appendectomy: Yes


Hx Coronary Stent: Yes





- ANESTHESIA


Hx Anesthesia: Yes


Hx Anesthesia Reactions: No


Hx Malignant Hyperthermia: No





Meds


Allergies/Adverse Reactions: 


 Allergies











Allergy/AdvReac Type Severity Reaction Status Date / Time


 


clopidogrel Allergy Mild SWELLING Verified 06/06/18 13:34














Results





- Vital Signs


Recent Vital Signs: 





 Last Vital Signs











Temp  98.0 F   09/20/18 18:45


 


Pulse  102 H  09/20/18 18:45


 


Resp  22   09/20/18 18:45


 


BP  117/68   09/20/18 18:45


 


Pulse Ox  98   09/20/18 18:45














- Labs


Result Diagrams: 


 09/20/18 15:24





 09/20/18 15:24


Labs: 





 Laboratory Results - last 24 hr











  09/20/18 09/20/18 09/20/18





  14:25 15:24 15:24


 


WBC   7.2 


 


RBC   3.60 L 


 


Hgb   9.0 L 


 


Hct   27.5 L 


 


MCV   76.3 L 


 


MCH   24.9 L 


 


MCHC   32.7 L 


 


RDW   20.6 H 


 


Plt Count   125 L 


 


MPV   10.5 


 


Neut % (Auto)   73.1 


 


Lymph % (Auto)   16.8 L 


 


Mono % (Auto)   7.2 


 


Eos % (Auto)   1.3 


 


Baso % (Auto)   1.6 


 


Neut # (Auto)   5.2 


 


Lymph # (Auto)   1.2 


 


Mono # (Auto)   0.5 


 


Eos # (Auto)   0.1 


 


Baso # (Auto)   0.1 


 


Differential Comment    


 


PT    13.0 H


 


INR    1.2


 


APTT    34


 


Sodium   


 


Potassium   


 


Chloride   


 


Carbon Dioxide   


 


Anion Gap   


 


BUN   


 


Creatinine   


 


Est GFR ( Amer)   


 


Est GFR (Non-Af Amer)   


 


POC Glucose (mg/dL)  166 H  


 


Random Glucose   


 


Calcium   


 


Total Bilirubin   


 


AST   


 


ALT   


 


Alkaline Phosphatase   


 


Total Creatine Kinase   


 


Troponin I   


 


NT-Pro-B Natriuret Pep   


 


Total Protein   


 


Albumin   


 


Globulin   


 


Albumin/Globulin Ratio   


 


Lipase   


 


Urine Color   


 


Urine Clarity   


 


Urine pH   


 


Ur Specific Gravity   


 


Urine Protein   


 


Urine Glucose (UA)   


 


Urine Ketones   


 


Urine Blood   


 


Urine Nitrate   


 


Urine Bilirubin   


 


Urine Urobilinogen   


 


Ur Leukocyte Esterase   


 


Urine WBC (Auto)   


 


Urine RBC (Auto)   


 


Ur Squamous Epith Cells   


 


Urine Bacteria   


 


Hyaline Casts   














  09/20/18 09/20/18





  15:24 17:51


 


WBC  


 


RBC  


 


Hgb  


 


Hct  


 


MCV  


 


MCH  


 


MCHC  


 


RDW  


 


Plt Count  


 


MPV  


 


Neut % (Auto)  


 


Lymph % (Auto)  


 


Mono % (Auto)  


 


Eos % (Auto)  


 


Baso % (Auto)  


 


Neut # (Auto)  


 


Lymph # (Auto)  


 


Mono # (Auto)  


 


Eos # (Auto)  


 


Baso # (Auto)  


 


Differential Comment  


 


PT  


 


INR  


 


APTT  


 


Sodium  139 


 


Potassium  4.2 


 


Chloride  105 


 


Carbon Dioxide  23 


 


Anion Gap  15 


 


BUN  14 


 


Creatinine  1.3 H 


 


Est GFR ( Amer)  47 


 


Est GFR (Non-Af Amer)  39 


 


POC Glucose (mg/dL)  


 


Random Glucose  162 H 


 


Calcium  9.2 


 


Total Bilirubin  0.8 


 


AST  26 


 


ALT  19 


 


Alkaline Phosphatase  71 


 


Total Creatine Kinase  144 H 


 


Troponin I  0.0280 


 


NT-Pro-B Natriuret Pep  76141 H 


 


Total Protein  6.3 


 


Albumin  3.5 


 


Globulin  2.9 


 


Albumin/Globulin Ratio  1.2 


 


Lipase  201 


 


Urine Color   Yellow


 


Urine Clarity   Clear


 


Urine pH   5.0


 


Ur Specific Gravity   1.008


 


Urine Protein   Negative


 


Urine Glucose (UA)   Normal


 


Urine Ketones   Negative


 


Urine Blood   2+ H


 


Urine Nitrate   Negative


 


Urine Bilirubin   Negative


 


Urine Urobilinogen   Normal


 


Ur Leukocyte Esterase   Neg


 


Urine WBC (Auto)   2


 


Urine RBC (Auto)   20 H


 


Ur Squamous Epith Cells   1


 


Urine Bacteria   Rare


 


Hyaline Casts   3-5 H

## 2018-09-20 NOTE — RAD
Date of service: 



09/20/2018



HISTORY:

 cough, sob 



COMPARISON:

Chest radiographs 08/18/2018. 



FINDINGS:



LUNGS:

Inspiratory volume appears diminished however no definite acute 

alveolitis pattern is appreciated.  Chronic interstitial pulmonary 

changes persist but appears stable



PLEURA:

No significant pleural effusion identified, no pneumothorax apparent.



CARDIOVASCULAR:

Stable cardiomegaly.  No definite pulmonary vascular congestion.  

AICD/pacemaker reiterated. 



OSSEOUS STRUCTURES:

No significant abnormalities.



VISUALIZED UPPER ABDOMEN:

Normal.



OTHER FINDINGS:

None.



IMPRESSION:

Cardiomegaly appears stable however inspiratory volume is diminished. 

 No pulmonary vascular congestion.  Chronic interstitial pulmonary 

changes reiterated.  AICD/pacemaker reiterated.

## 2018-09-20 NOTE — CT
Date of service: 



09/20/2018



PROCEDURE:  CT Abdomen and Pelvis with contrast



HISTORY:

LLQ pain



COMPARISON:

09/18/2015.  CT abdomen and pelvis



TECHNIQUE:

Oral contrast only. 



Radiation dose:



Total exam DLP = 226.12 mGy-cm.



This CT exam was performed using one or more of the following dose 

reduction techniques: Automated exposure control, adjustment of the 

mA and/or kV according to patient size, and/or use of iterative 

reconstruction technique.



FINDINGS:



LOWER THORAX:

Large, stable hiatal hernia.



Stable cardiomegaly. 



Small bilateral pleural effusions and associated compressive 

atelectasis. 



LIVER:

Unremarkable. No gross lesion or ductal dilatation. 



GALLBLADDER AND BILE DUCTS:

Distended gallbladder without evidence of gallstones or other 

pathologic process. 



PANCREAS:

Unremarkable. No gross lesion or ductal dilatation.



SPLEEN:

Unremarkable. 



ADRENALS:

Unremarkable. No mass. 



KIDNEYS AND URETERS:

Unremarkable. No hydronephrosis. No solid mass. 



VASCULATURE:

Unremarkable. No aortic aneurysm. 



BOWEL:

Thickening of wall of the descending colon which is diffuse.  



Diverticulosis without an acute inflammatory component or other 

associated pathologic process.







APPENDIX:

Normal appendix. 



PERITONEUM:

Thickening of the wall of the descending colon the overall 

pattern/appearance suggests colitis.



Diverticulosis without an acute inflammatory component or other 

associated pathologic process. 



LYMPH NODES:

Unremarkable. No enlarged lymph nodes. 



BLADDER:

Unremarkable. 



REPRODUCTIVE:

Unremarkable. 



BONES:

No acute fracture. 



OTHER FINDINGS:

None.



IMPRESSION:

Unremarkable contrast enhanced CT of the abdomen and pelvis.

## 2018-09-21 LAB
ALBUMIN SERPL-MCNC: 3.4 G/DL (ref 3.5–5)
ALBUMIN/GLOB SERPL: 1.3 {RATIO} (ref 1–2.1)
ALT SERPL-CCNC: 23 U/L (ref 9–52)
ANISOCYTOSIS BLD QL SMEAR: (no result)
APTT BLD: 30 SECONDS (ref 21–34)
AST SERPL-CCNC: 18 U/L (ref 14–36)
BASOPHILS # BLD AUTO: 0 K/UL (ref 0–0.2)
BASOPHILS NFR BLD: 0.1 % (ref 0–2)
BUN SERPL-MCNC: 18 MG/DL (ref 7–17)
CALCIUM SERPL-MCNC: 8.9 MG/DL (ref 8.6–10.4)
CK MB SERPL-MCNC: 2.54 NG/ML (ref 0–3.38)
CK MB SERPL-MCNC: 2.7 NG/ML (ref 0–3.38)
CK MB SERPL-MCNC: 3.16 NG/ML (ref 0–3.38)
EOSINOPHIL # BLD AUTO: 0 K/UL (ref 0–0.7)
EOSINOPHIL NFR BLD: 0 % (ref 0–4)
ERYTHROCYTE [DISTWIDTH] IN BLOOD BY AUTOMATED COUNT: 20.4 % (ref 11.5–14.5)
GFR NON-AFRICAN AMERICAN: 36
HGB BLD-MCNC: 8.7 G/DL (ref 11–16)
HYPOCHROMIC: SLIGHT
INR PPP: 1.2
LYMPHOCYTE: 6 % (ref 20–40)
LYMPHOCYTES # BLD AUTO: 0.3 K/UL (ref 1–4.3)
LYMPHOCYTES NFR BLD AUTO: 5.5 % (ref 20–40)
MCH RBC QN AUTO: 25.2 PG (ref 27–31)
MCHC RBC AUTO-ENTMCNC: 33 G/DL (ref 33–37)
MCV RBC AUTO: 76.5 FL (ref 81–99)
MONOCYTE: 1 % (ref 0–10)
MONOCYTES # BLD: 0.1 K/UL (ref 0–0.8)
MONOCYTES NFR BLD: 1.4 % (ref 0–10)
NEUTROPHILS # BLD: 5.1 K/UL (ref 1.8–7)
NEUTROPHILS NFR BLD AUTO: 93 % (ref 50–75)
NEUTROPHILS NFR BLD AUTO: 93 % (ref 50–75)
NRBC BLD AUTO-RTO: 0.1 % (ref 0–2)
PLATELET # BLD EST: (no result) 10*3/UL
PLATELET # BLD: 112 K/UL (ref 130–400)
PMV BLD AUTO: 10.7 FL (ref 7.2–11.7)
PROTHROMBIN TIME: 12.9 SECONDS (ref 9.7–12.2)
RBC # BLD AUTO: 3.44 MIL/UL (ref 3.8–5.2)
TOTAL CELLS COUNTED BLD: 100
TROPONIN I SERPL-MCNC: 0.02 NG/ML (ref 0–0.12)
TROPONIN I SERPL-MCNC: 0.03 NG/ML (ref 0–0.12)
TROPONIN I SERPL-MCNC: 0.03 NG/ML (ref 0–0.12)
WBC # BLD AUTO: 5.5 K/UL (ref 4.8–10.8)

## 2018-09-21 RX ADMIN — HUMAN INSULIN SCH U: 100 INJECTION, SOLUTION SUBCUTANEOUS at 12:06

## 2018-09-21 RX ADMIN — METHYLPREDNISOLONE SODIUM SUCCINATE SCH MG: 40 INJECTION, POWDER, FOR SOLUTION INTRAMUSCULAR; INTRAVENOUS at 19:12

## 2018-09-21 RX ADMIN — IPRATROPIUM BROMIDE AND ALBUTEROL SULFATE SCH ML: .5; 3 SOLUTION RESPIRATORY (INHALATION) at 15:44

## 2018-09-21 RX ADMIN — IPRATROPIUM BROMIDE AND ALBUTEROL SULFATE SCH ML: .5; 3 SOLUTION RESPIRATORY (INHALATION) at 23:36

## 2018-09-21 RX ADMIN — SACUBITRIL AND VALSARTAN SCH TAB: 24; 26 TABLET, FILM COATED ORAL at 09:30

## 2018-09-21 RX ADMIN — HUMAN INSULIN SCH U: 100 INJECTION, SOLUTION SUBCUTANEOUS at 08:36

## 2018-09-21 RX ADMIN — IPRATROPIUM BROMIDE AND ALBUTEROL SULFATE SCH ML: .5; 3 SOLUTION RESPIRATORY (INHALATION) at 03:10

## 2018-09-21 RX ADMIN — HUMAN INSULIN SCH: 100 INJECTION, SOLUTION SUBCUTANEOUS at 22:01

## 2018-09-21 RX ADMIN — IPRATROPIUM BROMIDE AND ALBUTEROL SULFATE SCH ML: .5; 3 SOLUTION RESPIRATORY (INHALATION) at 11:05

## 2018-09-21 RX ADMIN — IPRATROPIUM BROMIDE AND ALBUTEROL SULFATE SCH: .5; 3 SOLUTION RESPIRATORY (INHALATION) at 19:31

## 2018-09-21 RX ADMIN — HUMAN INSULIN SCH U: 100 INJECTION, SOLUTION SUBCUTANEOUS at 17:03

## 2018-09-21 RX ADMIN — IPRATROPIUM BROMIDE AND ALBUTEROL SULFATE SCH ML: .5; 3 SOLUTION RESPIRATORY (INHALATION) at 07:30

## 2018-09-21 NOTE — CP.PCM.PN
Subjective





- Date & Time of Evaluation


Date of Evaluation: 09/21/18


Time of Evaluation: 13:49





- Subjective


Subjective: 





CHIEF COMPLAINTS TODAY :


Patient is still coughing wheezing with mild expectoration


Generalized weakness





ROS.


HEENT :  N.


Resp :       No hemoptysis 


Cardio :     No anginal  CP, PND, orthopnea, palpitation 


GI :           No abd.pain, n/v ,diarrhea or GI bleeding .


CNS : No headache, vertigo, focal deficit.


Musculoskel :  No joint swelling ,


Derm :        No rash 


Psych :     Normal affect.


Ext :  No  swelling ,calf pain 





PE.


Pt. is alert awake in no distress.


V.S  As noted in the chart 


Head ,ear nose,throat and eyes : Normal.


Neck : Supple with normal carotids.


Lungs: Bilateral poor air entry with rhonchis at the bases


Heart : S1 & S2 normal with S4. No murmur.


Abd : Soft non tender with normal bowel sounds.


Neuro : Moves all ext. with no localized deficit.


Ext : No edema with intact pulses.Non tender calves 


Derm : No rashes or decubitus ulcer.





LABS/RADIOLOGY: CT of the abdomen shows thickening of the descending wall





ASSESSMENT/PLAN : 


Decrease steroids


Continue IV antibiotics


GI evaluation











Objective





- Vital Signs/Intake and Output


Vital Signs (last 24 hours): 


 











Temp Pulse Resp BP Pulse Ox


 


 97.8 F   91 H  20   130/69   100 


 


 09/21/18 07:59  09/21/18 07:59  09/21/18 07:59  09/21/18 09:30  09/21/18 11:00








Intake and Output: 


 











 09/21/18 09/21/18





 11:59 23:59


 


Intake Total 120 


 


Balance 120 














- Medications


Medications: 


 Current Medications





Albuterol/Ipratropium (Duoneb 3 Mg/0.5 Mg (3 Ml) Ud)  3 ml INH RQ4 PRN


   PRN Reason: Cough and congestion


Albuterol/Ipratropium (Duoneb 3 Mg/0.5 Mg (3 Ml) Ud)  3 ml INH RQ4 [facility]


   Last Admin: 09/21/18 11:05 Dose:  3 ml


Aspirin (Ecotrin)  81 mg PO DAILY [facility]


   Last Admin: 09/21/18 09:30 Dose:  81 mg


Ergocalciferol (Drisdol 50,000 Intl Units Cap)  1 cap PO QWK [facility]


Famotidine (Pepcid)  20 mg PO BID [facility]


Furosemide (Lasix)  40 mg IVP DAILY [facility]


   Last Admin: 09/21/18 09:30 Dose:  40 mg


Heparin Sodium (Porcine) (Heparin)  5,000 units SC Q12 [facility]


   Last Admin: 09/21/18 09:31 Dose:  5,000 units


Ceftriaxone Sodium 1 gm/ (Sodium Chloride)  100 mls @ 100 mls/hr IVPB DAILY [facility]


   PRN Reason: Protocol


   Last Admin: 09/21/18 09:31 Dose:  100 mls/hr


Insulin Human Regular (Novolin R)  0 unit SC ACHS [facility]


   PRN Reason: Protocol


   Last Admin: 09/21/18 12:06 Dose:  4 u


Methylprednisolone (Solu-Medrol)  60 mg IV Q6 [facility]


   Last Admin: 09/21/18 12:11 Dose:  60 mg


Metoprolol Tartrate (Lopressor)  25 mg PO BID [facility]


   Last Admin: 09/21/18 09:30 Dose:  25 mg


Pneumococcal Polyvalent Vaccine (Pneumovax 23 Vaccine)  0.5 ml IM .ONCE ONE


   Stop: 09/22/18 10:01


Repaglinide (Prandin)  2 mg PO DAILY [facility]


   Last Admin: 09/21/18 09:30 Dose:  2 mg


Rosuvastatin Calcium (Crestor)  20 mg PO [facility]


Sacubitril/Valsartan (Entresto 24 Mg-26 Mg)  1 tab PO DAILY [facility]


   Last Admin: 09/21/18 09:30 Dose:  1 tab


Ticagrelor (Brilinta)  90 mg PO BID [facility]


   Last Admin: 09/21/18 09:31 Dose:  90 mg











- Labs


Labs: 


 





 09/21/18 07:42 





 09/21/18 07:50 





 











PT  12.9 SECONDS (9.7-12.2)  H  09/21/18  07:42    


 


INR  1.2   09/21/18  07:42    


 


APTT  30 SECONDS (21-34)   09/21/18  07:42

## 2018-09-21 NOTE — CP.PCM.CON
History of Present Illness





- History of Present Illness


History of Present Illness: 





CC: GI consult for abnormal CT appearance of Colon





HPI: 86 year old woman with severe cardiomyopathy, COPD, AICD admitted 

yesterday with cough and dyspnea. Patient has frequent post prandial upper 

abdominal pain. EGD done in 2015 showed large hiatal hernia. Patient has long 

history of microcytic anemia. She states she had colonoscopy around 5 years ago

, but details are presently inknown. Patient was found to have left colonic 

thickening on CT with diverticulosis. She was begun on antibiotics. Patient 

notes yellow mucoid stools.





Review of Systems





- Constitutional


Constitutional: absent: Chills





- EENT


Eyes: absent: Change in Vision


Ears: absent: Ear Pain


Nose/Mouth/Throat: absent: Epistaxis





- Cardiovascular


Cardiovascular: Chest Pain





- Respiratory


Respiratory: Cough, Dyspnea





- Gastrointestinal


Gastrointestinal: Abdominal Pain, Bloating, Constipation.  absent: Hematochezia

, Melena





- Genitourinary


Genitourinary: absent: Difficulty Urinating





- Musculoskeletal


Musculoskeletal: absent: Back Pain





- Integumentary


Integumentary: absent: Jaundice





- Neurological


Neurological: absent: Dizziness





- Psychiatric


Psychiatric: absent: Confusion





Past Patient History





- Infectious Disease


Hx of Infectious Diseases: None





- Tetanus Immunizations


Tetanus Immunization: Unknown





- Past Medical History & Family History


Past Medical History?: Yes





- Past Social History


Smoking Status: Never Smoked





- CARDIAC


Hx Cardia Arrhythmia: Yes


Hx Congestive Heart Failure: Yes


Hx Hypercholesterolemia: Yes


Hx Hypertension: Yes


Hx Pacemaker: Yes (2016)





- PULMONARY


Hx Asthma: Yes


Hx Chronic Obstructive Pulmonary Disease (COPD): Yes





- NEUROLOGICAL


Hx Transient Ischemic Attacks (TIA): Yes





- HEENT


Hx HEENT Problems: No





- RENAL


Hx Chronic Kidney Disease: No





- ENDOCRINE/METABOLIC


Hx Diabetes Mellitus Type 2: Yes





- HEMATOLOGICAL/ONCOLOGICAL


Hx Anemia: Yes





- INTEGUMENTARY


Hx Dermatological Problems: No





- MUSCULOSKELETAL/RHEUMATOLOGICAL


Hx Arthritis: Yes





- GASTROINTESTINAL


Hx Gastrointestinal Disorders: No





- GENITOURINARY/GYNECOLOGICAL


Hx Genitourinary Disorders: No





- PSYCHIATRIC


Hx Anxiety: Yes


Hx Substance Use: No





- SURGICAL HISTORY


Hx Appendectomy: Yes


Hx Coronary Stent: Yes





- ANESTHESIA


Hx Anesthesia: Yes


Hx Anesthesia Reactions: No


Hx Malignant Hyperthermia: No





Meds


Allergies/Adverse Reactions: 


 Allergies











Allergy/AdvReac Type Severity Reaction Status Date / Time


 


clopidogrel Allergy Mild SWELLING Verified 06/06/18 13:34














- Medications


Medications: 


 Current Medications





Albuterol/Ipratropium (Duoneb 3 Mg/0.5 Mg (3 Ml) Ud)  3 ml INH RQ4 PRN


   PRN Reason: Cough and congestion


Albuterol/Ipratropium (Duoneb 3 Mg/0.5 Mg (3 Ml) Ud)  3 ml INH RQ4 Replaced by [facility]


   Last Admin: 09/21/18 15:44 Dose:  3 ml


Aspirin (Ecotrin)  81 mg PO DAILY Replaced by [facility]


   Last Admin: 09/21/18 09:30 Dose:  81 mg


Ergocalciferol (Drisdol 50,000 Intl Units Cap)  1 cap PO QWK Replaced by [facility]


Famotidine (Pepcid)  20 mg PO BID Replaced by [facility]


Furosemide (Lasix)  40 mg IVP DAILY Replaced by [facility]


   Last Admin: 09/21/18 09:30 Dose:  40 mg


Heparin Sodium (Porcine) (Heparin)  5,000 units SC Q12 Replaced by [facility]


   Last Admin: 09/21/18 09:31 Dose:  5,000 units


Ceftriaxone Sodium 1 gm/ (Sodium Chloride)  100 mls @ 100 mls/hr IVPB DAILY Replaced by [facility]


   PRN Reason: Protocol


   Last Admin: 09/21/18 09:31 Dose:  100 mls/hr


Insulin Human Regular (Novolin R)  0 unit SC ACHS Replaced by [facility]


   PRN Reason: Protocol


   Last Admin: 09/21/18 12:06 Dose:  4 u


Methylprednisolone (Solu-Medrol)  30 mg IV Q6H Replaced by [facility]


Metoprolol Tartrate (Lopressor)  25 mg PO BID Replaced by [facility]


   Last Admin: 09/21/18 09:30 Dose:  25 mg


Pneumococcal Polyvalent Vaccine (Pneumovax 23 Vaccine)  0.5 ml IM .ONCE ONE


   Stop: 09/22/18 10:01


Repaglinide (Prandin)  2 mg PO DAILY Replaced by [facility]


   Last Admin: 09/21/18 09:30 Dose:  2 mg


Rosuvastatin Calcium (Crestor)  20 mg PO HS Replaced by [facility]


Sacubitril/Valsartan (Entresto 24 Mg-26 Mg)  1 tab PO DAILY Replaced by [facility]


   Last Admin: 09/21/18 09:30 Dose:  1 tab


Ticagrelor (Brilinta)  90 mg PO BID Replaced by [facility]


   Last Admin: 09/21/18 09:31 Dose:  90 mg











Physical Exam





- Constitutional


Appears: Younger Than Stated Age, Chronically Ill





- Head Exam


Head Exam: NORMOCEPHALIC





- Eye Exam


Eye Exam: absent: Scleral icterus





- ENT Exam


ENT Exam: Normal Exam





- Neck Exam


Neck exam: Positive for: Normal Inspection





- Respiratory Exam


Respiratory Exam: Clear to Auscultation Bilateral





- Cardiovascular Exam


Cardiovascular Exam: REGULAR RHYTHM





- GI/Abdominal Exam


GI & Abdominal Exam: Normal Bowel Sounds, Soft.  absent: Distended, Mass, 

Rebound, Tenderness





- Rectal Exam


Rectal Exam: Deferred





- Extremities Exam


Extremities exam: Positive for: normal inspection





Results





- Vital Signs


Recent Vital Signs: 


 Last Vital Signs











Temp  98.2 F   09/21/18 15:52


 


Pulse  97 H  09/21/18 15:52


 


Resp  20   09/21/18 15:52


 


BP  99/56 L  09/21/18 15:52


 


Pulse Ox  100   09/21/18 15:52














- Labs


Result Diagrams: 


 09/21/18 07:42





 09/21/18 07:50


Labs: 


 Laboratory Results - last 24 hr











  09/20/18 09/20/18 09/20/18





  15:24 15:24 17:51


 


WBC  7.2  


 


RBC  3.60 L  


 


Hgb  9.0 L  


 


Hct  27.5 L  


 


MCV  76.3 L  


 


MCH  24.9 L  


 


MCHC  32.7 L  


 


RDW  20.6 H  


 


Plt Count  125 L  


 


MPV  10.5  


 


Neut % (Auto)  73.1  


 


Lymph % (Auto)  16.8 L  


 


Mono % (Auto)  7.2  


 


Eos % (Auto)  1.3  


 


Baso % (Auto)  1.6  


 


Neut # (Auto)  5.2  


 


Lymph # (Auto)  1.2  


 


Mono # (Auto)  0.5  


 


Eos # (Auto)  0.1  


 


Baso # (Auto)  0.1  


 


Neutrophils % (Manual)   


 


Lymphocytes % (Manual)   


 


Monocytes % (Manual)   


 


Differential Comment    


 


Platelet Estimate   


 


Hypochromasia (manual)   


 


Anisocytosis (manual)   


 


PT   


 


INR   


 


APTT   


 


Sodium   


 


Potassium   


 


Chloride   


 


Carbon Dioxide   


 


Anion Gap   


 


BUN   


 


Creatinine   


 


Est GFR ( Amer)   


 


Est GFR (Non-Af Amer)   


 


POC Glucose (mg/dL)   


 


Random Glucose   


 


Calcium   


 


Phosphorus   


 


Magnesium   


 


Total Bilirubin   


 


AST   


 


ALT   


 


Alkaline Phosphatase   


 


Total Creatine Kinase   


 


CK-MB (Mass)   


 


Troponin I   0.0280 


 


NT-Pro-B Natriuret Pep   97791 H 


 


Total Protein   


 


Albumin   


 


Globulin   


 


Albumin/Globulin Ratio   


 


Urine Color    Yellow


 


Urine Clarity    Clear


 


Urine pH    5.0


 


Ur Specific Gravity    1.008


 


Urine Protein    Negative


 


Urine Glucose (UA)    Normal


 


Urine Ketones    Negative


 


Urine Blood    2+ H


 


Urine Nitrate    Negative


 


Urine Bilirubin    Negative


 


Urine Urobilinogen    Normal


 


Ur Leukocyte Esterase    Neg


 


Urine WBC (Auto)    2


 


Urine RBC (Auto)    20 H


 


Ur Squamous Epith Cells    1


 


Urine Bacteria    Rare


 


Hyaline Casts    3-5 H














  09/20/18 09/21/18 09/21/18





  20:58 01:11 07:05


 


WBC   


 


RBC   


 


Hgb   


 


Hct   


 


MCV   


 


MCH   


 


MCHC   


 


RDW   


 


Plt Count   


 


MPV   


 


Neut % (Auto)   


 


Lymph % (Auto)   


 


Mono % (Auto)   


 


Eos % (Auto)   


 


Baso % (Auto)   


 


Neut # (Auto)   


 


Lymph # (Auto)   


 


Mono # (Auto)   


 


Eos # (Auto)   


 


Baso # (Auto)   


 


Neutrophils % (Manual)   


 


Lymphocytes % (Manual)   


 


Monocytes % (Manual)   


 


Differential Comment   


 


Platelet Estimate   


 


Hypochromasia (manual)   


 


Anisocytosis (manual)   


 


PT   


 


INR   


 


APTT   


 


Sodium   


 


Potassium   


 


Chloride   


 


Carbon Dioxide   


 


Anion Gap   


 


BUN   


 


Creatinine   


 


Est GFR ( Amer)   


 


Est GFR (Non-Af Amer)   


 


POC Glucose (mg/dL)  286 H   241 H


 


Random Glucose   


 


Calcium   


 


Phosphorus   


 


Magnesium   


 


Total Bilirubin   


 


AST   


 


ALT   


 


Alkaline Phosphatase   


 


Total Creatine Kinase   148 H 


 


CK-MB (Mass)   2.70 


 


Troponin I   0.0270 


 


NT-Pro-B Natriuret Pep   


 


Total Protein   


 


Albumin   


 


Globulin   


 


Albumin/Globulin Ratio   


 


Urine Color   


 


Urine Clarity   


 


Urine pH   


 


Ur Specific Gravity   


 


Urine Protein   


 


Urine Glucose (UA)   


 


Urine Ketones   


 


Urine Blood   


 


Urine Nitrate   


 


Urine Bilirubin   


 


Urine Urobilinogen   


 


Ur Leukocyte Esterase   


 


Urine WBC (Auto)   


 


Urine RBC (Auto)   


 


Ur Squamous Epith Cells   


 


Urine Bacteria   


 


Hyaline Casts   














  09/21/18 09/21/18 09/21/18





  07:42 07:42 07:50


 


WBC  5.5  


 


RBC  3.44 L  


 


Hgb  8.7 L  


 


Hct  26.3 L  


 


MCV  76.5 L  


 


MCH  25.2 L  


 


MCHC  33.0  


 


RDW  20.4 H  


 


Plt Count  112 L  


 


MPV  10.7  


 


Neut % (Auto)  93.0 H  


 


Lymph % (Auto)  5.5 L  


 


Mono % (Auto)  1.4  


 


Eos % (Auto)  0.0  


 


Baso % (Auto)  0.1  


 


Neut # (Auto)  5.1  


 


Lymph # (Auto)  0.3 L  


 


Mono # (Auto)  0.1  


 


Eos # (Auto)  0.0  


 


Baso # (Auto)  0.0  


 


Neutrophils % (Manual)  93 H  


 


Lymphocytes % (Manual)  6 L  


 


Monocytes % (Manual)  1  


 


Differential Comment   


 


Platelet Estimate  Slightly decreased L  


 


Hypochromasia (manual)  Slight  


 


Anisocytosis (manual)  Moderate  


 


PT   12.9 H 


 


INR   1.2 


 


APTT   30 


 


Sodium    141


 


Potassium    3.8


 


Chloride    103


 


Carbon Dioxide    24


 


Anion Gap    17


 


BUN    18 H


 


Creatinine    1.4 H


 


Est GFR ( Amer)    43


 


Est GFR (Non-Af Amer)    36


 


POC Glucose (mg/dL)   


 


Random Glucose    256 H


 


Calcium    8.9


 


Phosphorus    3.6


 


Magnesium    1.9


 


Total Bilirubin    0.4


 


AST    18


 


ALT    23


 


Alkaline Phosphatase    61


 


Total Creatine Kinase    134


 


CK-MB (Mass)    2.54


 


Troponin I    0.0280


 


NT-Pro-B Natriuret Pep   


 


Total Protein    6.1 L


 


Albumin    3.4 L


 


Globulin    2.7


 


Albumin/Globulin Ratio    1.3


 


Urine Color   


 


Urine Clarity   


 


Urine pH   


 


Ur Specific Gravity   


 


Urine Protein   


 


Urine Glucose (UA)   


 


Urine Ketones   


 


Urine Blood   


 


Urine Nitrate   


 


Urine Bilirubin   


 


Urine Urobilinogen   


 


Ur Leukocyte Esterase   


 


Urine WBC (Auto)   


 


Urine RBC (Auto)   


 


Ur Squamous Epith Cells   


 


Urine Bacteria   


 


Hyaline Casts   














  09/21/18





  11:41


 


WBC 


 


RBC 


 


Hgb 


 


Hct 


 


MCV 


 


MCH 


 


MCHC 


 


RDW 


 


Plt Count 


 


MPV 


 


Neut % (Auto) 


 


Lymph % (Auto) 


 


Mono % (Auto) 


 


Eos % (Auto) 


 


Baso % (Auto) 


 


Neut # (Auto) 


 


Lymph # (Auto) 


 


Mono # (Auto) 


 


Eos # (Auto) 


 


Baso # (Auto) 


 


Neutrophils % (Manual) 


 


Lymphocytes % (Manual) 


 


Monocytes % (Manual) 


 


Differential Comment 


 


Platelet Estimate 


 


Hypochromasia (manual) 


 


Anisocytosis (manual) 


 


PT 


 


INR 


 


APTT 


 


Sodium 


 


Potassium 


 


Chloride 


 


Carbon Dioxide 


 


Anion Gap 


 


BUN 


 


Creatinine 


 


Est GFR ( Amer) 


 


Est GFR (Non-Af Amer) 


 


POC Glucose (mg/dL)  293 H


 


Random Glucose 


 


Calcium 


 


Phosphorus 


 


Magnesium 


 


Total Bilirubin 


 


AST 


 


ALT 


 


Alkaline Phosphatase 


 


Total Creatine Kinase 


 


CK-MB (Mass) 


 


Troponin I 


 


NT-Pro-B Natriuret Pep 


 


Total Protein 


 


Albumin 


 


Globulin 


 


Albumin/Globulin Ratio 


 


Urine Color 


 


Urine Clarity 


 


Urine pH 


 


Ur Specific Gravity 


 


Urine Protein 


 


Urine Glucose (UA) 


 


Urine Ketones 


 


Urine Blood 


 


Urine Nitrate 


 


Urine Bilirubin 


 


Urine Urobilinogen 


 


Ur Leukocyte Esterase 


 


Urine WBC (Auto) 


 


Urine RBC (Auto) 


 


Ur Squamous Epith Cells 


 


Urine Bacteria 


 


Hyaline Casts 














Assessment & Plan


(1) Anemia


Assessment and Plan: 


Acute on chronic microcytic anemia. Has been transfused in past. GI endoscopic 

workup done 3-5 years ago, details not presently available. Now also 

thrombocytopenic.


Rec: stool OB. Iron studies. Would not urgently perform endoscopic procedures 

due to multiple severe comorbidities and acute respiratory illness.


Status: Acute   





(2) CHF exacerbation


Status: Acute   





(3) Chronic congestive heart failure


Status: Acute   





(4) Colitis


Assessment and Plan: 


Suspect CDiff or antibiotic associated colitis


Rec: minimize antibiotic use if possible. Check stool for CDiff toxin and if 

positive would treat with Vancomycin PO. Defer colonoscopy until patient is 

more medically stable.


Status: Acute   





(5) CAD (coronary artery disease)


Status: Acute   





(6) Cardiomyopathy


Status: Acute   





- Date & Time


Date: 09/21/18


Time: 16:15

## 2018-09-21 NOTE — CP.PCM.CON
History of Present Illness





- History of Present Illness


History of Present Illness: 


INFECTIOUS DISEASE CONSULT;


HPI;86-year-old female with multiple medical problems including ischemic 

cardiomyopathy, CAD with 2 stents, ejection fraction 30%, type 2 diabetes 

mellitus, COPD, an AICD who is admitted by the emergency room complaining off 

shortness of breath, cough with yellowish whitish expectorant and increased 

wheezing.  Patient also was experiencing shortness of breath at rest.  In the 

emergency room patient was found to have proBNP of 29,000 with chest x-ray 

showing low inspiratory volumes, and chronic interstitial changes but no 

evidence of alveolitis or PVC.


Patient underwent a CT of the abdomen and pelvis which showed some thickening 

of the wall of the descending colon suggestive of colitis.  The CT did not 

reveal diverticulitis.


Patient was recently found to have lower abdominal pain with mucoid stools and 

was given by mouth Flagyl by a local M.D. which he visited.


Patient also has anemia and had a hemoglobin 8.7.  Patient also complains of 

generalized fatigue and tiredness overall.





INFECTIOUS DISEASE CONSULTATION REQUESTED BY PMD FOR EVALUATION OF PNEUMONIA 

AND EXACERBATION OF COPD.


PATIENT STILL COMPLAINS OFF 2-3 MUCOID STOOLS ALTERNATING WITH SOFT STOOLS.  

pATIENT DENIES HISTORY OF MELENA, NAUSEA OR VOMITING.


PATIENT WAS STARTED ON iv rOCEPHIN 1 G ONCE A DAY .





PMH: Anemia, Anxiety, Arthritis, Asthma, Cardia Arrhythmia, CHF, COPD, Diabetes

, HTN, Hypercholesterolemia, TIA


   Denies: Chronic Kidney Disease


Surgical History: Appendectomy, Coronary Stent, Endoscopy, Pacemaker (2016)





- [facility] Procedures








ASSISTANCE WITH RESPIRATORY VENTILATION, 24-96 HRS, CPAP (11/28/17)


ASSISTANCE WITH RESPIRATORY VENTILATION, <24 HRS, CPAP (07/21/16)


ESOPHAGOGASTRODUODENOSCOPY [EGD] W/CLOSED BIOPSY (06/14/15)


LEFT HEART CARDIAC CATH (04/03/14)


LT HEART ANGIOCARDIOGRAM (04/03/14)


MEASURE OF CARDIAC SAMPL & PRESSURE, L HEART, PERC APPROACH (11/03/15)


PACKED CELL TRANSFUSION (06/14/15)


PLAIN RADIOGRAPHY OF MULT COR ART USING OTH CONTRAST (11/03/15)


TRANSFUSE NONAUT RED BLOOD CELLS IN PERIPH VEIN, PERC (07/21/16)








Family History: States: No Known Family Hx





- Social History


Hx Tobacco Use: No


Hx Alcohol Use: No


Hx Substance Use: No





- Immunization History


Hx Tetanus Toxoid Vaccination: No


Hx Influenza Vaccination: Yes


Hx Pneumococcal Vaccination: Yes





ALLERGY;CLOPIDOGREL.




















Review of Systems





- Constitutional


Constitutional: Fatigue.  absent: Chills, Fever





- EENT


Eyes: absent: Change in Vision


Nose/Mouth/Throat: absent: Mouth Lesions, Sore Throat





- Cardiovascular


Cardiovascular: Dyspnea, Dyspnea on Exertion.  absent: Chest Pain





- Respiratory


Respiratory: Cough, Change in Mucous Color.  absent: Pain with Coughing





- Gastrointestinal


Gastrointestinal: Abdominal Pain (LOWER ABDOMINAL PAIN.), Change in Stool 

Character, Loose Stools.  absent: Nausea, Vomiting





- Genitourinary


Genitourinary: Freq UTI





- Neurological


Neurological: absent: Headaches





- Hematologic/Lymphatic


Hematologic: As Per HPI





Past Patient History





- Infectious Disease


Hx of Infectious Diseases: None





- Tetanus Immunizations


Tetanus Immunization: Unknown





- Past Medical History & Family History


Past Medical History?: Yes





- Past Social History


Smoking Status: Never Smoked





- CARDIAC


Hx Cardia Arrhythmia: Yes


Hx Congestive Heart Failure: Yes


Hx Hypercholesterolemia: Yes


Hx Hypertension: Yes


Hx Pacemaker: Yes (2016)





- PULMONARY


Hx Asthma: Yes


Hx Chronic Obstructive Pulmonary Disease (COPD): Yes





- NEUROLOGICAL


Hx Transient Ischemic Attacks (TIA): Yes





- HEENT


Hx HEENT Problems: No





- RENAL


Hx Chronic Kidney Disease: No





- ENDOCRINE/METABOLIC


Hx Diabetes Mellitus Type 2: Yes





- HEMATOLOGICAL/ONCOLOGICAL


Hx Anemia: Yes





- INTEGUMENTARY


Hx Dermatological Problems: No





- MUSCULOSKELETAL/RHEUMATOLOGICAL


Hx Arthritis: Yes





- GASTROINTESTINAL


Hx Gastrointestinal Disorders: No





- GENITOURINARY/GYNECOLOGICAL


Hx Genitourinary Disorders: No





- PSYCHIATRIC


Hx Anxiety: Yes


Hx Substance Use: No





- SURGICAL HISTORY


Hx Appendectomy: Yes


Hx Coronary Stent: Yes





- ANESTHESIA


Hx Anesthesia: Yes


Hx Anesthesia Reactions: No


Hx Malignant Hyperthermia: No





Meds


Allergies/Adverse Reactions: 


 Allergies











Allergy/AdvReac Type Severity Reaction Status Date / Time


 


clopidogrel Allergy Mild SWELLING Verified 06/06/18 13:34














- Medications


Medications: 


 Current Medications





Albuterol/Ipratropium (Duoneb 3 Mg/0.5 Mg (3 Ml) Ud)  3 ml INH RQ4 PRN


   PRN Reason: Cough and congestion


Albuterol/Ipratropium (Duoneb 3 Mg/0.5 Mg (3 Ml) Ud)  3 ml INH RQ4 [place]


   Last Admin: 09/21/18 11:05 Dose:  3 ml


Aspirin (Ecotrin)  81 mg PO DAILY [facility]


   Last Admin: 09/21/18 09:30 Dose:  81 mg


Ergocalciferol (Drisdol 50,000 Intl Units Cap)  1 cap PO QWK [facility]


Furosemide (Lasix)  40 mg IVP DAILY [facility]


   Last Admin: 09/21/18 09:30 Dose:  40 mg


Heparin Sodium (Porcine) (Heparin)  5,000 units SC Q12 [facility]


   Last Admin: 09/21/18 09:31 Dose:  5,000 units


Ceftriaxone Sodium 1 gm/ (Sodium Chloride)  100 mls @ 100 mls/hr IVPB DAILY [facility]


   PRN Reason: Protocol


   Last Admin: 09/21/18 09:31 Dose:  100 mls/hr


Insulin Human Regular (Novolin R)  0 unit SC ACHS [facility]


   PRN Reason: Protocol


   Last Admin: 09/21/18 08:36 Dose:  3 u


Methylprednisolone (Solu-Medrol)  60 mg IV Q6 [facility]


   Last Admin: 09/21/18 05:19 Dose:  60 mg


Metoprolol Tartrate (Lopressor)  25 mg PO BID [facility]


   Last Admin: 09/21/18 09:30 Dose:  25 mg


Pneumococcal Polyvalent Vaccine (Pneumovax 23 Vaccine)  0.5 ml IM .ONCE ONE


   Stop: 09/22/18 10:01


Repaglinide (Prandin)  2 mg PO DAILY [facility]


   Last Admin: 09/21/18 09:30 Dose:  2 mg


Rosuvastatin Calcium (Crestor)  20 mg PO [facility]


Sacubitril/Valsartan (Entresto 24 Mg-26 Mg)  1 tab PO DAILY [facility]


   Last Admin: 09/21/18 09:30 Dose:  1 tab


Ticagrelor (Brilinta)  90 mg PO BID [facility]


   Last Admin: 09/21/18 09:31 Dose:  90 mg











Physical Exam





- Constitutional


Appears: No Acute Distress, Cachectic





- Head Exam


Head Exam: NORMAL INSPECTION





- Eye Exam


Eye Exam: EOMI, PERRL


Pupil Exam: NORMAL ACCOMODATION





- ENT Exam


ENT Exam: Mucous Membranes Moist





- Neck Exam


Neck exam: Positive for: Normal Inspection.  Negative for: Meningismus





- Respiratory Exam


Respiratory Exam: Prolonged Expiratory Phase, Wheezes





- Cardiovascular Exam


Cardiovascular Exam: REGULAR RHYTHM, +S1, +S2





- GI/Abdominal Exam


GI & Abdominal Exam: Normal Bowel Sounds, Soft, Tenderness (LOWER ABDOMINAL 

DISCOMFORT ON DEEP PALPATION.).  absent: Rebound





- Extremities Exam


Extremities exam: Positive for: pedal pulses present.  Negative for: calf 

tenderness, pedal edema





- Neurological Exam


Neurological exam: Normal Gait





- Psychiatric Exam


Psychiatric exam: Normal Mood





- Skin


Skin Exam: Pallor, Warm





Results





- Vital Signs


Recent Vital Signs: 


 Last Vital Signs











Temp  97.8 F   09/21/18 07:59


 


Pulse  91 H  09/21/18 07:59


 


Resp  20   09/21/18 07:59


 


BP  130/69   09/21/18 09:30


 


Pulse Ox  100   09/21/18 11:00














- Labs


Result Diagrams: 


 09/21/18 07:42





 09/21/18 07:50


Labs: 


 Laboratory Results - last 24 hr











  09/20/18 09/20/18 09/20/18





  14:25 15:24 15:24


 


WBC   7.2 


 


RBC   3.60 L 


 


Hgb   9.0 L 


 


Hct   27.5 L 


 


MCV   76.3 L 


 


MCH   24.9 L 


 


MCHC   32.7 L 


 


RDW   20.6 H 


 


Plt Count   125 L 


 


MPV   10.5 


 


Neut % (Auto)   73.1 


 


Lymph % (Auto)   16.8 L 


 


Mono % (Auto)   7.2 


 


Eos % (Auto)   1.3 


 


Baso % (Auto)   1.6 


 


Neut # (Auto)   5.2 


 


Lymph # (Auto)   1.2 


 


Mono # (Auto)   0.5 


 


Eos # (Auto)   0.1 


 


Baso # (Auto)   0.1 


 


Neutrophils % (Manual)   


 


Lymphocytes % (Manual)   


 


Monocytes % (Manual)   


 


Differential Comment    


 


Platelet Estimate   


 


Hypochromasia (manual)   


 


Anisocytosis (manual)   


 


PT    13.0 H


 


INR    1.2


 


APTT    34


 


Sodium   


 


Potassium   


 


Chloride   


 


Carbon Dioxide   


 


Anion Gap   


 


BUN   


 


Creatinine   


 


Est GFR ( Amer)   


 


Est GFR (Non-Af Amer)   


 


POC Glucose (mg/dL)  166 H  


 


Random Glucose   


 


Calcium   


 


Phosphorus   


 


Magnesium   


 


Total Bilirubin   


 


AST   


 


ALT   


 


Alkaline Phosphatase   


 


Total Creatine Kinase   


 


CK-MB (Mass)   


 


Troponin I   


 


NT-Pro-B Natriuret Pep   


 


Total Protein   


 


Albumin   


 


Globulin   


 


Albumin/Globulin Ratio   


 


Lipase   


 


Urine Color   


 


Urine Clarity   


 


Urine pH   


 


Ur Specific Gravity   


 


Urine Protein   


 


Urine Glucose (UA)   


 


Urine Ketones   


 


Urine Blood   


 


Urine Nitrate   


 


Urine Bilirubin   


 


Urine Urobilinogen   


 


Ur Leukocyte Esterase   


 


Urine WBC (Auto)   


 


Urine RBC (Auto)   


 


Ur Squamous Epith Cells   


 


Urine Bacteria   


 


Hyaline Casts   














  09/20/18 09/20/18 09/20/18





  15:24 17:51 20:58


 


WBC   


 


RBC   


 


Hgb   


 


Hct   


 


MCV   


 


MCH   


 


MCHC   


 


RDW   


 


Plt Count   


 


MPV   


 


Neut % (Auto)   


 


Lymph % (Auto)   


 


Mono % (Auto)   


 


Eos % (Auto)   


 


Baso % (Auto)   


 


Neut # (Auto)   


 


Lymph # (Auto)   


 


Mono # (Auto)   


 


Eos # (Auto)   


 


Baso # (Auto)   


 


Neutrophils % (Manual)   


 


Lymphocytes % (Manual)   


 


Monocytes % (Manual)   


 


Differential Comment   


 


Platelet Estimate   


 


Hypochromasia (manual)   


 


Anisocytosis (manual)   


 


PT   


 


INR   


 


APTT   


 


Sodium  139  


 


Potassium  4.2  


 


Chloride  105  


 


Carbon Dioxide  23  


 


Anion Gap  15  


 


BUN  14  


 


Creatinine  1.3 H  


 


Est GFR ( Amer)  47  


 


Est GFR (Non-Af Amer)  39  


 


POC Glucose (mg/dL)    286 H


 


Random Glucose  162 H  


 


Calcium  9.2  


 


Phosphorus   


 


Magnesium   


 


Total Bilirubin  0.8  


 


AST  26  


 


ALT  19  


 


Alkaline Phosphatase  71  


 


Total Creatine Kinase  144 H  


 


CK-MB (Mass)   


 


Troponin I  0.0280  


 


NT-Pro-B Natriuret Pep  58559 H  


 


Total Protein  6.3  


 


Albumin  3.5  


 


Globulin  2.9  


 


Albumin/Globulin Ratio  1.2  


 


Lipase  201  


 


Urine Color   Yellow 


 


Urine Clarity   Clear 


 


Urine pH   5.0 


 


Ur Specific Gravity   1.008 


 


Urine Protein   Negative 


 


Urine Glucose (UA)   Normal 


 


Urine Ketones   Negative 


 


Urine Blood   2+ H 


 


Urine Nitrate   Negative 


 


Urine Bilirubin   Negative 


 


Urine Urobilinogen   Normal 


 


Ur Leukocyte Esterase   Neg 


 


Urine WBC (Auto)   2 


 


Urine RBC (Auto)   20 H 


 


Ur Squamous Epith Cells   1 


 


Urine Bacteria   Rare 


 


Hyaline Casts   3-5 H 














  09/21/18 09/21/18 09/21/18





  01:11 07:42 07:42


 


WBC   5.5 


 


RBC   3.44 L 


 


Hgb   8.7 L 


 


Hct   26.3 L 


 


MCV   76.5 L 


 


MCH   25.2 L 


 


MCHC   33.0 


 


RDW   20.4 H 


 


Plt Count   112 L 


 


MPV   10.7 


 


Neut % (Auto)   93.0 H 


 


Lymph % (Auto)   5.5 L 


 


Mono % (Auto)   1.4 


 


Eos % (Auto)   0.0 


 


Baso % (Auto)   0.1 


 


Neut # (Auto)   5.1 


 


Lymph # (Auto)   0.3 L 


 


Mono # (Auto)   0.1 


 


Eos # (Auto)   0.0 


 


Baso # (Auto)   0.0 


 


Neutrophils % (Manual)   93 H 


 


Lymphocytes % (Manual)   6 L 


 


Monocytes % (Manual)   1 


 


Differential Comment   


 


Platelet Estimate   Slightly decreased L 


 


Hypochromasia (manual)   Slight 


 


Anisocytosis (manual)   Moderate 


 


PT    12.9 H


 


INR    1.2


 


APTT    30


 


Sodium   


 


Potassium   


 


Chloride   


 


Carbon Dioxide   


 


Anion Gap   


 


BUN   


 


Creatinine   


 


Est GFR ( Amer)   


 


Est GFR (Non-Af Amer)   


 


POC Glucose (mg/dL)   


 


Random Glucose   


 


Calcium   


 


Phosphorus   


 


Magnesium   


 


Total Bilirubin   


 


AST   


 


ALT   


 


Alkaline Phosphatase   


 


Total Creatine Kinase  148 H  


 


CK-MB (Mass)  2.70  


 


Troponin I  0.0270  


 


NT-Pro-B Natriuret Pep   


 


Total Protein   


 


Albumin   


 


Globulin   


 


Albumin/Globulin Ratio   


 


Lipase   


 


Urine Color   


 


Urine Clarity   


 


Urine pH   


 


Ur Specific Gravity   


 


Urine Protein   


 


Urine Glucose (UA)   


 


Urine Ketones   


 


Urine Blood   


 


Urine Nitrate   


 


Urine Bilirubin   


 


Urine Urobilinogen   


 


Ur Leukocyte Esterase   


 


Urine WBC (Auto)   


 


Urine RBC (Auto)   


 


Ur Squamous Epith Cells   


 


Urine Bacteria   


 


Hyaline Casts   














  09/21/18





  07:50


 


WBC 


 


RBC 


 


Hgb 


 


Hct 


 


MCV 


 


MCH 


 


MCHC 


 


RDW 


 


Plt Count 


 


MPV 


 


Neut % (Auto) 


 


Lymph % (Auto) 


 


Mono % (Auto) 


 


Eos % (Auto) 


 


Baso % (Auto) 


 


Neut # (Auto) 


 


Lymph # (Auto) 


 


Mono # (Auto) 


 


Eos # (Auto) 


 


Baso # (Auto) 


 


Neutrophils % (Manual) 


 


Lymphocytes % (Manual) 


 


Monocytes % (Manual) 


 


Differential Comment 


 


Platelet Estimate 


 


Hypochromasia (manual) 


 


Anisocytosis (manual) 


 


PT 


 


INR 


 


APTT 


 


Sodium  141


 


Potassium  3.8


 


Chloride  103


 


Carbon Dioxide  24


 


Anion Gap  17


 


BUN  18 H


 


Creatinine  1.4 H


 


Est GFR ( Amer)  43


 


Est GFR (Non-Af Amer)  36


 


POC Glucose (mg/dL) 


 


Random Glucose  256 H


 


Calcium  8.9


 


Phosphorus  3.6


 


Magnesium  1.9


 


Total Bilirubin  0.4


 


AST  18


 


ALT  23


 


Alkaline Phosphatase  61


 


Total Creatine Kinase  134


 


CK-MB (Mass)  2.54


 


Troponin I  0.0280


 


NT-Pro-B Natriuret Pep 


 


Total Protein  6.1 L


 


Albumin  3.4 L


 


Globulin  2.7


 


Albumin/Globulin Ratio  1.3


 


Lipase 


 


Urine Color 


 


Urine Clarity 


 


Urine pH 


 


Ur Specific Gravity 


 


Urine Protein 


 


Urine Glucose (UA) 


 


Urine Ketones 


 


Urine Blood 


 


Urine Nitrate 


 


Urine Bilirubin 


 


Urine Urobilinogen 


 


Ur Leukocyte Esterase 


 


Urine WBC (Auto) 


 


Urine RBC (Auto) 


 


Ur Squamous Epith Cells 


 


Urine Bacteria 


 


Hyaline Casts 














- Imaging and Cardiology


  ** CT scan - abdomenand pelvis with by mouth contrast


Status: Report reviewed by me





Assessment & Plan


(1) Chr obstructive pulmonary disease w/ acute lower respiratory infxn


Status: Acute   





(2) Chronic congestive heart failure


Status: Acute   





(3) Anemia


Status: Acute   





(4) Colitis


Status: Acute   





(5) HTN (hypertension)


Status: Acute   





(6) DM type 2 (diabetes mellitus, type 2)


Status: Acute   





- Assessment and Plan (Free Text)


Plan: 





PLAN.


pANCULTURES


aTYPICAL TITERS


MRSA SCREEN.


SPUTUM gRAM STAIN AND CULTURE


dIARRHEA WORKUP.


STOOLS FOR c. DIFFICILE TOXIN AS ORDERED BY GI.


cONTINUE iv rOCEPHIN 1 G ONCE A DAY 9/20/18.


dECREASE STEROIDS IF FEASIBLE.


pEPCID 20 MG BY MOUTH TWICE A DAY.


gi WORKUP IN ORDER


WILL FOLLOW ALONG WITH YOU.





.

## 2018-09-22 LAB
ALBUMIN SERPL-MCNC: 3 G/DL (ref 3.5–5)
ALBUMIN/GLOB SERPL: 1.2 {RATIO} (ref 1–2.1)
ALT SERPL-CCNC: 32 U/L (ref 9–52)
ANISOCYTOSIS BLD QL SMEAR: (no result)
AST SERPL-CCNC: 50 U/L (ref 14–36)
BASOPHILS # BLD AUTO: 0 K/UL (ref 0–0.2)
BASOPHILS NFR BLD: 0 % (ref 0–2)
BUN SERPL-MCNC: 30 MG/DL (ref 7–17)
CALCIUM SERPL-MCNC: 8.5 MG/DL (ref 8.6–10.4)
EOSINOPHIL # BLD AUTO: 0 K/UL (ref 0–0.7)
EOSINOPHIL NFR BLD: 0 % (ref 0–4)
ERYTHROCYTE [DISTWIDTH] IN BLOOD BY AUTOMATED COUNT: 20.7 % (ref 11.5–14.5)
ERYTHROCYTE [SEDIMENTATION RATE] IN BLOOD: 16 MM/HR (ref 0–20)
GFR NON-AFRICAN AMERICAN: 31
HGB BLD-MCNC: 8 G/DL (ref 11–16)
HYPOCHROMIC: (no result)
LYMPHOCYTE: 2 % (ref 20–40)
LYMPHOCYTES # BLD AUTO: 0.2 K/UL (ref 1–4.3)
LYMPHOCYTES NFR BLD AUTO: 2 % (ref 20–40)
MCH RBC QN AUTO: 24.9 PG (ref 27–31)
MCHC RBC AUTO-ENTMCNC: 32.7 G/DL (ref 33–37)
MCV RBC AUTO: 76 FL (ref 81–99)
MONOCYTE: 3 % (ref 0–10)
MONOCYTES # BLD: 0.5 K/UL (ref 0–0.8)
MONOCYTES NFR BLD: 3.9 % (ref 0–10)
NEUTROPHILS # BLD: 11.6 K/UL (ref 1.8–7)
NEUTROPHILS NFR BLD AUTO: 94 % (ref 50–75)
NEUTROPHILS NFR BLD AUTO: 94.1 % (ref 50–75)
NEUTS BAND NFR BLD: 1 % (ref 0–2)
NRBC BLD AUTO-RTO: 0.1 % (ref 0–2)
OVALOCYTES BLD QL SMEAR: SLIGHT
PLATELET # BLD EST: (no result) 10*3/UL
PLATELET # BLD: 117 K/UL (ref 130–400)
PMV BLD AUTO: 10.8 FL (ref 7.2–11.7)
POIKILOCYTOSIS BLD QL SMEAR: SLIGHT
RBC # BLD AUTO: 3.22 MIL/UL (ref 3.8–5.2)
TEARDROP CELLS: SLIGHT
TOTAL CELLS COUNTED BLD: 100
WBC # BLD AUTO: 12.3 K/UL (ref 4.8–10.8)

## 2018-09-22 RX ADMIN — HUMAN INSULIN SCH U: 100 INJECTION, SOLUTION SUBCUTANEOUS at 12:32

## 2018-09-22 RX ADMIN — IPRATROPIUM BROMIDE AND ALBUTEROL SULFATE SCH ML: .5; 3 SOLUTION RESPIRATORY (INHALATION) at 03:15

## 2018-09-22 RX ADMIN — METHYLPREDNISOLONE SODIUM SUCCINATE SCH MG: 40 INJECTION, POWDER, FOR SOLUTION INTRAMUSCULAR; INTRAVENOUS at 00:24

## 2018-09-22 RX ADMIN — HUMAN INSULIN SCH U: 100 INJECTION, SOLUTION SUBCUTANEOUS at 08:08

## 2018-09-22 RX ADMIN — SACUBITRIL AND VALSARTAN SCH TAB: 24; 26 TABLET, FILM COATED ORAL at 10:04

## 2018-09-22 RX ADMIN — HUMAN INSULIN SCH: 100 INJECTION, SOLUTION SUBCUTANEOUS at 21:44

## 2018-09-22 RX ADMIN — HUMAN INSULIN SCH U: 100 INJECTION, SOLUTION SUBCUTANEOUS at 17:57

## 2018-09-22 RX ADMIN — IPRATROPIUM BROMIDE AND ALBUTEROL SULFATE SCH ML: .5; 3 SOLUTION RESPIRATORY (INHALATION) at 15:54

## 2018-09-22 RX ADMIN — IPRATROPIUM BROMIDE AND ALBUTEROL SULFATE SCH ML: .5; 3 SOLUTION RESPIRATORY (INHALATION) at 19:23

## 2018-09-22 RX ADMIN — IPRATROPIUM BROMIDE AND ALBUTEROL SULFATE SCH ML: .5; 3 SOLUTION RESPIRATORY (INHALATION) at 08:03

## 2018-09-22 RX ADMIN — METHYLPREDNISOLONE SODIUM SUCCINATE SCH MG: 40 INJECTION, POWDER, FOR SOLUTION INTRAMUSCULAR; INTRAVENOUS at 17:56

## 2018-09-22 RX ADMIN — METHYLPREDNISOLONE SODIUM SUCCINATE SCH MG: 40 INJECTION, POWDER, FOR SOLUTION INTRAMUSCULAR; INTRAVENOUS at 23:55

## 2018-09-22 RX ADMIN — METHYLPREDNISOLONE SODIUM SUCCINATE SCH MG: 40 INJECTION, POWDER, FOR SOLUTION INTRAMUSCULAR; INTRAVENOUS at 05:15

## 2018-09-22 RX ADMIN — IPRATROPIUM BROMIDE AND ALBUTEROL SULFATE SCH ML: .5; 3 SOLUTION RESPIRATORY (INHALATION) at 23:33

## 2018-09-22 RX ADMIN — IPRATROPIUM BROMIDE AND ALBUTEROL SULFATE SCH: .5; 3 SOLUTION RESPIRATORY (INHALATION) at 12:47

## 2018-09-22 RX ADMIN — METHYLPREDNISOLONE SODIUM SUCCINATE SCH MG: 40 INJECTION, POWDER, FOR SOLUTION INTRAMUSCULAR; INTRAVENOUS at 12:32

## 2018-09-22 NOTE — CP.PCM.PN
Subjective





- Date & Time of Evaluation


Date of Evaluation: 09/22/18


Time of Evaluation: 23:41





- Subjective


Subjective: 





CHIEF COMPLAINTS TODAY :


AFEBRILE


Patient is still coughing wheezing with mild expectoration


Generalized weakness


+VE DIARRHOEA





ROS.


HEENT :  N.


Resp :       No hemoptysis  +VE COUGH/+VE WHEEZE


Cardio :     No anginal  CP, PND, orthopnea, palpitation 


GI :           No abd.pain, n/v ,diarrhea or GI bleeding .


CNS : No headache, vertigo, focal deficit.


Musculoskel :  No joint swelling ,


Derm :        No rash 


Psych :     Normal affect.


Ext :  No  swelling ,calf pain 





PE.


Pt. is alert awake in no distress.


V.S  As noted in the chart 


Head ,ear nose,throat and eyes : Normal.


Neck : Supple with normal carotids.


Lungs: B/L EXPIRATORY WHEEZE/RHONCHI


Heart : S1 & S2 normal with S4. No murmur.


Abd : Soft non tender with normal bowel sounds.


Neuro : Moves all ext. with no localized deficit.


Ext : No edema with intact pulses.Non tender calves 


Derm : No rashes or decubitus ulcer.





LABS/RADIOLOGY:


wbc 12.3


H/H 8.0 PLT .  117


cREATININE 1.6/BUN 30 


CT of the abdomen shows thickening of the descending wall +VE COLITIS


STOOLS -VE OB











Objective





- Vital Signs/Intake and Output


Vital Signs (last 24 hours): 


 











Temp Pulse Resp BP Pulse Ox


 


 98.2 F   98 H  20   110/72   100 


 


 09/22/18 15:25  09/22/18 20:00  09/22/18 15:25  09/22/18 17:56  09/22/18 15:25








Intake and Output: 


 











 09/22/18 09/23/18





 18:59 06:59


 


Intake Total 620 500


 


Balance 620 500














- Medications


Medications: 


 Current Medications





Albuterol/Ipratropium (Duoneb 3 Mg/0.5 Mg (3 Ml) Ud)  3 ml INH RQ4 PRN


   PRN Reason: Cough and congestion


Albuterol/Ipratropium (Duoneb 3 Mg/0.5 Mg (3 Ml) Ud)  3 ml INH RQ4 [facility]


   Last Admin: 09/22/18 23:33 Dose:  3 ml


Aspirin (Ecotrin)  81 mg PO DAILY [facility]


   Last Admin: 09/22/18 10:04 Dose:  81 mg


Ergocalciferol (Drisdol 50,000 Intl Units Cap)  1 cap PO QWK [facility]


Famotidine (Pepcid)  20 mg PO BID [facility]


   Last Admin: 09/22/18 17:56 Dose:  20 mg


Furosemide (Lasix)  40 mg IVP DAILY [facility]


   Last Admin: 09/22/18 10:06 Dose:  Not Given


Heparin Sodium (Porcine) (Heparin)  5,000 units SC Q12 [facility]


   Last Admin: 09/22/18 21:41 Dose:  5,000 units


Ceftriaxone Sodium 1 gm/ (Sodium Chloride)  100 mls @ 100 mls/hr IVPB DAILY [facility]


   PRN Reason: Protocol


   Last Admin: 09/22/18 10:06 Dose:  100 mls/hr


Insulin Human Regular (Novolin R)  0 unit SC ACHS [facility]


   PRN Reason: Protocol


   Last Admin: 09/22/18 21:44 Dose:  Not Given


Methylprednisolone (Solu-Medrol)  30 mg IV Q6H [facility]


   Last Admin: 09/22/18 17:56 Dose:  30 mg


Metoprolol Tartrate (Lopressor)  25 mg PO BID [facility]


   Last Admin: 09/22/18 17:56 Dose:  25 mg


Repaglinide (Prandin)  2 mg PO DAILY [facility]


   Last Admin: 09/22/18 10:05 Dose:  2 mg


Rosuvastatin Calcium (Crestor)  20 mg PO HS [facility]


   Last Admin: 09/22/18 21:41 Dose:  20 mg


Sacubitril/Valsartan (Entresto 24 Mg-26 Mg)  1 tab PO DAILY [facility]


   Last Admin: 09/22/18 10:04 Dose:  1 tab


Ticagrelor (Brilinta)  90 mg PO BID [facility]


   Last Admin: 09/22/18 18:46 Dose:  90 mg











- Labs


Labs: 


 





 09/22/18 06:33 





 09/22/18 06:33 





 











PT  12.9 SECONDS (9.7-12.2)  H  09/21/18  07:42    


 


INR  1.2   09/21/18  07:42    


 


APTT  30 SECONDS (21-34)   09/21/18  07:42    














Assessment and Plan


(1) Chr obstructive pulmonary disease w/ acute lower respiratory infxn


Status: Acute   





(2) Chronic congestive heart failure


Status: Acute   





(3) Anemia


Status: Acute   





(4) Colitis


Status: Acute   





(5) HTN (hypertension)


Status: Acute   





(6) DM type 2 (diabetes mellitus, type 2)


Status: Acute   





- Assessment and Plan (Free Text)


Plan: 





dIARRHEA WORKUP IN PROGRESS. 


STOOLS FOR C. DIFFICILE TOXIN AS ORDERED BY GI.-P





CONTINUE iv ROCEPHIN 1 G ONCE A DAY 9/20/18.


 STEROIDS AS PER PMD.


PEPCID 20 MG BY MOUTH TWICE A DAY.








GI WORKUP IN ORDER





PULMONARY TOILET

## 2018-09-22 NOTE — CP.PCM.PN
Subjective





- Date & Time of Evaluation


Date of Evaluation: 09/22/18


Time of Evaluation: 14:33





- Subjective


Subjective: 





CHIEF COMPLAINTS TODAY :


Patient is still coughing wheezing with mild expectoration


Generalized weakness





ROS.


HEENT :  N.


Resp :       No hemoptysis 


Cardio :     No anginal  CP, PND, orthopnea, palpitation 


GI :           No abd.pain, n/v ,diarrhea or GI bleeding .


CNS : No headache, vertigo, focal deficit.


Musculoskel :  No joint swelling ,


Derm :        No rash 


Psych :     Normal affect.


Ext :  No  swelling ,calf pain 





PE.


Pt. is alert awake in no distress.


V.S  As noted in the chart 


Head ,ear nose,throat and eyes : Normal.


Neck : Supple with normal carotids.


Lungs: Bilateral poor air entry with rhonchis at the bases


Heart : S1 & S2 normal with S4. No murmur.


Abd : Soft non tender with normal bowel sounds.


Neuro : Moves all ext. with no localized deficit.


Ext : No edema with intact pulses.Non tender calves 


Derm : No rashes or decubitus ulcer.





LABS/RADIOLOGY: CT of the abdomen shows thickening of the descending wall





ASSESSMENT/PLAN : 


GI W/P PENDING 


STOOL GUAIC NEG 


STEROIDS TAPERED 


H/H DOWN , WBC IS UP( STEROIDS ) 





Objective





- Vital Signs/Intake and Output


Vital Signs (last 24 hours): 


 











Temp Pulse Resp BP Pulse Ox


 


 98.8 F   92 H  96 H  94/51 L  99 


 


 09/22/18 07:25  09/22/18 12:00  09/22/18 07:25  09/22/18 10:03  09/22/18 04:00








Intake and Output: 


 











 09/22/18 09/22/18





 11:59 23:59


 


Intake Total 120 500


 


Balance 120 500














- Medications


Medications: 


 Current Medications





Albuterol/Ipratropium (Duoneb 3 Mg/0.5 Mg (3 Ml) Ud)  3 ml INH RQ4 PRN


   PRN Reason: Cough and congestion


Albuterol/Ipratropium (Duoneb 3 Mg/0.5 Mg (3 Ml) Ud)  3 ml INH RQ4 [facility]


   Last Admin: 09/22/18 12:47 Dose:  Not Given


Aspirin (Ecotrin)  81 mg PO DAILY [facility]


   Last Admin: 09/22/18 10:04 Dose:  81 mg


Ergocalciferol (Drisdol 50,000 Intl Units Cap)  1 cap PO QWK [facility]


Famotidine (Pepcid)  20 mg PO BID [facility]


   Last Admin: 09/22/18 10:04 Dose:  20 mg


Furosemide (Lasix)  40 mg IVP DAILY [facility]


   Last Admin: 09/22/18 10:06 Dose:  Not Given


Heparin Sodium (Porcine) (Heparin)  5,000 units SC Q12 [facility]


   Last Admin: 09/22/18 10:06 Dose:  5,000 units


Ceftriaxone Sodium 1 gm/ (Sodium Chloride)  100 mls @ 100 mls/hr IVPB DAILY [facility]


   PRN Reason: Protocol


   Last Admin: 09/22/18 10:06 Dose:  100 mls/hr


Insulin Human Regular (Novolin R)  0 unit SC ACHS [facility]


   PRN Reason: Protocol


   Last Admin: 09/22/18 12:32 Dose:  8 u


Methylprednisolone (Solu-Medrol)  30 mg IV Q6H [facility]


   Last Admin: 09/22/18 12:32 Dose:  30 mg


Metoprolol Tartrate (Lopressor)  25 mg PO BID [facility]


   Last Admin: 09/22/18 10:06 Dose:  Not Given


Repaglinide (Prandin)  2 mg PO DAILY [facility]


   Last Admin: 09/22/18 10:05 Dose:  2 mg


Rosuvastatin Calcium (Crestor)  20 mg PO HS [facility]


   Last Admin: 09/21/18 21:46 Dose:  20 mg


Sacubitril/Valsartan (Entresto 24 Mg-26 Mg)  1 tab PO DAILY [facility]


   Last Admin: 09/22/18 10:04 Dose:  1 tab


Ticagrelor (Brilinta)  90 mg PO BID [facility]


   Last Admin: 09/22/18 10:04 Dose:  90 mg











- Labs


Labs: 


 





 09/22/18 06:33 





 09/22/18 06:33 





 











PT  12.9 SECONDS (9.7-12.2)  H  09/21/18  07:42    


 


INR  1.2   09/21/18  07:42    


 


APTT  30 SECONDS (21-34)   09/21/18  07:42

## 2018-09-22 NOTE — CP.PCM.PN
Subjective





- Date & Time of Evaluation


Date of Evaluation: 09/22/18


Time of Evaluation: 10:56





- Subjective


Subjective: 





COVERING DR GOLDMAN/DARREN





Mild diarrhea today but no pain. Resting comfortably


Stool for C diff and other stool studies not reported





Objective





- Vital Signs/Intake and Output


Vital Signs (last 24 hours): 


 











Temp Pulse Resp BP Pulse Ox


 


 98.8 F   103 H  96 H  94/51 L  99 


 


 09/22/18 07:25  09/22/18 10:03  09/22/18 07:25  09/22/18 10:03  09/22/18 04:00








Intake and Output: 


 











 09/22/18 09/22/18





 06:59 18:59


 


Intake Total 250 120


 


Balance 250 120














- Medications


Medications: 


 Current Medications





Albuterol/Ipratropium (Duoneb 3 Mg/0.5 Mg (3 Ml) Ud)  3 ml INH RQ4 PRN


   PRN Reason: Cough and congestion


Albuterol/Ipratropium (Duoneb 3 Mg/0.5 Mg (3 Ml) Ud)  3 ml INH RQ4 [facility]


   Last Admin: 09/22/18 08:03 Dose:  3 ml


Aspirin (Ecotrin)  81 mg PO DAILY [facility]


   Last Admin: 09/22/18 10:04 Dose:  81 mg


Ergocalciferol (Drisdol 50,000 Intl Units Cap)  1 cap PO QWK [facility]


Famotidine (Pepcid)  20 mg PO BID [facility]


   Last Admin: 09/22/18 10:04 Dose:  20 mg


Furosemide (Lasix)  40 mg IVP DAILY [facility]


   Last Admin: 09/22/18 10:06 Dose:  Not Given


Heparin Sodium (Porcine) (Heparin)  5,000 units SC Q12 [facility]


   Last Admin: 09/22/18 10:06 Dose:  5,000 units


Ceftriaxone Sodium 1 gm/ (Sodium Chloride)  100 mls @ 100 mls/hr IVPB DAILY [facility]


   PRN Reason: Protocol


   Last Admin: 09/22/18 10:06 Dose:  100 mls/hr


Insulin Human Regular (Novolin R)  0 unit SC ACHS [facility]


   PRN Reason: Protocol


   Last Admin: 09/22/18 08:08 Dose:  4 u


Methylprednisolone (Solu-Medrol)  30 mg IV Q6H [facility]


   Last Admin: 09/22/18 05:15 Dose:  30 mg


Metoprolol Tartrate (Lopressor)  25 mg PO BID [facility]


   Last Admin: 09/22/18 10:06 Dose:  Not Given


Repaglinide (Prandin)  2 mg PO DAILY [facility]


   Last Admin: 09/22/18 10:05 Dose:  2 mg


Rosuvastatin Calcium (Crestor)  20 mg PO HS [facility]


   Last Admin: 09/21/18 21:46 Dose:  20 mg


Sacubitril/Valsartan (Entresto 24 Mg-26 Mg)  1 tab PO DAILY [facility]


   Last Admin: 09/22/18 10:04 Dose:  1 tab


Ticagrelor (Brilinta)  90 mg PO BID [facility]


   Last Admin: 09/22/18 10:04 Dose:  90 mg











- Labs


Labs: 


 





 09/22/18 06:33 





 09/22/18 06:33 





 











PT  12.9 SECONDS (9.7-12.2)  H  09/21/18  07:42    


 


INR  1.2   09/21/18  07:42    


 


APTT  30 SECONDS (21-34)   09/21/18  07:42    














- Constitutional


Appears: No Acute Distress





- Head Exam


Head Exam: ATRAUMATIC, NORMOCEPHALIC





- Eye Exam


Eye Exam: EOMI, PERRL





- Respiratory Exam


Respiratory Exam: NORMAL BREATHING PATTERN





- Cardiovascular Exam


Cardiovascular Exam: REGULAR RHYTHM





- GI/Abdominal Exam


GI & Abdominal Exam: Soft, Hyperactive Bowel Sounds.  absent: Distended, 

Guarding, Tenderness, Mass, Rebound





- Extremities Exam


Extremities Exam: Normal Inspection





Assessment and Plan


(1) Acute colitis


Assessment & Plan: 


Likely infectious. C diff toxin and other stool studies were ordered and 

pending.


Supportive care.


Status: Acute   





(2) Iron deficiency anemia


Assessment & Plan: 


Stool OB pending


Status: Acute   





(3) Abnormal CT scan, colon


Assessment & Plan: 


Work up as above.


Status: Acute

## 2018-09-23 LAB
% IRON SATURATION: 7 (ref 20–55)
ALBUMIN SERPL-MCNC: 3.2 G/DL (ref 3.5–5)
ALBUMIN/GLOB SERPL: 1.3 {RATIO} (ref 1–2.1)
ALT SERPL-CCNC: 37 U/L (ref 9–52)
ANISOCYTOSIS BLD QL SMEAR: (no result)
AST SERPL-CCNC: 24 U/L (ref 14–36)
BASO STIPL BLD QL SMEAR: SLIGHT
BASOPHILS # BLD AUTO: 0 K/UL (ref 0–0.2)
BASOPHILS NFR BLD: 0.4 % (ref 0–2)
BUN SERPL-MCNC: 38 MG/DL (ref 7–17)
CALCIUM SERPL-MCNC: 8.5 MG/DL (ref 8.6–10.4)
EOSINOPHIL # BLD AUTO: 0 K/UL (ref 0–0.7)
EOSINOPHIL NFR BLD: 0 % (ref 0–4)
ERYTHROCYTE [DISTWIDTH] IN BLOOD BY AUTOMATED COUNT: 21.1 % (ref 11.5–14.5)
FERRITIN SERPL-MCNC: 26 NG/ML
GFR NON-AFRICAN AMERICAN: 33
HGB BLD-MCNC: 8.4 G/DL (ref 11–16)
HYPOCHROMIC: SLIGHT
IRON SERPL-MCNC: 24 UG/DL (ref 37–170)
LYMPHOCYTE: 2 % (ref 20–40)
LYMPHOCYTES # BLD AUTO: 0.2 K/UL (ref 1–4.3)
LYMPHOCYTES NFR BLD AUTO: 1.6 % (ref 20–40)
MCH RBC QN AUTO: 25.3 PG (ref 27–31)
MCHC RBC AUTO-ENTMCNC: 32.7 G/DL (ref 33–37)
MCV RBC AUTO: 77.5 FL (ref 81–99)
MICROCYTES BLD QL SMEAR: SLIGHT
MONOCYTE: 3 % (ref 0–10)
MONOCYTES # BLD: 0.4 K/UL (ref 0–0.8)
MONOCYTES NFR BLD: 4 % (ref 0–10)
NEUTROPHILS # BLD: 10.1 K/UL (ref 1.8–7)
NEUTROPHILS NFR BLD AUTO: 94 % (ref 50–75)
NEUTROPHILS NFR BLD AUTO: 95 % (ref 50–75)
NRBC BLD AUTO-RTO: 0.3 % (ref 0–2)
OVALOCYTES BLD QL SMEAR: SLIGHT
PLATELET # BLD EST: (no result) 10*3/UL
PLATELET # BLD: 112 K/UL (ref 130–400)
PMV BLD AUTO: 10.8 FL (ref 7.2–11.7)
POIKILOCYTOSIS BLD QL SMEAR: SLIGHT
POLYCHROMIC: SLIGHT
RBC # BLD AUTO: 3.3 MIL/UL (ref 3.8–5.2)
SCHISTOCYTES BLD QL SMEAR: SLIGHT
TARGETS BLD QL SMEAR: SLIGHT
TIBC SERPL-MCNC: 355 UG/DL (ref 250–450)
TOTAL CELLS COUNTED BLD: 100
WBC # BLD AUTO: 10.8 K/UL (ref 4.8–10.8)

## 2018-09-23 RX ADMIN — HUMAN INSULIN SCH U: 100 INJECTION, SOLUTION SUBCUTANEOUS at 07:45

## 2018-09-23 RX ADMIN — IPRATROPIUM BROMIDE AND ALBUTEROL SULFATE SCH: .5; 3 SOLUTION RESPIRATORY (INHALATION) at 23:32

## 2018-09-23 RX ADMIN — SACUBITRIL AND VALSARTAN SCH TAB: 24; 26 TABLET, FILM COATED ORAL at 09:40

## 2018-09-23 RX ADMIN — METHYLPREDNISOLONE SODIUM SUCCINATE SCH MG: 40 INJECTION, POWDER, FOR SOLUTION INTRAMUSCULAR; INTRAVENOUS at 17:41

## 2018-09-23 RX ADMIN — HUMAN INSULIN SCH: 100 INJECTION, SOLUTION SUBCUTANEOUS at 21:53

## 2018-09-23 RX ADMIN — IPRATROPIUM BROMIDE AND ALBUTEROL SULFATE SCH: .5; 3 SOLUTION RESPIRATORY (INHALATION) at 05:38

## 2018-09-23 RX ADMIN — IPRATROPIUM BROMIDE AND ALBUTEROL SULFATE SCH: .5; 3 SOLUTION RESPIRATORY (INHALATION) at 16:01

## 2018-09-23 RX ADMIN — HUMAN INSULIN SCH U: 100 INJECTION, SOLUTION SUBCUTANEOUS at 12:22

## 2018-09-23 RX ADMIN — IPRATROPIUM BROMIDE AND ALBUTEROL SULFATE SCH ML: .5; 3 SOLUTION RESPIRATORY (INHALATION) at 20:00

## 2018-09-23 RX ADMIN — METHYLPREDNISOLONE SODIUM SUCCINATE SCH MG: 40 INJECTION, POWDER, FOR SOLUTION INTRAMUSCULAR; INTRAVENOUS at 12:22

## 2018-09-23 RX ADMIN — METHYLPREDNISOLONE SODIUM SUCCINATE SCH MG: 40 INJECTION, POWDER, FOR SOLUTION INTRAMUSCULAR; INTRAVENOUS at 05:27

## 2018-09-23 RX ADMIN — IPRATROPIUM BROMIDE AND ALBUTEROL SULFATE SCH ML: .5; 3 SOLUTION RESPIRATORY (INHALATION) at 08:18

## 2018-09-23 RX ADMIN — IPRATROPIUM BROMIDE AND ALBUTEROL SULFATE SCH: .5; 3 SOLUTION RESPIRATORY (INHALATION) at 12:47

## 2018-09-23 RX ADMIN — METHYLPREDNISOLONE SODIUM SUCCINATE SCH MG: 40 INJECTION, POWDER, FOR SOLUTION INTRAMUSCULAR; INTRAVENOUS at 23:39

## 2018-09-23 RX ADMIN — HUMAN INSULIN SCH: 100 INJECTION, SOLUTION SUBCUTANEOUS at 17:42

## 2018-09-23 NOTE — CP.PCM.PN
Subjective





- Date & Time of Evaluation


Date of Evaluation: 09/23/18


Time of Evaluation: 10:30





- Subjective


Subjective: 





COVERING DR GOLDMAN/DARREN





Reports less bowel frequency and no pain, +cough with sputum


Stools for OB-x1. Stool for Cdiff and Culture not yet reported. 








Objective





- Vital Signs/Intake and Output


Vital Signs (last 24 hours): 


 











Temp Pulse Resp BP Pulse Ox


 


 98.0 F   91 H  18   115/57 L  100 


 


 09/23/18 07:30  09/23/18 07:30  09/23/18 07:30  09/23/18 09:40  09/23/18 07:30








Intake and Output: 


 











 09/23/18 09/23/18





 06:59 18:59


 


Intake Total 500 


 


Balance 500 














- Medications


Medications: 


 Current Medications





Albuterol/Ipratropium (Duoneb 3 Mg/0.5 Mg (3 Ml) Ud)  3 ml INH RQ4 PRN


   PRN Reason: Cough and congestion


Albuterol/Ipratropium (Duoneb 3 Mg/0.5 Mg (3 Ml) Ud)  3 ml INH RQ4 [facility]


   Last Admin: 09/23/18 08:18 Dose:  3 ml


Aspirin (Ecotrin)  81 mg PO DAILY [facility]


   Last Admin: 09/23/18 09:40 Dose:  81 mg


Ergocalciferol (Drisdol 50,000 Intl Units Cap)  1 cap PO QWK [facility]


Famotidine (Pepcid)  20 mg PO BID [facility]


   Last Admin: 09/23/18 09:40 Dose:  20 mg


Furosemide (Lasix)  40 mg IVP DAILY [facility]


   Last Admin: 09/23/18 09:39 Dose:  40 mg


Heparin Sodium (Porcine) (Heparin)  5,000 units SC Q12 [facility]


   Last Admin: 09/23/18 09:41 Dose:  5,000 units


Ceftriaxone Sodium 1 gm/ (Sodium Chloride)  100 mls @ 100 mls/hr IVPB DAILY [facility]


   PRN Reason: Protocol


   Last Admin: 09/23/18 09:41 Dose:  100 mls/hr


Insulin Human Regular (Novolin R)  0 unit SC ACHS [facility]


   PRN Reason: Protocol


   Last Admin: 09/23/18 07:45 Dose:  4 u


Methylprednisolone (Solu-Medrol)  30 mg IV Q6H [facility]


   Last Admin: 09/23/18 05:27 Dose:  30 mg


Metoprolol Tartrate (Lopressor)  25 mg PO BID [facility]


   Last Admin: 09/23/18 09:40 Dose:  25 mg


Repaglinide (Prandin)  2 mg PO DAILY [facility]


   Last Admin: 09/23/18 09:41 Dose:  2 mg


Rosuvastatin Calcium (Crestor)  20 mg PO HS [facility]


   Last Admin: 09/22/18 21:41 Dose:  20 mg


Sacubitril/Valsartan (Entresto 24 Mg-26 Mg)  1 tab PO DAILY [facility]


   Last Admin: 09/23/18 09:40 Dose:  1 tab


Ticagrelor (Brilinta)  90 mg PO BID [facility]


   Last Admin: 09/23/18 09:40 Dose:  90 mg











- Labs


Labs: 


 





 09/23/18 08:46 





 09/23/18 07:46 





 











PT  12.9 SECONDS (9.7-12.2)  H  09/21/18  07:42    


 


INR  1.2   09/21/18  07:42    


 


APTT  30 SECONDS (21-34)   09/21/18  07:42    














- Constitutional


Appears: No Acute Distress





- Head Exam


Head Exam: ATRAUMATIC, NORMOCEPHALIC





- Respiratory Exam


Respiratory Exam: Rhonchi, NORMAL BREATHING PATTERN





- Cardiovascular Exam


Cardiovascular Exam: REGULAR RHYTHM, +S1





- GI/Abdominal Exam


GI & Abdominal Exam: Soft, Normal Bowel Sounds.  absent: Distended, Guarding, 

Tenderness, Mass, Rebound





- Extremities Exam


Extremities Exam: Normal Inspection





Assessment and Plan


(1) Acute colitis


Assessment & Plan: 


Clinically improving. Awaiting stool studies and will reorder. On abx for 

Pulmonary infection.


Status: Acute   





(2) Iron deficiency anemia


Assessment & Plan: 


Stool OB negative x 1.


Status: Acute   





(3) Abnormal CT scan, colon


Assessment & Plan: 


as above. Colonoscopy when patient more stable or as outpatient.


Status: Acute

## 2018-09-23 NOTE — CP.PCM.PN
Subjective





- Date & Time of Evaluation


Date of Evaluation: 09/23/18


Time of Evaluation: 14:23





- Subjective


Subjective: 





CHIEF COMPLAINTS TODAY :


AFEBRILE


Patient is still coughing.


LESS WHEEZE


Generalized weakness


DENIES ABD. PAIN/OR FURTHER LOOSE STOOLS





ROS.


HEENT :  N.


Resp :       No hemoptysis  +VE COUGH/+VE WHEEZE


Cardio :     No anginal  CP, PND, orthopnea, palpitation 


GI :           No abd.pain, n/v ,diarrhea or GI bleeding .


CNS : No headache, vertigo, focal deficit.


Musculoskel :  No joint swelling ,


Derm :        No rash 


Psych :     Normal affect.


Ext :  No  swelling ,calf pain 





PE.


Pt. is alert awake in no distress.


V.S  As noted in the chart 


Head ,ear nose,throat and eyes : Normal.


Neck : Supple with normal carotids.


Lungs: B/L EXPIRATORY WHEEZE/RHONCHI


Heart : S1 & S2 normal with S4. No murmur.


Abd : Soft non tender with normal bowel sounds.


Neuro : Moves all ext. with no localized deficit.


Ext : No edema with intact pulses.Non tender calves 


Derm : No rashes or decubitus ulcer.





LABS/RADIOLOGY:


LEGIONELLA URINE AG -VE


MYCOPLASMA IGM -VE





CT of the abdomen shows thickening of the descending wall +VE COLITIS


STOOLS -VE OB





Objective





- Vital Signs/Intake and Output


Vital Signs (last 24 hours): 


 











Temp Pulse Resp BP Pulse Ox


 


 98.0 F   91 H  18   115/57 L  100 


 


 09/23/18 07:30  09/23/18 07:30  09/23/18 07:30  09/23/18 09:40  09/23/18 07:30








Intake and Output: 


 











 09/23/18 09/23/18





 06:59 18:59


 


Intake Total 500 


 


Balance 500 














- Medications


Medications: 


 Current Medications





Albuterol/Ipratropium (Duoneb 3 Mg/0.5 Mg (3 Ml) Ud)  3 ml INH RQ4 PRN


   PRN Reason: Cough and congestion


Albuterol/Ipratropium (Duoneb 3 Mg/0.5 Mg (3 Ml) Ud)  3 ml INH RQ4 [facility]


   Last Admin: 09/23/18 12:47 Dose:  Not Given


Aspirin (Ecotrin)  81 mg PO DAILY [facility]


   Last Admin: 09/23/18 09:40 Dose:  81 mg


Ergocalciferol (Drisdol 50,000 Intl Units Cap)  1 cap PO QWK [facility]


Famotidine (Pepcid)  20 mg PO BID [facility]


   Last Admin: 09/23/18 09:40 Dose:  20 mg


Furosemide (Lasix)  40 mg IVP DAILY [facility]


   Last Admin: 09/23/18 09:39 Dose:  40 mg


Heparin Sodium (Porcine) (Heparin)  5,000 units SC Q12 [facility]


   Last Admin: 09/23/18 09:41 Dose:  5,000 units


Ceftriaxone Sodium 1 gm/ (Sodium Chloride)  100 mls @ 100 mls/hr IVPB DAILY [facility]


   PRN Reason: Protocol


   Last Admin: 09/23/18 09:41 Dose:  100 mls/hr


Insulin Human Regular (Novolin R)  0 unit SC ACHS [facility]


   PRN Reason: Protocol


   Last Admin: 09/23/18 12:22 Dose:  8 u


Methylprednisolone (Solu-Medrol)  30 mg IV Q6H [facility]


   Last Admin: 09/23/18 12:22 Dose:  30 mg


Metoprolol Tartrate (Lopressor)  25 mg PO BID [facility]


   Last Admin: 09/23/18 09:40 Dose:  25 mg


Repaglinide (Prandin)  2 mg PO DAILY [facility]


   Last Admin: 09/23/18 09:41 Dose:  2 mg


Rosuvastatin Calcium (Crestor)  20 mg PO HS [facility]


   Last Admin: 09/22/18 21:41 Dose:  20 mg


Sacubitril/Valsartan (Entresto 24 Mg-26 Mg)  1 tab PO DAILY [facility]


   Last Admin: 09/23/18 09:40 Dose:  1 tab


Ticagrelor (Brilinta)  90 mg PO BID [facility]


   Last Admin: 09/23/18 09:40 Dose:  90 mg











- Labs


Labs: 


 





 09/23/18 08:46 





 09/23/18 07:46 





 











PT  12.9 SECONDS (9.7-12.2)  H  09/21/18  07:42    


 


INR  1.2   09/21/18  07:42    


 


APTT  30 SECONDS (21-34)   09/21/18  07:42    














Assessment and Plan


(1) Chr obstructive pulmonary disease w/ acute lower respiratory infxn


Status: Acute   





(2) Chronic congestive heart failure


Status: Acute   





(3) Anemia


Status: Acute   





(4) Colitis


Status: Acute   





(5) HTN (hypertension)


Status: Acute   





(6) DM type 2 (diabetes mellitus, type 2)


Status: Acute   





- Assessment and Plan (Free Text)


Plan: 


PLAN;


STOOLS FOR C. DIFFICILE TOXIN AS ORDERED BY GI.-P





CONTINUE iv ROCEPHIN 1 G ONCE A DAY 9/20/18.


 STEROIDS AS PER PMD.


PEPCID 20 MG BY MOUTH TWICE A DAY.








GI WORKUP IN ORDER





PULMONARY TOILET

## 2018-09-23 NOTE — CP.PCM.PN
Subjective





- Date & Time of Evaluation


Date of Evaluation: 09/23/18


Time of Evaluation: 16:00





- Subjective


Subjective: 





CHIEF COMPLAINTS TODAY :


Patient is still coughing wheezing with mild expectoration


Generalized weakness





ROS.


HEENT :  N.


Resp :       No hemoptysis 


Cardio :     No anginal  CP, PND, orthopnea, palpitation 


GI :           No abd.pain, n/v ,diarrhea or GI bleeding .


CNS : No headache, vertigo, focal deficit.


Musculoskel :  No joint swelling ,


Derm :        No rash 


Psych :     Normal affect.


Ext :  No  swelling ,calf pain 





PE.


Pt. is alert awake in no distress.


V.S  As noted in the chart 


Head ,ear nose,throat and eyes : Normal.


Neck : Supple with normal carotids.


Lungs: Bilateral poor air entry with rhonchis at the bases


Heart : S1 & S2 normal with S4. No murmur.


Abd : Soft non tender with normal bowel sounds.


Neuro : Moves all ext. with no localized deficit.


Ext : No edema with intact pulses.Non tender calves 


Derm : No rashes or decubitus ulcer.





LABS/RADIOLOGY: CT of the abdomen shows thickening of the descending wall





ASSESSMENT/PLAN : 


GI W/P PENDING 


STOOL GUAIC NEG 


STEROIDS TAPERED 


HG IS 8.4 





Objective





- Vital Signs/Intake and Output


Vital Signs (last 24 hours): 


 











Temp Pulse Resp BP Pulse Ox


 


 98.0 F   100 H  18   115/57 L  100 


 


 09/23/18 07:30  09/23/18 12:30  09/23/18 07:30  09/23/18 09:40  09/23/18 07:30








Intake and Output: 


 











 09/23/18 09/23/18





 11:59 23:59


 


Intake Total  500


 


Balance  500














- Medications


Medications: 


 Current Medications





Albuterol/Ipratropium (Duoneb 3 Mg/0.5 Mg (3 Ml) Ud)  3 ml INH RQ4 PRN


   PRN Reason: Cough and congestion


Albuterol/Ipratropium (Duoneb 3 Mg/0.5 Mg (3 Ml) Ud)  3 ml INH RQ4 [facility]


   Last Admin: 09/23/18 12:47 Dose:  Not Given


Aspirin (Ecotrin)  81 mg PO DAILY [facility]


   Last Admin: 09/23/18 09:40 Dose:  81 mg


Ergocalciferol (Drisdol 50,000 Intl Units Cap)  1 cap PO QWK [facility]


Famotidine (Pepcid)  20 mg PO BID [facility]


   Last Admin: 09/23/18 09:40 Dose:  20 mg


Furosemide (Lasix)  40 mg IVP DAILY [facility]


   Last Admin: 09/23/18 09:39 Dose:  40 mg


Heparin Sodium (Porcine) (Heparin)  5,000 units SC Q12 [facility]


   Last Admin: 09/23/18 09:41 Dose:  5,000 units


Ceftriaxone Sodium 1 gm/ (Sodium Chloride)  100 mls @ 100 mls/hr IVPB DAILY [facility]


   PRN Reason: Protocol


   Last Admin: 09/23/18 09:41 Dose:  100 mls/hr


Insulin Human Regular (Novolin R)  0 unit SC ACHS [facility]


   PRN Reason: Protocol


   Last Admin: 09/23/18 12:22 Dose:  8 u


Methylprednisolone (Solu-Medrol)  30 mg IV Q6H [facility]


   Last Admin: 09/23/18 12:22 Dose:  30 mg


Metoprolol Tartrate (Lopressor)  25 mg PO BID [facility]


   Last Admin: 09/23/18 09:40 Dose:  25 mg


Repaglinide (Prandin)  2 mg PO DAILY [facility]


   Last Admin: 09/23/18 09:41 Dose:  2 mg


Rosuvastatin Calcium (Crestor)  20 mg PO HS [facility]


   Last Admin: 09/22/18 21:41 Dose:  20 mg


Sacubitril/Valsartan (Entresto 24 Mg-26 Mg)  1 tab PO DAILY [facility]


   Last Admin: 09/23/18 09:40 Dose:  1 tab


Ticagrelor (Brilinta)  90 mg PO BID [facility]


   Last Admin: 09/23/18 09:40 Dose:  90 mg











- Labs


Labs: 


 





 09/23/18 08:46 





 09/23/18 07:46 





 











PT  12.9 SECONDS (9.7-12.2)  H  09/21/18  07:42    


 


INR  1.2   09/21/18  07:42    


 


APTT  30 SECONDS (21-34)   09/21/18  07:42

## 2018-09-24 LAB
ALBUMIN SERPL-MCNC: 3 G/DL (ref 3.5–5)
ALBUMIN/GLOB SERPL: 1.2 {RATIO} (ref 1–2.1)
ALT SERPL-CCNC: 34 U/L (ref 9–52)
ANISOCYTOSIS BLD QL SMEAR: SLIGHT
AST SERPL-CCNC: 25 U/L (ref 14–36)
BASO STIPL BLD QL SMEAR: SLIGHT
BASOPHILS # BLD AUTO: 0 K/UL (ref 0–0.2)
BASOPHILS NFR BLD: 0.1 % (ref 0–2)
BUN SERPL-MCNC: 45 MG/DL (ref 7–17)
CALCIUM SERPL-MCNC: 8.1 MG/DL (ref 8.6–10.4)
EOSINOPHIL # BLD AUTO: 0 K/UL (ref 0–0.7)
EOSINOPHIL NFR BLD: 0 % (ref 0–4)
ERYTHROCYTE [DISTWIDTH] IN BLOOD BY AUTOMATED COUNT: 20.3 % (ref 11.5–14.5)
GFR NON-AFRICAN AMERICAN: 31
HGB BLD-MCNC: 8.7 G/DL (ref 11–16)
HYPOCHROMIC: SLIGHT
LYMPHOCYTE: 2 % (ref 20–40)
LYMPHOCYTES # BLD AUTO: 0.2 K/UL (ref 1–4.3)
LYMPHOCYTES NFR BLD AUTO: 2.3 % (ref 20–40)
MCH RBC QN AUTO: 25.1 PG (ref 27–31)
MCHC RBC AUTO-ENTMCNC: 32.9 G/DL (ref 33–37)
MCV RBC AUTO: 76.2 FL (ref 81–99)
MICROCYTES BLD QL SMEAR: SLIGHT
MONOCYTE: 2 % (ref 0–10)
MONOCYTES # BLD: 0.3 K/UL (ref 0–0.8)
MONOCYTES NFR BLD: 3 % (ref 0–10)
NEUTROPHILS # BLD: 8.4 K/UL (ref 1.8–7)
NEUTROPHILS NFR BLD AUTO: 94.6 % (ref 50–75)
NEUTROPHILS NFR BLD AUTO: 96 % (ref 50–75)
NRBC BLD AUTO-RTO: 0 % (ref 0–2)
OVALOCYTES BLD QL SMEAR: SLIGHT
PLATELET # BLD EST: (no result) 10*3/UL
PLATELET # BLD: 129 K/UL (ref 130–400)
PMV BLD AUTO: 10.8 FL (ref 7.2–11.7)
POIKILOCYTOSIS BLD QL SMEAR: SLIGHT
RBC # BLD AUTO: 3.48 MIL/UL (ref 3.8–5.2)
TARGETS BLD QL SMEAR: SLIGHT
TEARDROP CELLS: SLIGHT
TOTAL CELLS COUNTED BLD: 100
WBC # BLD AUTO: 8.9 K/UL (ref 4.8–10.8)

## 2018-09-24 RX ADMIN — HUMAN INSULIN SCH U: 100 INJECTION, SOLUTION SUBCUTANEOUS at 17:21

## 2018-09-24 RX ADMIN — IPRATROPIUM BROMIDE AND ALBUTEROL SULFATE SCH ML: .5; 3 SOLUTION RESPIRATORY (INHALATION) at 19:42

## 2018-09-24 RX ADMIN — SODIUM FERRIC GLUCONATE COMPLEX SCH MLS/HR: 12.5 INJECTION INTRAVENOUS at 15:02

## 2018-09-24 RX ADMIN — METHYLPREDNISOLONE SODIUM SUCCINATE SCH MG: 40 INJECTION, POWDER, FOR SOLUTION INTRAMUSCULAR; INTRAVENOUS at 12:27

## 2018-09-24 RX ADMIN — IPRATROPIUM BROMIDE AND ALBUTEROL SULFATE SCH: .5; 3 SOLUTION RESPIRATORY (INHALATION) at 03:10

## 2018-09-24 RX ADMIN — HUMAN INSULIN SCH U: 100 INJECTION, SOLUTION SUBCUTANEOUS at 07:52

## 2018-09-24 RX ADMIN — IPRATROPIUM BROMIDE AND ALBUTEROL SULFATE SCH ML: .5; 3 SOLUTION RESPIRATORY (INHALATION) at 13:13

## 2018-09-24 RX ADMIN — HUMAN INSULIN SCH U: 100 INJECTION, SOLUTION SUBCUTANEOUS at 12:26

## 2018-09-24 RX ADMIN — METHYLPREDNISOLONE SODIUM SUCCINATE SCH MG: 40 INJECTION, POWDER, FOR SOLUTION INTRAMUSCULAR; INTRAVENOUS at 05:16

## 2018-09-24 RX ADMIN — SACUBITRIL AND VALSARTAN SCH TAB: 24; 26 TABLET, FILM COATED ORAL at 09:24

## 2018-09-24 RX ADMIN — IPRATROPIUM BROMIDE AND ALBUTEROL SULFATE SCH ML: .5; 3 SOLUTION RESPIRATORY (INHALATION) at 23:54

## 2018-09-24 RX ADMIN — IPRATROPIUM BROMIDE AND ALBUTEROL SULFATE SCH ML: .5; 3 SOLUTION RESPIRATORY (INHALATION) at 16:24

## 2018-09-24 RX ADMIN — IPRATROPIUM BROMIDE AND ALBUTEROL SULFATE SCH ML: .5; 3 SOLUTION RESPIRATORY (INHALATION) at 07:10

## 2018-09-24 RX ADMIN — METHYLPREDNISOLONE SODIUM SUCCINATE SCH MG: 40 INJECTION, POWDER, FOR SOLUTION INTRAMUSCULAR; INTRAVENOUS at 21:46

## 2018-09-24 RX ADMIN — HUMAN INSULIN SCH U: 100 INJECTION, SOLUTION SUBCUTANEOUS at 21:45

## 2018-09-24 NOTE — CARD
--------------- APPROVED REPORT --------------





Date of service: 09/21/2018



EKG Measurement

Heart [id number]

CO [id number]

[id number]

[id number]

[id number]



<Conclusion>

Sinus rhythm with premature supraventricular complexes and with 

occasional premature ventricular complexes

IVCD Left bundle branch block type.

Abnormal ECG

## 2018-09-24 NOTE — CP.PCM.PN
Subjective





- Date & Time of Evaluation


Date of Evaluation: 09/24/18


Time of Evaluation: 14:14





- Subjective


Subjective: 





CHIEF COMPLAINTS TODAY :


AFEBRILE


Patient is still coughing.


STILL  WHEEZING


Generalized weakness ! ON IV IRON INFUSION


DENIES ABD. PAIN.


HAS SOFT STOOLS





ROS.


HEENT :  N.


Resp :       No hemoptysis  +VE COUGH/+VE WHEEZE


Cardio :     No anginal  CP, PND, orthopnea, palpitation 


GI :           No abd.pain, n/v ,diarrhea or GI bleeding .


CNS : No headache, vertigo, focal deficit.


Musculoskel :  No joint swelling ,


Derm :        No rash 


Psych :     Normal affect.


Ext :  No  swelling ,calf pain 





PE.


Pt. is alert awake in no distress.


V.S  As noted in the chart 


Head ,ear nose,throat and eyes : Normal.


Neck : Supple with normal carotids.


Lungs: B/L EXPIRATORY WHEEZE/RHONCHI


Heart : S1 & S2 normal with S4. No murmur.


Abd : Soft non tender with normal bowel sounds.


Neuro : Moves all ext. with no localized deficit.


Ext : No edema with intact pulses.Non tender calves 


Derm : No rashes or decubitus ulcer.





LABS/RADIOLOGY:


REVIEWED


LEGIONELLA URINE AG -VE


MYCOPLASMA IGM -VE





CT of the abdomen shows thickening of the descending wall +VE COLITIS


STOOLS -VE OB





Objective





- Vital Signs/Intake and Output


Vital Signs (last 24 hours): 


 











Temp Pulse Resp BP Pulse Ox


 


 97.7 F   105 H  18   92/52 L  100 


 


 09/24/18 07:00  09/24/18 12:37  09/24/18 07:00  09/24/18 09:25  09/24/18 07:00








Intake and Output: 


 











 09/24/18 09/24/18





 06:59 18:59


 


Intake Total 240 500


 


Balance 240 500














- Medications


Medications: 


 Current Medications





Albuterol/Ipratropium (Duoneb 3 Mg/0.5 Mg (3 Ml) Ud)  3 ml INH RQ4 PRN


   PRN Reason: Cough and congestion


Albuterol/Ipratropium (Duoneb 3 Mg/0.5 Mg (3 Ml) Ud)  3 ml INH RQ4 Replaced by [facility]


   Last Admin: 09/24/18 13:13 Dose:  3 ml


Aspirin (Ecotrin)  81 mg PO DAILY [facility]placed [facility]


   Last Admin: 09/24/18 09:24 Dose:  81 mg


Ergocalciferol (Drisdol 50,000 Intl Units Cap)  1 cap PO QWK Replaced [facility]


Famotidine (Pepcid)  20 mg PO BID [facility]


   Last Admin: 09/24/18 09:24 Dose:  20 mg


Ferric Sodium Gluconate Complex (Ferrlecit)  125 mg IVPB DAILY [facility]placed [facility]


   Stop: 10/02/18 14:16


Furosemide (Lasix)  40 mg IVP DAILY [facility]


   Last Admin: 09/24/18 09:25 Dose:  Not Given


Ceftriaxone Sodium 1 gm/ (Sodium Chloride)  100 mls @ 100 mls/hr IVPB DAILY [facility]


   PRN Reason: Protocol


   Last Admin: 09/24/18 09:24 Dose:  100 mls/hr


Insulin Human Regular (Novolin R)  0 unit SC ACHS [facility]placed [facility]


   PRN Reason: Protocol


   Last Admin: 09/24/18 12:26 Dose:  6 u


Methylprednisolone (Solu-Medrol)  30 mg IV Q12 [facility]placed [facility]


Metoprolol Tartrate (Lopressor)  25 mg PO BID [facility]placed [facility]


   Last Admin: 09/24/18 09:25 Dose:  Not Given


Repaglinide (Prandin)  2 mg PO DAILY [facility]


   Last Admin: 09/24/18 09:25 Dose:  2 mg


Rosuvastatin Calcium (Crestor)  20 mg PO HS [facility]placed [facility]


   Last Admin: 09/23/18 21:51 Dose:  20 mg


Sacubitril/Valsartan (Entresto 24 Mg-26 Mg)  1 tab PO DAILY [facility]placed [facility]


   Last Admin: 09/24/18 09:24 Dose:  1 tab


Ticagrelor (Brilinta)  90 mg PO BID Replaced [facility]


   Last Admin: 09/24/18 09:24 Dose:  90 mg











- Labs


Labs: 


 





 09/24/18 08:16 





 09/24/18 08:16 





 











PT  12.9 SECONDS (9.7-12.2)  H  09/21/18  07:42    


 


INR  1.2   09/21/18  07:42    


 


APTT  30 SECONDS (21-34)   09/21/18  07:42    














Assessment and Plan


(1) Chr obstructive pulmonary disease w/ acute lower respiratory infxn


Status: Acute   





(2) Chronic congestive heart failure


Status: Acute   





(3) Anemia


Status: Acute   





(4) Colitis


Status: Acute   





(5) HTN (hypertension)


Status: Acute   





(6) DM type 2 (diabetes mellitus, type 2)


Status: Acute   





- Assessment and Plan (Free Text)


Plan: 


ON FERRICELET IV INFUSION DAILY 9/24/18





CONTINUE iv ROCEPHIN 1 G ONCE A DAY 9/20/18.


 STEROIDS AS PER PMD.


PEPCID 20 MG BY MOUTH TWICE A DAY.








GI WORKUP AS OPD TILL PT MORE STABLE.


STOOL C.DIFFICILE -P.





PULMONARY TOILET

## 2018-09-24 NOTE — CP.PCM.PN
Subjective





- Date & Time of Evaluation


Date of Evaluation: 09/24/18


Time of Evaluation: 14:00





- Subjective


Subjective: 





CHIEF COMPLAINTS TODAY :


Patient is still coughing wheezing with mild expectoration


Generalized weakness





ROS.


HEENT :  N.


Resp :       No hemoptysis 


Cardio :     No anginal  CP, PND, orthopnea, palpitation 


GI :           No abd.pain, n/v ,diarrhea or GI bleeding .


CNS : No headache, vertigo, focal deficit.


Musculoskel :  No joint swelling ,


Derm :        No rash 


Psych :     Normal affect.


Ext :  No  swelling ,calf pain 





PE.


Pt. is alert awake in no distress.


V.S  As noted in the chart 


Head ,ear nose,throat and eyes : Normal.


Neck : Supple with normal carotids.


Lungs: Bilateral poor air entry with rhonchis at the bases


Heart : S1 & S2 normal with S4. No murmur.


Abd : Soft non tender with normal bowel sounds.


Neuro : Moves all ext. with no localized deficit.


Ext : No edema with intact pulses.Non tender calves 


Derm : No rashes or decubitus ulcer.





LABS/RADIOLOGY: CT of the abdomen shows thickening of the descending wall





ASSESSMENT/PLAN : 


repeat hemoglobin is 8.7 g/dL.


Both iron and TIBC is low, ferritin is normal.  Will give 3 days of IV iron.


Decrease steroids.


C. difficile still pending








Objective





- Vital Signs/Intake and Output


Vital Signs (last 24 hours): 


 











Temp Pulse Resp BP Pulse Ox


 


 97.7 F   105 H  18   92/52 L  100 


 


 09/24/18 07:00  09/24/18 12:37  09/24/18 07:00  09/24/18 09:25  09/24/18 07:00








Intake and Output: 


 











 09/24/18 09/24/18





 11:59 23:59


 


Intake Total 240 


 


Balance 240 














- Medications


Medications: 


 Current Medications





Albuterol/Ipratropium (Duoneb 3 Mg/0.5 Mg (3 Ml) Ud)  3 ml INH RQ4 PRN


   PRN Reason: Cough and congestion


Albuterol/Ipratropium (Duoneb 3 Mg/0.5 Mg (3 Ml) Ud)  3 ml INH RQ4 PASQUALE


   Last Admin: 09/24/18 13:13 Dose:  3 ml


Aspirin (Ecotrin)  81 mg PO DAILY [facility]


   Last Admin: 09/24/18 09:24 Dose:  81 mg


Ergocalciferol (Drisdol 50,000 Intl Units Cap)  1 cap PO QWK [facility]


Famotidine (Pepcid)  20 mg PO BID [facility]


   Last Admin: 09/24/18 09:24 Dose:  20 mg


Furosemide (Lasix)  40 mg IVP DAILY [facility]


   Last Admin: 09/24/18 09:25 Dose:  Not Given


Ceftriaxone Sodium 1 gm/ (Sodium Chloride)  100 mls @ 100 mls/hr IVPB DAILY [facility]


   PRN Reason: Protocol


   Last Admin: 09/24/18 09:24 Dose:  100 mls/hr


Insulin Human Regular (Novolin R)  0 unit SC ACHS PASQUALE


   PRN Reason: Protocol


   Last Admin: 09/24/18 12:26 Dose:  6 u


Methylprednisolone (Solu-Medrol)  30 mg IV Q6H [facility]


   Last Admin: 09/24/18 12:27 Dose:  30 mg


Metoprolol Tartrate (Lopressor)  25 mg PO BID [facility]


   Last Admin: 09/24/18 09:25 Dose:  Not Given


Repaglinide (Prandin)  2 mg PO DAILY [facility]


   Last Admin: 09/24/18 09:25 Dose:  2 mg


Rosuvastatin Calcium (Crestor)  20 mg PO HS [facility]


   Last Admin: 09/23/18 21:51 Dose:  20 mg


Sacubitril/Valsartan (Entresto 24 Mg-26 Mg)  1 tab PO DAILY [facility]


   Last Admin: 09/24/18 09:24 Dose:  1 tab


Ticagrelor (Brilinta)  90 mg PO BID [facility]


   Last Admin: 09/24/18 09:24 Dose:  90 mg











- Labs


Labs: 


 





 09/24/18 08:16 





 09/24/18 08:16 





 











PT  12.9 SECONDS (9.7-12.2)  H  09/21/18  07:42    


 


INR  1.2   09/21/18  07:42    


 


APTT  30 SECONDS (21-34)   09/21/18  07:42

## 2018-09-24 NOTE — CARD
--------------- APPROVED REPORT --------------





Date of service: 09/20/2018



EKG Measurement

Heart [id number]

VT [id number]

[id number]

[id number]

[id number]



<Conclusion>

Sinus tachycardia with premature atrial complexes

Left bundle branch block

Abnormal ECG

## 2018-09-24 NOTE — CP.PCM.PN
Subjective





- Date & Time of Evaluation


Date of Evaluation: 09/24/18


Time of Evaluation: 09:36





- Subjective


Subjective: 





f/u colitis





Soft BMs, no bleeding or abdominal pain


+ back pain and cough/dyspnea


Stool CDiff not resulted





Objective





- Vital Signs/Intake and Output


Vital Signs (last 24 hours): 


 











Temp Pulse Resp BP Pulse Ox


 


 97.7 F   89   18   92/52 L  100 


 


 09/24/18 07:00  09/24/18 09:23  09/24/18 07:00  09/24/18 09:25  09/24/18 07:00








Intake and Output: 


 











 09/24/18 09/24/18





 06:59 18:59


 


Intake Total 240 


 


Balance 240 














- Medications


Medications: 


 Current Medications





Albuterol/Ipratropium (Duoneb 3 Mg/0.5 Mg (3 Ml) Ud)  3 ml INH RQ4 PRN


   PRN Reason: Cough and congestion


Albuterol/Ipratropium (Duoneb 3 Mg/0.5 Mg (3 Ml) Ud)  3 ml INH RQ4 Replaced by [facility]


   Last Admin: 09/24/18 03:10 Dose:  Not Given


Aspirin (Ecotrin)  81 mg PO DAILY Replaced by [facility]


   Last Admin: 09/24/18 09:24 Dose:  81 mg


Ergocalciferol (Drisdol 50,000 Intl Units Cap)  1 cap PO QWK Replaced by [facility]


Famotidine (Pepcid)  20 mg PO BID Replaced by [facility]


   Last Admin: 09/24/18 09:24 Dose:  20 mg


Furosemide (Lasix)  40 mg IVP DAILY Replaced by [facility]


   Last Admin: 09/24/18 09:25 Dose:  Not Given


Heparin Sodium (Porcine) (Heparin)  5,000 units SC Q12 Replaced by [facility]


   Last Admin: 09/24/18 09:25 Dose:  5,000 units


Ceftriaxone Sodium 1 gm/ (Sodium Chloride)  100 mls @ 100 mls/hr IVPB DAILY Replaced by [facility]


   PRN Reason: Protocol


   Last Admin: 09/24/18 09:24 Dose:  100 mls/hr


Insulin Human Regular (Novolin R)  0 unit SC ACHS Replaced by [facility]


   PRN Reason: Protocol


   Last Admin: 09/24/18 07:52 Dose:  4 u


Methylprednisolone (Solu-Medrol)  30 mg IV Q6H Replaced by [facility]


   Last Admin: 09/24/18 05:16 Dose:  30 mg


Metoprolol Tartrate (Lopressor)  25 mg PO BID Replaced by [facility]


   Last Admin: 09/24/18 09:25 Dose:  Not Given


Repaglinide (Prandin)  2 mg PO DAILY Replaced by [facility]


   Last Admin: 09/24/18 09:25 Dose:  2 mg


Rosuvastatin Calcium (Crestor)  20 mg PO HS Replaced by [facility]


   Last Admin: 09/23/18 21:51 Dose:  20 mg


Sacubitril/Valsartan (Entresto 24 Mg-26 Mg)  1 tab PO DAILY Replaced by [facility]


   Last Admin: 09/24/18 09:24 Dose:  1 tab


Ticagrelor (Brilinta)  90 mg PO BID Replaced by [facility]


   Last Admin: 09/24/18 09:24 Dose:  90 mg











- Labs


Labs: 


 





 09/24/18 08:16 





 09/24/18 08:16 





 











PT  12.9 SECONDS (9.7-12.2)  H  09/21/18  07:42    


 


INR  1.2   09/21/18  07:42    


 


APTT  30 SECONDS (21-34)   09/21/18  07:42    














- Constitutional


Appears: No Acute Distress, Younger Than Stated Age





- Head Exam


Head Exam: NORMOCEPHALIC





- Eye Exam


Eye Exam: absent: Scleral icterus





- Respiratory Exam


Respiratory Exam: Clear to Ausculation Bilateral





- Cardiovascular Exam


Cardiovascular Exam: REGULAR RHYTHM





- GI/Abdominal Exam


GI & Abdominal Exam: Soft.  absent: Tenderness





Assessment and Plan


(1) Anemia


Assessment & Plan: 


chronic microcytic anemia


Stool OB negative but Iron deficiency present


GI workup on hold until pt more stable pulmonary wise. BNP is markedly elevated 

and pt is dyspneic


Status: Acute   





(2) CHF exacerbation


Status: Acute   





(3) Chronic congestive heart failure


Status: Acute   





(4) Colitis


Assessment & Plan: 


suspect infectious


. Clinically improving.


Await stool CDiff results, ordered 3 days ago


Status: Acute   





(5) CAD (coronary artery disease)


Status: Acute   





(6) Cardiomyopathy


Status: Acute

## 2018-09-25 RX ADMIN — IPRATROPIUM BROMIDE AND ALBUTEROL SULFATE SCH ML: .5; 3 SOLUTION RESPIRATORY (INHALATION) at 04:45

## 2018-09-25 RX ADMIN — HUMAN INSULIN SCH: 100 INJECTION, SOLUTION SUBCUTANEOUS at 22:19

## 2018-09-25 RX ADMIN — HUMAN INSULIN SCH UNITS: 100 INJECTION, SOLUTION SUBCUTANEOUS at 17:15

## 2018-09-25 RX ADMIN — IPRATROPIUM BROMIDE AND ALBUTEROL SULFATE SCH ML: .5; 3 SOLUTION RESPIRATORY (INHALATION) at 23:38

## 2018-09-25 RX ADMIN — METHYLPREDNISOLONE SODIUM SUCCINATE SCH MG: 40 INJECTION, POWDER, FOR SOLUTION INTRAMUSCULAR; INTRAVENOUS at 22:37

## 2018-09-25 RX ADMIN — IPRATROPIUM BROMIDE AND ALBUTEROL SULFATE SCH ML: .5; 3 SOLUTION RESPIRATORY (INHALATION) at 16:07

## 2018-09-25 RX ADMIN — METHYLPREDNISOLONE SODIUM SUCCINATE SCH MG: 40 INJECTION, POWDER, FOR SOLUTION INTRAMUSCULAR; INTRAVENOUS at 09:41

## 2018-09-25 RX ADMIN — IPRATROPIUM BROMIDE AND ALBUTEROL SULFATE SCH ML: .5; 3 SOLUTION RESPIRATORY (INHALATION) at 07:37

## 2018-09-25 RX ADMIN — IPRATROPIUM BROMIDE AND ALBUTEROL SULFATE SCH ML: .5; 3 SOLUTION RESPIRATORY (INHALATION) at 11:48

## 2018-09-25 RX ADMIN — HUMAN INSULIN SCH UNITS: 100 INJECTION, SOLUTION SUBCUTANEOUS at 08:19

## 2018-09-25 RX ADMIN — SODIUM FERRIC GLUCONATE COMPLEX SCH: 12.5 INJECTION INTRAVENOUS at 12:57

## 2018-09-25 RX ADMIN — HUMAN INSULIN SCH: 100 INJECTION, SOLUTION SUBCUTANEOUS at 12:58

## 2018-09-25 RX ADMIN — SACUBITRIL AND VALSARTAN SCH TAB: 24; 26 TABLET, FILM COATED ORAL at 09:43

## 2018-09-25 RX ADMIN — IPRATROPIUM BROMIDE AND ALBUTEROL SULFATE SCH ML: .5; 3 SOLUTION RESPIRATORY (INHALATION) at 19:46

## 2018-09-25 NOTE — CP.PCM.PN
Subjective





- Date & Time of Evaluation


Date of Evaluation: 09/25/18


Time of Evaluation: 13:50





- Subjective


Subjective: 





CHIEF COMPLAINTS TODAY :


Patient is still coughing wheezing with mild expectoration


Generalized weakness





ROS.


HEENT :  N.


Resp :       No hemoptysis 


Cardio :     No anginal  CP, PND, orthopnea, palpitation 


GI :           No abd.pain, n/v ,diarrhea or GI bleeding .


CNS : No headache, vertigo, focal deficit.


Musculoskel :  No joint swelling ,


Derm :        No rash 


Psych :     Normal affect.


Ext :  No  swelling ,calf pain 





PE.


Pt. is alert awake in no distress.


V.S  As noted in the chart 


Head ,ear nose,throat and eyes : Normal.


Neck : Supple with normal carotids.


Lungs: Bilateral poor air entry with rhonchis at the bases


Heart : S1 & S2 normal with S4. No murmur.


Abd : Soft non tender with normal bowel sounds.


Neuro : Moves all ext. with no localized deficit.


Ext : No edema with intact pulses.Non tender calves 


Derm : No rashes or decubitus ulcer.





LABS/RADIOLOGY: CT of the abdomen shows thickening of the descending wall





ASSESSMENT/PLAN : 


on weaning of the steroid patient became more short of breath with wheezing, 

steroid has now increased to every 8 hours.


Continue IV iron  therapy.








Objective





- Vital Signs/Intake and Output


Vital Signs (last 24 hours): 


                                        











Temp Pulse Resp BP Pulse Ox


 


 98.0 F   103 H  18   119/76   100 


 


 09/25/18 07:00  09/25/18 07:06  09/25/18 07:00  09/25/18 09:41  09/25/18 07:00








Intake and Output: 


                                        











 09/25/18 09/25/18





 11:59 23:59


 


Intake Total 100 


 


Balance 100 














- Medications


Medications: 


                               Current Medications





Albuterol/Ipratropium (Duoneb 3 Mg/0.5 Mg (3 Ml) Ud)  3 ml INH RQ4 PRN


   PRN Reason: Cough and congestion


Albuterol/Ipratropium (Duoneb 3 Mg/0.5 Mg (3 Ml) Ud)  3 ml INH RQ4 [facility]


   Last Admin: 09/25/18 11:48 Dose:  3 ml


Aspirin (Ecotrin)  81 mg PO DAILY [facility]


   Last Admin: 09/25/18 09:41 Dose:  81 mg


Ergocalciferol (Drisdol 50,000 Intl Units Cap)  1 cap PO QWK [facility]


Famotidine (Pepcid)  20 mg PO BID [facility]


   Last Admin: 09/25/18 09:41 Dose:  20 mg


Furosemide (Lasix)  40 mg IVP DAILY [facility]


   Last Admin: 09/25/18 09:41 Dose:  40 mg


Ceftriaxone Sodium 1 gm/ (Sodium Chloride)  100 mls @ 100 mls/hr IVPB DAILY [facility];

Protocol


   Last Admin: 09/25/18 10:05 Dose:  100 mls/hr


Ferric Sodium Gluconate Complex 125 mg/ Sodium Chloride  110 mls @ 110 mls/hr 

IVPB DAILY [facility]


   Stop: 10/02/18 15:01


   Last Admin: 09/25/18 12:57 Dose:  Not Given


Insulin Human Regular (Novolin R)  0 unit SC ACHS [facility]; Protocol


   Last Admin: 09/25/18 12:58 Dose:  Not Given


Methylprednisolone (Solu-Medrol)  30 mg IV Q12 [facility]


   Last Admin: 09/25/18 09:41 Dose:  30 mg


Metoprolol Tartrate (Lopressor)  25 mg PO BID [facility]


   Last Admin: 09/25/18 09:41 Dose:  25 mg


Repaglinide (Prandin)  2 mg PO DAILY [facility]


   Last Admin: 09/25/18 09:58 Dose:  2 mg


Rosuvastatin Calcium (Crestor)  20 mg PO HS [facility]


   Last Admin: 09/24/18 21:45 Dose:  20 mg


Sacubitril/Valsartan (Entresto 24 Mg-26 Mg)  1 tab PO DAILY [facility]


   Last Admin: 09/25/18 09:43 Dose:  1 tab


Ticagrelor (Brilinta)  90 mg PO BID [facility]


   Last Admin: 09/25/18 09:58 Dose:  90 mg











- Labs


Labs: 


                                        





                                 09/24/18 08:16 





                                 09/24/18 08:16 





                                        











PT  12.9 SECONDS (9.7-12.2)  H  09/21/18  07:42    


 


INR  1.2   09/21/18  07:42    


 


APTT  30 SECONDS (21-34)   09/21/18  07:42

## 2018-09-25 NOTE — CP.PCM.PN
Subjective





- Date & Time of Evaluation


Date of Evaluation: 09/25/18


Time of Evaluation: 20:24





- Subjective


Subjective: 





CHIEF COMPLAINTS TODAY :


AFEBRILE


Patient is still coughing.


STILL  WHEEZING


Generalized weakness ! ON IV IRON INFUSION








ROS.


HEENT :  N.


Resp :       No hemoptysis  +VE COUGH/+VE WHEEZE


Cardio :     No anginal  CP, PND, orthopnea, palpitation 


GI :           No abd.pain, n/v ,diarrhea or GI bleeding .


CNS : No headache, vertigo, focal deficit.


Musculoskel :  No joint swelling ,


Derm :        No rash 


Psych :     Normal affect.


Ext :  No  swelling ,calf pain 





PE.


Pt. is alert awake in no distress.


V.S  As noted in the chart 


Head ,ear nose,throat and eyes : Normal.


Neck : Supple with normal carotids.


Lungs: B/L EXPIRATORY WHEEZE/RHONCHI


Heart : S1 & S2 normal with S4. No murmur.


Abd : Soft non tender with normal bowel sounds.


Neuro : Moves all ext. with no localized deficit.


Ext : No edema with intact pulses.Non tender calves 


Derm : No rashes or decubitus ulcer.





LABS/RADIOLOGY:


REVIEWED


LEGIONELLA URINE AG -VE


MYCOPLASMA IGM -VE





CT of the abdomen shows thickening of the descending wall +VE COLITIS


STOOLS -VE OB





Objective





- Vital Signs/Intake and Output


Vital Signs (last 24 hours): 


                                        











Temp Pulse Resp BP Pulse Ox


 


 97.9 F   93 H  20   112/65   93 L


 


 09/25/18 15:00  09/25/18 15:00  09/25/18 15:00  09/25/18 18:16  09/25/18 15:00











- Medications


Medications: 


                               Current Medications





Albuterol/Ipratropium (Duoneb 3 Mg/0.5 Mg (3 Ml) Ud)  3 ml INH RQ4 PRN


   PRN Reason: Cough and congestion


Albuterol/Ipratropium (Duoneb 3 Mg/0.5 Mg (3 Ml) Ud)  3 ml [facility]H [facility]


   Last Admin: 09/25/18 19:46 Dose:  3 ml


Aspirin (Ecotrin)  81 mg PO DAILY [facility]


   Last Admin: 09/25/18 09:41 Dose:  81 mg


Ergocalciferol (Drisdol 50,000 Intl Units Cap)  1 cap PO QWK [facility]


Famotidine (Pepcid)  20 mg PO BID [facility]


   Last Admin: 09/25/18 09:41 Dose:  20 mg


Furosemide (Lasix)  40 mg IVP DAILY [facility]


   Last Admin: 09/25/18 09:41 Dose:  40 mg


Ceftriaxone Sodium 1 gm/ (Sodium Chloride)  100 mls @ 100 mls/hr IVPB DAILY [facility];

Protocol


   Last Admin: 09/25/18 10:05 Dose:  100 mls/hr


Ferric Sodium Gluconate Complex 125 mg/ Sodium Chloride  110 mls @ 110 mls/hr 

IVPB DAILY [facility]


   Stop: 10/02/18 15:01


   Last Admin: 09/25/18 12:57 Dose:  Not Given


Insulin Human Regular (Novolin R)  0 unit SC ACHS [facility]; Protocol


   Last Admin: 09/25/18 17:15 Dose:  2 units


Methylprednisolone (Solu-Medrol)  30 mg IV Q12 [facility]


   Last Admin: 09/25/18 09:41 Dose:  30 mg


Metoprolol Tartrate (Lopressor)  25 mg PO BID [facility]


   Last Admin: 09/25/18 18:16 Dose:  25 mg


Repaglinide (Prandin)  2 mg PO DAILY [facility]


   Last Admin: 09/25/18 09:58 Dose:  2 mg


Rosuvastatin Calcium (Crestor)  20 mg PO HS [facility]


   Last Admin: 09/24/18 21:45 Dose:  20 mg


Sacubitril/Valsartan (Entresto 24 Mg-26 Mg)  1 tab PO DAILY [facility]


   Last Admin: 09/25/18 09:43 Dose:  1 tab


Ticagrelor (Brilinta)  90 mg PO BID [facility]


   Last Admin: 09/25/18 18:16 Dose:  90 mg











- Labs


Labs: 


                                        





                                 09/24/18 08:16 





                                 09/24/18 08:16 





                                        











PT  12.9 SECONDS (9.7-12.2)  H  09/21/18  07:42    


 


INR  1.2   09/21/18  07:42    


 


APTT  30 SECONDS (21-34)   09/21/18  07:42    














Assessment and Plan


(1) Chr obstructive pulmonary disease w/ acute lower respiratory infxn


Status: Acute   





(2) Chronic congestive heart failure


Status: Acute   





(3) Anemia


Status: Acute   





(4) Colitis


Status: Acute   





(5) HTN (hypertension)


Status: Acute   





(6) DM type 2 (diabetes mellitus, type 2)


Status: Acute   





- Assessment and Plan (Free Text)


Plan: 





ON FERRICELET IV INFUSION DAILY 9/24/18





CONTINUE iv ROCEPHIN 1 G ONCE A DAY 9/20/18.


 STEROIDS AS PER PMD.


PEPCID 20 MG BY MOUTH TWICE A DAY.








GI WORKUP AS OPD TILL PT MORE STABLE.


STOOL C.DIFFICILE -P.





PULMONARY TOILET





F/U CXR pa AND LATERAL R/O CHF VS PNEUMONIA


CASE DISCUSSED WITH THE STAFF..

## 2018-09-26 LAB
ALBUMIN SERPL-MCNC: 3.1 G/DL (ref 3.5–5)
ALBUMIN/GLOB SERPL: 1.3 {RATIO} (ref 1–2.1)
ALT SERPL-CCNC: 29 U/L (ref 9–52)
ANISOCYTOSIS BLD QL SMEAR: SLIGHT
AST SERPL-CCNC: 23 U/L (ref 14–36)
BASOPHILS # BLD AUTO: 0 K/UL (ref 0–0.2)
BASOPHILS NFR BLD: 0.1 % (ref 0–2)
BUN SERPL-MCNC: 41 MG/DL (ref 7–17)
CALCIUM SERPL-MCNC: 8.5 MG/DL (ref 8.6–10.4)
EOSINOPHIL # BLD AUTO: 0 K/UL (ref 0–0.7)
EOSINOPHIL NFR BLD: 0 % (ref 0–4)
ERYTHROCYTE [DISTWIDTH] IN BLOOD BY AUTOMATED COUNT: 20.5 % (ref 11.5–14.5)
GFR NON-AFRICAN AMERICAN: 33
HGB BLD-MCNC: 9.2 G/DL (ref 11–16)
HYPOCHROMIC: SLIGHT
LYMPHOCYTE: 7 % (ref 20–40)
LYMPHOCYTES # BLD AUTO: 0.4 K/UL (ref 1–4.3)
LYMPHOCYTES NFR BLD AUTO: 4.7 % (ref 20–40)
MCH RBC QN AUTO: 24.7 PG (ref 27–31)
MCHC RBC AUTO-ENTMCNC: 32.3 G/DL (ref 33–37)
MCV RBC AUTO: 76.5 FL (ref 81–99)
MONOCYTE: 5 % (ref 0–10)
MONOCYTES # BLD: 0.5 K/UL (ref 0–0.8)
MONOCYTES NFR BLD: 5.4 % (ref 0–10)
NEUTROPHILS # BLD: 8.4 K/UL (ref 1.8–7)
NEUTROPHILS NFR BLD AUTO: 87 % (ref 50–75)
NEUTROPHILS NFR BLD AUTO: 89.8 % (ref 50–75)
NEUTS BAND NFR BLD: 1 % (ref 0–2)
NRBC BLD AUTO-RTO: 0.2 % (ref 0–2)
PLATELET # BLD EST: NORMAL 10*3/UL
PLATELET # BLD: 152 K/UL (ref 130–400)
PMV BLD AUTO: 10.5 FL (ref 7.2–11.7)
POLYCHROMIC: SLIGHT
RBC # BLD AUTO: 3.73 MIL/UL (ref 3.8–5.2)
TOTAL CELLS COUNTED BLD: 100
WBC # BLD AUTO: 9.3 K/UL (ref 4.8–10.8)

## 2018-09-26 RX ADMIN — METHYLPREDNISOLONE SODIUM SUCCINATE SCH MG: 40 INJECTION, POWDER, FOR SOLUTION INTRAMUSCULAR; INTRAVENOUS at 10:35

## 2018-09-26 RX ADMIN — HUMAN INSULIN SCH UNITS: 100 INJECTION, SOLUTION SUBCUTANEOUS at 08:21

## 2018-09-26 RX ADMIN — IPRATROPIUM BROMIDE AND ALBUTEROL SULFATE SCH ML: .5; 3 SOLUTION RESPIRATORY (INHALATION) at 15:55

## 2018-09-26 RX ADMIN — SODIUM FERRIC GLUCONATE COMPLEX SCH MLS/HR: 12.5 INJECTION INTRAVENOUS at 11:00

## 2018-09-26 RX ADMIN — IPRATROPIUM BROMIDE AND ALBUTEROL SULFATE SCH ML: .5; 3 SOLUTION RESPIRATORY (INHALATION) at 19:52

## 2018-09-26 RX ADMIN — HUMAN INSULIN SCH: 100 INJECTION, SOLUTION SUBCUTANEOUS at 21:41

## 2018-09-26 RX ADMIN — IPRATROPIUM BROMIDE AND ALBUTEROL SULFATE SCH ML: .5; 3 SOLUTION RESPIRATORY (INHALATION) at 23:38

## 2018-09-26 RX ADMIN — HUMAN INSULIN SCH: 100 INJECTION, SOLUTION SUBCUTANEOUS at 12:30

## 2018-09-26 RX ADMIN — SACUBITRIL AND VALSARTAN SCH TAB: 24; 26 TABLET, FILM COATED ORAL at 10:35

## 2018-09-26 RX ADMIN — METHYLPREDNISOLONE SODIUM SUCCINATE SCH MG: 40 INJECTION, POWDER, FOR SOLUTION INTRAMUSCULAR; INTRAVENOUS at 17:49

## 2018-09-26 RX ADMIN — HUMAN INSULIN SCH UNITS: 100 INJECTION, SOLUTION SUBCUTANEOUS at 17:48

## 2018-09-26 NOTE — CP.PCM.PN
Subjective





- Date & Time of Evaluation


Date of Evaluation: 09/26/18


Time of Evaluation: 17:45





- Subjective


Subjective: 





CHIEF COMPLAINTS TODAY :


AFEBRILE


Patient is STILL WITH BOUTS OF COUGH


NON PRODUCTIVE


STILL  WHEEZING





Generalized weakness ! ON IV IRON INFUSION








ROS.


HEENT :  N.


Resp :       No hemoptysis  +VE COUGH/+VE WHEEZE


Cardio :     No anginal  CP, PND, orthopnea, palpitation 


GI :           No abd.pain, n/v ,diarrhea or GI bleeding .


CNS : No headache, vertigo, focal deficit.


Musculoskel :  No joint swelling ,


Derm :        No rash 


Psych :     Normal affect.


Ext :  No  swelling ,calf pain 





PE.


Pt. is alert awake in no distress.


V.S  As noted in the chart 


Head ,ear nose,throat and eyes : Normal.


Neck : Supple with normal carotids.


Lungs: B/L EXPIRATORY WHEEZE/RHONCHI


Heart : S1 & S2 normal with S4. No murmur.


Abd : Soft non tender with normal bowel sounds.


Neuro : Moves all ext. with no localized deficit.


Ext : No edema with intact pulses.Non tender calves 


Derm : No rashes or decubitus ulcer.





LABS/RADIOLOGY:


REVIEWED


LEGIONELLA URINE AG -VE


MYCOPLASMA IGM -VE





CT of the abdomen shows thickening of the descending wall +VE COLITIS


STOOLS -VE OB





Objective





- Vital Signs/Intake and Output


Vital Signs (last 24 hours): 


                                        











Temp Pulse Resp BP Pulse Ox


 


 97.9 F   86   20   102/56 L  100 


 


 09/26/18 15:20  09/26/18 15:20  09/26/18 15:20  09/26/18 15:20  09/26/18 15:20








Intake and Output: 


                                        











 09/26/18 09/26/18





 06:59 18:59


 


Intake Total 250 


 


Balance 250 














- Medications


Medications: 


                               Current Medications





Albuterol/Ipratropium (Duoneb 3 Mg/0.5 Mg (3 Ml) Ud)  3 ml INH RQ4 [facility]


Aspirin (Ecotrin)  81 mg PO DAILY [facility]


   Last Admin: 09/26/18 10:34 Dose:  81 mg


Ergocalciferol (Drisdol 50,000 Intl Units Cap)  1 cap PO QWK [facility]


Famotidine (Pepcid)  20 mg PO BID [facility]


   Last Admin: 09/26/18 10:34 Dose:  20 mg


Furosemide (Lasix)  40 mg IVP DAILY [facility]


   Last Admin: 09/26/18 10:35 Dose:  40 mg


Ferric Sodium Gluconate Complex 125 mg/ Sodium Chloride  110 mls @ 110 mls/hr 

IVPB DAILY [facility]


   Stop: 10/02/18 15:01


   Last Admin: 09/26/18 11:00 Dose:  110 mls/hr


Ceftriaxone Sodium 1 gm/ (Sodium Chloride)  100 mls @ 100 mls/hr IVPB DAILY [facility];

Protocol


Insulin Human Regular (Novolin R)  0 unit SC ACHS [facility]; Protocol


   Last Admin: 09/26/18 12:30 Dose:  Not Given


Methylprednisolone (Solu-Medrol)  30 mg IV Q6 [facility]


Metoprolol Tartrate (Lopressor)  25 mg PO BID [facility]


   Last Admin: 09/26/18 10:36 Dose:  25 mg


Pneumococcal Polyvalent Vaccine (Pneumovax 23 Vaccine)  0.5 ml IM .ONCE ONE


   Stop: 09/27/18 12:01


Repaglinide (Prandin)  2 mg PO DAILY [facility]


   Last Admin: 09/26/18 10:35 Dose:  2 mg


Rosuvastatin Calcium (Crestor)  20 mg PO HS [facility]


   Last Admin: 09/25/18 22:37 Dose:  20 mg


Sacubitril/Valsartan (Entresto 24 Mg-26 Mg)  1 tab PO DAILY [facility]


   Last Admin: 09/26/18 10:35 Dose:  1 tab


Ticagrelor (Brilinta)  90 mg PO BID [facility]


   Last Admin: 09/26/18 10:35 Dose:  90 mg











- Labs


Labs: 


                                        





                                 09/26/18 07:37 





                                 09/26/18 07:37 





                                        











PT  12.9 SECONDS (9.7-12.2)  H  09/21/18  07:42    


 


INR  1.2   09/21/18  07:42    


 


APTT  30 SECONDS (21-34)   09/21/18  07:42    














Assessment and Plan


(1) Chr obstructive pulmonary disease w/ acute lower respiratory infxn


Status: Acute   





(2) Chronic congestive heart failure


Status: Acute   





(3) Anemia


Status: Acute   





(4) Colitis


Status: Acute   





(5) HTN (hypertension)


Status: Acute   





(6) DM type 2 (diabetes mellitus, type 2)


Status: Acute   





- Assessment and Plan (Free Text)


Plan: 





PLAN;


WILL CHECK PERTUSSIS-SEROLOGY IGG,IGA..


PATIENT HAS PROLONGED COUGH ILLNESS LASTING MORE THAN 2 WEEKS WITHOUT A CLEAR 

CAUSE.  aLSO PATIENT HAS PAROXYSMS OF COUGH.


sTART iv zITHROMAX 500 MG LOADING DOSE,


F/U iv zITHROMAX 250 MG ONCE A DAY DAILY X5 DAYS.





DROPLET PRECAUTIONS FOR PERTUSSIS





DC  iv ROCEPHIN 1 G ONCE A DAY 9/20/18.-9/26/18


 STEROIDS AS PER PMD.


PEPCID 20 MG BY MOUTH TWICE A DAY.


ON FERRICELET IV INFUSION DAILY 9/24/18





GI WORKUP AS OPD TILL PT MORE STABLE





DISCUSSED CASE WITH  IN DETAIL.

## 2018-09-26 NOTE — CP.PCM.PN
Subjective





- Date & Time of Evaluation


Date of Evaluation: 09/26/18


Time of Evaluation: 15:28





- Subjective


Subjective: 





CC: f/u anemia





c/o productive cough. CXR- normal pulm. BNP markedly elevated


Denies abdominal pain, diarrhea. CDiff specimen never resulted in lab.


Hgb has remained stable. Now patient is on Brillinta. Stool OB positive


Patient is too ill for elective EGD/colonoscopy- discussed with patient and her 

spouse- they will follow up with me in office





Objective





- Vital Signs/Intake and Output


Vital Signs (last 24 hours): 


                                        











Temp Pulse Resp BP Pulse Ox


 


 98.2 F   86   18   117/57 L  99 


 


 09/26/18 07:00  09/26/18 07:00  09/26/18 07:00  09/26/18 10:36  09/26/18 07:00








Intake and Output: 


                                        











 09/26/18 09/26/18





 06:59 18:59


 


Intake Total 250 


 


Balance 250 














- Medications


Medications: 


                               Current Medications





Aspirin (Ecotrin)  81 mg PO DAILY [facility]


   Last Admin: 09/26/18 10:34 Dose:  81 mg


Ergocalciferol (Drisdol 50,000 Intl Units Cap)  1 cap PO QWK [facility]


Famotidine (Pepcid)  20 mg PO BID [facility]


   Last Admin: 09/26/18 10:34 Dose:  20 mg


Furosemide (Lasix)  40 mg IVP DAILY [facility]


   Last Admin: 09/26/18 10:35 Dose:  40 mg


Ferric Sodium Gluconate Complex 125 mg/ Sodium Chloride  110 mls @ 110 mls/hr 

IVPB DAILY [facility]


   Stop: 10/02/18 15:01


   Last Admin: 09/26/18 11:00 Dose:  110 mls/hr


Ceftriaxone Sodium 1 gm/ (Sodium Chloride)  100 mls @ 100 mls/hr IVPB DAILY [facility];

Protocol


Insulin Human Regular (Novolin R)  0 unit SC ACHS [facility]; Protocol


   Last Admin: 09/26/18 12:30 Dose:  Not Given


Methylprednisolone (Solu-Medrol)  30 mg IV Q12 [facility]


   Last Admin: 09/26/18 10:35 Dose:  30 mg


Metoprolol Tartrate (Lopressor)  25 mg PO BID [facility]


   Last Admin: 09/26/18 10:36 Dose:  25 mg


Pneumococcal Polyvalent Vaccine (Pneumovax 23 Vaccine)  0.5 ml IM .ONCE ONE


   Stop: 09/27/18 12:01


Repaglinide (Prandin)  2 mg PO DAILY [facility]


   Last Admin: 09/26/18 10:35 Dose:  2 mg


Rosuvastatin Calcium (Crestor)  20 mg PO HS [facility]


   Last Admin: 09/25/18 22:37 Dose:  20 mg


Sacubitril/Valsartan (Entresto 24 Mg-26 Mg)  1 tab PO DAILY [facility]


   Last Admin: 09/26/18 10:35 Dose:  1 tab


Ticagrelor (Brilinta)  90 mg PO BID [facility]


   Last Admin: 09/26/18 10:35 Dose:  90 mg











- Labs


Labs: 


                                        





                                 09/26/18 07:37 





                                 09/26/18 07:37 





                                        











PT  12.9 SECONDS (9.7-12.2)  H  09/21/18  07:42    


 


INR  1.2   09/21/18  07:42    


 


APTT  30 SECONDS (21-34)   09/21/18  07:42    














- Constitutional


Appears: No Acute Distress





- Head Exam


Head Exam: NORMOCEPHALIC





- Neck Exam


Neck Exam: Normal Inspection





- Respiratory Exam


Respiratory Exam: Wheezes





- Cardiovascular Exam


Cardiovascular Exam: REGULAR RHYTHM





- GI/Abdominal Exam


GI & Abdominal Exam: Soft.  absent: Tenderness





Assessment and Plan


(1) Anemia


Assessment & Plan: 


stable, iron deficient- R/O chronic GI blood losses, stool OB positive


Should have EGD/colonoscopy but at present patient too ill, has dyspnea, 

elevated BNP and is wheezing


Recommend careful monitoring for bleeding on Brillinta


Pt/ were provided with my contact info to see me in office after 

discharge


Status: Acute   





(2) CHF exacerbation


Status: Acute   





(3) Chronic congestive heart failure


Status: Acute   





(4) Colitis


Assessment & Plan: 


seen on CT


Clinically stable, without diarrhea. CDiff toxin never received despite 2 

orders. If diarrhea recurs will reorder CDiff toxin stool study


Status: Acute   





(5) CAD (coronary artery disease)


Status: Acute   





(6) Cardiomyopathy


Status: Acute

## 2018-09-26 NOTE — CP.PCM.DIS
Provider





- Provider


Date of Admission: 


09/25/18 14:00





Attending physician: 


Osmar Fierro MD





Time Spent in preparation of Discharge (in minutes): 35





Hospital Course





- Lab Results


Lab Results: 


                                  Micro Results





09/21/18 07:42   Blood   Blood Culture - Final


                            NO GROWTH AFTER 5 DAYS


09/21/18 07:42   Blood   Gram Stain - Final


                            TEST NOT PERFORMED


09/21/18 07:42   Blood   Blood Culture - Final


                            NO GROWTH AFTER 5 DAYS


09/21/18 07:42   Blood   Gram Stain - Final


                            TEST NOT PERFORMED


09/24/18 07:00   Sputum   Gram Stain - Final


09/24/18 07:00   Sputum   Sputum Culture - Final


                            NORMAL ORAL CARROL


09/22/18 14:12   Stool   Stool Culture - Final


                            NO SALMONELLA, SHIGELLA OR CAMPYLOBACTER ISOLATED.


09/22/18 03:18   Naris   MRSA Culture (Admit) - Final


                            MRSA NOT DETECTED





                             Most Recent Lab Values











WBC  9.3 K/uL (4.8-10.8)   09/26/18  07:37    


 


RBC  3.73 Mil/uL (3.80-5.20)  L  09/26/18  07:37    


 


Hgb  9.2 g/dL (11.0-16.0)  L  09/26/18  07:37    


 


Hct  28.5 % (34.0-47.0)  L  09/26/18  07:37    


 


MCV  76.5 fL (81.0-99.0)  L  09/26/18  07:37    


 


MCH  24.7 pg (27.0-31.0)  L  09/26/18  07:37    


 


MCHC  32.3 g/dL (33.0-37.0)  L  09/26/18  07:37    


 


RDW  20.5 % (11.5-14.5)  H  09/26/18  07:37    


 


Plt Count  152 K/uL (130-400)   09/26/18  07:37    


 


MPV  10.5 fL (7.2-11.7)   09/26/18  07:37    


 


Neut % (Auto)  89.8 % (50.0-75.0)  H  09/26/18  07:37    


 


Lymph % (Auto)  4.7 % (20.0-40.0)  L  09/26/18  07:37    


 


Mono % (Auto)  5.4 % (0.0-10.0)   09/26/18  07:37    


 


Eos % (Auto)  0.0 % (0.0-4.0)   09/26/18  07:37    


 


Baso % (Auto)  0.1 % (0.0-2.0)   09/26/18  07:37    


 


Neut # (Auto)  8.4 K/uL (1.8-7.0)  H  09/26/18  07:37    


 


Lymph # (Auto)  0.4 K/uL (1.0-4.3)  L  09/26/18  07:37    


 


Mono # (Auto)  0.5 K/uL (0.0-0.8)   09/26/18  07:37    


 


Eos # (Auto)  0.0 K/uL (0.0-0.7)   09/26/18  07:37    


 


Baso # (Auto)  0.0 K/uL (0.0-0.2)   09/26/18  07:37    


 


Neutrophils % (Manual)  87 % (50-75)  H  09/26/18  07:37    


 


Band Neutrophils %  1 % (0-2)   09/26/18  07:37    


 


Lymphocytes % (Manual)  7 % (20-40)  L  09/26/18  07:37    


 


Monocytes % (Manual)  5 % (0-10)   09/26/18  07:37    


 


Differential Comment     09/20/18  15:24    


 


Platelet Estimate  Normal  (NORMAL)   09/26/18  07:37    


 


Polychromasia  Slight   09/26/18  07:37    


 


Hypochromasia (manual)  Slight   09/26/18  07:37    


 


Poikilocytosis (manual  Slight   09/24/18  08:16    


 


Basophilic Stippling  Slight   09/24/18  08:16    


 


Anisocytosis (manual)  Slight   09/26/18  07:37    


 


Microcytosis (manual)  Slight   09/24/18  08:16    


 


Target Cells  Slight   09/24/18  08:16    


 


Tear Drop Cells  Slight   09/24/18  08:16    


 


Ovalocytes  Slight   09/24/18  08:16    


 


Schistocytes  Slight   09/23/18  08:46    


 


ESR  16 mm/hr (0-20)   09/22/18  06:33    


 


PT  12.9 SECONDS (9.7-12.2)  H  09/21/18  07:42    


 


INR  1.2   09/21/18  07:42    


 


APTT  30 SECONDS (21-34)   09/21/18  07:42    


 


Sodium  138 mmol/L (132-148)   09/26/18  07:37    


 


Potassium  5.0 mmol/L (3.6-5.2)   09/26/18  07:37    


 


Chloride  101 mmol/L ()   09/26/18  07:37    


 


Carbon Dioxide  33 mmol/L (22-30)  H  09/26/18  07:37    


 


Anion Gap  10  (10-20)   09/26/18  07:37    


 


BUN  41 mg/dL (7-17)  H  09/26/18  07:37    


 


Creatinine  1.5 mg/dL (0.7-1.2)  H  09/26/18  07:37    


 


Est GFR ( Amer)  40   09/26/18  07:37    


 


Est GFR (Non-Af Amer)  33   09/26/18  07:37    


 


POC Glucose (mg/dL)  286 mg/dL ()  H  09/26/18  06:13    


 


Random Glucose  280 mg/dL ()  H  09/26/18  07:37    


 


Calcium  8.5 mg/dl (8.6-10.4)  L  09/26/18  07:37    


 


Phosphorus  3.6 mg/dL (2.5-4.5)   09/21/18  07:50    


 


Magnesium  1.9 mg/dL (1.6-2.3)   09/22/18  06:33    


 


Iron  24 ug/dL ()  L  09/23/18  07:46    


 


TIBC  355 ug/dL (250-450)   09/23/18  07:46    


 


% Saturation  7  (20-55)  L  09/23/18  07:46    


 


Ferritin  26.0 ng/mL  09/23/18  07:46    


 


Total Bilirubin  0.2 mg/dL (0.2-1.3)   09/26/18  07:37    


 


AST  23 U/L (14-36)   09/26/18  07:37    


 


ALT  29 U/L (9-52)   09/26/18  07:37    


 


Alkaline Phosphatase  54 U/L ()   09/26/18  07:37    


 


Total Creatine Kinase  186 U/L ()  H  09/21/18  16:35    


 


CK-MB (Mass)  3.16 ng/mL (0.0-3.38)   09/21/18  16:35    


 


Troponin I  0.0230 ng/mL (0.00-0.120)   09/21/18  16:35    


 


C-Reactive Protein  21.30 mg/L (0.0-9.9)  H  09/22/18  06:33    


 


NT-Pro-B Natriuret Pep  30947 pg/mL (0-900)  H  09/26/18  07:37    


 


Total Protein  5.4 g/dL (6.3-8.3)  L  09/26/18  07:37    


 


Albumin  3.1 g/dL (3.5-5.0)  L  09/26/18  07:37    


 


Globulin  2.3 gm/dL (2.2-3.9)   09/26/18  07:37    


 


Albumin/Globulin Ratio  1.3  (1.0-2.1)   09/26/18  07:37    


 


Lipase  201 U/L ()   09/20/18  15:24    


 


Urine Color  Yellow  (YELLOW)   09/20/18  17:51    


 


Urine Clarity  Clear  (Clear)   09/20/18  17:51    


 


Urine pH  5.0  (5.0-8.0)   09/20/18  17:51    


 


Ur Specific Gravity  1.008  (1.003-1.030)   09/20/18  17:51    


 


Urine Protein  Negative mg/dL (NEGATIVE)   09/20/18  17:51    


 


Urine Glucose (UA)  Normal mg/dL (Normal)   09/20/18  17:51    


 


Urine Ketones  Negative mg/dL (NEGATIVE)   09/20/18  17:51    


 


Urine Blood  2+  (NEGATIVE)  H  09/20/18  17:51    


 


Urine Nitrate  Negative  (NEGATIVE)   09/20/18  17:51    


 


Urine Bilirubin  Negative  (NEGATIVE)   09/20/18  17:51    


 


Urine Urobilinogen  Normal mg/dL (0.2-1.0)   09/20/18  17:51    


 


Ur Leukocyte Esterase  Neg Hunter/uL (Negative)   09/20/18  17:51    


 


Urine WBC (Auto)  2 /hpf (0-5)   09/20/18  17:51    


 


Urine RBC (Auto)  20 /hpf (0-3)  H  09/20/18  17:51    


 


Ur Squamous Epith Cells  1 /hpf (0-5)   09/20/18  17:51    


 


Urine Bacteria  Rare  (<OCC)   09/20/18  17:51    


 


Hyaline Casts  3-5 /lpf (0-2)  H  09/20/18  17:51    


 


Stool Occult Blood  Negative  (NEGATIVE)   09/22/18  14:12    


 


Ur L.pneumophila Ag  Negative  (NEGATIVE)   09/22/18  03:18    


 


Mycoplasma pneumon IgM  Negative  (NEGATIVE)   09/22/18  06:33    














- Hospital Course


Hospital Course: 





For the last few days patient has been complaining of cough expectoration white 

to yellowish with increasing wheezing symptoms increase in intensity and 

severity patient became short of breath at rest.  Patient presented to [facility] Emergency Room found to have acute COPD with exacerbation and possible 

respiratory infection the proBNP was elevated at 29,000 chest x-ray did not show

any acute infiltrate.


Few days ago patient had lower abdominal pain was seen by local MD and was 

prescribed Flagyl for possible diverticulitis.  CT of the abdomen with oral 

contrast did not  revealed any diverticulitis.





PAST HIST.


Patient has history of ischemic cardiomyopathy with left antegrade ejection 

fraction of less than 30%.  Patient also has AICD coronary artery disease with 

stent type II diabetes COPD and anemia.


Patient was admitted on the telemetry bed.  Serial enzymes were negative for 

myocardial injury.  BNP decreased in number to 12,004 discharge with IV Lasix


Patient was given IV antibiotics and IV steroids and bronchodilators.  Patient 

improved on above therapy patient still has occasional wheezing which will be 

controlled at home on nebulizer and inhalers and a short course of prednisone.


Septic workup was negative.  Hemoglobin dropped down as low as 8 g.  Patient 

received 3 days of IV iron, and hemoglobin was 9.2 before discharge.  Patient 

will continue her home medications.











Discharge Exam





- Head Exam


Head Exam: NORMOCEPHALIC





Discharge Plan





- Follow Up Plan


Condition: FAIR


Disposition: HOME/ ROUTINE

## 2018-09-26 NOTE — RAD
Date of service: 



09/26/2018



HISTORY:

 CHF /PNEUMONIA 



COMPARISON:

9/20/2018



TECHNIQUE:

Chest PA and lateral



FINDINGS:



LUNGS:

No active pulmonary disease.



PLEURA:

No significant pleural effusion identified. No pneumothorax apparent.



CARDIOVASCULAR:

Normal heart size.  Hiatal hernia with air-fluid level noted 

retrocardiac.  Permanent pacemaker.  Normal heart size.



OSSEOUS STRUCTURES:

No significant abnormalities.



VISUALIZED UPPER ABDOMEN:

Normal.



OTHER FINDINGS:

None.



IMPRESSION:

No active disease.

## 2018-09-27 RX ADMIN — METHYLPREDNISOLONE SODIUM SUCCINATE SCH MG: 40 INJECTION, POWDER, FOR SOLUTION INTRAMUSCULAR; INTRAVENOUS at 13:15

## 2018-09-27 RX ADMIN — SODIUM FERRIC GLUCONATE COMPLEX SCH MLS/HR: 12.5 INJECTION INTRAVENOUS at 11:00

## 2018-09-27 RX ADMIN — IPRATROPIUM BROMIDE AND ALBUTEROL SULFATE SCH ML: .5; 3 SOLUTION RESPIRATORY (INHALATION) at 19:23

## 2018-09-27 RX ADMIN — HUMAN INSULIN SCH UNITS: 100 INJECTION, SOLUTION SUBCUTANEOUS at 17:27

## 2018-09-27 RX ADMIN — METHYLPREDNISOLONE SODIUM SUCCINATE SCH MG: 40 INJECTION, POWDER, FOR SOLUTION INTRAMUSCULAR; INTRAVENOUS at 06:07

## 2018-09-27 RX ADMIN — IPRATROPIUM BROMIDE AND ALBUTEROL SULFATE SCH ML: .5; 3 SOLUTION RESPIRATORY (INHALATION) at 23:45

## 2018-09-27 RX ADMIN — HUMAN INSULIN SCH UNITS: 100 INJECTION, SOLUTION SUBCUTANEOUS at 12:15

## 2018-09-27 RX ADMIN — METHYLPREDNISOLONE SODIUM SUCCINATE SCH MG: 40 INJECTION, POWDER, FOR SOLUTION INTRAMUSCULAR; INTRAVENOUS at 00:06

## 2018-09-27 RX ADMIN — IPRATROPIUM BROMIDE AND ALBUTEROL SULFATE SCH ML: .5; 3 SOLUTION RESPIRATORY (INHALATION) at 07:34

## 2018-09-27 RX ADMIN — IPRATROPIUM BROMIDE AND ALBUTEROL SULFATE SCH ML: .5; 3 SOLUTION RESPIRATORY (INHALATION) at 03:16

## 2018-09-27 RX ADMIN — METHYLPREDNISOLONE SODIUM SUCCINATE SCH MG: 40 INJECTION, POWDER, FOR SOLUTION INTRAMUSCULAR; INTRAVENOUS at 17:23

## 2018-09-27 RX ADMIN — IPRATROPIUM BROMIDE AND ALBUTEROL SULFATE SCH ML: .5; 3 SOLUTION RESPIRATORY (INHALATION) at 11:05

## 2018-09-27 RX ADMIN — IPRATROPIUM BROMIDE AND ALBUTEROL SULFATE SCH ML: .5; 3 SOLUTION RESPIRATORY (INHALATION) at 16:15

## 2018-09-27 RX ADMIN — SACUBITRIL AND VALSARTAN SCH TAB: 24; 26 TABLET, FILM COATED ORAL at 10:32

## 2018-09-27 RX ADMIN — HUMAN INSULIN SCH UNITS: 100 INJECTION, SOLUTION SUBCUTANEOUS at 21:45

## 2018-09-27 RX ADMIN — HUMAN INSULIN SCH UNITS: 100 INJECTION, SOLUTION SUBCUTANEOUS at 08:24

## 2018-09-27 NOTE — CP.PCM.PN
Subjective





- Date & Time of Evaluation


Date of Evaluation: 09/27/18


Time of Evaluation: 23:23





- Subjective


Subjective: 





CHIEF COMPLAINTS TODAY :


AFEBRILE


C/O LESS COUGH


LESS DYSPNOEA





PT ON DROPLET PRECAUTION FOR R/O PERTUSSIS





 AT BEDSIDE.





Generalized weakness ! ON IV IRON INFUSION








ROS.


HEENT :  N.


Resp :       No hemoptysis  +VE COUGH/+VE WHEEZE


Cardio :     No anginal  CP, PND, orthopnea, palpitation 


GI :           No abd.pain, n/v ,diarrhea or GI bleeding .


CNS : No headache, vertigo, focal deficit.


Musculoskel :  No joint swelling ,


Derm :        No rash 


Psych :     Normal affect.


Ext :  No  swelling ,calf pain 





PE.


Pt. is alert awake in no distress.


V.S  As noted in the chart 


Head ,ear nose,throat and eyes : Normal.


Neck : Supple with normal carotids.


Lungs: B/L EXPIRATORY WHEEZE/RHONCHI


Heart : S1 & S2 normal with S4. No murmur.


Abd : Soft non tender with normal bowel sounds.


Neuro : Moves all ext. with no localized deficit.


Ext : No edema with intact pulses.Non tender calves 


Derm : No rashes or decubitus ulcer.





LABS/RADIOLOGY:


REVIEWED


LEGIONELLA URINE AG -VE


MYCOPLASMA IGM -VE





CT of the abdomen shows thickening of the descending wall +VE COLITIS


STOOLS -VE OB








Objective





- Vital Signs/Intake and Output


Vital Signs (last 24 hours): 


                                        











Temp Pulse Resp BP Pulse Ox


 


 98.3 F   104 H  20   120/70   98 


 


 09/27/18 15:15  09/27/18 16:00  09/27/18 15:15  09/27/18 17:23  09/27/18 15:15











- Medications


Medications: 


                               Current Medications





Albuterol/Ipratropium (Duoneb 3 Mg/0.5 Mg (3 Ml) Ud)  3 ml INH RQ4 [facility]


   Last Admin: 09/27/18 19:23 Dose:  3 ml


Aspirin (Ecotrin)  81 mg PO DAILY [facility]


   Last Admin: 09/27/18 10:31 Dose:  81 mg


Ergocalciferol (Drisdol 50,000 Intl Units Cap)  1 cap PO QWK [facility]


   Last Admin: 09/27/18 10:32 Dose:  1 cap


Famotidine (Pepcid)  20 mg PO BID [facility]


   Last Admin: 09/27/18 17:22 Dose:  20 mg


Furosemide (Lasix)  40 mg IVP DAILY [facility]


   Last Admin: 09/27/18 10:31 Dose:  40 mg


Ferric Sodium Gluconate Complex 125 mg/ Sodium Chloride  110 mls @ 110 mls/hr I

VPB DAILY [facility]


   Stop: 10/02/18 15:01


   Last Admin: 09/27/18 11:00 Dose:  110 mls/hr


Azithromycin 250 mg/ Sodium (Chloride)  250 mls @ 250 mls/hr IVPB Q24H [facility]; 

Protocol


Insulin Human Regular (Novolin R)  0 unit SC ACHS [facility]; Protocol


   Last Admin: 09/27/18 21:45 Dose:  4 units


Methylprednisolone (Solu-Medrol)  30 mg IV Q6 [facility]


   Last Admin: 09/27/18 17:23 Dose:  30 mg


Metoprolol Tartrate (Lopressor)  25 mg PO BID [facility]


   Last Admin: 09/27/18 17:23 Dose:  25 mg


Repaglinide (Prandin)  2 mg PO DAILY [facility]


   Last Admin: 09/27/18 10:31 Dose:  2 mg


Rosuvastatin Calcium (Crestor)  20 mg PO HS [facility]


   Last Admin: 09/27/18 21:24 Dose:  20 mg


Sacubitril/Valsartan (Entresto 24 Mg-26 Mg)  1 tab PO DAILY [facility]


   Last Admin: 09/27/18 10:32 Dose:  1 tab


Ticagrelor (Brilinta)  90 mg PO BID [facility]


   Last Admin: 09/27/18 17:22 Dose:  90 mg











- Labs


Labs: 


                                        





                                 09/26/18 07:37 





                                 09/26/18 07:37 





                                        











PT  12.9 SECONDS (9.7-12.2)  H  09/21/18  07:42    


 


INR  1.2   09/21/18  07:42    


 


APTT  30 SECONDS (21-34)   09/21/18  07:42    














Assessment and Plan


(1) Chr obstructive pulmonary disease w/ acute lower respiratory infxn


Status: Acute   





(2) Chronic congestive heart failure


Status: Acute   





(3) Anemia


Status: Acute   





(4) Colitis


Status: Acute   





(5) HTN (hypertension)


Status: Acute   





(6) DM type 2 (diabetes mellitus, type 2)


Status: Acute   





- Assessment and Plan (Free Text)


Plan: 





PATIENT HAS PROLONGED COUGH ILLNESS LASTING MORE THAN 2 WEEKS WITHOUT A CLEAR 

CAUSE.  aLSO PATIENT HAS PAROXYSMS OF COUGH.


CONTINUE  iv zITHROMAX 500 MG LOADING DOSE,


F/U iv zITHROMAX 250 MG ONCE A DAY DAILY X5 DAYS.





DROPLET PRECAUTIONS FOR PERTUSSIS





DC  iv ROCEPHIN 1 G ONCE A DAY 9/20/18.-9/26/18


INCREASED  STEROIDS AS PER PMD.


PEPCID 20 MG BY MOUTH TWICE A DAY.


ON FERRICELET IV INFUSION DAILY 9/24/18





PT TO GET Tdap 0.5ml IM IN AM 9/28/18. 


SPOKE TO PHARMACY.





F/U PERTUSSIS IGA.IGG SEROLOGY

## 2018-09-27 NOTE — CP.PCM.PN
Subjective





- Date & Time of Evaluation


Date of Evaluation: 09/27/18


Time of Evaluation: 14:56





- Subjective


Subjective: 





F/U Colitis/ anemia





Denies diarrhea


+LLQ mild discomfort


On steroids/antibiotics for COPD


Less dyspneic today





Objective





- Vital Signs/Intake and Output


Vital Signs (last 24 hours): 


                                        











Temp Pulse Resp BP Pulse Ox


 


 97.8 F   90   18   114/61   98 


 


 09/27/18 08:15  09/27/18 08:15  09/27/18 08:15  09/27/18 10:31  09/27/18 08:15








Intake and Output: 


                                        











 09/27/18 09/27/18





 06:59 18:59


 


Intake Total 150 


 


Balance 150 














- Medications


Medications: 


                               Current Medications





Albuterol/Ipratropium (Duoneb 3 Mg/0.5 Mg (3 Ml) Ud)  3 ml INH RQ4 [facility]


   Last Admin: 09/27/18 11:05 Dose:  3 ml


Aspirin (Ecotrin)  81 mg PO DAILY [facility]


   Last Admin: 09/27/18 10:31 Dose:  81 mg


Ergocalciferol (Drisdol 50,000 Intl Units Cap)  1 cap PO QWK [facility]


   Last Admin: 09/27/18 10:32 Dose:  1 cap


Famotidine (Pepcid)  20 mg PO BID [facility]


   Last Admin: 09/27/18 10:30 Dose:  20 mg


Furosemide (Lasix)  40 mg IVP DAILY [facility]


   Last Admin: 09/27/18 10:31 Dose:  40 mg


Ferric Sodium Gluconate Complex 125 mg/ Sodium Chloride  110 mls @ 110 mls/hr 

IVPB DAILY [facility]


   Stop: 10/02/18 15:01


   Last Admin: 09/27/18 11:00 Dose:  110 mls/hr


Azithromycin 250 mg/ Sodium (Chloride)  250 mls @ 250 mls/hr IVPB Q24H [facility]; 

Protocol


Insulin Human Regular (Novolin R)  0 unit SC ACHS [facility]; Protocol


   Last Admin: 09/27/18 12:15 Dose:  8 units


Methylprednisolone (Solu-Medrol)  30 mg IV Q6 [facility]


   Last Admin: 09/27/18 13:15 Dose:  30 mg


Metoprolol Tartrate (Lopressor)  25 mg PO BID [facility]


   Last Admin: 09/27/18 10:30 Dose:  25 mg


Repaglinide (Prandin)  2 mg PO DAILY [facility]


   Last Admin: 09/27/18 10:31 Dose:  2 mg


Rosuvastatin Calcium (Crestor)  20 mg PO HS [facility]


   Last Admin: 09/26/18 21:07 Dose:  20 mg


Sacubitril/Valsartan (Entresto 24 Mg-26 Mg)  1 tab PO DAILY [facility]


   Last Admin: 09/27/18 10:32 Dose:  1 tab


Ticagrelor (Brilinta)  90 mg PO BID [facility]


   Last Admin: 09/27/18 10:31 Dose:  90 mg











- Labs


Labs: 


                                        





                                 09/26/18 07:37 





                                 09/26/18 07:37 





                                        











PT  12.9 SECONDS (9.7-12.2)  H  09/21/18  07:42    


 


INR  1.2   09/21/18  07:42    


 


APTT  30 SECONDS (21-34)   09/21/18  07:42    














- Constitutional


Appears: No Acute Distress





- Head Exam


Head Exam: NORMOCEPHALIC





- Eye Exam


Eye Exam: absent: Scleral icterus





- Respiratory Exam


Respiratory Exam: Rhonchi





- Cardiovascular Exam


Cardiovascular Exam: REGULAR RHYTHM (S3 gallop audible)





- GI/Abdominal Exam


GI & Abdominal Exam: Soft, Normal Bowel Sounds.  absent: Distended, Guarding, 

Tenderness





Assessment and Plan


(1) Anemia


Assessment & Plan: 


Iron deficient. Stool OB positive. Has had GI workup in past few years. 

Presently too acutely ill for GI procedures. Will be scheduled electively as 

outpatient. Pt and spouse have my contact information


Need to monitor carefully for GI bleeding on Brillinta


Status: Acute   





(2) CHF exacerbation


Status: Acute   





(3) Chronic congestive heart failure


Status: Acute   





(4) Colitis


Assessment & Plan: 


Seen on CT. ? Clinical significance. Asymptomatic


Status: Acute   





(5) CAD (coronary artery disease)


Status: Acute   





(6) Cardiomyopathy


Status: Acute

## 2018-09-27 NOTE — CP.PCM.PN
Subjective





- Date & Time of Evaluation


Date of Evaluation: 09/27/18


Time of Evaluation: 14:03





- Subjective


Subjective: 





CHIEF COMPLAINTS TODAY :


on tapering the steroid patient is now out of breath wheezing cough with no 

expectoration





ROS.


HEENT :  N.


Resp :       No hemoptysis 


Cardio :     No anginal  CP, PND, orthopnea, palpitation 


GI :           No abd.pain, n/v ,diarrhea or GI bleeding .


CNS : No headache, vertigo, focal deficit.


Musculoskel :  No joint swelling ,


Derm :        No rash 


Psych :     Normal affect.


Ext :  No  swelling ,calf pain 





PE.


Pt. is alert awake in no distress.


V.S  As noted in the chart 


Head ,ear nose,throat and eyes : Normal.


Neck : Supple with normal carotids.


Lungs: Bilateral poor air entry with rhonchis at the bases


Heart : S1 & S2 normal with S4. No murmur.


Abd : Soft non tender with normal bowel sounds.


Neuro : Moves all ext. with no localized deficit.


Ext : No edema with intact pulses.Non tender calves 


Derm : No rashes or decubitus ulcer.





LABS/RADIOLOGY: CT of the abdomen shows thickening of the descending wall





ASSESSMENT/PLAN : 


we will hold the disc and continue IV L  Lasix





Objective





- Vital Signs/Intake and Output


Vital Signs (last 24 hours): 


                                        











Temp Pulse Resp BP Pulse Ox


 


 97.8 F   90   18   114/61   98 


 


 09/27/18 08:15  09/27/18 08:15  09/27/18 08:15  09/27/18 10:31  09/27/18 08:15











- Medications


Medications: 


                               Current Medications





Albuterol/Ipratropium (Duoneb 3 Mg/0.5 Mg (3 Ml) Ud)  3 ml INH RQ4 [facility]


   Last Admin: 09/27/18 11:05 Dose:  3 ml


Aspirin (Ecotrin)  81 mg PO DAILY [facility]


   Last Admin: 09/27/18 10:31 Dose:  81 mg


Ergocalciferol (Drisdol 50,000 Intl Units Cap)  1 cap PO QWK [facility]


   Last Admin: 09/27/18 10:32 Dose:  1 cap


Famotidine (Pepcid)  20 mg PO BID [facility]


   Last Admin: 09/27/18 10:30 Dose:  20 mg


Furosemide (Lasix)  40 mg IVP DAILY [facility]


   Last Admin: 09/27/18 10:31 Dose:  40 mg


Ferric Sodium Gluconate Complex 125 mg/ Sodium Chloride  110 mls @ 110 mls/hr 

IVPB DAILY [facility]


   Stop: 10/02/18 15:01


   Last Admin: 09/27/18 11:00 Dose:  110 mls/hr


Azithromycin 250 mg/ Sodium (Chloride)  250 mls @ 250 mls/hr IVPB Q24H [facility]; 

Protocol


Insulin Human Regular (Novolin R)  0 unit SC ACHS [facility]; Protocol


   Last Admin: 09/27/18 12:15 Dose:  8 units


Methylprednisolone (Solu-Medrol)  30 mg IV Q6 [facility]


   Last Admin: 09/27/18 13:15 Dose:  30 mg


Metoprolol Tartrate (Lopressor)  25 mg PO BID [facility]


   Last Admin: 09/27/18 10:30 Dose:  25 mg


Repaglinide (Prandin)  2 mg PO DAILY [facility]


   Last Admin: 09/27/18 10:31 Dose:  2 mg


Rosuvastatin Calcium (Crestor)  20 mg PO HS [facility]


   Last Admin: 09/26/18 21:07 Dose:  20 mg


Sacubitril/Valsartan (Entresto 24 Mg-26 Mg)  1 tab PO DAILY [facility]


   Last Admin: 09/27/18 10:32 Dose:  1 tab


Ticagrelor (Brilinta)  90 mg PO BID [facility]


   Last Admin: 09/27/18 10:31 Dose:  90 mg











- Labs


Labs: 


                                        





                                 09/26/18 07:37 





                                 09/26/18 07:37 





                                        











PT  12.9 SECONDS (9.7-12.2)  H  09/21/18  07:42    


 


INR  1.2   09/21/18  07:42    


 


APTT  30 SECONDS (21-34)   09/21/18  07:42

## 2018-09-28 LAB
ALBUMIN SERPL-MCNC: 2.9 G/DL (ref 3.5–5)
ALBUMIN/GLOB SERPL: 1.2 {RATIO} (ref 1–2.1)
ALT SERPL-CCNC: 32 U/L (ref 9–52)
ANISOCYTOSIS BLD QL SMEAR: SLIGHT
AST SERPL-CCNC: 20 U/L (ref 14–36)
BASOPHILS # BLD AUTO: 0 K/UL (ref 0–0.2)
BASOPHILS NFR BLD: 0.3 % (ref 0–2)
BUN SERPL-MCNC: 42 MG/DL (ref 7–17)
CALCIUM SERPL-MCNC: 9 MG/DL (ref 8.6–10.4)
EOSINOPHIL # BLD AUTO: 0 K/UL (ref 0–0.7)
EOSINOPHIL NFR BLD: 0 % (ref 0–4)
ERYTHROCYTE [DISTWIDTH] IN BLOOD BY AUTOMATED COUNT: 20.4 % (ref 11.5–14.5)
GFR NON-AFRICAN AMERICAN: 36
HGB BLD-MCNC: 9 G/DL (ref 11–16)
HYPOCHROMIC: (no result)
LYMPHOCYTE: 2 % (ref 20–40)
LYMPHOCYTES # BLD AUTO: 0.4 K/UL (ref 1–4.3)
LYMPHOCYTES NFR BLD AUTO: 3.5 % (ref 20–40)
MCH RBC QN AUTO: 24.9 PG (ref 27–31)
MCHC RBC AUTO-ENTMCNC: 32.6 G/DL (ref 33–37)
MCV RBC AUTO: 76.6 FL (ref 81–99)
MONOCYTE: 5 % (ref 0–10)
MONOCYTES # BLD: 0.6 K/UL (ref 0–0.8)
MONOCYTES NFR BLD: 4.9 % (ref 0–10)
NEUTROPHILS # BLD: 11.1 K/UL (ref 1.8–7)
NEUTROPHILS NFR BLD AUTO: 91.3 % (ref 50–75)
NEUTROPHILS NFR BLD AUTO: 93 % (ref 50–75)
NRBC BLD AUTO-RTO: 0.5 % (ref 0–2)
OVALOCYTES BLD QL SMEAR: SLIGHT
PLATELET # BLD EST: NORMAL 10*3/UL
PLATELET # BLD: 144 K/UL (ref 130–400)
PMV BLD AUTO: 10.6 FL (ref 7.2–11.7)
POLYCHROMIC: SLIGHT
RBC # BLD AUTO: 3.59 MIL/UL (ref 3.8–5.2)
TARGETS BLD QL SMEAR: SLIGHT
TOTAL CELLS COUNTED BLD: 100
WBC # BLD AUTO: 12.1 K/UL (ref 4.8–10.8)

## 2018-09-28 RX ADMIN — IPRATROPIUM BROMIDE AND ALBUTEROL SULFATE SCH ML: .5; 3 SOLUTION RESPIRATORY (INHALATION) at 11:24

## 2018-09-28 RX ADMIN — SACUBITRIL AND VALSARTAN SCH TAB: 24; 26 TABLET, FILM COATED ORAL at 10:49

## 2018-09-28 RX ADMIN — SODIUM FERRIC GLUCONATE COMPLEX SCH MLS/HR: 12.5 INJECTION INTRAVENOUS at 10:48

## 2018-09-28 RX ADMIN — METHYLPREDNISOLONE SODIUM SUCCINATE SCH MG: 40 INJECTION, POWDER, FOR SOLUTION INTRAMUSCULAR; INTRAVENOUS at 00:09

## 2018-09-28 RX ADMIN — IPRATROPIUM BROMIDE AND ALBUTEROL SULFATE SCH ML: .5; 3 SOLUTION RESPIRATORY (INHALATION) at 08:41

## 2018-09-28 RX ADMIN — HUMAN INSULIN SCH UNITS: 100 INJECTION, SOLUTION SUBCUTANEOUS at 11:25

## 2018-09-28 RX ADMIN — IPRATROPIUM BROMIDE AND ALBUTEROL SULFATE SCH ML: .5; 3 SOLUTION RESPIRATORY (INHALATION) at 19:34

## 2018-09-28 RX ADMIN — METHYLPREDNISOLONE SODIUM SUCCINATE SCH MG: 40 INJECTION, POWDER, FOR SOLUTION INTRAMUSCULAR; INTRAVENOUS at 11:25

## 2018-09-28 RX ADMIN — IPRATROPIUM BROMIDE AND ALBUTEROL SULFATE SCH ML: .5; 3 SOLUTION RESPIRATORY (INHALATION) at 15:42

## 2018-09-28 RX ADMIN — INSULIN GLARGINE SCH UNITS: 100 INJECTION, SOLUTION SUBCUTANEOUS at 22:29

## 2018-09-28 RX ADMIN — METHYLPREDNISOLONE SODIUM SUCCINATE SCH MG: 40 INJECTION, POWDER, FOR SOLUTION INTRAMUSCULAR; INTRAVENOUS at 06:28

## 2018-09-28 RX ADMIN — HUMAN INSULIN SCH UNITS: 100 INJECTION, SOLUTION SUBCUTANEOUS at 22:29

## 2018-09-28 RX ADMIN — HUMAN INSULIN SCH UNITS: 100 INJECTION, SOLUTION SUBCUTANEOUS at 19:01

## 2018-09-28 RX ADMIN — HUMAN INSULIN SCH UNITS: 100 INJECTION, SOLUTION SUBCUTANEOUS at 08:28

## 2018-09-28 RX ADMIN — METHYLPREDNISOLONE SODIUM SUCCINATE SCH MG: 40 INJECTION, POWDER, FOR SOLUTION INTRAMUSCULAR; INTRAVENOUS at 19:00

## 2018-09-28 RX ADMIN — IPRATROPIUM BROMIDE AND ALBUTEROL SULFATE SCH: .5; 3 SOLUTION RESPIRATORY (INHALATION) at 05:18

## 2018-09-28 NOTE — CP.PCM.PN
Subjective





- Date & Time of Evaluation


Date of Evaluation: 09/28/18


Time of Evaluation: 13:44





- Subjective


Subjective: 





CHIEF COMPLAINTS TODAY :


on tapering the steroid patient is now out of breath wheezing cough with no 

expectoration





ROS.


HEENT :  N.


Resp :       No hemoptysis 


Cardio :     No anginal  CP, PND, orthopnea, palpitation 


GI :           No abd.pain, n/v ,diarrhea or GI bleeding .


CNS : No headache, vertigo, focal deficit.


Musculoskel :  No joint swelling ,


Derm :        No rash 


Psych :     Normal affect.


Ext :  No  swelling ,calf pain 





PE.


Pt. is alert awake in no distress.


V.S  As noted in the chart 


Head ,ear nose,throat and eyes : Normal.


Neck : Supple with normal carotids.


Lungs: Bilateral poor air entry with rhonchis at the bases


Heart : S1 & S2 normal with S4. No murmur.


Abd : Soft non tender with normal bowel sounds.


Neuro : Moves all ext. with no localized deficit.


Ext : No edema with intact pulses.Non tender calves 


Derm : No rashes or decubitus ulcer.





LABS/RADIOLOGY: CT of the abdomen shows thickening of the descending wall





ASSESSMENT/PLAN : 


blood sugars are elevated due to steroids.  Will add long-acting insulin in the 

night.


Continue present bronchodilators and IV antibiotics.  Awaiting for whooping 

cough serology





Objective





- Vital Signs/Intake and Output


Vital Signs (last 24 hours): 


                                        











Temp Pulse Resp BP Pulse Ox


 


 98.2 F   101 H  20   106/58 L  95 


 


 09/28/18 07:39  09/28/18 07:55  09/28/18 07:39  09/28/18 10:49  09/28/18 07:39











- Medications


Medications: 


                               Current Medications





Albuterol/Ipratropium (Duoneb 3 Mg/0.5 Mg (3 Ml) Ud)  3 ml INH RQ4 [facility]


   Last Admin: 09/28/18 11:24 Dose:  3 ml


Aspirin (Ecotrin)  81 mg PO DAILY [facility]


   Last Admin: 09/28/18 10:49 Dose:  81 mg


Ergocalciferol (Drisdol 50,000 Intl Units Cap)  1 cap PO QWK [facility]


   Last Admin: 09/27/18 10:32 Dose:  1 cap


Famotidine (Pepcid)  20 mg PO BID [facility]


   Last Admin: 09/28/18 10:49 Dose:  20 mg


Furosemide (Lasix)  40 mg IVP DAILY [facility]


   Last Admin: 09/28/18 10:49 Dose:  40 mg


Ferric Sodium Gluconate Complex 125 mg/ Sodium Chloride  110 mls @ 110 mls/hr 

IVPB DAILY [facility]


   Stop: 10/02/18 15:01


   Last Admin: 09/28/18 10:48 Dose:  110 mls/hr


Azithromycin 250 mg/ Sodium (Chloride)  250 mls @ 250 mls/hr IVPB Q24H [facility]; 

Protocol


   Last Admin: 09/28/18 01:45 Dose:  250 mls/hr


Insulin Human Regular (Novolin R)  0 unit SC ACHS [facility]; Protocol


   Last Admin: 09/28/18 11:25 Dose:  10 units


Methylprednisolone (Solu-Medrol)  30 mg IV Q6 [facility]


   Last Admin: 09/28/18 11:25 Dose:  30 mg


Metoprolol Tartrate (Lopressor)  25 mg PO BID [facility]


   Last Admin: 09/28/18 10:49 Dose:  25 mg


Repaglinide (Prandin)  2 mg PO DAILY [facility]


   Last Admin: 09/28/18 10:49 Dose:  2 mg


Rosuvastatin Calcium (Crestor)  20 mg PO HS [facility]


   Last Admin: 09/27/18 21:24 Dose:  20 mg


Sacubitril/Valsartan (Entresto 24 Mg-26 Mg)  1 tab PO DAILY [facility]


   Last Admin: 09/28/18 10:49 Dose:  1 tab


Ticagrelor (Brilinta)  90 mg PO BID [facility]


   Last Admin: 09/28/18 10:49 Dose:  90 mg











- Labs


Labs: 


                                        





                                 09/28/18 07:52 





                                 09/28/18 07:52 





                                        











PT  12.9 SECONDS (9.7-12.2)  H  09/21/18  07:42    


 


INR  1.2   09/21/18  07:42    


 


APTT  30 SECONDS (21-34)   09/21/18  07:42

## 2018-09-28 NOTE — CP.PCM.PN
Subjective





- Date & Time of Evaluation


Date of Evaluation: 09/28/18


Time of Evaluation: 14:54





- Subjective


Subjective: 





CHIEF COMPLAINTS TODAY :


AFEBRILE


C/O LESS COUGH


LESS DYSPNOEA





PT ON DROPLET PRECAUTION FOR R/O PERTUSSIS





 AT BEDSIDE.





Generalized weakness ! ON IV IRON INFUSION








ROS.


HEENT :  N.


Resp :       No hemoptysis  +VE COUGH/+VE WHEEZE


Cardio :     No anginal  CP, PND, orthopnea, palpitation 


GI :           No abd.pain, n/v ,diarrhea or GI bleeding .


CNS : No headache, vertigo, focal deficit.


Musculoskel :  No joint swelling ,


Derm :        No rash 


Psych :     Normal affect.


Ext :  No  swelling ,calf pain 





PE.


Pt. is alert awake in no distress.


V.S  As noted in the chart 


Head ,ear nose,throat and eyes : Normal.


Neck : Supple with normal carotids.


Lungs: B/L EXPIRATORY WHEEZE/RHONCHI


Heart : S1 & S2 normal with S4. No murmur.


Abd : Soft non tender with normal bowel sounds.


Neuro : Moves all ext. with no localized deficit.


Ext : No edema with intact pulses.Non tender calves 


Derm : No rashes or decubitus ulcer.





LABS/RADIOLOGY:


REVIEWED


WBC 12.1 H/H 9.0.


CXR 9/26  NAD.


CREAT 1.4/BUN 42.





LEGIONELLA URINE AG -VE


MYCOPLASMA IGM -VE





CT of the abdomen shows thickening of the descending wall +VE COLITIS


STOOLS -VE OB








Objective





- Vital Signs/Intake and Output


Vital Signs (last 24 hours): 


                                        











Temp Pulse Resp BP Pulse Ox


 


 98.2 F   87   20   106/58 L  95 


 


 09/28/18 07:39  09/28/18 11:39  09/28/18 07:39  09/28/18 10:49  09/28/18 07:39











- Medications


Medications: 


                               Current Medications





Albuterol/Ipratropium (Duoneb 3 Mg/0.5 Mg (3 Ml) Ud)  3 ml INH RQ4 [facility]


   Last Admin: 09/28/18 11:24 Dose:  3 ml


Aspirin (Ecotrin)  81 mg PO DAILY [facility]


   Last Admin: 09/28/18 10:49 Dose:  81 mg


Ergocalciferol (Drisdol 50,000 Intl Units Cap)  1 cap PO QWK [facility]


   Last Admin: 09/27/18 10:32 Dose:  1 cap


Famotidine (Pepcid)  20 mg PO BID [facility]


   Last Admin: 09/28/18 10:49 Dose:  20 mg


Furosemide (Lasix)  40 mg IVP DAILY [facility]


   Last Admin: 09/28/18 10:49 Dose:  40 mg


Ferric Sodium Gluconate Complex 125 mg/ Sodium Chloride  110 mls @ 110 mls/hr 

IVPB DAILY [facility]


   Stop: 10/02/18 15:01


   Last Admin: 09/28/18 10:48 Dose:  110 mls/hr


Azithromycin 250 mg/ Sodium (Chloride)  250 mls @ 250 mls/hr IVPB Q24H [facility]; 

Protocol


   Last Admin: 09/28/18 01:45 Dose:  250 mls/hr


Insulin Glargine (Lantus)  25 unit SC HS [facility]


Insulin Human Regular (Novolin R)  0 unit SC ACHS [facility]; Protocol


   Last Admin: 09/28/18 11:25 Dose:  10 units


Methylprednisolone (Solu-Medrol)  30 mg IV Q6 [facility]


   Last Admin: 09/28/18 11:25 Dose:  30 mg


Metoprolol Tartrate (Lopressor)  25 mg PO BID [facility]


   Last Admin: 09/28/18 10:49 Dose:  25 mg


Repaglinide (Prandin)  2 mg PO DAILY [facility]


   Last Admin: 09/28/18 10:49 Dose:  2 mg


Rosuvastatin Calcium (Crestor)  20 mg PO HS [facility]


   Last Admin: 09/27/18 21:24 Dose:  20 mg


Sacubitril/Valsartan (Entresto 24 Mg-26 Mg)  1 tab PO DAILY [facility]


   Last Admin: 09/28/18 10:49 Dose:  1 tab


Ticagrelor (Brilinta)  90 mg PO BID [facility]


   Last Admin: 09/28/18 10:49 Dose:  90 mg











- Labs


Labs: 


                                        





                                 09/28/18 07:52 





                                 09/28/18 07:52 





                                        











PT  12.9 SECONDS (9.7-12.2)  H  09/21/18  07:42    


 


INR  1.2   09/21/18  07:42    


 


APTT  30 SECONDS (21-34)   09/21/18  07:42    














Assessment and Plan


(1) Chr obstructive pulmonary disease w/ acute lower respiratory infxn


Assessment & Plan: 





PATIENT HAS PROLONGED COUGH ILLNESS LASTING MORE THAN 2 WEEKS WITHOUT A CLEAR 

CAUSE.  ALSO PATIENT HAS PAROXYSMS OF COUGH.


CONTINUE  iv ZITHROMAX 500 MG LOADING DOSE,


F/U iv zITHROMAX 250 MG ONCE A DAY DAILY X5 DAYS.





DROPLET PRECAUTIONS FOR PERTUSSIS





DC  iv ROCEPHIN 1 G ONCE A DAY 9/20/18.-9/26/18


INCREASED  STEROIDS AS PER PMD.


PEPCID 20 MG BY MOUTH TWICE A DAY.


ON FERRICELET IV INFUSION DAILY 9/24/18





PT GOT  Tdap 0.5ml IM  TODAY  9/29/18. 








F/U PERTUSSIS IGA.IGG SEROLOGY


DISCUSSED WITH 








Status: Acute   





(2) Chronic congestive heart failure


Status: Acute   





(3) Anemia


Status: Acute   





(4) Colitis


Status: Acute   





(5) HTN (hypertension)


Status: Acute   





(6) DM type 2 (diabetes mellitus, type 2)


Status: Acute

## 2018-09-29 RX ADMIN — METHYLPREDNISOLONE SODIUM SUCCINATE SCH MG: 40 INJECTION, POWDER, FOR SOLUTION INTRAMUSCULAR; INTRAVENOUS at 11:37

## 2018-09-29 RX ADMIN — HUMAN INSULIN SCH UNITS: 100 INJECTION, SOLUTION SUBCUTANEOUS at 11:37

## 2018-09-29 RX ADMIN — SODIUM FERRIC GLUCONATE COMPLEX SCH MLS/HR: 12.5 INJECTION INTRAVENOUS at 09:47

## 2018-09-29 RX ADMIN — IPRATROPIUM BROMIDE AND ALBUTEROL SULFATE SCH ML: .5; 3 SOLUTION RESPIRATORY (INHALATION) at 19:42

## 2018-09-29 RX ADMIN — IPRATROPIUM BROMIDE AND ALBUTEROL SULFATE SCH: .5; 3 SOLUTION RESPIRATORY (INHALATION) at 01:00

## 2018-09-29 RX ADMIN — IPRATROPIUM BROMIDE AND ALBUTEROL SULFATE SCH ML: .5; 3 SOLUTION RESPIRATORY (INHALATION) at 15:44

## 2018-09-29 RX ADMIN — IPRATROPIUM BROMIDE AND ALBUTEROL SULFATE SCH: .5; 3 SOLUTION RESPIRATORY (INHALATION) at 04:31

## 2018-09-29 RX ADMIN — IPRATROPIUM BROMIDE AND ALBUTEROL SULFATE SCH ML: .5; 3 SOLUTION RESPIRATORY (INHALATION) at 23:23

## 2018-09-29 RX ADMIN — METHYLPREDNISOLONE SODIUM SUCCINATE SCH MG: 40 INJECTION, POWDER, FOR SOLUTION INTRAMUSCULAR; INTRAVENOUS at 17:48

## 2018-09-29 RX ADMIN — SACUBITRIL AND VALSARTAN SCH TAB: 24; 26 TABLET, FILM COATED ORAL at 09:46

## 2018-09-29 RX ADMIN — HUMAN INSULIN SCH UNITS: 100 INJECTION, SOLUTION SUBCUTANEOUS at 17:47

## 2018-09-29 RX ADMIN — IPRATROPIUM BROMIDE AND ALBUTEROL SULFATE SCH ML: .5; 3 SOLUTION RESPIRATORY (INHALATION) at 08:07

## 2018-09-29 RX ADMIN — METHYLPREDNISOLONE SODIUM SUCCINATE SCH MG: 40 INJECTION, POWDER, FOR SOLUTION INTRAMUSCULAR; INTRAVENOUS at 02:16

## 2018-09-29 RX ADMIN — HUMAN INSULIN SCH UNITS: 100 INJECTION, SOLUTION SUBCUTANEOUS at 08:34

## 2018-09-29 RX ADMIN — METHYLPREDNISOLONE SODIUM SUCCINATE SCH MG: 40 INJECTION, POWDER, FOR SOLUTION INTRAMUSCULAR; INTRAVENOUS at 06:06

## 2018-09-29 RX ADMIN — HUMAN INSULIN SCH UNITS: 100 INJECTION, SOLUTION SUBCUTANEOUS at 21:26

## 2018-09-29 RX ADMIN — INSULIN GLARGINE SCH UNITS: 100 INJECTION, SOLUTION SUBCUTANEOUS at 21:27

## 2018-09-29 RX ADMIN — IPRATROPIUM BROMIDE AND ALBUTEROL SULFATE SCH ML: .5; 3 SOLUTION RESPIRATORY (INHALATION) at 11:36

## 2018-09-29 NOTE — CP.PCM.PN
Subjective





- Date & Time of Evaluation


Date of Evaluation: 09/29/18


Time of Evaluation: 22:31





- Subjective


Subjective: 





CHIEF COMPLAINTS TODAY :


AFEBRILE.


FEELING BETTER


SITTING ON SIDE OF BED








PT ON DROPLET PRECAUTION FOR R/O PERTUSSIS





 AT BEDSIDE.


ON IV IRON INFUSION DAILY








ROS.


HEENT :  N.


Resp :       No hemoptysis  +VE COUGH/+VE WHEEZE


Cardio :     No anginal  CP, PND, orthopnea, palpitation 


GI :           No abd.pain, n/v ,diarrhea or GI bleeding .


CNS : No headache, vertigo, focal deficit.


Musculoskel :  No joint swelling ,


Derm :        No rash 


Psych :     Normal affect.


Ext :  No  swelling ,calf pain 





PE.


Pt. is alert awake in no distress.


V.S  As noted in the chart 


Head ,ear nose,throat and eyes : Normal.


Neck : Supple with normal carotids.


Lungs:  LESS B/L EXPIRATORY WHEEZE


Heart : S1 & S2 normal with S4. No murmur.


Abd : Soft non tender with normal bowel sounds.


Neuro : Moves all ext. with no localized deficit.


Ext : No edema with intact pulses.Non tender calves 


Derm : No rashes or decubitus ulcer.





LABS/RADIOLOGY:


REVIEWED


WBC 12.1 H/H 9.0.


CXR 9/26  NAD.


CREAT 1.4/BUN 42.





LEGIONELLA URINE AG -VE


MYCOPLASMA IGM -VE





CT of the abdomen shows thickening of the descending wall +VE COLITIS


STOOLS -VE OB





Objective





- Vital Signs/Intake and Output


Vital Signs (last 24 hours): 


                                        











Temp Pulse Resp BP Pulse Ox


 


 97.4 F L  69   20   115/65   96 


 


 09/29/18 15:00  09/29/18 15:00  09/29/18 15:00  09/29/18 17:49  09/29/18 15:00











- Medications


Medications: 


                               Current Medications





Albuterol/Ipratropium (Duoneb 3 Mg/0.5 Mg (3 Ml) Ud)  3 ml INH RQ4 [facility]


   Last Admin: 09/29/18 19:42 Dose:  3 ml


Aspirin (Ecotrin)  81 mg PO DAILY [facility]


   Last Admin: 09/29/18 09:46 Dose:  81 mg


Ergocalciferol (Drisdol 50,000 Intl Units Cap)  1 cap PO QWK [facility]


   Last Admin: 09/27/18 10:32 Dose:  1 cap


Famotidine (Pepcid)  20 mg PO BID [facility]


   Last Admin: 09/29/18 17:48 Dose:  20 mg


Furosemide (Lasix)  40 mg IVP DAILY [facility]


   Last Admin: 09/29/18 09:46 Dose:  40 mg


Ferric Sodium Gluconate Complex 125 mg/ Sodium Chloride  110 mls @ 110 mls/hr 

IVPB DAILY [facility]


   Stop: 10/02/18 15:01


   Last Admin: 09/29/18 09:47 Dose:  110 mls/hr


Azithromycin 250 mg/ Sodium (Chloride)  250 mls @ 250 mls/hr IVPB Q24H [facility]; P

[facility]


   Last Admin: 09/29/18 02:17 Dose:  250 mls/hr


Insulin Glargine (Lantus)  25 unit SC [facility]


   Last Admin: 09/29/18 21:27 Dose:  25 units


Insulin Human Regular (Novolin R)  [facility]; Protocol


   Last Admin: 09/29/18 21:26 Dose:  3 units


Methylprednisolone (Solu-Medrol)  30 mg IV Q6 [facility]


   Last Admin: 09/29/18 17:48 Dose:  30 mg


Metoprolol Tartrate (Lopressor)  25 mg PO BID [facility]


   Last Admin: 09/29/18 17:49 Dose:  25 mg


Repaglinide (Prandin)  2 mg PO DAILY [facility]


   Last Admin: 09/29/18 09:46 Dose:  2 mg


Rosuvastatin Calcium (Crestor)  20 mg PO HS [facility]


   Last Admin: 09/29/18 21:26 Dose:  20 mg


Sacubitril/Valsartan (Entresto 24 Mg-26 Mg)  1 tab PO DAILY [facility]


   Last Admin: 09/29/18 09:46 Dose:  1 tab


Ticagrelor (Brilinta)  90 mg PO BID [facility]


   Last Admin: 09/29/18 17:49 Dose:  90 mg











- Labs


Labs: 


                                        





                                 09/28/18 07:52 





                                 09/28/18 07:52 





                                        











PT  12.9 SECONDS (9.7-12.2)  H  09/21/18  07:42    


 


INR  1.2   09/21/18  07:42    


 


APTT  30 SECONDS (21-34)   09/21/18  07:42    














Assessment and Plan


(1) Chr obstructive pulmonary disease w/ acute lower respiratory infxn


Assessment & Plan: 


 CXR REPEAT  NOTED -NAD


PT S/P Dtap -no side-effects.





CONTINUE  iv ZITHROMAX 500 MG LOADING DOSE,


F/U iv zITHROMAX 250 MG ONCE A DAY DAILY X5 DAYS.





DROPLET PRECAUTIONS FOR PERTUSSIS


Status: Acute   





(2) Chronic congestive heart failure


Status: Acute   





(3) Anemia


Status: Acute   





(4) Colitis


Status: Acute   





(5) HTN (hypertension)


Status: Acute   





(6) DM type 2 (diabetes mellitus, type 2)


Assessment & Plan: 


BS HIGH  SEC TO STEROIDS.


Status: Acute

## 2018-09-29 NOTE — CP.PCM.PN
Subjective





- Date & Time of Evaluation


Date of Evaluation: 09/29/18


Time of Evaluation: 14:45





- Subjective


Subjective: 





CHIEF COMPLAINTS TODAY :


on tapering the steroid patient is now out of breath wheezing cough with no 

expectoration





ROS.


HEENT :  N.


Resp :       No hemoptysis 


Cardio :     No anginal  CP, PND, orthopnea, palpitation 


GI :           No abd.pain, n/v ,diarrhea or GI bleeding .


CNS : No headache, vertigo, focal deficit.


Musculoskel :  No joint swelling ,


Derm :        No rash 


Psych :     Normal affect.


Ext :  No  swelling ,calf pain 





PE.


Pt. is alert awake in no distress.


V.S  As noted in the chart 


Head ,ear nose,throat and eyes : Normal.


Neck : Supple with normal carotids.


Lungs: Bilateral poor air entry with rhonchis at the bases


Heart : S1 & S2 normal with S4. No murmur.


Abd : Soft non tender with normal bowel sounds.


Neuro : Moves all ext. with no localized deficit.


Ext : No edema with intact pulses.Non tender calves 


Derm : No rashes or decubitus ulcer.





LABS/RADIOLOGY: CT of the abdomen shows thickening of the descending wall





ASSESSMENT/PLAN : 


blood sugars are elevated due to steroids.  Will add long-acting insulin in the 

night.


Continue present bronchodilators and IV antibiotics.  Awaiting for whooping 

cough serology











Objective





- Vital Signs/Intake and Output


Vital Signs (last 24 hours): 


                                        











Temp Pulse Resp BP Pulse Ox


 


 98.1 F   90   20   132/73   96 


 


 09/29/18 07:00  09/29/18 08:39  09/29/18 07:00  09/29/18 09:46  09/29/18 07:00











- Medications


Medications: 


                               Current Medications





Albuterol/Ipratropium (Duoneb 3 Mg/0.5 Mg (3 Ml) Ud)  3 ml INH RQ4 [facility]


   Last Admin: 09/29/18 11:36 Dose:  3 ml


Aspirin (Ecotrin)  81 mg PO DAILY [facility]


   Last Admin: 09/29/18 09:46 Dose:  81 mg


Ergocalciferol (Drisdol 50,000 Intl Units Cap)  1 cap PO QWK [facility]


   Last Admin: 09/27/18 10:32 Dose:  1 cap


Famotidine (Pepcid)  20 mg PO BID [facility]


   Last Admin: 09/29/18 09:46 Dose:  20 mg


Furosemide (Lasix)  40 mg IVP DAILY [facility]


   Last Admin: 09/29/18 09:46 Dose:  40 mg


Ferric Sodium Gluconate Complex 125 mg/ Sodium Chloride  110 mls @ 110 mls/hr 

IVPB DAILY [facility]


   Stop: 10/02/18 15:01


   Last Admin: 09/29/18 09:47 Dose:  110 mls/hr


Azithromycin 250 mg/ Sodium (Chloride)  250 mls @ 250 mls/hr IVPB Q24H [facility]; 

Protocol


   Last Admin: 09/29/18 02:17 Dose:  250 mls/hr


Insulin Glargine (Lantus)  25 unit SC HS [facility]


   Last Admin: 09/28/18 22:29 Dose:  25 units


Ins[facility] Human [facility] ([facility])  [facility]; Protocol


   Last Admin: 09/29/18 11:37 Dose:  10 units


Methylprednisolone (Solu-Medrol)  30 mg IV Q6 [facility]


   Last Admin: 09/29/18 11:37 Dose:  30 mg


Metoprolol Tartrate (Lopressor)  25 mg PO BID [facility]


   Last Admin: 09/29/18 09:46 Dose:  25 mg


Repaglinide (Prandin)  2 mg PO DAILY [facility]


   Last Admin: 09/29/18 09:46 Dose:  2 mg


Rosuvastatin Calcium (Crestor)  20 mg PO [facility]


   Last Admin: 09/28/18 22:28 Dose:  20 mg


Sacubitril/Valsartan (Entresto 24 Mg-26 Mg)  1 tab PO DAILY [facility]


   Last Admin: 09/29/18 09:46 Dose:  1 tab


Ticagrelor (Brilinta)  90 mg PO BID [facility]


   Last Admin: 09/29/18 09:46 Dose:  90 mg











- Labs


Labs: 


                                        





                                 09/28/18 07:52 





                                 09/28/18 07:52 





                                        











PT  12.9 SECONDS (9.7-12.2)  H  09/21/18  07:42    


 


INR  1.2   09/21/18  07:42    


 


APTT  30 SECONDS (21-34)   09/21/18  07:42

## 2018-09-30 LAB
ALBUMIN SERPL-MCNC: 3.1 G/DL (ref 3.5–5)
ALBUMIN/GLOB SERPL: 1.2 {RATIO} (ref 1–2.1)
ALT SERPL-CCNC: 35 U/L (ref 9–52)
ANISOCYTOSIS BLD QL SMEAR: (no result)
AST SERPL-CCNC: 26 U/L (ref 14–36)
B PERT FHA IGA SER QL: 229 IU/ML
B PERT FHA IGG SER QL: 62 IU/ML
B PERT IGG SER IA-ACNC: 52 IU/ML
BASOPHILS # BLD AUTO: 0.1 K/UL (ref 0–0.2)
BASOPHILS NFR BLD: 0.4 % (ref 0–2)
BUN SERPL-MCNC: 44 MG/DL (ref 7–17)
CALCIUM SERPL-MCNC: 9.2 MG/DL (ref 8.6–10.4)
EOSINOPHIL # BLD AUTO: 0 K/UL (ref 0–0.7)
EOSINOPHIL NFR BLD: 0 % (ref 0–4)
ERYTHROCYTE [DISTWIDTH] IN BLOOD BY AUTOMATED COUNT: 20.4 % (ref 11.5–14.5)
GFR NON-AFRICAN AMERICAN: 36
HGB BLD-MCNC: 10.3 G/DL (ref 11–16)
HYPOCHROMIC: SLIGHT
LYMPHOCYTE: 3 % (ref 20–40)
LYMPHOCYTES # BLD AUTO: 0.5 K/UL (ref 1–4.3)
LYMPHOCYTES NFR BLD AUTO: 3.1 % (ref 20–40)
MACROCYTES BLD QL SMEAR: SLIGHT
MCH RBC QN AUTO: 24.9 PG (ref 27–31)
MCHC RBC AUTO-ENTMCNC: 32.3 G/DL (ref 33–37)
MCV RBC AUTO: 77.1 FL (ref 81–99)
MICROCYTES BLD QL SMEAR: SLIGHT
MONOCYTE: 5 % (ref 0–10)
MONOCYTES # BLD: 0.7 K/UL (ref 0–0.8)
MONOCYTES NFR BLD: 4.2 % (ref 0–10)
NEUTROPHILS # BLD: 14.5 K/UL (ref 1.8–7)
NEUTROPHILS NFR BLD AUTO: 90 % (ref 50–75)
NEUTROPHILS NFR BLD AUTO: 92.3 % (ref 50–75)
NEUTS BAND NFR BLD: 2 % (ref 0–2)
NRBC BLD AUTO-RTO: 0.2 % (ref 0–2)
NRBC BLD AUTO-RTO: 1 % (ref 0–0)
OVALOCYTES BLD QL SMEAR: SLIGHT
PLATELET # BLD EST: NORMAL 10*3/UL
PLATELET # BLD: 149 K/UL (ref 130–400)
PMV BLD AUTO: 10.4 FL (ref 7.2–11.7)
POIKILOCYTOSIS BLD QL SMEAR: SLIGHT
POLYCHROMIC: SLIGHT
RBC # BLD AUTO: 4.15 MIL/UL (ref 3.8–5.2)
TEARDROP CELLS: SLIGHT
TOTAL CELLS COUNTED BLD: 100
WBC # BLD AUTO: 15.7 K/UL (ref 4.8–10.8)

## 2018-09-30 RX ADMIN — METHYLPREDNISOLONE SODIUM SUCCINATE SCH MG: 40 INJECTION, POWDER, FOR SOLUTION INTRAMUSCULAR; INTRAVENOUS at 05:32

## 2018-09-30 RX ADMIN — METHYLPREDNISOLONE SODIUM SUCCINATE SCH MG: 40 INJECTION, POWDER, FOR SOLUTION INTRAMUSCULAR; INTRAVENOUS at 22:36

## 2018-09-30 RX ADMIN — METHYLPREDNISOLONE SODIUM SUCCINATE SCH MG: 40 INJECTION, POWDER, FOR SOLUTION INTRAMUSCULAR; INTRAVENOUS at 12:49

## 2018-09-30 RX ADMIN — IPRATROPIUM BROMIDE AND ALBUTEROL SULFATE SCH ML: .5; 3 SOLUTION RESPIRATORY (INHALATION) at 11:31

## 2018-09-30 RX ADMIN — SACUBITRIL AND VALSARTAN SCH TAB: 24; 26 TABLET, FILM COATED ORAL at 09:32

## 2018-09-30 RX ADMIN — HUMAN INSULIN SCH UNITS: 100 INJECTION, SOLUTION SUBCUTANEOUS at 22:34

## 2018-09-30 RX ADMIN — HUMAN INSULIN SCH UNITS: 100 INJECTION, SOLUTION SUBCUTANEOUS at 16:53

## 2018-09-30 RX ADMIN — IPRATROPIUM BROMIDE AND ALBUTEROL SULFATE SCH ML: .5; 3 SOLUTION RESPIRATORY (INHALATION) at 19:45

## 2018-09-30 RX ADMIN — IPRATROPIUM BROMIDE AND ALBUTEROL SULFATE SCH ML: .5; 3 SOLUTION RESPIRATORY (INHALATION) at 07:49

## 2018-09-30 RX ADMIN — SODIUM FERRIC GLUCONATE COMPLEX SCH MLS/HR: 12.5 INJECTION INTRAVENOUS at 09:31

## 2018-09-30 RX ADMIN — IPRATROPIUM BROMIDE AND ALBUTEROL SULFATE SCH: .5; 3 SOLUTION RESPIRATORY (INHALATION) at 23:26

## 2018-09-30 RX ADMIN — HUMAN INSULIN SCH UNITS: 100 INJECTION, SOLUTION SUBCUTANEOUS at 09:33

## 2018-09-30 RX ADMIN — IPRATROPIUM BROMIDE AND ALBUTEROL SULFATE SCH: .5; 3 SOLUTION RESPIRATORY (INHALATION) at 04:14

## 2018-09-30 RX ADMIN — METHYLPREDNISOLONE SODIUM SUCCINATE SCH MG: 40 INJECTION, POWDER, FOR SOLUTION INTRAMUSCULAR; INTRAVENOUS at 00:12

## 2018-09-30 RX ADMIN — HUMAN INSULIN SCH UNITS: 100 INJECTION, SOLUTION SUBCUTANEOUS at 12:48

## 2018-09-30 RX ADMIN — IPRATROPIUM BROMIDE AND ALBUTEROL SULFATE SCH ML: .5; 3 SOLUTION RESPIRATORY (INHALATION) at 15:50

## 2018-09-30 RX ADMIN — INSULIN GLARGINE SCH UNITS: 100 INJECTION, SOLUTION SUBCUTANEOUS at 22:35

## 2018-09-30 NOTE — CP.PCM.PN
Subjective





- Date & Time of Evaluation


Date of Evaluation: 09/30/18


Time of Evaluation: 15:49





- Subjective


Subjective: 





CHIEF COMPLAINTS TODAY :


AFEBRILE.


FEELING BETTER


SITTING ON SIDE OF BED








PT ON DROPLET PRECAUTION FOR R/O PERTUSSIS





 AT BEDSIDE.


ON IV IRON INFUSION DAILY








ROS.


HEENT :  N.


Resp :       No hemoptysis  +VE COUGH/+VE WHEEZE


Cardio :     No anginal  CP, PND, orthopnea, palpitation 


GI :           No abd.pain, n/v ,diarrhea or GI bleeding .


CNS : No headache, vertigo, focal deficit.


Musculoskel :  No joint swelling ,


Derm :        No rash 


Psych :     Normal affect.


Ext :  No  swelling ,calf pain 





PE.


Pt. is alert awake in no distress.


V.S  As noted in the chart 


Head ,ear nose,throat and eyes : Normal.


Neck : Supple with normal carotids.


Lungs:  LESS B/L EXPIRATORY WHEEZE


Heart : S1 & S2 normal with S4. No murmur.


Abd : Soft non tender with normal bowel sounds.


Neuro : Moves all ext. with no localized deficit.


Ext : No edema with intact pulses.Non tender calves 


Derm : No rashes or decubitus ulcer.





LABS/RADIOLOGY:


REVIEWED


WBC 12.1 H/H 9.0.


CXR 9/26  NAD.


CREAT 1.4/BUN 42.





LEGIONELLA URINE AG -VE


MYCOPLASMA IGM -VE





CT of the abdomen shows thickening of the descending wall +VE COLITIS


STOOLS -VE OB








Objective





- Vital Signs/Intake and Output


Vital Signs (last 24 hours): 


                                        











Temp Pulse Resp BP Pulse Ox


 


 98.2 F   82   20   123/72   95 


 


 09/29/18 23:20  09/29/18 23:20  09/29/18 23:20  09/30/18 09:32  09/29/18 23:20








Intake and Output: 


                                        











 09/30/18 09/30/18





 06:59 18:59


 


Intake Total 570 


 


Balance 570 














- Medications


Medications: 


                               Current Medications





Albuterol/Ipratropium (Duoneb 3 Mg/0.5 Mg (3 Ml) Ud)  3 ml INH RQ4 [facility]


   Last Admin: 09/30/18 11:31 Dose:  3 ml


Aspirin (Ecotrin)  81 mg PO DAILY [facility]


   Last Admin: 09/30/18 09:32 Dose:  81 mg


Ergocalciferol (Drisdol 50,000 Intl Units Cap)  1 cap PO QWK [facility]


   Last Admin: 09/27/18 10:32 Dose:  1 cap


Famotidine (Pepcid)  20 mg PO BID [facility]


   Last Admin: 09/30/18 09:32 Dose:  20 mg


Furosemide (Lasix)  40 mg IVP DAILY [facility]


   Last Admin: 09/30/18 09:32 Dose:  40 mg


Ferric Sodium Gluconate Complex 125 mg/ Sodium Chloride  110 mls @ 110 mls/hr 

IVPB DAILY [facility]


   Stop: 10/02/18 15:01


   Last Admin: 09/30/18 09:31 Dose:  110 mls/hr


Azithromycin 250 mg/ Sodium (Chloride)  250 mls @ 250 mls/hr IVPB Q24H [facility]; 

Protocol


   Last Admin: 09/30/18 02:18 Dose:  250 mls/hr


Insulin Glargine (Lantus)  25 unit SC [facility]


   Last Admin: 09/29/18 21:27 Dose:  25 units


Insulin Human Regular (Novolin R)  0 unit SC ACHS [facility]; Protocol


   Last Admin: 09/30/18 12:48 Dose:  6 units


Methylprednisolone (Solu-Medrol)  30 mg IV Q12 [facility]


Metoprolol Tartrate (Lopressor)  25 mg PO BID [facility]


   Last Admin: 09/30/18 09:32 Dose:  25 mg


Repaglinide (Prandin)  2 mg PO DAILY [facility]


   Last Admin: 09/30/18 09:32 Dose:  2 mg


Rosuvastatin Calcium (Crestor)  20 mg PO HS [facility]


   Last Admin: 09/29/18 21:26 Dose:  20 mg


Sacubitril/Valsartan (Entresto 24 Mg-26 Mg)  1 tab PO DAILY [facility]


   Last Admin: 09/30/18 09:32 Dose:  1 tab


Ticagrelor (Brilinta)  90 mg PO BID [facility]


   Last Admin: 09/30/18 09:32 Dose:  90 mg











- Labs


Labs: 


                                        





                                 09/30/18 08:04 





                                 09/30/18 08:04 





                                        











PT  12.9 SECONDS (9.7-12.2)  H  09/21/18  07:42    


 


INR  1.2   09/21/18  07:42    


 


APTT  30 SECONDS (21-34)   09/21/18  07:42    














Assessment and Plan


(1) Chr obstructive pulmonary disease w/ acute lower respiratory infxn


Assessment & Plan: 


CXR REPEAT  NOTED -NAD


PT S/P Dtap -no side-effects.





CONTINUE  iv ZITHROMAX 500 MG LOADING DOSE,


F/U iv zITHROMAX 250 MG ONCE A DAY DAILY X5 DAYS.





DROPLET PRECAUTIONS FOR PERTUSSIS


F/U  SEROLOGY FOR PERTUSSIS. P.





PT LIVES WITH HER  ONLY . 


 TO F/U WITH PMD FOR ZITHROMAX PROPHYLAXIS EMPIRICALLY.


Status: Acute   





(2) Chronic congestive heart failure


Status: Acute   





(3) Anemia


Status: Acute   





(4) Colitis


Status: Acute   





(5) HTN (hypertension)


Status: Acute   





(6) DM type 2 (diabetes mellitus, type 2)


Status: Acute

## 2018-09-30 NOTE — CP.PCM.PN
Subjective





- Date & Time of Evaluation


Date of Evaluation: 09/30/18


Time of Evaluation: 14:58





- Subjective


Subjective: 





CHIEF COMPLAINTS TODAY :


on tapering the steroid patient is now out of breath wheezing cough with no 

expectoration





ROS.


HEENT :  N.


Resp :       No hemoptysis 


Cardio :     No anginal  CP, PND, orthopnea, palpitation 


GI :           No abd.pain, n/v ,diarrhea or GI bleeding .


CNS : No headache, vertigo, focal deficit.


Musculoskel :  No joint swelling ,


Derm :        No rash 


Psych :     Normal affect.


Ext :  No  swelling ,calf pain 





PE.


Pt. is alert awake in no distress.


V.S  As noted in the chart 


Head ,ear nose,throat and eyes : Normal.


Neck : Supple with normal carotids.


Lungs: Bilateral poor air entry with rhonchis at the bases


Heart : S1 & S2 normal with S4. No murmur.


Abd : Soft non tender with normal bowel sounds.


Neuro : Moves all ext. with no localized deficit.


Ext : No edema with intact pulses.Non tender calves 


Derm : No rashes or decubitus ulcer.





LABS/RADIOLOGY: CT of the abdomen shows thickening of the descending wall





ASSESSMENT/PLAN : 


blood sugars are elevated due to steroids.  Will add long-acting insulin in the 

night.


Continue present bronchodilators and IV antibiotics.  Awaiting for whooping 

cough serology








Objective





- Vital Signs/Intake and Output


Vital Signs (last 24 hours): 


                                        











Temp Pulse Resp BP Pulse Ox


 


 98.2 F   82   20   123/72   95 


 


 09/29/18 23:20  09/29/18 23:20  09/29/18 23:20  09/30/18 09:32  09/29/18 23:20








Intake and Output: 


                                        











 09/30/18 09/30/18





 11:59 23:59


 


Intake Total 250 


 


Balance 250 














- Medications


Medications: 


                               Current Medications





Albuterol/Ipratropium (Duoneb 3 Mg/0.5 Mg (3 Ml) Ud)  3 ml INH RQ4 [facility]


   Last Admin: 09/30/18 11:31 Dose:  3 ml


Aspirin (Ecotrin)  81 mg PO DAILY [facility]


   Last Admin: 09/30/18 09:32 Dose:  81 mg


Ergocalciferol (Drisdol 50,000 Intl Units Cap)  1 cap PO QWK [facility]


   Last Admin: 09/27/18 10:32 Dose:  1 cap


Famotidine (Pepcid)  20 mg PO BID [facility]


   Last Admin: 09/30/18 09:32 Dose:  20 mg


Furosemide (Lasix)  40 mg IVP DAILY [facility]


   Last Admin: 09/30/18 09:32 Dose:  40 mg


Ferric Sodium Gluconate Complex 125 mg/ Sodium Chloride  110 mls @ 110 mls/hr 

IVPB DAILY [facility]


   Stop: 10/02/18 15:01


   Last Admin: 09/30/18 09:31 Dose:  110 mls/hr


Azithromycin 250 mg/ Sodium (Chloride)  250 mls @ 250 mls/hr IVPB Q24H [facility]; P

rotocol


   Last Admin: 09/30/18 02:18 Dose:  250 mls/hr


Insulin Glargine (Lantus)  25 unit SC [facility]


   Last Admin: 09/29/18 21:27 Dose:  25 units


Insulin Human Regular (Novolin R)  0 unit SC [facility]; Protocol


   Last Admin: 09/30/18 12:48 Dose:  6 units


Methylprednisolone (Solu-Medrol)  30 mg IV Q6 [facility]


   Last Admin: 09/30/18 12:49 Dose:  30 mg


Metoprolol Tartrate (Lopressor)  25 mg PO BID [facility]


   Last Admin: 09/30/18 09:32 Dose:  25 mg


Repaglinide (Prandin)  2 mg PO DAILY [facility]


   Last Admin: 09/30/18 09:32 Dose:  2 mg


Rosuvastatin Calcium (Crestor)  20 mg PO HS [facility]


   Last Admin: 09/29/18 21:26 Dose:  20 mg


Sacubitril/Valsartan (Entresto 24 Mg-26 Mg)  1 tab PO DAILY [facility]


   Last Admin: 09/30/18 09:32 Dose:  1 tab


Ticagrelor (Brilinta)  90 mg PO BID [facility]


   Last Admin: 09/30/18 09:32 Dose:  90 mg











- Labs


Labs: 


                                        





                                 09/30/18 08:04 





                                 09/30/18 08:04 





                                        











PT  12.9 SECONDS (9.7-12.2)  H  09/21/18  07:42    


 


INR  1.2   09/21/18  07:42    


 


APTT  30 SECONDS (21-34)   09/21/18  07:42

## 2018-10-01 VITALS
DIASTOLIC BLOOD PRESSURE: 62 MMHG | OXYGEN SATURATION: 96 % | RESPIRATION RATE: 20 BRPM | TEMPERATURE: 98 F | SYSTOLIC BLOOD PRESSURE: 98 MMHG

## 2018-10-01 VITALS — HEART RATE: 77 BPM

## 2018-10-01 RX ADMIN — SODIUM FERRIC GLUCONATE COMPLEX SCH MLS/HR: 12.5 INJECTION INTRAVENOUS at 12:44

## 2018-10-01 RX ADMIN — IPRATROPIUM BROMIDE AND ALBUTEROL SULFATE SCH: .5; 3 SOLUTION RESPIRATORY (INHALATION) at 03:29

## 2018-10-01 RX ADMIN — METHYLPREDNISOLONE SODIUM SUCCINATE SCH MG: 40 INJECTION, POWDER, FOR SOLUTION INTRAMUSCULAR; INTRAVENOUS at 10:42

## 2018-10-01 RX ADMIN — HUMAN INSULIN SCH UNITS: 100 INJECTION, SOLUTION SUBCUTANEOUS at 08:28

## 2018-10-01 RX ADMIN — IPRATROPIUM BROMIDE AND ALBUTEROL SULFATE SCH ML: .5; 3 SOLUTION RESPIRATORY (INHALATION) at 11:42

## 2018-10-01 RX ADMIN — HUMAN INSULIN SCH UNITS: 100 INJECTION, SOLUTION SUBCUTANEOUS at 12:13

## 2018-10-01 RX ADMIN — IPRATROPIUM BROMIDE AND ALBUTEROL SULFATE SCH ML: .5; 3 SOLUTION RESPIRATORY (INHALATION) at 08:43

## 2018-10-01 RX ADMIN — IPRATROPIUM BROMIDE AND ALBUTEROL SULFATE SCH ML: .5; 3 SOLUTION RESPIRATORY (INHALATION) at 16:17

## 2018-10-01 RX ADMIN — SACUBITRIL AND VALSARTAN SCH TAB: 24; 26 TABLET, FILM COATED ORAL at 10:40

## 2018-10-01 NOTE — CP.PCM.DIS
Provider





- Provider


Date of Admission: 


09/25/18 14:00





Attending physician: 


Osmar Fierro MD





Time Spent in preparation of Discharge (in minutes): 35





Hospital Course





- Lab Results


Lab Results: 


                                  Micro Results





09/21/18 07:42   Blood   Blood Culture - Final


                            NO GROWTH AFTER 5 DAYS


09/21/18 07:42   Blood   Gram Stain - Final


                            TEST NOT PERFORMED


09/21/18 07:42   Blood   Blood Culture - Final


                            NO GROWTH AFTER 5 DAYS


09/21/18 07:42   Blood   Gram Stain - Final


                            TEST NOT PERFORMED


09/24/18 07:00   Sputum   Gram Stain - Final


09/24/18 07:00   Sputum   Sputum Culture - Final


                            NORMAL ORAL CARROL


09/22/18 14:12   Stool   Stool Culture - Final


                            NO SALMONELLA, SHIGELLA OR CAMPYLOBACTER ISOLATED.


09/22/18 03:18   Naris   MRSA Culture (Admit) - Final


                            MRSA NOT DETECTED





                             Most Recent Lab Values











WBC  15.7 K/uL (4.8-10.8)  H  09/30/18  08:04    


 


RBC  4.15 Mil/uL (3.80-5.20)   09/30/18  08:04    


 


Hgb  10.3 g/dL (11.0-16.0)  L  09/30/18  08:04    


 


Hct  32.0 % (34.0-47.0)  L  09/30/18  08:04    


 


MCV  77.1 fL (81.0-99.0)  L  09/30/18  08:04    


 


MCH  24.9 pg (27.0-31.0)  L  09/30/18  08:04    


 


MCHC  32.3 g/dL (33.0-37.0)  L  09/30/18  08:04    


 


RDW  20.4 % (11.5-14.5)  H  09/30/18  08:04    


 


Plt Count  149 K/uL (130-400)   09/30/18  08:04    


 


MPV  10.4 fL (7.2-11.7)   09/30/18  08:04    


 


Neut % (Auto)  92.3 % (50.0-75.0)  H  09/30/18  08:04    


 


Lymph % (Auto)  3.1 % (20.0-40.0)  L  09/30/18  08:04    


 


Mono % (Auto)  4.2 % (0.0-10.0)   09/30/18  08:04    


 


Eos % (Auto)  0.0 % (0.0-4.0)   09/30/18  08:04    


 


Baso % (Auto)  0.4 % (0.0-2.0)   09/30/18  08:04    


 


Neut # (Auto)  14.5 K/uL (1.8-7.0)  H  09/30/18  08:04    


 


Lymph # (Auto)  0.5 K/uL (1.0-4.3)  L  09/30/18  08:04    


 


Mono # (Auto)  0.7 K/uL (0.0-0.8)   09/30/18  08:04    


 


Eos # (Auto)  0.0 K/uL (0.0-0.7)   09/30/18  08:04    


 


Baso # (Auto)  0.1 K/uL (0.0-0.2)   09/30/18  08:04    


 


Neutrophils % (Manual)  90 % (50-75)  H  09/30/18  08:04    


 


Band Neutrophils %  2 % (0-2)   09/30/18  08:04    


 


Lymphocytes % (Manual)  3 % (20-40)  L  09/30/18  08:04    


 


Monocytes % (Manual)  5 % (0-10)   09/30/18  08:04    


 


Nucleated RBC %  1 % (0-0)  H  09/30/18  08:04    


 


Differential Comment     09/20/18  15:24    


 


Platelet Estimate  Normal  (NORMAL)   09/30/18  08:04    


 


Polychromasia  Slight   09/30/18  08:04    


 


Hypochromasia (manual)  Slight   09/30/18  08:04    


 


Poikilocytosis (manual  Slight   09/30/18  08:04    


 


Basophilic Stippling  Slight   09/24/18  08:16    


 


Anisocytosis (manual)  Moderate   09/30/18  08:04    


 


Microcytosis (manual)  Slight   09/30/18  08:04    


 


Macrocytosis (manual)  Slight   09/30/18  08:04    


 


Target Cells  Slight   09/28/18  07:52    


 


Tear Drop Cells  Slight   09/30/18  08:04    


 


Ovalocytes  Slight   09/30/18  08:04    


 


Schistocytes  Slight   09/23/18  08:46    


 


ESR  16 mm/hr (0-20)   09/22/18  06:33    


 


PT  12.9 SECONDS (9.7-12.2)  H  09/21/18  07:42    


 


INR  1.2   09/21/18  07:42    


 


APTT  30 SECONDS (21-34)   09/21/18  07:42    


 


Sodium  136 mmol/L (132-148)   09/30/18  08:04    


 


Potassium  4.0 mmol/L (3.6-5.2)   09/30/18  08:04    


 


Chloride  98 mmol/L ()   09/30/18  08:04    


 


Carbon Dioxide  30 mmol/L (22-30)   09/30/18  08:04    


 


Anion Gap  12  (10-20)   09/30/18  08:04    


 


BUN  44 mg/dL (7-17)  H  09/30/18  08:04    


 


Creatinine  1.4 mg/dL (0.7-1.2)  H  09/30/18  08:04    


 


Est GFR ( Amer)  43   09/30/18  08:04    


 


Est GFR (Non-Af Amer)  36   09/30/18  08:04    


 


POC Glucose (mg/dL)  193 mg/dL ()  H  10/01/18  11:49    


 


Random Glucose  221 mg/dL ()  H  09/30/18  08:04    


 


Calcium  9.2 mg/dl (8.6-10.4)   09/30/18  08:04    


 


Phosphorus  3.6 mg/dL (2.5-4.5)   09/21/18  07:50    


 


Magnesium  2.3 mg/dL (1.6-2.3)   09/30/18  08:04    


 


Iron  24 ug/dL ()  L  09/23/18  07:46    


 


TIBC  355 ug/dL (250-450)   09/23/18  07:46    


 


% Saturation  7  (20-55)  L  09/23/18  07:46    


 


Ferritin  26.0 ng/mL  09/23/18  07:46    


 


Total Bilirubin  0.4 mg/dL (0.2-1.3)   09/30/18  08:04    


 


AST  26 U/L (14-36)   09/30/18  08:04    


 


ALT  35 U/L (9-52)   09/30/18  08:04    


 


Alkaline Phosphatase  62 U/L ()   09/30/18  08:04    


 


Total Creatine Kinase  186 U/L ()  H  09/21/18  16:35    


 


CK-MB (Mass)  3.16 ng/mL (0.0-3.38)   09/21/18  16:35    


 


Troponin I  0.0230 ng/mL (0.00-0.120)   09/21/18  16:35    


 


C-Reactive Protein  21.30 mg/L (0.0-9.9)  H  09/22/18  06:33    


 


NT-Pro-B Natriuret Pep  28632 pg/mL (0-900)  H  09/26/18  07:37    


 


Total Protein  5.6 g/dL (6.3-8.3)  L  09/30/18  08:04    


 


Albumin  3.1 g/dL (3.5-5.0)  L  09/30/18  08:04    


 


Globulin  2.5 gm/dL (2.2-3.9)   09/30/18  08:04    


 


Albumin/Globulin Ratio  1.2  (1.0-2.1)   09/30/18  08:04    


 


Lipase  201 U/L ()   09/20/18  15:24    


 


Urine Color  Yellow  (YELLOW)   09/20/18  17:51    


 


Urine Clarity  Clear  (Clear)   09/20/18  17:51    


 


Urine pH  5.0  (5.0-8.0)   09/20/18  17:51    


 


Ur Specific Gravity  1.008  (1.003-1.030)   09/20/18  17:51    


 


Urine Protein  Negative mg/dL (NEGATIVE)   09/20/18  17:51    


 


Urine Glucose (UA)  Normal mg/dL (Normal)   09/20/18  17:51    


 


Urine Ketones  Negative mg/dL (NEGATIVE)   09/20/18  17:51    


 


Urine Blood  2+  (NEGATIVE)  H  09/20/18  17:51    


 


Urine Nitrate  Negative  (NEGATIVE)   09/20/18  17:51    


 


Urine Bilirubin  Negative  (NEGATIVE)   09/20/18  17:51    


 


Urine Urobilinogen  Normal mg/dL (0.2-1.0)   09/20/18  17:51    


 


Ur Leukocyte Esterase  Neg Hunter/uL (Negative)   09/20/18  17:51    


 


Urine WBC (Auto)  2 /hpf (0-5)   09/20/18  17:51    


 


Urine RBC (Auto)  20 /hpf (0-3)  H  09/20/18  17:51    


 


Ur Squamous Epith Cells  1 /hpf (0-5)   09/20/18  17:51    


 


Urine Bacteria  Rare  (<OCC)   09/20/18  17:51    


 


Hyaline Casts  3-5 /lpf (0-2)  H  09/20/18  17:51    


 


Stool Occult Blood  Negative  (NEGATIVE)   09/22/18  14:12    


 


B. pertussis IgG (PT)  52 IU/mL H  09/27/18  06:54    


 


B. pertussis IgG (FHA)  62 IU/mL  09/27/18  06:54    


 


B. pertussis IgA (PT)  58 IU/mL H  09/27/18  06:54    


 


B. pertussis IgA (FHA)  229 IU/mL H  09/27/18  06:54    


 


Ur L.pneumophila Ag  Negative  (NEGATIVE)   09/22/18  03:18    


 


Mycoplasma pneumon IgM  Negative  (NEGATIVE)   09/22/18  06:33    














- Hospital Course


Hospital Course: 





For the last few days patient has been complaining of cough expectoration white 

to yellowish with increasing wheezing symptoms increase in intensity and 

severity patient became short of breath at rest.  Patient presented to [facility] Emergency Room found to have acute COPD with exacerbation and possible 

respiratory infection the proBNP was elevated at 29,000 chest x-ray did not show

any acute infiltrate.


Few days ago patient had lower abdominal pain was seen by local MD and was 

prescribed Flagyl for possible diverticulitis.  CT of the abdomen with oral 

contrast did not  revealed any diverticulitis.





PAST HIST.


Patient has history of ischemic cardiomyopathy with left antegrade ejection 

fraction of less than 30%.  Patient also has AICD coronary artery disease with 

stent type II diabetes COPD and anemia.


Patient was admitted on the telemetry bed.  Serial enzymes were negative for 

myocardial injury.  BNP decreased in number to 12,004 discharge with IV Lasix


Patient was given IV antibiotics and IV steroids and bronchodilators.  Patient 

improved on above therapy patient still has occasional wheezing which will be 

controlled at home on nebulizer and inhalers and a short course of prednisone.


Septic workup was negative.  Hemoglobin dropped down as low as 8 g.  Patient 

received 3 days of IV iron, and hemoglobin was 9.2 before discharge.  Patient 

will continue her home medications.











Discharge Exam





- Head Exam


Head Exam: NORMOCEPHALIC





Discharge Plan





- Follow Up Plan


Condition: FAIR


Disposition: HOME/ ROUTINE

## 2018-10-01 NOTE — CP.PCM.PN
Subjective





- Date & Time of Evaluation


Date of Evaluation: 10/01/18


Time of Evaluation: 15:30





- Subjective


Subjective: 


PT CLEARED FOR D/C PER DR. NIEVES AND DR. LIU.  PT TO COMPLETE PREDNISONE AND 

ZITHROMAX PO. RX ALSO GIVEN FOR NEB TX.  PT AND  TO F/U WITH DR. NIEVES 

IN HIS OFFICE NEXT WEEK.  SEE BELOW FOR D/C INSTRUCTIONS PROVIDED TO THE PT.





-TU Y TU ESPOSO SON PARA SEGUIR CON EL DR. NIEVES EN LA OFICINA LA PRXIMA 

SEMANA --- LLAME A LA OFICINA PARA HACER MONZON QUE.





-CONTINUAR LOS MEDICAMENTOS [facility].


-NUEVAS PRESCRIPCIONES INCLUYEN


 1) ZITHROMAX 250MG (ANTIBITICO) - TOME 1 TABLETA POR BOCA MC VEZ AL DA POR 4

SANTOS MS (COMIENZA EL [facility], 10/2/18).


 2) PREDNISONE 10 MG (ESTEROIDE PARA MONZON RESPIRACIN) - TOME 1 TABLETA POR BOCA 

MC VEZ AL DA JACOB 10 SANTOS MS (COMIENZE EL [facility], 10/2/18).


 3) FLORASTOR 250 MG (PROBITICO PARA MONZON ESTMAGO MIENTRAS EST EN LOS NUEVOS 

MEDICAMENTOS) - TOME 1 CPSULA POR VA BOCA DOS SANTOS (MAANA Y


     NOCHE).


 4) VITAMINA D: DEMETRI 1 CPSULA MC VEZ POR SEMANA.


 5) DUONEB (TRATAMIENTO RESPIRATORIO) - USE CADA 4 HORAS SLO SI TIENE 

DIFICULTAD DE RESPIRAR O SORTIJA; SI TE SIENTES SANCHEZ, NO LO HACES


     TIENE QUE UTILIZAR BETTE MEDICAMENTO.


 6) PEPCID 20 MG (PARA MONZON ESTMAGO) - TOME 1 TABLETA POR LA BOCA DOS VECES AL 

DA.





-Para otras inquietudes o preguntas, contctese con el DR. JESSICA WALKER.





_________________________________________________________________________

____________________________________________________________





-YOU AND YOUR  ARE TO FOLLOW UP WITH DR. NIEVES IN THE OFFICE NEXT 

WEEK---CALL THE OFFICE TO MAKE YOUR APPOINTMENT. 





-CONTINUE HOME MEDICATIONS AS USUAL.


-NEW PRESCRIPTIONS INCLUDE


 1) ZITHROMAX 250MG (ANTIBIOTIC)--TAKE 1 TABLET BY MOUTH ONCE A DAY FOR 4 MORE 

DAYS (START ON TUESDAY, 10/2/18).


 2) PREDNISONE 10 MG (STEROID FOR YOUR BREATHING)--TAKE 1 TABLET BY MOUTH ONCE A

DAY FOR 10 MORE DAYS (START ON TUESDAY, 10/2/18).


 3) FLORASTOR 250 MG (PROBIOTIC FOR YOUR STOMACH WHILE ON THE NEW 

MEDICATIONS)--TAKE 1 CAPSULE BY MOUTH TWICE 1 DAY (MORNING AND 


     EVENING).


 4) VITAMIN D--TAKE 1 CAPSULE ONCE A WEEK.


 5) DUONEB (BREATHING TREATMENT)--USE EVERY 4 HOURS ONLY IF YOU HAVE SHORTNESS 

OF BREATH OR WHEEZING;  IF YOU FEEL FINE, YOU DO NOT 


     HAVE TO USE THIS MEDICATION.


 6) PEPCID 20 MG (FOR YOUR STOMACH)--TAKE 1 TABLET BY MOUTH TWICE A DAY.





-FOR FURTHER CONCERNS OR QUESTIONS, CONTACT DR. NIEVES'S OFFICE. 





Objective





- Vital Signs/Intake and Output


Vital Signs (last 24 hours): 


                                        











Temp Pulse Resp BP Pulse Ox


 


 97.4 F L  77   18   119/72   100 


 


 10/01/18 09:16  10/01/18 09:16  10/01/18 09:16  10/01/18 10:41  10/01/18 09:16








Intake and Output: 


                                        











 10/01/18 10/01/18





 06:59 18:59


 


Intake Total 700 


 


Balance 700 














- Medications


Medications: 


                               Current Medications





Albuterol/Ipratropium (Duoneb 3 Mg/0.5 Mg (3 Ml) Ud)  3 ml INH RQ4 [facility]


   Last Admin: 10/01/18 08:43 Dose:  3 ml


Aspirin (Ecotrin)  81 mg PO DAILY [facility]


   Last Admin: 10/01/18 10:40 Dose:  81 mg


Ergocalciferol (Drisdol 50,000 Intl Units Cap)  1 cap PO QWK [facility]


   Last Admin: 09/27/18 10:32 Dose:  1 cap


Famotidine (Pepcid)  20 mg PO BID [facility]


   Last Admin: 10/01/18 10:41 Dose:  20 mg


Furosemide (Lasix)  40 mg IVP DAILY [facility]


   Last Admin: 10/01/18 10:41 Dose:  40 mg


Ferric Sodium Gluconate Complex 125 mg/ Sodium Chloride  110 mls @ 110 mls/hr 

IVPB DAILY [facility]


   Stop: 10/02/18 15:01


   Last Admin: 10/01/18 12:44 Dose:  110 mls/hr


Azithromycin 250 mg/ Sodium (Chloride)  250 mls @ 250 mls/hr IVPB Q24H [facility]; 

Protocol


   Last Admin: 10/01/18 02:35 Dose:  250 mls/hr


Insulin Glargine (Lantus)  25 unit SC HS [facility]


   Last Admin: 09/30/18 22:35 Dose:  25 units


Insulin Human Regular (Novolin R)  0 unit SC [facility]; Protocol


   Last Admin: 10/01/18 12:13 Dose:  2 units


Methylprednisolone (Solu-Medrol)  30 mg IV Q12 [facility]


   Last Admin: 10/01/18 10:42 Dose:  30 mg


Metoprolol Tartrate (Lopressor)  25 mg PO BID [facility]


   Last Admin: 10/01/18 10:41 Dose:  25 mg


Repaglinide (Prandin)  2 mg PO DAILY [facility]


   Last Admin: 10/01/18 10:40 Dose:  2 mg


Rosuvastatin Calcium (Crestor)  20 mg PO HS [facility]


   Last Admin: 09/30/18 22:38 Dose:  20 mg


Sacubitril/Valsartan (Entresto 24 Mg-26 Mg)  1 tab PO DAILY [facility]


   Last Admin: 10/01/18 10:40 Dose:  1 tab


Ticagrelor (Brilinta)  90 mg PO BID [facility]


   Last Admin: 10/01/18 10:40 Dose:  90 mg











- Labs


Labs: 


                                        





                                 09/30/18 08:04 





                                 09/30/18 08:04 





                                        











PT  12.9 SECONDS (9.7-12.2)  H  09/21/18  07:42    


 


INR  1.2   09/21/18  07:42    


 


APTT  30 SECONDS (21-34)   09/21/18  07:42

## 2018-10-01 NOTE — CP.PCM.PN
Subjective





- Date & Time of Evaluation


Date of Evaluation: 10/01/18


Time of Evaluation: 14:09





- Subjective


Subjective: 





CHIEF COMPLAINTS TODAY :


AFEBRILE.


OOB


FEELING BETTER 





 AT BEDSIDE.





PERTUSSIS SEROLOGY IGA, IGG -HIGHLY POSITIVE.








ROS.


HEENT :  N.


Resp :       No hemoptysis  +VE COUGH/+VE WHEEZE


Cardio :     No anginal  CP, PND, orthopnea, palpitation 


GI :           No abd.pain, n/v ,diarrhea or GI bleeding .


CNS : No headache, vertigo, focal deficit.


Musculoskel :  No joint swelling ,


Derm :        No rash 


Psych :     Normal affect.


Ext :  No  swelling ,calf pain 





PE.


Pt. is alert awake in no distress.


V.S  As noted in the chart 


Head ,ear nose,throat and eyes : Normal.


Neck : Supple with normal carotids.


Lungs:  LESS B/L EXPIRATORY WHEEZE


Heart : S1 & S2 normal with S4. No murmur.


Abd : Soft non tender with normal bowel sounds.


Neuro : Moves all ext. with no localized deficit.


Ext : No edema with intact pulses.Non tender calves 


Derm : No rashes or decubitus ulcer.





LABS/RADIOLOGY:


REVIEWED


WBC ELEVATED SECONDARY TO STEROIDS


CXR 9/26  NAD.








LEGIONELLA URINE AG -VE


MYCOPLASMA IGM -VE





Objective





- Vital Signs/Intake and Output


Vital Signs (last 24 hours): 


                                        











Temp Pulse Resp BP Pulse Ox


 


 97.4 F L  77   18   119/72   100 


 


 10/01/18 09:16  10/01/18 09:16  10/01/18 09:16  10/01/18 10:41  10/01/18 09:16








Intake and Output: 


                                        











 10/01/18 10/01/18





 06:59 18:59


 


Intake Total 700 


 


Balance 700 














- Medications


Medications: 


                               Current Medications





Albuterol/Ipratropium (Duoneb 3 Mg/0.5 Mg (3 Ml) Ud)  3 ml INH RQ4 [facility]


   Last Admin: 10/01/18 08:43 Dose:  3 ml


Aspirin (Ecotrin)  81 mg PO DAILY [facility]


   Last Admin: 10/01/18 10:40 Dose:  81 mg


Ergocalciferol (Drisdol 50,000 Intl Units Cap)  1 cap PO QWK [facility]


   Last Admin: 09/27/18 10:32 Dose:  1 cap


Famotidine (Pepcid)  20 mg PO BID [facility]


   Last Admin: 10/01/18 10:41 Dose:  20 mg


Furosemide (Lasix)  40 mg IVP DAILY [facility]


   Last Admin: 10/01/18 10:41 Dose:  40 mg


Ferric Sodium Gluconate Complex 125 mg/ Sodium Chloride  110 mls @ 110 mls/hr 

IVPB DAILY [facility]


   Stop: 10/02/18 15:01


   Last Admin: 10/01/18 12:44 Dose:  110 mls/hr


Azithromycin 250 mg/ Sodium (Chloride)  250 mls @ 250 mls/hr IVPB Q24H [facility]; 

Protocol


   Last Admin: 10/01/18 02:35 Dose:  250 mls/hr


Insulin Glargine (Lantus)  25 unit SC [facility]


   Last Admin: 09/30/18 22:35 Dose:  25 units


Insulin Human Regular (Novolin R)  0 unit SC [facility]; Protocol


   Last Admin: 10/01/18 12:13 Dose:  2 units


Methylprednisolone (Solu-Medrol)  30 mg IV Q12 [facility]


   Last Admin: 10/01/18 10:42 Dose:  30 mg


Metoprolol Tartrate (Lopressor)  25 mg PO BID [facility]


   Last Admin: 10/01/18 10:41 Dose:  25 mg


Repaglinide (Prandin)  2 mg PO DAILY [facility]


   Last Admin: 10/01/18 10:40 Dose:  2 mg


Rosuvastatin Calcium (Crestor)  20 mg PO [facility]


   Last Admin: 09/30/18 22:38 Dose:  20 mg


Sacubitril/Valsartan (Entresto 24 Mg-26 Mg)  1 tab PO DAILY [facility]


   Last Admin: 10/01/18 10:40 Dose:  1 tab


Ticagrelor (Brilinta)  90 mg PO BID [facility]


   Last Admin: 10/01/18 10:40 Dose:  90 mg











- Labs


Labs: 


                                        





                                 09/30/18 08:04 





                                 09/30/18 08:04 





                                        











PT  12.9 SECONDS (9.7-12.2)  H  09/21/18  07:42    


 


INR  1.2   09/21/18  07:42    


 


APTT  30 SECONDS (21-34)   09/21/18  07:42    














Assessment and Plan


(1) Pertussis pneumonia


Assessment & Plan: 


patient already treated with iv  Zithromax x 5days


continue by mouth Zithromax 250 OD daily for 5 days more.


Tapering steroids as per PMD.


Patient   to see his M.D. for dTAP VACCINE.


AND BY MOUTH ZITHROMAX FOR 5 DAYS FOR PROPHYLAXIS AGAINST PERTUSSIS.


CASE DISCUSSED WITH PMD AND STAFF.





CASE DISCUSSED WITH THE  WHO WAS AT THE BEDSIDE..


Status: Acute   





(2) Chr obstructive pulmonary disease w/ acute lower respiratory infxn


Status: Acute   





(3) Chronic congestive heart failure


Status: Acute   





(4) Anemia


Status: Acute   





(5) Colitis


Status: Acute   





(6) HTN (hypertension)


Status: Acute   





(7) DM type 2 (diabetes mellitus, type 2)


Status: Acute

## 2018-12-06 ENCOUNTER — HOSPITAL ENCOUNTER (INPATIENT)
Dept: HOSPITAL 31 - C.ER | Age: 83
LOS: 13 days | DRG: 291 | End: 2018-12-19
Attending: INTERNAL MEDICINE | Admitting: INTERNAL MEDICINE
Payer: MEDICARE

## 2018-12-06 VITALS — BODY MASS INDEX: 25.5 KG/M2

## 2018-12-06 DIAGNOSIS — I13.0: Primary | ICD-10-CM

## 2018-12-06 DIAGNOSIS — Z66: ICD-10-CM

## 2018-12-06 DIAGNOSIS — Z79.82: ICD-10-CM

## 2018-12-06 DIAGNOSIS — F41.9: ICD-10-CM

## 2018-12-06 DIAGNOSIS — J44.0: ICD-10-CM

## 2018-12-06 DIAGNOSIS — Z95.0: ICD-10-CM

## 2018-12-06 DIAGNOSIS — E11.22: ICD-10-CM

## 2018-12-06 DIAGNOSIS — J93.9: ICD-10-CM

## 2018-12-06 DIAGNOSIS — N18.9: ICD-10-CM

## 2018-12-06 DIAGNOSIS — I82.611: ICD-10-CM

## 2018-12-06 DIAGNOSIS — I50.23: ICD-10-CM

## 2018-12-06 DIAGNOSIS — I25.5: ICD-10-CM

## 2018-12-06 DIAGNOSIS — I34.0: ICD-10-CM

## 2018-12-06 DIAGNOSIS — J96.01: ICD-10-CM

## 2018-12-06 DIAGNOSIS — A41.9: ICD-10-CM

## 2018-12-06 DIAGNOSIS — Z51.5: ICD-10-CM

## 2018-12-06 DIAGNOSIS — E87.5: ICD-10-CM

## 2018-12-06 DIAGNOSIS — E87.2: ICD-10-CM

## 2018-12-06 DIAGNOSIS — Z95.5: ICD-10-CM

## 2018-12-06 DIAGNOSIS — J44.1: ICD-10-CM

## 2018-12-06 DIAGNOSIS — I25.10: ICD-10-CM

## 2018-12-06 DIAGNOSIS — N17.9: ICD-10-CM

## 2018-12-06 DIAGNOSIS — J15.6: ICD-10-CM

## 2018-12-06 LAB
ALBUMIN SERPL-MCNC: 3.5 G/DL (ref 3.5–5)
ALBUMIN/GLOB SERPL: 1.2 {RATIO} (ref 1–2.1)
ALT SERPL-CCNC: 41 U/L (ref 9–52)
APTT BLD: 33 SECONDS (ref 21–34)
ARTERIAL BLOOD GAS O2 SAT: 99.6 % (ref 95–98)
ARTERIAL BLOOD GAS PCO2: 30 MM/HG (ref 35–45)
ARTERIAL BLOOD GAS TCO2: 21.3 MMOL/L (ref 22–28)
ARTERIAL PATENCY WRIST A: YES
AST SERPL-CCNC: 75 U/L (ref 14–36)
BASOPHILS # BLD AUTO: 0.1 K/UL (ref 0–0.2)
BASOPHILS NFR BLD: 1.2 % (ref 0–2)
BUN SERPL-MCNC: 11 MG/DL (ref 7–17)
CALCIUM SERPL-MCNC: 9.2 MG/DL (ref 8.6–10.4)
EOSINOPHIL # BLD AUTO: 0.3 K/UL (ref 0–0.7)
EOSINOPHIL NFR BLD: 4.8 % (ref 0–4)
ERYTHROCYTE [DISTWIDTH] IN BLOOD BY AUTOMATED COUNT: 19.4 % (ref 11.5–14.5)
GFR NON-AFRICAN AMERICAN: 47
HCO3 BLDA-SCNC: 22.9 MMOL/L (ref 21–28)
HGB BLD-MCNC: 11.6 G/DL (ref 11–16)
INR PPP: 1.2
LYMPHOCYTES # BLD AUTO: 1.1 K/UL (ref 1–4.3)
LYMPHOCYTES NFR BLD AUTO: 18.7 % (ref 20–40)
MCH RBC QN AUTO: 27.7 PG (ref 27–31)
MCHC RBC AUTO-ENTMCNC: 31.8 G/DL (ref 33–37)
MCV RBC AUTO: 87.1 FL (ref 81–99)
MONOCYTES # BLD: 0.7 K/UL (ref 0–0.8)
MONOCYTES NFR BLD: 11.1 % (ref 0–10)
NEUTROPHILS # BLD: 3.9 K/UL (ref 1.8–7)
NEUTROPHILS NFR BLD AUTO: 64.2 % (ref 50–75)
NRBC BLD AUTO-RTO: 0 % (ref 0–2)
PH BLDA: 7.44 [PH] (ref 7.35–7.45)
PLATELET # BLD: 209 K/UL (ref 130–400)
PMV BLD AUTO: 9.6 FL (ref 7.2–11.7)
PO2 BLDA: 171 MM/HG (ref 80–100)
PROTHROMBIN TIME: 12.8 SECONDS (ref 9.7–12.2)
RBC # BLD AUTO: 4.2 MIL/UL (ref 3.8–5.2)
TROPONIN I SERPL-MCNC: 0.09 NG/ML (ref 0–0.12)
WBC # BLD AUTO: 6 K/UL (ref 4.8–10.8)

## 2018-12-06 PROCEDURE — 05HA33Z INSERTION OF INFUSION DEVICE INTO LEFT BRACHIAL VEIN, PERCUTANEOUS APPROACH: ICD-10-PCS | Performed by: INTERNAL MEDICINE

## 2018-12-06 RX ADMIN — HUMAN INSULIN SCH: 100 INJECTION, SOLUTION SUBCUTANEOUS at 22:33

## 2018-12-06 NOTE — C.PDOC
History Of Present Illness


Patient with Hx of COPD, CAD with LED stent, ejection fraction of 20%, presents 

to the ER via EMS with a complaint of SOB that began earlier today. She is 

currently speaking in 1-3 word sentences. Denies chest pain.


Time Seen by Provider: 12/06/18 20:22


Chief Complaint (Nursing): Abdominal Pain


History Per: Patient


History/Exam Limitations: no limitations


Onset/Duration Of Symptoms: Hrs


Current Symptoms Are (Timing): Still Present


Severity: Severe


Pain Scale Rating Of: 8


Radiation Of Pain To:: None


Quality Of Discomfort: Unable To Describe


Associated Symptoms: denies: Chest Pain


Exacerbating Factors: None


Alleviating Factors: None


Recent travel outside of the [place]: No


Abnormal Vaginal Bleeding: No





Past Medical History


Reviewed: Historical Data, Nursing Documentation, Vital Signs





- Medical History


PMH: Anemia, Anxiety, Arthritis, Asthma, Cardia Arrhythmia, CHF, COPD, Diabetes,

HTN, Hypercholesterolemia, TIA


   Denies: Chronic Kidney Disease


Surgical History: Appendectomy, Coronary Stent, Endoscopy, Pacemaker (2016)





- [facility] Procedures











ASSISTANCE WITH RESPIRATORY VENTILATION, 24-96 HRS, CPAP (11/28/17)


ASSISTANCE WITH RESPIRATORY VENTILATION, <24 HRS, CPAP (07/21/16)


ESOPHAGOGASTRODUODENOSCOPY [EGD] W/CLOSED BIOPSY (06/14/15)


LEFT HEART CARDIAC CATH (04/03/14)


LT HEART ANGIOCARDIOGRAM (04/03/14)


MEASURE OF CARDIAC SAMPL & PRESSURE, L HEART, PERC APPROACH (11/03/15)


PACKED CELL TRANSFUSION (06/14/15)


PLAIN RADIOGRAPHY OF MULT COR ART USING OTH CONTRAST (11/03/15)


TRANSFUSE NONAUT RED BLOOD CELLS IN PERIPH VEIN, PERC (07/21/16)








Family History: States: No Known Family Hx





- Social History


Hx Tobacco Use: No


Hx Alcohol Use: No


Hx Substance Use: No





- Immunization History


Hx Tetanus Toxoid Vaccination: No


Hx Influenza Vaccination: Yes


Hx Pneumococcal Vaccination: Yes





Review Of Systems


Review Of Systems: ROS cannot be obtained secondary to pt's inabilty to answer 

questions.





Physical Exam





- Physical Exam


Appears: In Acute Distress, Other (Severe discomfort)


Skin: Warm, Dry


Head: Normacephalic


Eye(s): bilateral: Normal Inspection


Oral Mucosa: Moist


Throat: No Erythema, No Exudate


Neck: Trachea Midline, Supple


Chest: Other (Pacemaker defibrillator to left side)


Cardiovascular: Rhythm Regular (Tachycardic)


Respiratory: Decreased Breath Sounds, No Rales, Rhonchi (at bases), No Wheezing


Gastrointestinal/Abdominal: Soft, No Tenderness


Back: No CVA Tenderness


Extremity: Normal ROM


Extremity: Bilateral: Atraumatic, Normal Color And Temperature, Normal ROM


Pulses: Left Dorsalis Pedis: Normal, Right Dorsalis Pedis: Normal


Neurological/Psych: Oriented x3


Gait: Unable To Assess





ED Course And Treatment





- Laboratory Results


Result Diagrams: 


                                 12/06/18 20:31





                                 12/06/18 20:31


ECG: Interpreted By Me, Viewed By Me


ECG Rhythm: Sinus Rhythm (100), Nonspecific Changes (ivcd, occ pvc's unchanged 

from 9/21/18)


Progress Note: EKG, blood work, and CXR ordered. Albuterol nebulizer and 

solumedrol administered.





Critical Care Time





- Critical Care Note


Total Time (in mins): 30


Documented critical care: time excludes all time spent performing seperately 

billable procedures.





Disposition


Discussed With : Osmar Fierro


Comment: accepted the pt on his service and took over the care at 9:22 PM


Doctor Will See Patient In The: Hospital


Counseled Patient/Family Regarding: Studies Performed, Diagnosis





- Disposition


Disposition: HOSPITALIZED


Disposition Time: 20:22


Condition: GUARDED


Forms:  [facility] (English)





- POA


Present On Arrival: Poor Glycemic Control





- Clinical Impression


Clinical Impression: 


 CHF exacerbation, Respiratory distress, COPD exacerbation








- Scribe Statement


The provider has reviewed the documentation as recorded by the Scribe





Rosas Wilkerson





All medical record entries made by the Scribe were at my direction and 

personally dictated by me. I have reviewed the chart and agree that the record 

accurately reflects my personal performance of the history, physical exam, 

medical decision making, and the department course for this patient. I have also

personally directed, reviewed, and agree with the discharge instructions and 

disposition.





Decision To Admit





- Pt Status Changed To:


Hospital Disposition Of: Inpatient





- Admit Certification


Admit to Inpatient:: After my assessment, the patient will require 

hospitalization for at least two midnights.  This is because of the severity of 

symptoms shown, intensity of services needed, and/or the medical risk in this 

patient being treated as an outpatient.





- InPatient:


Physician Admission Certification:: After my assessment, the patient will 

require hospitalization for at least two midnights.  This is because of the 

severity of symptoms shown, intensity of services needed, and/or the medical 

risk in this patient being treated as an outpatient.





- .


Bed Request Type: Telemetry


Admitting Physician: Osmar Fierro


Patient Diagnosis: 


 CHF exacerbation, Respiratory distress, COPD exacerbation

## 2018-12-07 LAB
ALBUMIN SERPL-MCNC: 3.2 G/DL (ref 3.5–5)
ALBUMIN SERPL-MCNC: 3.4 G/DL (ref 3.5–5)
ALBUMIN/GLOB SERPL: 1.1 {RATIO} (ref 1–2.1)
ALBUMIN/GLOB SERPL: 1.2 {RATIO} (ref 1–2.1)
ALT SERPL-CCNC: 44 U/L (ref 9–52)
ALT SERPL-CCNC: 49 U/L (ref 9–52)
ANISOCYTOSIS BLD QL SMEAR: SLIGHT
ANISOCYTOSIS BLD QL SMEAR: SLIGHT
ARTERIAL BLOOD GAS O2 SAT: 96.3 % (ref 95–98)
ARTERIAL BLOOD GAS PCO2: 42 MM/HG (ref 35–45)
ARTERIAL BLOOD GAS TCO2: 26.1 MMOL/L (ref 22–28)
ARTERIAL PATENCY WRIST A: YES
AST SERPL-CCNC: 59 U/L (ref 14–36)
AST SERPL-CCNC: 63 U/L (ref 14–36)
BASOPHILS # BLD AUTO: 0 K/UL (ref 0–0.2)
BASOPHILS # BLD AUTO: 0.1 K/UL (ref 0–0.2)
BASOPHILS NFR BLD: 0.2 % (ref 0–2)
BASOPHILS NFR BLD: 1.3 % (ref 0–2)
BILIRUB DIRECT SERPL-MCNC: 0.5 MG/DL (ref 0–0.4)
BUN SERPL-MCNC: 16 MG/DL (ref 7–17)
BUN SERPL-MCNC: 21 MG/DL (ref 7–17)
CALCIUM SERPL-MCNC: 9 MG/DL (ref 8.6–10.4)
CALCIUM SERPL-MCNC: 9.1 MG/DL (ref 8.6–10.4)
CK MB SERPL-MCNC: 2.66 NG/ML (ref 0–3.38)
CK MB SERPL-MCNC: 2.76 NG/ML (ref 0–3.38)
EOSINOPHIL # BLD AUTO: 0 K/UL (ref 0–0.7)
EOSINOPHIL # BLD AUTO: 0 K/UL (ref 0–0.7)
EOSINOPHIL NFR BLD: 0 % (ref 0–4)
EOSINOPHIL NFR BLD: 0.2 % (ref 0–4)
ERYTHROCYTE [DISTWIDTH] IN BLOOD BY AUTOMATED COUNT: 19 % (ref 11.5–14.5)
ERYTHROCYTE [DISTWIDTH] IN BLOOD BY AUTOMATED COUNT: 19.1 % (ref 11.5–14.5)
GFR NON-AFRICAN AMERICAN: 33
GFR NON-AFRICAN AMERICAN: 43
HCO3 BLDA-SCNC: 24.5 MMOL/L (ref 21–28)
HGB BLD-MCNC: 10.3 G/DL (ref 11–16)
HGB BLD-MCNC: 10.9 G/DL (ref 11–16)
HYPOCHROMIC: SLIGHT
HYPOCHROMIC: SLIGHT
INHALED O2 CONCENTRATION: 45 %
LYMPHOCYTE: 4 % (ref 20–40)
LYMPHOCYTE: 4 % (ref 20–40)
LYMPHOCYTES # BLD AUTO: 0.1 K/UL (ref 1–4.3)
LYMPHOCYTES # BLD AUTO: 0.5 K/UL (ref 1–4.3)
LYMPHOCYTES NFR BLD AUTO: 2.5 % (ref 20–40)
LYMPHOCYTES NFR BLD AUTO: 4.1 % (ref 20–40)
MCH RBC QN AUTO: 27.6 PG (ref 27–31)
MCH RBC QN AUTO: 28.9 PG (ref 27–31)
MCHC RBC AUTO-ENTMCNC: 31.8 G/DL (ref 33–37)
MCHC RBC AUTO-ENTMCNC: 33.1 G/DL (ref 33–37)
MCV RBC AUTO: 86.9 FL (ref 81–99)
MCV RBC AUTO: 87.2 FL (ref 81–99)
MONOCYTE: 5 % (ref 0–10)
MONOCYTES # BLD: 0.1 K/UL (ref 0–0.8)
MONOCYTES # BLD: 1.1 K/UL (ref 0–0.8)
MONOCYTES NFR BLD: 1.9 % (ref 0–10)
MONOCYTES NFR BLD: 8.9 % (ref 0–10)
NEUTROPHILS # BLD: 10.6 K/UL (ref 1.8–7)
NEUTROPHILS # BLD: 5.7 K/UL (ref 1.8–7)
NEUTROPHILS NFR BLD AUTO: 86.6 % (ref 50–75)
NEUTROPHILS NFR BLD AUTO: 90 % (ref 50–75)
NEUTROPHILS NFR BLD AUTO: 94.3 % (ref 50–75)
NEUTROPHILS NFR BLD AUTO: 96 % (ref 50–75)
NEUTS BAND NFR BLD: 1 % (ref 0–2)
NRBC BLD AUTO-RTO: 0 % (ref 0–2)
NRBC BLD AUTO-RTO: 0.1 % (ref 0–2)
OVALOCYTES BLD QL SMEAR: SLIGHT
OVALOCYTES BLD QL SMEAR: SLIGHT
PH BLDA: 7.38 [PH] (ref 7.35–7.45)
PLATELET # BLD EST: NORMAL 10*3/UL
PLATELET # BLD EST: NORMAL 10*3/UL
PLATELET # BLD: 146 K/UL (ref 130–400)
PLATELET # BLD: 149 K/UL (ref 130–400)
PMV BLD AUTO: 10.3 FL (ref 7.2–11.7)
PMV BLD AUTO: 10.4 FL (ref 7.2–11.7)
PO2 BLDA: 68 MM/HG (ref 80–100)
POLYCHROMIC: SLIGHT
POLYCHROMIC: SLIGHT
RBC # BLD AUTO: 3.75 MIL/UL (ref 3.8–5.2)
RBC # BLD AUTO: 3.79 MIL/UL (ref 3.8–5.2)
TOTAL CELLS COUNTED BLD: 100
TOTAL CELLS COUNTED BLD: 100
TROPONIN I SERPL-MCNC: 0.06 NG/ML (ref 0–0.12)
TROPONIN I SERPL-MCNC: 0.07 NG/ML (ref 0–0.12)
WBC # BLD AUTO: 12.2 K/UL (ref 4.8–10.8)
WBC # BLD AUTO: 6 K/UL (ref 4.8–10.8)

## 2018-12-07 RX ADMIN — ENOXAPARIN SODIUM SCH MG: 40 INJECTION SUBCUTANEOUS at 10:24

## 2018-12-07 RX ADMIN — HUMAN INSULIN SCH UNITS: 100 INJECTION, SOLUTION SUBCUTANEOUS at 12:42

## 2018-12-07 RX ADMIN — SACUBITRIL AND VALSARTAN SCH: 49; 51 TABLET, FILM COATED ORAL at 19:00

## 2018-12-07 RX ADMIN — HUMAN INSULIN SCH: 100 INJECTION, SOLUTION SUBCUTANEOUS at 21:24

## 2018-12-07 RX ADMIN — RANOLAZINE SCH MG: 500 TABLET, FILM COATED, EXTENDED RELEASE ORAL at 10:23

## 2018-12-07 RX ADMIN — IPRATROPIUM BROMIDE AND ALBUTEROL SULFATE PRN ML: .5; 3 SOLUTION RESPIRATORY (INHALATION) at 13:32

## 2018-12-07 RX ADMIN — HUMAN INSULIN SCH UNITS: 100 INJECTION, SOLUTION SUBCUTANEOUS at 08:08

## 2018-12-07 RX ADMIN — HUMAN INSULIN SCH UNITS: 100 INJECTION, SOLUTION SUBCUTANEOUS at 17:19

## 2018-12-07 RX ADMIN — ASPIRIN SCH MG: 325 TABLET, DELAYED RELEASE ORAL at 10:24

## 2018-12-07 RX ADMIN — RANOLAZINE SCH MG: 500 TABLET, FILM COATED, EXTENDED RELEASE ORAL at 17:20

## 2018-12-07 NOTE — CARD
--------------- APPROVED REPORT --------------





Date of service: 12/06/2018



EKG Measurement

Heart [id number]

RI [id number]

[id number]

[id number]

[id number]



<Conclusion>

Sinus rhythm with premature ventricular complexes or fusion complexes

Possible Left atrial enlargement

Nonspecific intraventricular conduction delay

ST & T wave abnormality, consider lateral ischemia

Abnormal ECG

## 2018-12-07 NOTE — CP.PCM.HP
History of Present Illness





- History of Present Illness


History of Present Illness: 





                                          COMPREHENSIVE CONSULT    








HPI


86 years old  woman with cardiopulmonary disorder complaining of 

shortness of breath cough expectoration for the last 2 days increase in inten

sity and severity presented to [facility] ER with systolic heart failure.  

Patient was given Lasix nebulizer with partial improvement.





PAST HIST.


Patient has history of coronary artery disease with stent and ischemic 

cardiomyopathy with left ventricle ejection fraction of less than 30%.  Patient 

also has a moderate mitral regurg.  History of type 2 diabetes COPD and recently

had an infection with pertussis.


PERSONAL HIST:   Smoking.   N   Alcohol.   N      Allergy N            Travel_- 

    .


FAMILY HIST : 


ROS :


Constitutional: Negative for weight change,  Eyes: Negative for redness, sw

elling, itching, discharge, vision changes, blurry vision, double vision, 

glaucoma, cataracts, 


  Ears: Negative for hearing loss, ringing, , tinnitus, vertigo


 Nose: Negative for rhinorrhea, stuffiness, sniffing, itching, postnasal drip, 

discoloration, nasal congestion and epistaxis. 


 Throat: Negative for throat clearing, sore throat, hoarseness, difficulty 

swallowing and difficulty speaking. 


  Respiratory: Negative for hemoptysis, snoring at night,


 Cardiovascular: Negative for edema of legs 


Neurology: Negative for irritability, muscle weakness, numbness and tingling, 

seizures, tremors, migraines, slurred speech, syncope, mood changes, recurrent 

headaches 


Gastrointestinal: Negative for difficulty swallowing, diarrhea, constipation, 

black stools, rectal bleeding, nausea, flatulence, reflux, poor appetite, 

changes in bowel habits, abdominal pain


  Genitourinary: Negative for frequent urination, hematuria, discharge, 

incontinence, urinary retention, frequent UTI, Psychiatric: Negative for 

depression, anxiety/panic, suicidal tendencies, 


Musculoskeletal: Negative for swollen joints, back pain, , neck pain, morning 

stiffness of joints, . 


 Skin: Negative for rash, ulcers, itching, dry skin and pigmented lesions.


P/E: 


  Constitutional: Appears stated age and in no apparent distress. 


 Head: Normocephalic. 


  Ears: External ear canals patent without inflammation. Tympanic membranes 

intact with normal light reflex and landmark. 


  Eyes: Pupils are central, bilaterally equal, symmetrical and reacts to light 

with normal movements and no icterus or pallor. 


 Nose: External nares are patent. Mucosa is pink  Mouth-Throat: Good general 

appearance and condition. No post-pharyngeal/oropharyngeal erythema and 

tonsillar hypertrophy. Good dental hygiene. 


  Neck-Lymphatic: Neck is supple with normal ROM, no thyromegaly, lymph nodes or

masses. JVD is normal with no carotid bruit. 


  Lungs: Bilaterally inspiratory expiratory wheezing with basal rales


  Cardiovascular: S1 and S2 are normal with MENDEZ murmurs, gallops and rub. 


  GI Exam: No hepatomegaly. Abdomen is soft and non-tender. No Organomegaly , 

masses or hernias are evident and bowel sounds are normal and active. 


  Neurology: Higher function and all cranial nerves intact, with no gross motor 

or sensory deficit. Superficial and deep reflexes are normal with downwards 

planters. No cerebellar deficit with normal gait. 


  Musculoskeletal: No tender spots with normal curvature of the spine with no 

swelling or restricted ROM of the small and large joints. 


  Extremities: Homans sign absent. Intact pulses with no pitting edema, calf 

tenderness or skin color changes. 


  Skin: No rash, eruptions or abnormal skin pigmentation





LAB/RADIOLOGY:


ASSESMENT :


Acute on chronic systolic congestive heart failure with reduced ejection 

fraction.


Coronary artery disease with stent.


COPD with exacerbation rule out respiratory infection.


Type 2 diabetes





PLAN: 


See orders








Present on Admission





- Present on Admission


Any Indicators Present on Admission: No





Past Patient History





- Infectious Disease


Hx of Infectious Diseases: None





- Tetanus Immunizations


Tetanus Immunization: Unknown





- Past Medical History & Family History


Past Medical History?: Yes





- Past Social History


Smoking Status: Never Smoked





- CARDIAC


Hx Cardiac Disorders: Yes (CAD)


Hx Hypercholesterolemia: Yes





- PULMONARY


Hx Chronic Obstructive Pulmonary Disease (COPD): Yes





- NEUROLOGICAL


Hx Transient Ischemic Attacks (TIA): Yes





- HEENT


Hx HEENT Problems: No





- RENAL


Hx Chronic Kidney Disease: No





- ENDOCRINE/METABOLIC


Hx Diabetes Mellitus Type 2: Yes





- HEMATOLOGICAL/ONCOLOGICAL


Hx Anemia: Yes





- INTEGUMENTARY


Hx Dermatological Problems: No





- MUSCULOSKELETAL/RHEUMATOLOGICAL


Hx Arthritis: Yes


Hx Falls: No





- GASTROINTESTINAL


Hx Gastrointestinal Disorders: No





- GENITOURINARY/GYNECOLOGICAL


Hx Genitourinary Disorders: No





- PSYCHIATRIC


Hx Anxiety: Yes


Hx Substance Use: No





- SURGICAL HISTORY


Hx Appendectomy: Yes


Hx Coronary Stent: Yes





- ANESTHESIA


Hx Anesthesia: Yes


Hx Anesthesia Reactions: No


Hx Malignant Hyperthermia: No





Meds


Allergies/Adverse Reactions: 


                                    Allergies











Allergy/AdvReac Type Severity Reaction Status Date / Time


 


clopidogrel Allergy Mild SWELLING Verified 06/06/18 13:34














Results





- Vital Signs


Recent Vital Signs: 





                                Last Vital Signs











Temp  97.4 F L  12/07/18 08:25


 


Pulse  97 H  12/07/18 13:34


 


Resp  18   12/07/18 08:25


 


BP  130/72   12/07/18 10:24


 


Pulse Ox  100   12/07/18 08:25














- Labs


Result Diagrams: 


                                 12/07/18 07:08





                                 12/07/18 07:08


Labs: 





                         Laboratory Results - last 24 hr











  12/06/18 12/06/18 12/06/18





  20:31 20:31 20:31


 


WBC  6.0  D  


 


RBC  4.20  


 


Hgb  11.6  


 


Hct  36.5  


 


MCV  87.1  D  


 


MCH  27.7  


 


MCHC  31.8 L  


 


RDW  19.4 H  


 


Plt Count  209  


 


MPV  9.6  


 


Neut % (Auto)  64.2  


 


Lymph % (Auto)  18.7 L  


 


Mono % (Auto)  11.1 H  


 


Eos % (Auto)  4.8 H  


 


Baso % (Auto)  1.2  


 


Neut # (Auto)  3.9  


 


Lymph # (Auto)  1.1  


 


Mono # (Auto)  0.7  


 


Eos # (Auto)  0.3  


 


Baso # (Auto)  0.1  


 


Neutrophils % (Manual)   


 


Lymphocytes % (Manual)   


 


Monocytes % (Manual)   


 


Platelet Estimate   


 


Polychromasia   


 


Hypochromasia (manual)   


 


Anisocytosis (manual)   


 


Ovalocytes   


 


PT   12.8 H 


 


INR   1.2 


 


APTT   33 


 


Puncture Site   


 


pCO2   


 


pO2   


 


HCO3   


 


ABG pH   


 


ABG Total CO2   


 


ABG O2 Saturation   


 


ABG Base Excess   


 


Benji Test   


 


ABG Potassium   


 


A-a O2 Difference   


 


Respiratory Index   


 


Glucose   


 


Lactate   


 


Vent Mode   


 


Mechanical Rate   


 


FiO2   


 


Inspiratory BiPAP   


 


Expiratory BiPAP   


 


Sodium    138


 


Potassium    3.2 L


 


Chloride    103


 


Carbon Dioxide    21 L


 


Anion Gap    17


 


BUN    11


 


Creatinine    1.1


 


Est GFR ( Amer)    57


 


Est GFR (Non-Af Amer)    47


 


POC Glucose (mg/dL)   


 


Random Glucose    246 H


 


Calcium    9.2


 


Magnesium    2.1


 


Total Bilirubin    0.6


 


Direct Bilirubin   


 


AST    75 H D


 


ALT    41


 


Alkaline Phosphatase    80


 


Total Creatine Kinase   


 


CK-MB (Mass)   


 


Troponin I    0.0900


 


NT-Pro-B Natriuret Pep    62011 H


 


Total Protein    6.4


 


Albumin    3.5


 


Globulin    2.9


 


Albumin/Globulin Ratio    1.2


 


Arterial Blood Potassium   














  12/06/18 12/06/18 12/07/18





  21:00 22:31 01:55


 


WBC   


 


RBC   


 


Hgb   


 


Hct   


 


MCV   


 


MCH   


 


MCHC   


 


RDW   


 


Plt Count   


 


MPV   


 


Neut % (Auto)   


 


Lymph % (Auto)   


 


Mono % (Auto)   


 


Eos % (Auto)   


 


Baso % (Auto)   


 


Neut # (Auto)   


 


Lymph # (Auto)   


 


Mono # (Auto)   


 


Eos # (Auto)   


 


Baso # (Auto)   


 


Neutrophils % (Manual)   


 


Lymphocytes % (Manual)   


 


Monocytes % (Manual)   


 


Platelet Estimate   


 


Polychromasia   


 


Hypochromasia (manual)   


 


Anisocytosis (manual)   


 


Ovalocytes   


 


PT   


 


INR   


 


APTT   


 


Puncture Site  Rr   Rra


 


pCO2  30 L   42


 


pO2  171 H   68 L


 


HCO3  22.9   24.5


 


ABG pH  7.44   7.38


 


ABG Total CO2  21.3 L   26.1


 


ABG O2 Saturation  99.6 H   96.3


 


ABG Base Excess  -2.7 L   -0.4


 


Benji Test  Yes   Yes


 


ABG Potassium  3.0 L   3.1 L


 


A-a O2 Difference    200.0


 


Respiratory Index    2.9


 


Glucose  216 H   271 H


 


Lactate  2.4 H   1.1


 


Vent Mode    Bipap


 


Mechanical Rate    12


 


FiO2    45.0


 


Inspiratory BiPAP    12


 


Expiratory BiPAP    6


 


Sodium  141.0   142.0


 


Potassium   


 


Chloride  109.0 H   110.0 H


 


Carbon Dioxide   


 


Anion Gap   


 


BUN   


 


Creatinine   


 


Est GFR ( Amer)   


 


Est GFR (Non-Af Amer)   


 


POC Glucose (mg/dL)   235 H 


 


Random Glucose   


 


Calcium   


 


Magnesium   


 


Total Bilirubin   


 


Direct Bilirubin   


 


AST   


 


ALT   


 


Alkaline Phosphatase   


 


Total Creatine Kinase   


 


CK-MB (Mass)   


 


Troponin I   


 


NT-Pro-B Natriuret Pep   


 


Total Protein   


 


Albumin   


 


Globulin   


 


Albumin/Globulin Ratio   


 


Arterial Blood Potassium  3.0 L   3.1 L














  12/07/18 12/07/18 12/07/18





  06:24 07:08 07:08


 


WBC   6.0 


 


RBC   3.79 L 


 


Hgb   10.9 L 


 


Hct   33.0 L 


 


MCV   87.2 


 


MCH   28.9 


 


MCHC   33.1 


 


RDW   19.1 H 


 


Plt Count   146 


 


MPV   10.3 


 


Neut % (Auto)   94.3 H 


 


Lymph % (Auto)   2.5 L 


 


Mono % (Auto)   1.9 


 


Eos % (Auto)   0.0 


 


Baso % (Auto)   1.3 


 


Neut # (Auto)   5.7 


 


Lymph # (Auto)   0.1 L 


 


Mono # (Auto)   0.1 


 


Eos # (Auto)   0.0 


 


Baso # (Auto)   0.1 


 


Neutrophils % (Manual)   96 H 


 


Lymphocytes % (Manual)   4 L 


 


Monocytes % (Manual)   TEST NOT PERFORMED 


 


Platelet Estimate   Normal 


 


Polychromasia   Slight 


 


Hypochromasia (manual)   Slight 


 


Anisocytosis (manual)   Slight 


 


Ovalocytes   Slight 


 


PT   


 


INR   


 


APTT   


 


Puncture Site   


 


pCO2   


 


pO2   


 


HCO3   


 


ABG pH   


 


ABG Total CO2   


 


ABG O2 Saturation   


 


ABG Base Excess   


 


Benji Test   


 


ABG Potassium   


 


A-a O2 Difference   


 


Respiratory Index   


 


Glucose   


 


Lactate   


 


Vent Mode   


 


Mechanical Rate   


 


FiO2   


 


Inspiratory BiPAP   


 


Expiratory BiPAP   


 


Sodium    140


 


Potassium    3.5 L


 


Chloride    104


 


Carbon Dioxide    26


 


Anion Gap    14


 


BUN    16


 


Creatinine    1.2


 


Est GFR ( Amer)    52


 


Est GFR (Non-Af Amer)    43


 


POC Glucose (mg/dL)  228 H  


 


Random Glucose    249 H


 


Calcium    9.0


 


Magnesium   


 


Total Bilirubin    0.5


 


Direct Bilirubin    0.5 H


 


AST    59 H D


 


ALT    49


 


Alkaline Phosphatase    84


 


Total Creatine Kinase    58


 


CK-MB (Mass)    2.66


 


Troponin I    0.0700


 


NT-Pro-B Natriuret Pep   


 


Total Protein    6.0 L


 


Albumin    3.2 L


 


Globulin    2.8


 


Albumin/Globulin Ratio    1.1


 


Arterial Blood Potassium   














  12/07/18





  11:28


 


WBC 


 


RBC 


 


Hgb 


 


Hct 


 


MCV 


 


MCH 


 


MCHC 


 


RDW 


 


Plt Count 


 


MPV 


 


Neut % (Auto) 


 


Lymph % (Auto) 


 


Mono % (Auto) 


 


Eos % (Auto) 


 


Baso % (Auto) 


 


Neut # (Auto) 


 


Lymph # (Auto) 


 


Mono # (Auto) 


 


Eos # (Auto) 


 


Baso # (Auto) 


 


Neutrophils % (Manual) 


 


Lymphocytes % (Manual) 


 


Monocytes % (Manual) 


 


Platelet Estimate 


 


Polychromasia 


 


Hypochromasia (manual) 


 


Anisocytosis (manual) 


 


Ovalocytes 


 


PT 


 


INR 


 


APTT 


 


Puncture Site 


 


pCO2 


 


pO2 


 


HCO3 


 


ABG pH 


 


ABG Total CO2 


 


ABG O2 Saturation 


 


ABG Base Excess 


 


Benji Test 


 


ABG Potassium 


 


A-a O2 Difference 


 


Respiratory Index 


 


Glucose 


 


Lactate 


 


Vent Mode 


 


Mechanical Rate 


 


FiO2 


 


Inspiratory BiPAP 


 


Expiratory BiPAP 


 


Sodium 


 


Potassium 


 


Chloride 


 


Carbon Dioxide 


 


Anion Gap 


 


BUN 


 


Creatinine 


 


Est GFR ( Amer) 


 


Est GFR (Non-Af Amer) 


 


POC Glucose (mg/dL)  270 H


 


Random Glucose 


 


Calcium 


 


Magnesium 


 


Total Bilirubin 


 


Direct Bilirubin 


 


AST 


 


ALT 


 


Alkaline Phosphatase 


 


Total Creatine Kinase 


 


CK-MB (Mass) 


 


Troponin I 


 


NT-Pro-B Natriuret Pep 


 


Total Protein 


 


Albumin 


 


Globulin 


 


Albumin/Globulin Ratio 


 


Arterial Blood Potassium

## 2018-12-07 NOTE — RAD
Date of service: 



12/06/2018



PROCEDURE:  CHEST RADIOGRAPH, 1 VIEW



HISTORY:

SOB



COMPARISON:

09/26/2018.



FINDINGS:



LUNGS:

There is moderate pulmonary venous congestion.  There fluid in the 

minor fissure. 



PLEURA:

No pneumothorax or large right pleural effusion.  Suspect small left 

pleural effusion. 



CARDIOVASCULAR:

Persistent moderate cardiomegaly.  No aortic atherosclerotic 

calcifications present.  There is stable position of left-sided AICD. 



OSSEOUS STRUCTURES:

Within normal limits for the patient's age.



VISUALIZED UPPER ABDOMEN:

Normal.



OTHER FINDINGS:

None. 



IMPRESSION:

Mild congestive heart failure.

## 2018-12-08 LAB
ALBUMIN SERPL-MCNC: 3.2 G/DL (ref 3.5–5)
ALBUMIN/GLOB SERPL: 1.2 {RATIO} (ref 1–2.1)
ALT SERPL-CCNC: 54 U/L (ref 9–52)
ANISOCYTOSIS BLD QL SMEAR: SLIGHT
AST SERPL-CCNC: 77 U/L (ref 14–36)
BASOPHILS # BLD AUTO: 0 K/UL (ref 0–0.2)
BASOPHILS NFR BLD: 0.5 % (ref 0–2)
BUN SERPL-MCNC: 32 MG/DL (ref 7–17)
BURR CELLS BLD QL SMEAR: SLIGHT
CALCIUM SERPL-MCNC: 8.7 MG/DL (ref 8.6–10.4)
EOSINOPHIL # BLD AUTO: 0 K/UL (ref 0–0.7)
EOSINOPHIL NFR BLD: 0.2 % (ref 0–4)
EOSINOPHIL NFR BLD: 1 % (ref 0–4)
ERYTHROCYTE [DISTWIDTH] IN BLOOD BY AUTOMATED COUNT: 19.1 % (ref 11.5–14.5)
GFR NON-AFRICAN AMERICAN: 28
HGB BLD-MCNC: 10 G/DL (ref 11–16)
HYPOCHROMIC: SLIGHT
LG PLATELETS BLD QL SMEAR: PRESENT
LYMPHOCYTE: 7 % (ref 20–40)
LYMPHOCYTES # BLD AUTO: 0.4 K/UL (ref 1–4.3)
LYMPHOCYTES NFR BLD AUTO: 4.5 % (ref 20–40)
MCH RBC QN AUTO: 27.9 PG (ref 27–31)
MCHC RBC AUTO-ENTMCNC: 32.3 G/DL (ref 33–37)
MCV RBC AUTO: 86.3 FL (ref 81–99)
MONOCYTE: 5 % (ref 0–10)
MONOCYTES # BLD: 0.9 K/UL (ref 0–0.8)
MONOCYTES NFR BLD: 9.3 % (ref 0–10)
NEUTROPHILS # BLD: 8.2 K/UL (ref 1.8–7)
NEUTROPHILS NFR BLD AUTO: 85.5 % (ref 50–75)
NEUTROPHILS NFR BLD AUTO: 86 % (ref 50–75)
NEUTS BAND NFR BLD: 1 % (ref 0–2)
NRBC BLD AUTO-RTO: 0.1 % (ref 0–2)
OVALOCYTES BLD QL SMEAR: SLIGHT
PLATELET # BLD EST: (no result) 10*3/UL
PLATELET # BLD: 127 K/UL (ref 130–400)
PMV BLD AUTO: 10.5 FL (ref 7.2–11.7)
POIKILOCYTOSIS BLD QL SMEAR: SLIGHT
RBC # BLD AUTO: 3.58 MIL/UL (ref 3.8–5.2)
TEARDROP CELLS: SLIGHT
TOTAL CELLS COUNTED BLD: 100
WBC # BLD AUTO: 9.5 K/UL (ref 4.8–10.8)

## 2018-12-08 RX ADMIN — ASPIRIN SCH MG: 325 TABLET, DELAYED RELEASE ORAL at 09:52

## 2018-12-08 RX ADMIN — HUMAN INSULIN SCH UNITS: 100 INJECTION, SOLUTION SUBCUTANEOUS at 08:29

## 2018-12-08 RX ADMIN — ENOXAPARIN SODIUM SCH MG: 40 INJECTION SUBCUTANEOUS at 09:52

## 2018-12-08 RX ADMIN — RANOLAZINE SCH: 500 TABLET, FILM COATED, EXTENDED RELEASE ORAL at 10:06

## 2018-12-08 RX ADMIN — SACUBITRIL AND VALSARTAN SCH: 49; 51 TABLET, FILM COATED ORAL at 17:21

## 2018-12-08 RX ADMIN — RANOLAZINE SCH: 500 TABLET, FILM COATED, EXTENDED RELEASE ORAL at 17:21

## 2018-12-08 RX ADMIN — HUMAN INSULIN SCH UNITS: 100 INJECTION, SOLUTION SUBCUTANEOUS at 12:32

## 2018-12-08 RX ADMIN — HUMAN INSULIN SCH: 100 INJECTION, SOLUTION SUBCUTANEOUS at 21:45

## 2018-12-08 RX ADMIN — SACUBITRIL AND VALSARTAN SCH: 49; 51 TABLET, FILM COATED ORAL at 09:53

## 2018-12-08 RX ADMIN — HUMAN INSULIN SCH UNITS: 100 INJECTION, SOLUTION SUBCUTANEOUS at 17:10

## 2018-12-08 NOTE — CP.PCM.PN
Subjective





- Date & Time of Evaluation


Date of Evaluation: 12/08/18


Time of Evaluation: 14:53





- Subjective


Subjective: 





CHIEF COMPLAINTS TODAY :


PT. FEELS WEAK AND SOB 


SYSTOLIC BP IS LOW 





ROS.


HEENT :  N.


Resp :       No hemoptysis 


Cardio :     No anginal  CPGI :           No abd.pain, n/v ,diarrhea or GI 

bleeding .


CNS : No headache, vertigo, focal deficit.


Musculoskel :  No joint swelling ,


Derm :        No rash 


Psych :     Normal affect.


Ext :  No  swelling ,calf pain 





PE.


Pt. is alert awake in no distress.


V.S  As noted in the chart 


Head ,ear nose,throat and eyes : Normal.


Neck : Supple with normal carotids.


Lungs: POOR AIR ENTRY WITH WHEEZING 


Heart : S1 & S2 normal with S4. No murmur.


Abd : Soft non tender with normal bowel sounds.


Neuro : Moves all ext. with no localized deficit.


Ext : No edema with intact pulses.Non tender calves 


Derm : No rashes or decubitus ulcer.





LABS/RADIOLOGY:





ASSESSMENT/PLAN : 


HOLD BP MEDS 


CONT IV LASIX, CHF IMPROVING 











Objective





- Vital Signs/Intake and Output


Vital Signs (last 24 hours): 


                                        











Temp Pulse Resp BP Pulse Ox


 


 98.0 F   97 H  24   86/53 L  100 


 


 12/08/18 07:00  12/08/18 14:15  12/08/18 14:15  12/08/18 14:15  12/08/18 07:00








Intake and Output: 


                                        











 12/08/18 12/08/18





 11:59 23:59


 


Intake Total  300


 


Balance  300














- Medications


Medications: 


                               Current Medications





Albuterol/Ipratropium (Duoneb 3 Mg/0.5 Mg (3 Ml) Ud)  3 ml INH RQ4 PRN


   PRN Reason: Shortness of Breath


   Last Admin: 12/07/18 13:32 Dose:  3 ml


Aspirin (Ecotrin)  325 mg PO DAILY [facility]


   Last Admin: 12/08/18 09:52 Dose:  325 mg


Enoxaparin Sodium (Lovenox)  40 mg SC DAILY [facility]


   Last Admin: 12/08/18 09:52 Dose:  40 mg


Ergocalciferol (Drisdol 50,000 Intl Units Cap)  1 cap PO QWK [place]


Furosemide (Lasix)  40 mg IVP DAILY [facility]


   Last Admin: 12/08/18 09:53 Dose:  Not Given


Insulin Human Regular (Novolin R)  0 unit SC ACHS [facility]; Protocol


   Last Admin: 12/08/18 12:32 Dose:  6 units


Metoprolol Tartrate (Lopressor)  25 mg PO BID [facility]


   Last Admin: 12/08/18 09:53 Dose:  Not Given


Montelukast Sodium (Singulair)  10 mg PO DAILY [facility]


   Last Admin: 12/08/18 09:54 Dose:  10 mg


Ranolazine (Ranexa)  500 mg PO BID [facility]


   Last Admin: 12/08/18 10:06 Dose:  Not Given


Repaglinide (Prandin)  2 mg PO DAILY [facility]


   Last Admin: 12/08/18 10:06 Dose:  Not Given


Roflumilast (Daliresp)  500 mcg PO DAILY [facility]


   Last Admin: 12/08/18 10:07 Dose:  Not Given


Rosuvastatin Calcium (Crestor)  20 mg PO HS [facility]


   Last Admin: 12/07/18 21:07 Dose:  20 mg


Sacubitril/Valsartan (Entresto 49 Mg-51 Mg)  1 tab PO BID [facility]


   Last Admin: 12/08/18 09:53 Dose:  Not Given











- Labs


Labs: 


                                        





                                 12/07/18 19:28 





                                 12/07/18 17:26 





                                        











PT  12.8 SECONDS (9.7-12.2)  H  12/06/18  20:31    


 


INR  1.2   12/06/18  20:31    


 


APTT  33 SECONDS (21-34)   12/06/18  20:31

## 2018-12-09 RX ADMIN — RANOLAZINE SCH MG: 500 TABLET, FILM COATED, EXTENDED RELEASE ORAL at 18:35

## 2018-12-09 RX ADMIN — ASPIRIN SCH MG: 325 TABLET, DELAYED RELEASE ORAL at 09:17

## 2018-12-09 RX ADMIN — ENOXAPARIN SODIUM SCH MG: 40 INJECTION SUBCUTANEOUS at 09:22

## 2018-12-09 RX ADMIN — HUMAN INSULIN SCH: 100 INJECTION, SOLUTION SUBCUTANEOUS at 08:30

## 2018-12-09 RX ADMIN — SACUBITRIL AND VALSARTAN SCH: 49; 51 TABLET, FILM COATED ORAL at 09:20

## 2018-12-09 RX ADMIN — SACUBITRIL AND VALSARTAN SCH TAB: 49; 51 TABLET, FILM COATED ORAL at 18:34

## 2018-12-09 RX ADMIN — RANOLAZINE SCH: 500 TABLET, FILM COATED, EXTENDED RELEASE ORAL at 09:20

## 2018-12-09 RX ADMIN — HUMAN INSULIN SCH: 100 INJECTION, SOLUTION SUBCUTANEOUS at 22:32

## 2018-12-09 RX ADMIN — HUMAN INSULIN SCH UNITS: 100 INJECTION, SOLUTION SUBCUTANEOUS at 12:18

## 2018-12-09 RX ADMIN — HUMAN INSULIN SCH: 100 INJECTION, SOLUTION SUBCUTANEOUS at 18:34

## 2018-12-09 NOTE — CP.PCM.PN
Subjective





- Date & Time of Evaluation


Date of Evaluation: 12/09/18


Time of Evaluation: 16:12





- Subjective


Subjective: 





Patient is still low blood pressure in the range of 80 mmHg.


IV half normal started.


BNP is still in 90,000


The BUN and creatinine is now trending up, we will cut down the Lasix to 20 mg 

once a day.





Objective





- Vital Signs/Intake and Output


Vital Signs (last 24 hours): 


                                        











Temp Pulse Resp BP Pulse Ox


 


 98.2 F   103 H  18   89/53 L  100 


 


 12/09/18 07:00  12/09/18 07:00  12/09/18 07:00  12/09/18 10:28  12/09/18 07:00








Intake and Output: 


                                        











 12/09/18 12/09/18





 11:59 23:59


 


Intake Total 540 980


 


Balance 540 980














- Medications


Medications: 


                               Current Medications





Albuterol/Ipratropium (Duoneb 3 Mg/0.5 Mg (3 Ml) Ud)  3 ml INH RQ4 PRN


   PRN Reason: Shortness of Breath


   Last Admin: 12/07/18 13:32 Dose:  3 ml


Aspirin (Ecotrin)  325 mg PO DAILY [facility]


   Last Admin: 12/09/18 09:17 Dose:  325 mg


Enoxaparin Sodium (Lovenox)  40 mg SC DAILY [facility]


   Last Admin: 12/09/18 09:22 Dose:  40 mg


Ergocalciferol (Drisdol 50,000 Intl Units Cap)  1 cap PO QWK [facility]


Furosemide (Lasix)  20 mg IVP DAILY [facility]


Sodium Chloride (Sodium Chloride 0.45%)  1,000 mls @ 60 mls/hr IV .[id number] [facility]


   Last Admin: 12/09/18 14:43 Dose:  60 mls/hr


Insulin Human Regular (Novolin R)  0 unit SC ACHS [facility]; Protocol


   Last Admin: 12/09/18 12:18 Dose:  3 units


Metoprolol Tartrate (Lopressor)  25 mg PO BID [facility]


   Last Admin: 12/09/18 09:20 Dose:  Not Given


Montelukast Sodium (Singulair)  10 mg PO DAILY [facility]


   Last Admin: 12/09/18 09:19 Dose:  Not Given


Ranolazine (Ranexa)  500 mg PO BID [facility]


   Last Admin: 12/09/18 09:20 Dose:  Not Given


Repaglinide (Prandin)  2 mg PO DAILY [facility]


   Last Admin: 12/09/18 09:17 Dose:  2 mg


Roflumilast (Daliresp)  500 mcg PO DAILY [facility]


   Last Admin: 12/09/18 09:17 Dose:  500 mcg


Rosuvastatin Calcium (Crestor)  20 mg PO HS [facility]


   Last Admin: 12/08/18 22:03 Dose:  20 mg


Sacubitril/Valsartan (Entresto 49 Mg-51 Mg)  1 tab PO BID [facility]


   Last Admin: 12/09/18 09:20 Dose:  Not Given











- Labs


Labs: 


                                        





                                 12/08/18 16:32 





                                 12/08/18 16:32 





                                        











PT  12.8 SECONDS (9.7-12.2)  H  12/06/18  20:31    


 


INR  1.2   12/06/18  20:31    


 


APTT  33 SECONDS (21-34)   12/06/18  20:31

## 2018-12-10 LAB
ALBUMIN SERPL-MCNC: 2.5 G/DL (ref 3.5–5)
ALBUMIN/GLOB SERPL: 1 {RATIO} (ref 1–2.1)
ALT SERPL-CCNC: 36 U/L (ref 9–52)
ANISOCYTOSIS BLD QL SMEAR: SLIGHT
AST SERPL-CCNC: 28 U/L (ref 14–36)
BASOPHILS # BLD AUTO: 0.1 K/UL (ref 0–0.2)
BASOPHILS NFR BLD: 1.2 % (ref 0–2)
BUN SERPL-MCNC: 25 MG/DL (ref 7–17)
CALCIUM SERPL-MCNC: 8 MG/DL (ref 8.6–10.4)
EOSINOPHIL # BLD AUTO: 0.4 K/UL (ref 0–0.7)
EOSINOPHIL NFR BLD: 2 % (ref 0–4)
EOSINOPHIL NFR BLD: 5.9 % (ref 0–4)
ERYTHROCYTE [DISTWIDTH] IN BLOOD BY AUTOMATED COUNT: 18.8 % (ref 11.5–14.5)
GFR NON-AFRICAN AMERICAN: 43
HGB BLD-MCNC: 9.5 G/DL (ref 11–16)
HYPOCHROMIC: SLIGHT
LYMPHOCYTE: 3 % (ref 20–40)
LYMPHOCYTES # BLD AUTO: 0.3 K/UL (ref 1–4.3)
LYMPHOCYTES NFR BLD AUTO: 3.6 % (ref 20–40)
MCH RBC QN AUTO: 28.8 PG (ref 27–31)
MCHC RBC AUTO-ENTMCNC: 33.2 G/DL (ref 33–37)
MCV RBC AUTO: 86.7 FL (ref 81–99)
MONOCYTE: 4 % (ref 0–10)
MONOCYTES # BLD: 0.6 K/UL (ref 0–0.8)
MONOCYTES NFR BLD: 8.5 % (ref 0–10)
NEUTROPHILS # BLD: 6 K/UL (ref 1.8–7)
NEUTROPHILS NFR BLD AUTO: 80.8 % (ref 50–75)
NEUTROPHILS NFR BLD AUTO: 90 % (ref 50–75)
NEUTS BAND NFR BLD: 1 % (ref 0–2)
NRBC BLD AUTO-RTO: 0.1 % (ref 0–2)
PLATELET # BLD EST: NORMAL 10*3/UL
PLATELET # BLD: 140 K/UL (ref 130–400)
PMV BLD AUTO: 10.4 FL (ref 7.2–11.7)
POLYCHROMIC: SLIGHT
RBC # BLD AUTO: 3.29 MIL/UL (ref 3.8–5.2)
TOTAL CELLS COUNTED BLD: 100
WBC # BLD AUTO: 7.5 K/UL (ref 4.8–10.8)

## 2018-12-10 RX ADMIN — RANOLAZINE SCH MG: 500 TABLET, FILM COATED, EXTENDED RELEASE ORAL at 18:47

## 2018-12-10 RX ADMIN — HUMAN INSULIN SCH: 100 INJECTION, SOLUTION SUBCUTANEOUS at 08:04

## 2018-12-10 RX ADMIN — HUMAN INSULIN SCH: 100 INJECTION, SOLUTION SUBCUTANEOUS at 23:51

## 2018-12-10 RX ADMIN — HUMAN INSULIN SCH: 100 INJECTION, SOLUTION SUBCUTANEOUS at 12:00

## 2018-12-10 RX ADMIN — HUMAN INSULIN SCH: 100 INJECTION, SOLUTION SUBCUTANEOUS at 18:35

## 2018-12-10 RX ADMIN — RANOLAZINE SCH MG: 500 TABLET, FILM COATED, EXTENDED RELEASE ORAL at 09:42

## 2018-12-10 RX ADMIN — SACUBITRIL AND VALSARTAN SCH TAB: 49; 51 TABLET, FILM COATED ORAL at 09:42

## 2018-12-10 RX ADMIN — SACUBITRIL AND VALSARTAN SCH: 49; 51 TABLET, FILM COATED ORAL at 18:33

## 2018-12-10 RX ADMIN — CEFEPIME SCH MLS/HR: 1 INJECTION, SOLUTION INTRAVENOUS at 13:50

## 2018-12-10 RX ADMIN — ENOXAPARIN SODIUM SCH MG: 40 INJECTION SUBCUTANEOUS at 09:41

## 2018-12-10 RX ADMIN — IPRATROPIUM BROMIDE AND ALBUTEROL SULFATE PRN ML: .5; 3 SOLUTION RESPIRATORY (INHALATION) at 01:21

## 2018-12-10 RX ADMIN — ASPIRIN SCH MG: 325 TABLET, DELAYED RELEASE ORAL at 09:41

## 2018-12-10 NOTE — CP.PCM.CON
History of Present Illness





- History of Present Illness


History of Present Illness: 





INFECTIOUS DISEASE CONSULT;


HPI;





86-year-old female with multiple medical problems including ischemic 

cardiomyopathy, CAD with 2 stents, ejection fraction 30%, type 2 diabetes 

mellitus, COPD, an AICD who is admitted by the emergency room complaining off 

shortness of breath, cough with yellowish whitish expectorant and increased 

wheezing.  





INFECTIOUS DISEASE CONSULTATION REQUESTED BY PMD FOR EVALUATION OF PNEUMONIA AND

EXACERBATION OF COPD.


ALSO SPUTUM REPORTED +VE FOR PSEUDOMONAS AERUGINOSA.





PTS PRBNP IS ALSO ELEVATED TO 34,400.  





PMH: Anemia, Anxiety, Arthritis, Asthma, Cardia Arrhythmia, CHF, COPD, Diabetes,

HTN, Hypercholesterolemia, TIA


   Denies: Chronic Kidney Disease, RECENT HX OF PERTUSSIS.


Surgical History: Appendectomy, Coronary Stent, Endoscopy, Pacemaker (2016)





- [email or web address] Procedures








ASSISTANCE WITH RESPIRATORY VENTILATION, 24-96 HRS, CPAP (11/28/17)


ASSISTANCE WITH RESPIRATORY VENTILATION, <24 HRS, CPAP (07/21/16)


ESOPHAGOGASTRODUODENOSCOPY [EGD] W/CLOSED BIOPSY (06/14/15)


LEFT HEART CARDIAC CATH (04/03/14)


LT HEART ANGIOCARDIOGRAM (04/03/14)


MEASURE OF CARDIAC SAMPL & PRESSURE, L HEART, PERC APPROACH (11/03/15)


PACKED CELL TRANSFUSION (06/14/15)


PLAIN RADIOGRAPHY OF MULT COR ART USING OTH CONTRAST (11/03/15)


TRANSFUSE NONAUT RED BLOOD CELLS IN PERIPH VEIN, PERC (07/21/16)








Family History: States: No Known Family Hx





- Social History


Hx Tobacco Use: No


Hx Alcohol Use: No


Hx Substance Use: No





- Immunization History


Hx Tetanus Toxoid Vaccination: No


Hx Influenza Vaccination: Yes


Hx Pneumococcal Vaccination: Yes


Dtap; RECEIVED IN AUG, 2018





ALLERGY;CLOPIDOGREL.





Review of Systems





- Constitutional


Constitutional: Fatigue, Fever, Malaise





- EENT


Eyes: absent: Change in Vision


Ears: absent: Ear Pain


Nose/Mouth/Throat: absent: Mouth Lesions





- Cardiovascular


Cardiovascular: Dyspnea.  absent: Chest Pain





- Respiratory


Respiratory: Cough, Dyspnea on Exertion, Chest Congestion, Change in Mucous 

Color





- Gastrointestinal


Gastrointestinal: absent: Abdominal Pain, Diarrhea





- Genitourinary


Genitourinary: absent: Freq UTI





- Hematologic/Lymphatic


Hematologic: As Per HPI.  absent: Easy Bruising, Lymphadenopathy





Past Patient History





- Infectious Disease


Hx of Infectious Diseases: None





- Tetanus Immunizations


Tetanus Immunization: Unknown





- Past Medical History & Family History


Past Medical History?: Yes





- Past Social History


Smoking Status: Never Smoked





- CARDIAC


Hx Cardiac Disorders: Yes (CAD)


Hx Hypercholesterolemia: Yes





- PULMONARY


Hx Chronic Obstructive Pulmonary Disease (COPD): Yes





- NEUROLOGICAL


Hx Transient Ischemic Attacks (TIA): Yes





- HEENT


Hx HEENT Problems: No





- RENAL


Hx Chronic Kidney Disease: No





- ENDOCRINE/METABOLIC


Hx Diabetes Mellitus Type 2: Yes





- HEMATOLOGICAL/ONCOLOGICAL


Hx Anemia: Yes





- INTEGUMENTARY


Hx Dermatological Problems: No





- MUSCULOSKELETAL/RHEUMATOLOGICAL


Hx Arthritis: Yes


Hx Falls: No





- GASTROINTESTINAL


Hx Gastrointestinal Disorders: No





- GENITOURINARY/GYNECOLOGICAL


Hx Genitourinary Disorders: No





- PSYCHIATRIC


Hx Anxiety: Yes


Hx Substance Use: No





- SURGICAL HISTORY


Hx Appendectomy: Yes


Hx Coronary Stent: Yes





- ANESTHESIA


Hx Anesthesia: Yes


Hx Anesthesia Reactions: No


Hx Malignant Hyperthermia: No





Meds


Allergies/Adverse Reactions: 


                                    Allergies











Allergy/AdvReac Type Severity Reaction Status Date / Time


 


clopidogrel Allergy Mild SWELLING Verified 06/06/18 13:34














- Medications


Medications: 


                               Current Medications





Albuterol/Ipratropium (Duoneb 3 Mg/0.5 Mg (3 Ml) Ud)  3 ml INH RQ4 PRN


   PRN Reason: Shortness of Breath


   Last Admin: 12/10/18 01:21 Dose:  3 ml


Aspirin (Ecotrin)  325 mg PO DAILY [facility]


   Last Admin: 12/10/18 09:41 Dose:  325 mg


Enoxaparin Sodium (Lovenox)  40 mg SC DAILY [facility]


   Last Admin: 12/10/18 09:41 Dose:  40 mg


Ergocalciferol (Drisdol 50,000 Intl Units Cap)  1 cap PO QWK [facility]


Furosemide (Lasix)  20 mg IVP DAILY [facility]


Sodium Chloride (Sodium Chloride 0.45%)  1,000 mls @ 60 mls/hr IV .[id number] [facility]


   Last Admin: 12/09/18 14:43 Dose:  60 mls/hr


Insulin Human Regular (Novolin R)  0 unit SC [facility]; Protocol


   Last Admin: 12/10/18 08:04 Dose:  Not Given


Metoprolol Tartrate (Lopressor)  25 mg PO BID [facility]


   Last Admin: 12/09/18 18:34 Dose:  Not Given


Montelukast Sodium (Singulair)  10 mg PO DAILY [facility]


   Last Admin: 12/10/18 09:41 Dose:  10 mg


Ranolazine (Ranexa)  500 mg PO BID [facility]


   Last Admin: 12/10/18 09:42 Dose:  500 mg


Repaglinide (Prandin)  2 mg PO DAILY [facility]


   Last Admin: 12/09/18 09:17 Dose:  2 mg


Roflumilast (Daliresp)  500 mcg PO DAILY [facility]


   Last Admin: 12/10/18 09:42 Dose:  500 mcg


Rosuvastatin Calcium (Crestor)  20 mg PO HS [facility]


   Last Admin: 12/09/18 22:04 Dose:  20 mg


Sacubitril/Valsartan (Entresto 49 Mg-51 Mg)  1 tab PO BID [facility]


   Last Admin: 12/10/18 09:42 Dose:  1 tab











Physical Exam





- Constitutional


Appears: No Acute Distress, Cachectic





- Head Exam


Head Exam: NORMAL INSPECTION





- Eye Exam


Eye Exam: EOMI, PERRL





- ENT Exam


ENT Exam: Normal Oropharynx





- Neck Exam


Neck exam: Positive for: Normal Inspection





- Respiratory Exam


Respiratory Exam: Prolonged Expiratory Phase, Rales, Wheezes, NORMAL BREATHING 

PATTERN





- Cardiovascular Exam


Cardiovascular Exam: REGULAR RHYTHM, +S1, +S2





- GI/Abdominal Exam


GI & Abdominal Exam: Normal Bowel Sounds, Soft.  absent: Tenderness





- Extremities Exam


Extremities exam: Positive for: pedal edema (1+ EDEMA), pedal pulses present.  

Negative for: calf tenderness





- Neurological Exam


Neurological exam: Alert, CN II-XII Intact, Oriented x3





- Psychiatric Exam


Psychiatric exam: Normal Mood





- Skin


Skin Exam: Normal Color, Warm





Results





- Vital Signs


Recent Vital Signs: 


                                Last Vital Signs











Temp  98.3 F   12/10/18 07:00


 


Pulse  74   12/10/18 07:15


 


Resp  20   12/10/18 07:10


 


BP  84/43 L  12/10/18 07:10


 


Pulse Ox  99   12/10/18 07:10














- Labs


Result Diagrams: 


                                 12/10/18 08:05





                                 12/10/18 08:05


Labs: 


                         Laboratory Results - last 24 hr











  12/09/18 12/09/18 12/10/18





  17:27 21:37 06:30


 


WBC   


 


RBC   


 


Hgb   


 


Hct   


 


MCV   


 


MCH   


 


MCHC   


 


RDW   


 


Plt Count   


 


MPV   


 


Neut % (Auto)   


 


Lymph % (Auto)   


 


Mono % (Auto)   


 


Eos % (Auto)   


 


Baso % (Auto)   


 


Neut # (Auto)   


 


Lymph # (Auto)   


 


Mono # (Auto)   


 


Eos # (Auto)   


 


Baso # (Auto)   


 


Neutrophils % (Manual)   


 


Band Neutrophils %   


 


Lymphocytes % (Manual)   


 


Monocytes % (Manual)   


 


Eosinophils % (Manual)   


 


Platelet Estimate   


 


Polychromasia   


 


Hypochromasia (manual)   


 


Anisocytosis (manual)   


 


Sodium   


 


Potassium   


 


Chloride   


 


Carbon Dioxide   


 


Anion Gap   


 


BUN   


 


Creatinine   


 


Est GFR ( Amer)   


 


Est GFR (Non-Af Amer)   


 


POC Glucose (mg/dL)  208 H  136 H  118 H


 


Random Glucose   


 


Calcium   


 


Total Bilirubin   


 


AST   


 


ALT   


 


Alkaline Phosphatase   


 


NT-Pro-B Natriuret Pep   


 


Total Protein   


 


Albumin   


 


Globulin   


 


Albumin/Globulin Ratio   














  12/10/18 12/10/18





  08:05 08:05


 


WBC  7.5 


 


RBC  3.29 L 


 


Hgb  9.5 L 


 


Hct  28.5 L 


 


MCV  86.7 


 


MCH  28.8 


 


MCHC  33.2 


 


RDW  18.8 H 


 


Plt Count  140 


 


MPV  10.4 


 


Neut % (Auto)  80.8 H 


 


Lymph % (Auto)  3.6 L 


 


Mono % (Auto)  8.5 


 


Eos % (Auto)  5.9 H 


 


Baso % (Auto)  1.2 


 


Neut # (Auto)  6.0 


 


Lymph # (Auto)  0.3 L 


 


Mono # (Auto)  0.6 


 


Eos # (Auto)  0.4 


 


Baso # (Auto)  0.1 


 


Neutrophils % (Manual)  90 H 


 


Band Neutrophils %  1 


 


Lymphocytes % (Manual)  3 L 


 


Monocytes % (Manual)  4 


 


Eosinophils % (Manual)  2 


 


Platelet Estimate  Normal 


 


Polychromasia  Slight 


 


Hypochromasia (manual)  Slight 


 


Anisocytosis (manual)  Slight 


 


Sodium   132


 


Potassium   4.5


 


Chloride   102


 


Carbon Dioxide   25


 


Anion Gap   10


 


BUN   25 H


 


Creatinine   1.2


 


Est GFR ( Amer)   52


 


Est GFR (Non-Af Amer)   43


 


POC Glucose (mg/dL)  


 


Random Glucose   120 H


 


Calcium   8.0 L


 


Total Bilirubin   0.6


 


AST   28


 


ALT   36


 


Alkaline Phosphatase   113


 


NT-Pro-B Natriuret Pep   47409 H


 


Total Protein   5.1 L


 


Albumin   2.5 L D


 


Globulin   2.6


 


Albumin/Globulin Ratio   1.0














- Imaging and Cardiology


  ** Chest x-ray


Status: Report reviewed by me (CHEST X-RAY 2/6/18 MODERATE pvc, FLUID AND MINOR 

FISSURE, LEFT-SIDED AICD..)





Assessment & Plan


(1) Chr obstructive pulmonary disease w/ acute lower respiratory infxn


Status: Acute   





(2) Respiratory distress


Status: Acute   





(3) CHF exacerbation


Status: Acute   





(4) Cardiomyopathy


Status: Acute   





(5) DM type 2 (diabetes mellitus, type 2)


Status: Acute   





(6) CAD (coronary artery disease)


Status: Acute   





- Assessment and Plan (Free Text)


Plan: 





PANCULTURE.


CRP.


START iv CEFEPIME 1 G iv PIGGYBACK ONCE A DAY DAILY 12/10/18 TO COVER FOR 

BRONCHOPULMONARY INFECTION AND ACUTE BRONCHITIS.


DIURESIS AS PER PMD.


PULMONARY TOILET.


DUONEB TREATMENTS.


F/U CULTURES TO ADJUST ABX.


WILL F/U WITH YOU.


THANKS.

## 2018-12-10 NOTE — CP.PCM.PN
Subjective





- Date & Time of Evaluation


Date of Evaluation: 12/10/18


Time of Evaluation: 14:21





- Subjective


Subjective: 





CHIEF COMPLAINTS TODAY :


PT. FEELS WEAK AND SOB 


SYSTOLIC BP IS LOW 





ROS.


HEENT :  N.


Resp :       No hemoptysis 


Cardio :     No anginal  CPGI :           No abd.pain, n/v ,diarrhea or GI 

bleeding .


CNS : No headache, vertigo, focal deficit.


Musculoskel :  No joint swelling ,


Derm :        No rash 


Psych :     Normal affect.


Ext :  No  swelling ,calf pain 





PE.


Pt. is alert awake in no distress.


V.S  As noted in the chart 


Head ,ear nose,throat and eyes : Normal.


Neck : Supple with normal carotids.


Lungs: POOR AIR ENTRY WITH WHEEZING 


Heart : S1 & S2 normal with S4. No murmur.


Abd : Soft non tender with normal bowel sounds.


Neuro : Moves all ext. with no localized deficit.


Ext : No edema with intact pulses.Non tender calves 


Derm : No rashes or decubitus ulcer.





LABS/RADIOLOGY: sPUTUM SHOWS pSEUDOMO





ASSESSMENT/PLAN : 


HOLD BP MEDS 


CONT IV LASIX, CHF IMPROVING 


id EVALUATION FOR iv ANTIBIOTICS





Objective





- Vital Signs/Intake and Output


Vital Signs (last 24 hours): 


                                        











Temp Pulse Resp BP Pulse Ox


 


 98.3 F   74   20   84/43 L  99 


 


 12/10/18 07:00  12/10/18 07:15  12/10/18 07:10  12/10/18 07:10  12/10/18 07:10








Intake and Output: 


                                        











 12/10/18 12/10/18





 11:59 23:59


 


Intake Total 480 


 


Balance 480 














- Medications


Medications: 


                               Current Medications





Albuterol/Ipratropium (Duoneb 3 Mg/0.5 Mg (3 Ml) Ud)  3 ml INH RQ4 PRN


   PRN Reason: Shortness of Breath


   Last Admin: 12/10/18 01:21 Dose:  3 ml


Aspirin (Ecotrin)  325 mg PO DAILY [facility]


   Last Admin: 12/10/18 09:41 Dose:  325 mg


Enoxaparin Sodium (Lovenox)  40 mg SC DAILY [facility]


   Last Admin: 12/10/18 09:41 Dose:  40 mg


Ergocalciferol (Drisdol 50,000 Intl Units Cap)  1 cap PO QWK [facility]


Furosemide (Lasix)  20 mg IVP DAILY [facility]


   Last Admin: 12/10/18 10:00 Dose:  Not Given


Sodium Chloride (Sodium Chloride 0.45%)  1,000 mls @ 60 mls/hr IV .[id number] [facility]


   Last Admin: 12/10/18 13:56 Dose:  60 mls/hr


Cefepime HCl (Maxipime Iv 1 Gm Premix)  1 gm in 50 mls @ 100 mls/hr IVPB Q24H 

[facility]; Protocol


   Last Admin: 12/10/18 13:50 Dose:  100 mls/hr


Insulin Human Regular (Novolin R)  0 unit SC ACHS [facility]; Protocol


   Last Admin: 12/10/18 08:04 Dose:  Not Given


Metoprolol Tartrate (Lopressor)  25 mg PO BID [facility]


   Last Admin: 12/10/18 10:00 Dose:  Not Given


Montelukast Sodium (Singulair)  10 mg PO DAILY [facility]


   Last Admin: 12/10/18 09:41 Dose:  10 mg


Ranolazine (Ranexa)  500 mg PO BID [facility]


   Last Admin: 12/10/18 09:42 Dose:  500 mg


Repaglinide (Prandin)  2 mg PO DAILY [facility]


   Last Admin: 12/10/18 10:00 Dose:  Not Given


Roflumilast (Daliresp)  500 mcg PO DAILY [facility]


   Last Admin: 12/10/18 09:42 Dose:  500 mcg


Rosuvastatin Calcium (Crestor)  20 mg PO HS [facility]


   Last Admin: 12/09/18 22:04 Dose:  20 mg


Sacubitril/Valsartan (Entresto 49 Mg-51 Mg)  1 tab PO BID [facility]


   Last Admin: 12/10/18 09:42 Dose:  1 tab











- Labs


Labs: 


                                        





                                 12/10/18 08:05 





                                 12/10/18 08:05 





                                        











PT  12.8 SECONDS (9.7-12.2)  H  12/06/18  20:31    


 


INR  1.2   12/06/18  20:31    


 


APTT  33 SECONDS (21-34)   12/06/18  20:31

## 2018-12-11 LAB
ALBUMIN SERPL-MCNC: 2.6 G/DL (ref 3.5–5)
ALBUMIN/GLOB SERPL: 0.9 {RATIO} (ref 1–2.1)
ALT SERPL-CCNC: 33 U/L (ref 9–52)
ANISOCYTOSIS BLD QL SMEAR: SLIGHT
AST SERPL-CCNC: 27 U/L (ref 14–36)
BASOPHILS # BLD AUTO: 0.1 K/UL (ref 0–0.2)
BASOPHILS NFR BLD: 0.6 % (ref 0–2)
BUN SERPL-MCNC: 30 MG/DL (ref 7–17)
CALCIUM SERPL-MCNC: 8.3 MG/DL (ref 8.6–10.4)
EOSINOPHIL # BLD AUTO: 0.4 K/UL (ref 0–0.7)
EOSINOPHIL NFR BLD: 3 % (ref 0–4)
EOSINOPHIL NFR BLD: 4.4 % (ref 0–4)
ERYTHROCYTE [DISTWIDTH] IN BLOOD BY AUTOMATED COUNT: 18.4 % (ref 11.5–14.5)
GFR NON-AFRICAN AMERICAN: 39
HGB BLD-MCNC: 10.1 G/DL (ref 11–16)
HYPOCHROMIC: SLIGHT
LYMPHOCYTE: 3 % (ref 20–40)
LYMPHOCYTES # BLD AUTO: 0.4 K/UL (ref 1–4.3)
LYMPHOCYTES NFR BLD AUTO: 4.6 % (ref 20–40)
MCH RBC QN AUTO: 28.9 PG (ref 27–31)
MCHC RBC AUTO-ENTMCNC: 33.3 G/DL (ref 33–37)
MCV RBC AUTO: 86.8 FL (ref 81–99)
MONOCYTE: 8 % (ref 0–10)
MONOCYTES # BLD: 1 K/UL (ref 0–0.8)
MONOCYTES NFR BLD: 11.3 % (ref 0–10)
NEUTROPHILS # BLD: 7.3 K/UL (ref 1.8–7)
NEUTROPHILS NFR BLD AUTO: 79.1 % (ref 50–75)
NEUTROPHILS NFR BLD AUTO: 86 % (ref 50–75)
NRBC BLD AUTO-RTO: 0.1 % (ref 0–2)
NRBC BLD AUTO-RTO: 1 % (ref 0–0)
PLATELET # BLD EST: NORMAL 10*3/UL
PLATELET # BLD: 153 K/UL (ref 130–400)
PMV BLD AUTO: 10.2 FL (ref 7.2–11.7)
POLYCHROMIC: SLIGHT
RBC # BLD AUTO: 3.5 MIL/UL (ref 3.8–5.2)
TOTAL CELLS COUNTED BLD: 100
WBC # BLD AUTO: 9.2 K/UL (ref 4.8–10.8)

## 2018-12-11 RX ADMIN — RANOLAZINE SCH MG: 500 TABLET, FILM COATED, EXTENDED RELEASE ORAL at 17:21

## 2018-12-11 RX ADMIN — RANOLAZINE SCH MG: 500 TABLET, FILM COATED, EXTENDED RELEASE ORAL at 10:00

## 2018-12-11 RX ADMIN — HUMAN INSULIN SCH: 100 INJECTION, SOLUTION SUBCUTANEOUS at 21:12

## 2018-12-11 RX ADMIN — ENOXAPARIN SODIUM SCH MG: 40 INJECTION SUBCUTANEOUS at 09:55

## 2018-12-11 RX ADMIN — SACUBITRIL AND VALSARTAN SCH TAB: 49; 51 TABLET, FILM COATED ORAL at 10:00

## 2018-12-11 RX ADMIN — HUMAN INSULIN SCH: 100 INJECTION, SOLUTION SUBCUTANEOUS at 16:33

## 2018-12-11 RX ADMIN — HUMAN INSULIN SCH: 100 INJECTION, SOLUTION SUBCUTANEOUS at 07:30

## 2018-12-11 RX ADMIN — SACUBITRIL AND VALSARTAN SCH: 49; 51 TABLET, FILM COATED ORAL at 17:21

## 2018-12-11 RX ADMIN — ASPIRIN SCH MG: 325 TABLET, DELAYED RELEASE ORAL at 09:55

## 2018-12-11 RX ADMIN — HUMAN INSULIN SCH: 100 INJECTION, SOLUTION SUBCUTANEOUS at 11:52

## 2018-12-11 RX ADMIN — CEFEPIME SCH MLS/HR: 1 INJECTION, SOLUTION INTRAVENOUS at 14:30

## 2018-12-11 NOTE — CP.PCM.PN
Subjective





- Date & Time of Evaluation


Date of Evaluation: 12/11/18


Time of Evaluation: 13:57





- Subjective


Subjective: 





CHIEF COMPLAINTS TODAY :


PT. FEELS WEAK AND SOB 


SYSTOLIC BP IS LOW 


no urine output is noted





ROS.


HEENT :  N.


Resp :       No hemoptysis 


Cardio :     No anginal  CPGI :           No abd.pain, n/v ,diarrhea or GI 

bleeding .


CNS : No headache, vertigo, focal deficit.


Musculoskel :  No joint swelling ,


Derm :        No rash 


Psych :     Normal affect.


Ext :  No  swelling ,calf pain 





PE.


Pt. is alert awake in no distress.


V.S  As noted in the chart 


Head ,ear nose,throat and eyes : Normal.


Neck : Supple with normal carotids.


Lungs: POOR AIR ENTRY WITH WHEEZING 


Heart : S1 & S2 normal with S4. No murmur.


Abd : Soft non tender with normal bowel sounds.


Neuro : Moves all ext. with no localized deficit.


Ext : No edema with intact pulses.Non tender calves 


Derm : No rashes or decubitus ulcer.





LABS/RADIOLOGY: sPUTUM SHOWS p[facility]





ASSESSMENT/PLAN : 


HOLD BP MEDS 


CONT IV LASIX, CHF IMPROVING 


repeat chest x-ray





Objective





- Vital Signs/Intake and Output


Vital Signs (last 24 hours): 


                                        











Temp Pulse Resp BP Pulse Ox


 


 98.8 F   108 H  20   95/67 L  98 


 


 12/11/18 07:00  12/11/18 07:00  12/11/18 07:00  12/11/18 07:00  12/11/18 07:00








Intake and Output: 


                                        











 12/11/18 12/11/18





 11:59 23:59


 


Intake Total 540 


 


Balance 540 














- Medications


Medications: 


                               Current Medications





Albuterol/Ipratropium (Duoneb 3 Mg/0.5 Mg (3 Ml) Ud)  3 ml INH RQ4 PRN


   PRN Reason: Shortness of Breath


   Last Admin: 12/10/18 01:21 Dose:  3 ml


Aspirin (Ecotrin)  325 mg PO DAILY [facility]


   Last Admin: 12/11/18 09:55 Dose:  325 mg


Enoxaparin Sodium (Lovenox)  40 mg SC DAILY [facility]


   Last Admin: 12/11/18 09:55 Dose:  40 mg


Ergocalciferol (Drisdol 50,000 Intl Units Cap)  1 cap PO QWK [facility]


Furosemide (Lasix)  20 mg IVP DAILY [facility]


   Last Admin: 12/10/18 10:00 Dose:  Not Given


Sodium Chloride (Sodium Chloride 0.45%)  1,000 mls @ 60 mls/hr IV .[id number] [facility]


   Last Admin: 12/10/18 23:52 Dose:  Not Given


Cefepime HCl (Maxipime Iv 1 Gm Premix)  1 gm in 50 mls @ 100 mls/hr IVPB Q24H 

[facility]; Protocol


   Last Admin: 12/10/18 13:50 Dose:  100 mls/hr


Insulin Human Regular (Novolin R)  0 unit SC ACHS [facility]; Protocol


   Last Admin: 12/11/18 11:52 Dose:  Not Given


Metoprolol Tartrate (Lopressor)  25 mg PO BID [facility]


   Last Admin: 12/11/18 09:57 Dose:  Not Given


Montelukast Sodium (Singulair)  10 mg PO DAILY [facility]


   Last Admin: 12/11/18 09:55 Dose:  10 mg


Ranolazine (Ranexa)  500 mg PO BID [facility]


   Last Admin: 12/11/18 10:00 Dose:  500 mg


Repaglinide (Prandin)  2 mg PO DAILY [facility]


   Last Admin: 12/11/18 09:56 Dose:  Not Given


Roflumilast (Daliresp)  500 mcg PO DAILY [facility]


   Last Admin: 12/11/18 10:01 Dose:  500 mcg


Rosuvastatin Calcium (Crestor)  20 mg PO HS [facility]


   Last Admin: 12/10/18 22:24 Dose:  20 mg


Sacubitril/Valsartan (Entresto 49 Mg-51 Mg)  1 tab PO BID [facility]


   Last Admin: 12/11/18 10:00 Dose:  1 tab











- Labs


Labs: 


                                        





                                 12/11/18 07:03 





                                 12/11/18 07:03 





                                        











PT  12.8 SECONDS (9.7-12.2)  H  12/06/18  20:31    


 


INR  1.2   12/06/18  20:31    


 


APTT  33 SECONDS (21-34)   12/06/18  20:31

## 2018-12-11 NOTE — CARD
--------------- APPROVED REPORT --------------





Date of service: 12/07/2018



EKG Measurement

Heart [id number]

MS [id number]

[id number]

[id number]

[id number]



<Conclusion>

Sinus rhythm with premature atrial complexes

Left ventricular hypertrophy with QRS widening

T wave abnormality, consider lateral ischemia

Abnormal ECG

## 2018-12-11 NOTE — CP.PCM.PN
Subjective





- Date & Time of Evaluation


Date of Evaluation: 12/11/18


Time of Evaluation: 20:32





- Subjective


Subjective: 





CHIEF COMPLAINTS TODAY :


C/O SOB ON EXERTION


FEELS WEAK


FACE PUFFY


SYSTOLIC BP IS LOW 








ROS.


HEENT :  N.


Resp :       No hemoptysis . +VE WHEEZE,RALES


Cardio :     No anginal  CPGI :           No abd.pain, n/v ,diarrhea or GI 

bleeding .


CNS : No headache, vertigo, focal deficit.


Musculoskel :  No joint swelling ,


Derm :        No rash 


Psych :     Normal affect.


Ext :  No  swelling ,calf pain 





PE.


Pt. is alert awake in no distress.


V.S  As noted in the chart 


Head ,ear nose,throat and eyes : Normal.


Neck : Supple with normal carotids.


Lungs: POOR AIR ENTRY WITH  EXP. WHEEZE


Heart : S1 & S2 normal with S4. No murmur.


Abd : Soft non tender with normal bowel sounds.


Neuro : Moves all ext. with no localized deficit.


Ext : No edema with intact pulses.Non tender calves 


Derm : No rashes or decubitus ulcer.





LABS/RADIOLOGY: 


WBC 9.2


H/H10.1








LFTS  N


CREAT 1.3/BUN 30











Objective





- Vital Signs/Intake and Output


Vital Signs (last 24 hours): 


                                        











Temp Pulse Resp BP Pulse Ox


 


 97.8 F   101 H  22   89/53 L  100 


 


 12/11/18 15:35  12/11/18 20:00  12/11/18 15:35  12/11/18 15:35  12/11/18 15:35








Intake and Output: 


                                        











 12/11/18 12/12/18





 18:59 06:59


 


Intake Total 450 


 


Balance 450 














- Medications


Medications: 


                               Current Medications





Albuterol/Ipratropium (Duoneb 3 Mg/0.5 Mg (3 Ml) Ud)  3 ml INH RQ4 PRN


   PRN Reason: Shortness of Breath


   Last Admin: 12/10/18 01:21 Dose:  3 ml


Aspirin (Ecotrin)  325 mg PO DAILY [facility]


   Last Admin: 12/11/18 09:55 Dose:  325 mg


Enoxaparin Sodium (Lovenox)  40 mg SC DAILY [facility]


   Last Admin: 12/11/18 09:55 Dose:  40 mg


Ergocalciferol (Drisdol 50,000 Intl Units Cap)  1 cap PO QWK [facility]


Furosemide (Lasix)  20 mg IVP DAILY [facility]


   Last Admin: 12/11/18 11:00 Dose:  Not Given


Sodium Chloride (Sodium Chloride 0.45%)  1,000 mls @ 60 mls/hr IV .[id number] [facility]


   Last Admin: 12/11/18 15:48 Dose:  Not Given


Cefepime HCl (Maxipime Iv 1 Gm Premix)  1 gm in 50 mls @ 100 mls/hr IVPB Q24H 

[facility]; Protocol


   Last Admin: 12/11/18 14:30 Dose:  100 mls/hr


Insulin Human Regular (Novolin R)  0 unit SC ACHS [facility]; Protocol


   Last Admin: 12/11/18 16:33 Dose:  Not Given


Metoprolol Tartrate (Lopressor)  25 mg PO BID [facility]


   Last Admin: 12/11/18 17:21 Dose:  Not Given


Montelukast Sodium (Singulair)  10 mg PO DAILY [facility]


   Last Admin: 12/11/18 09:55 Dose:  10 mg


Ranolazine (Ranexa)  500 mg PO BID [facility]


   Last Admin: 12/11/18 17:21 Dose:  500 mg


Repaglinide (Prandin)  2 mg PO DAILY [facility]


   Last Admin: 12/11/18 09:56 Dose:  Not Given


Roflumilast (Daliresp)  500 mcg PO DAILY [facility]


   Last Admin: 12/11/18 10:01 Dose:  500 mcg


Rosuvastatin Calcium (Crestor)  20 mg PO HS [facility]


   Last Admin: 12/10/18 22:24 Dose:  20 mg


Sacubitril/Valsartan (Entresto 49 Mg-51 Mg)  1 tab PO BID [facility]


   Last Admin: 12/11/18 17:21 Dose:  Not Given











- Labs


Labs: 


                                        





                                 12/11/18 07:03 





                                 12/11/18 07:03 





                                        











PT  12.8 SECONDS (9.7-12.2)  H  12/06/18  20:31    


 


INR  1.2   12/06/18  20:31    


 


APTT  33 SECONDS (21-34)   12/06/18  20:31    














Assessment and Plan


(1) Chr obstructive pulmonary disease w/ acute lower respiratory infxn


Status: Acute   





(2) Respiratory distress


Status: Acute   





(3) CHF exacerbation


Status: Acute   





(4) Cardiomyopathy


Status: Acute   





(5) DM type 2 (diabetes mellitus, type 2)


Status: Acute   





(6) CAD (coronary artery disease)


Status: Acute   





- Assessment and Plan (Free Text)


Plan: 





CONTINUE  iv CEFEPIME 1 G iv PIGGYBACK ONCE A DAY DAILY 12/10/18 


TO COVER FOR BRONCHOPULMONARY INFECTION AND ACUTE BRONCHITIS.


DIURESIS AS PER PMD.


IV STERIODS AS ORDERED FOR EXACERBATION OF COPD


PULMONARY TOILET.


DUONEB TREATMENTS.

## 2018-12-12 LAB
ALBUMIN SERPL-MCNC: 2.6 G/DL (ref 3.5–5)
ALBUMIN/GLOB SERPL: 1 {RATIO} (ref 1–2.1)
ALT SERPL-CCNC: 29 U/L (ref 9–52)
ANISOCYTOSIS BLD QL SMEAR: SLIGHT
AST SERPL-CCNC: 32 U/L (ref 14–36)
BASOPHILS # BLD AUTO: 0 K/UL (ref 0–0.2)
BASOPHILS NFR BLD: 0.6 % (ref 0–2)
BUN SERPL-MCNC: 34 MG/DL (ref 7–17)
BURR CELLS BLD QL SMEAR: SLIGHT
CALCIUM SERPL-MCNC: 8.6 MG/DL (ref 8.6–10.4)
EOSINOPHIL # BLD AUTO: 0.3 K/UL (ref 0–0.7)
EOSINOPHIL NFR BLD: 2 % (ref 0–4)
EOSINOPHIL NFR BLD: 3.4 % (ref 0–4)
ERYTHROCYTE [DISTWIDTH] IN BLOOD BY AUTOMATED COUNT: 18.3 % (ref 11.5–14.5)
GFR NON-AFRICAN AMERICAN: 33
HGB BLD-MCNC: 9 G/DL (ref 11–16)
HYPOCHROMIC: SLIGHT
LYMPHOCYTE: 3 % (ref 20–40)
LYMPHOCYTES # BLD AUTO: 0.3 K/UL (ref 1–4.3)
LYMPHOCYTES NFR BLD AUTO: 3.2 % (ref 20–40)
MCH RBC QN AUTO: 28.4 PG (ref 27–31)
MCHC RBC AUTO-ENTMCNC: 32.7 G/DL (ref 33–37)
MCV RBC AUTO: 86.8 FL (ref 81–99)
MONOCYTE: 7 % (ref 0–10)
MONOCYTES # BLD: 1.1 K/UL (ref 0–0.8)
MONOCYTES NFR BLD: 12.9 % (ref 0–10)
NEUTROPHILS # BLD: 6.6 K/UL (ref 1.8–7)
NEUTROPHILS NFR BLD AUTO: 79.9 % (ref 50–75)
NEUTROPHILS NFR BLD AUTO: 88 % (ref 50–75)
NRBC BLD AUTO-RTO: 0.1 % (ref 0–2)
OVALOCYTES BLD QL SMEAR: SLIGHT
PLATELET # BLD EST: NORMAL 10*3/UL
PLATELET # BLD: 149 K/UL (ref 130–400)
PMV BLD AUTO: 10.1 FL (ref 7.2–11.7)
POIKILOCYTOSIS BLD QL SMEAR: SLIGHT
RBC # BLD AUTO: 3.18 MIL/UL (ref 3.8–5.2)
TEARDROP CELLS: SLIGHT
TOTAL CELLS COUNTED BLD: 100
WBC # BLD AUTO: 8.3 K/UL (ref 4.8–10.8)

## 2018-12-12 RX ADMIN — SACUBITRIL AND VALSARTAN SCH: 49; 51 TABLET, FILM COATED ORAL at 18:50

## 2018-12-12 RX ADMIN — ASPIRIN SCH MG: 325 TABLET, DELAYED RELEASE ORAL at 09:53

## 2018-12-12 RX ADMIN — RANOLAZINE SCH MG: 500 TABLET, FILM COATED, EXTENDED RELEASE ORAL at 09:54

## 2018-12-12 RX ADMIN — SACUBITRIL AND VALSARTAN SCH TAB: 49; 51 TABLET, FILM COATED ORAL at 09:53

## 2018-12-12 RX ADMIN — HUMAN INSULIN SCH UNITS: 100 INJECTION, SOLUTION SUBCUTANEOUS at 12:19

## 2018-12-12 RX ADMIN — CEFEPIME SCH MLS/HR: 1 INJECTION, SOLUTION INTRAVENOUS at 13:44

## 2018-12-12 RX ADMIN — HUMAN INSULIN SCH: 100 INJECTION, SOLUTION SUBCUTANEOUS at 21:32

## 2018-12-12 RX ADMIN — HUMAN INSULIN SCH UNITS: 100 INJECTION, SOLUTION SUBCUTANEOUS at 08:25

## 2018-12-12 RX ADMIN — RANOLAZINE SCH MG: 500 TABLET, FILM COATED, EXTENDED RELEASE ORAL at 18:53

## 2018-12-12 RX ADMIN — ENOXAPARIN SODIUM SCH MG: 40 INJECTION SUBCUTANEOUS at 09:53

## 2018-12-12 RX ADMIN — HUMAN INSULIN SCH: 100 INJECTION, SOLUTION SUBCUTANEOUS at 17:19

## 2018-12-12 NOTE — CP.PCM.PN
Subjective





- Date & Time of Evaluation


Date of Evaluation: 12/12/18


Time of Evaluation: 14:18





- Subjective


Subjective: 





CHIEF COMPLAINTS TODAY :


PT. FEELS WEAK AND SOB 


SYSTOLIC BP IS LOW 


no urine output is NOT CHARTED 





ROS.


HEENT :  N.


Resp :       No hemoptysis 


Cardio :     No anginal  CPGI :           No abd.pain, n/v ,diarrhea or GI b

leeding .


CNS : No headache, vertigo, focal deficit.


Musculoskel :  No joint swelling ,


Derm :        No rash 


Psych :     Normal affect.


Ext :  No  swelling ,calf pain 





PE.


Pt. is alert awake in no distress.


V.S  As noted in the chart 


Head ,ear nose,throat and eyes : Normal.


Neck : Supple with normal carotids.


Lungs: POOR AIR ENTRY WITH WHEEZING 


Heart : S1 & S2 normal with S4. No murmur.


Abd : Soft non tender with normal bowel sounds.


Neuro : Moves all ext. with no localized deficit.


Ext : No edema with intact pulses.Non tender calves 


Derm : No rashes or decubitus ulcer.





LABS/RADIOLOGY: sPUTUM SHOWS pSEUDOMO





ASSESSMENT/PLAN : 


DISCUSSED AT LENGTH WITH THE FAMILY MEMBERS THAT THE PATIENT SHOULD DRINK AND 

EAT HER MEALS.  pATIENT IS VERY WEAK WITH LACK OF FOOD.


cONTINUE SMALL DOSE  AND BRONCHODILATORS .


bLOOD PRESSURE IS SLOWLY TRENDING UPWARD





Objective





- Vital Signs/Intake and Output


Vital Signs (last 24 hours): 


                                        











Temp Pulse Resp BP Pulse Ox


 


 97.6 F   103 H  18   99/52 L  95 


 


 12/12/18 08:42  12/12/18 12:00  12/12/18 08:42  12/12/18 09:53  12/12/18 08:42








Intake and Output: 


                                        











 12/12/18 12/12/18





 11:59 23:59


 


Intake Total 120 450


 


Balance 120 450














- Medications


Medications: 


                               Current Medications





Aspirin (Ecotrin)  325 mg PO DAILY [facility]


   Last Admin: 12/12/18 09:53 Dose:  325 mg


Enoxaparin Sodium (Lovenox)  40 mg SC DAILY [facility]


   Last Admin: 12/12/18 09:53 Dose:  40 mg


Ergocalciferol (Drisdol 50,000 Intl Units Cap)  1 cap PO QWK [facility]


Furosemide (Lasix)  20 mg IVP DAILY [facility]


   Last Admin: 12/12/18 09:52 Dose:  Not Given


Cefepime HCl (Maxipime Iv 1 Gm Premix)  1 gm in 50 mls @ 100 mls/hr IVPB Q24H 

[facility]; Protocol


   Last Admin: 12/12/18 13:44 Dose:  100 mls/hr


Insulin Human Regular (Novolin R)  0 unit SC ACHS [facility]; Protocol


   Last Admin: 12/12/18 12:19 Dose:  2 units


Metoprolol Tartrate (Lopressor)  25 mg PO BID [facility]


   Last Admin: 12/12/18 09:53 Dose:  Not Given


Montelukast Sodium (Singulair)  10 mg PO DAILY [facility]


   Last Admin: 12/12/18 09:53 Dose:  10 mg


Ranolazine (Ranexa)  500 mg PO BID [facility]


   Last Admin: 12/12/18 09:54 Dose:  500 mg


Repaglinide (Prandin)  2 mg PO DAILY [facility]


   Last Admin: 12/12/18 09:54 Dose:  2 mg


Roflumilast (Daliresp)  500 mcg PO DAILY [facility]


   Last Admin: 12/12/18 09:54 Dose:  500 mcg


Rosuvastatin Calcium (Crestor)  20 mg PO HS [facility]


   Last Admin: 12/11/18 21:18 Dose:  20 mg


Sacubitril/Valsartan (Entresto 49 Mg-51 Mg)  1 tab PO BID [facility]


   Last Admin: 12/12/18 09:53 Dose:  1 tab











- Labs


Labs: 


                                        





                                 12/12/18 07:59 





                                 12/12/18 07:59 





                                        











PT  12.8 SECONDS (9.7-12.2)  H  12/06/18  20:31    


 


INR  1.2   12/06/18  20:31    


 


APTT  33 SECONDS (21-34)   12/06/18  20:31

## 2018-12-12 NOTE — CP.PCM.PN
Subjective





- Date & Time of Evaluation


Date of Evaluation: 12/12/18


Time of Evaluation: 20:36





- Subjective


Subjective: 








CHIEF COMPLAINTS TODAY :


AFEBRILE


LESS SOB ON EXERTION


FACE PUFFY


NOT EATING AS PER 








ROS.


HEENT :  N.


Resp :       No hemoptysis . +VE WHEEZE,RALES


Cardio :     No anginal  CP


GI :           No abd.pain, n/v ,diarrhea or GI bleeding .


CNS : No headache, vertigo, focal deficit.


Musculoskel :  No joint swelling ,


Derm :        No rash 


Psych :     Normal affect.


Ext :  No  swelling ,calf pain 





PE.


Pt. is alert awake in no distress.


V.S  As noted in the chart 


Head ,ear nose,throat and eyes : Normal.


Neck : Supple with normal carotids.


Lungs: POOR AIR ENTRY WITH  EXP. WHEEZE


Heart : S1 & S2 normal with S4. No murmur.


Abd : Soft non tender with normal bowel sounds.


Neuro : Moves all ext. with no localized deficit.


Ext : No edema with intact pulses.Non tender calves 


Derm : No rashes or decubitus ulcer.





LABS/RADIOLOGY: 


WBC 8.3


H/H 9.0








LFTS  N


CREAT 1.5 /BUN 34


GFR 33








Objective





- Vital Signs/Intake and Output


Vital Signs (last 24 hours): 


                                        











Temp Pulse Resp BP Pulse Ox


 


 97.8 F   102 H  24   88/64 L  99 


 


 12/12/18 15:40  12/12/18 15:55  12/12/18 15:40  12/12/18 18:50  12/12/18 15:40








Intake and Output: 


                                        











 12/12/18 12/13/18





 18:59 06:59


 


Intake Total 450 


 


Balance 450 














- Medications


Medications: 


                               Current Medications





Aspirin (Ecotrin)  325 mg PO DAILY [facility]


   Last Admin: 12/12/18 09:53 Dose:  325 mg


Enoxaparin Sodium (Lovenox)  40 mg SC DAILY [facility]


   Last Admin: 12/12/18 09:53 Dose:  40 mg


Ergocalciferol (Drisdol 50,000 Intl Units Cap)  1 cap PO QWK [facility]


Furosemide (Lasix)  20 mg IVP DAILY [facility]


   Last Admin: 12/12/18 09:52 Dose:  Not Given


Cefepime HCl (Maxipime Iv 1 Gm Premix)  1 gm in 50 mls @ 100 mls/hr IVPB Q24H 

[facility]; Protocol


   Last Admin: 12/12/18 13:44 Dose:  100 mls/hr


Insulin Human Regular (Novolin R)  0 unit SC ACHS [facility]; Protocol


   Last Admin: 12/12/18 17:19 Dose:  Not Given


Metoprolol Tartrate (Lopressor)  25 mg PO BID [facility]


   Last Admin: 12/12/18 18:50 Dose:  Not Given


Montelukast Sodium (Singulair)  10 mg PO DAILY [facility]


   Last Admin: 12/12/18 09:53 Dose:  10 mg


Ranolazine (Ranexa)  500 mg PO BID [facility]


   Last Admin: 12/12/18 18:53 Dose:  500 mg


Repaglinide (Prandin)  2 mg PO DAILY [facility]


   Last Admin: 12/12/18 09:54 Dose:  2 mg


Roflumilast (Daliresp)  500 mcg PO DAILY [facility]


   Last Admin: 12/12/18 09:54 Dose:  500 mcg


Rosuvastatin Calcium (Crestor)  20 mg PO HS [facility]


   Last Admin: 12/11/18 21:18 Dose:  20 mg


Sacubitril/Valsartan (Entresto 49 Mg-51 Mg)  1 tab PO BID [facility]


   Last Admin: 12/12/18 18:50 Dose:  Not Given











- Labs


Labs: 


                                        





                                 12/12/18 07:59 





                                 12/12/18 07:59 





                                        











PT  12.8 SECONDS (9.7-12.2)  H  12/06/18  20:31    


 


INR  1.2   12/06/18  20:31    


 


APTT  33 SECONDS (21-34)   12/06/18  20:31    














Assessment and Plan


(1) Chr obstructive pulmonary disease w/ acute lower respiratory infxn


Status: Acute   





(2) Respiratory distress


Status: Acute   





(3) CHF exacerbation


Status: Acute   





(4) Cardiomyopathy


Status: Acute   





(5) DM type 2 (diabetes mellitus, type 2)


Status: Acute   





(6) CAD (coronary artery disease)


Status: Acute   





- Assessment and Plan (Free Text)


Plan: 





CONTINUE  iv CEFEPIME 1 G iv PIGGYBACK ONCE A DAY DAILY 12/10/18 


TO COVER FOR BRONCHOPULMONARY INFECTION AND ACUTE BRONCHITIS.


DIURESIS AS PER PMD.


IV STERIODS AS ORDERED FOR EXACERBATION OF COPD


PULMONARY TOILET.


DUONEB TREATMENTS.

## 2018-12-13 LAB
ALBUMIN SERPL-MCNC: 2.5 G/DL (ref 3.5–5)
ALBUMIN/GLOB SERPL: 1 {RATIO} (ref 1–2.1)
ALT SERPL-CCNC: 30 U/L (ref 9–52)
ANISOCYTOSIS BLD QL SMEAR: SLIGHT
AST SERPL-CCNC: 28 U/L (ref 14–36)
BASOPHILS # BLD AUTO: 0 K/UL (ref 0–0.2)
BASOPHILS NFR BLD: 0.3 % (ref 0–2)
BUN SERPL-MCNC: 59 MG/DL (ref 7–17)
BURR CELLS BLD QL SMEAR: SLIGHT
CALCIUM SERPL-MCNC: 8.8 MG/DL (ref 8.6–10.4)
EOSINOPHIL # BLD AUTO: 0.1 K/UL (ref 0–0.7)
EOSINOPHIL NFR BLD: 1 % (ref 0–4)
EOSINOPHIL NFR BLD: 1.3 % (ref 0–4)
ERYTHROCYTE [DISTWIDTH] IN BLOOD BY AUTOMATED COUNT: 18.8 % (ref 11.5–14.5)
GFR NON-AFRICAN AMERICAN: 28
HGB BLD-MCNC: 7.6 G/DL (ref 11–16)
HGB BLD-MCNC: 7.8 G/DL (ref 11–16)
HYPOCHROMIC: SLIGHT
LG PLATELETS BLD QL SMEAR: PRESENT
LYMPHOCYTE: 2 % (ref 20–40)
LYMPHOCYTES # BLD AUTO: 0.4 K/UL (ref 1–4.3)
LYMPHOCYTES NFR BLD AUTO: 3.6 % (ref 20–40)
MCH RBC QN AUTO: 28.4 PG (ref 27–31)
MCHC RBC AUTO-ENTMCNC: 33.1 G/DL (ref 33–37)
MCV RBC AUTO: 85.8 FL (ref 81–99)
MONOCYTE: 14 % (ref 0–10)
MONOCYTES # BLD: 1.2 K/UL (ref 0–0.8)
MONOCYTES NFR BLD: 11 % (ref 0–10)
NEUTROPHILS # BLD: 9.2 K/UL (ref 1.8–7)
NEUTROPHILS NFR BLD AUTO: 83 % (ref 50–75)
NEUTROPHILS NFR BLD AUTO: 83.8 % (ref 50–75)
NRBC BLD AUTO-RTO: 0.1 % (ref 0–2)
NRBC BLD AUTO-RTO: 1 % (ref 0–0)
OVALOCYTES BLD QL SMEAR: SLIGHT
PLATELET # BLD EST: NORMAL 10*3/UL
PLATELET # BLD: 145 K/UL (ref 130–400)
PMV BLD AUTO: 10.2 FL (ref 7.2–11.7)
POIKILOCYTOSIS BLD QL SMEAR: SLIGHT
RBC # BLD AUTO: 2.74 MIL/UL (ref 3.8–5.2)
TARGETS BLD QL SMEAR: SLIGHT
TEARDROP CELLS: SLIGHT
TOTAL CELLS COUNTED BLD: 100
WBC # BLD AUTO: 10.9 K/UL (ref 4.8–10.8)

## 2018-12-13 RX ADMIN — RANOLAZINE SCH MG: 500 TABLET, FILM COATED, EXTENDED RELEASE ORAL at 17:07

## 2018-12-13 RX ADMIN — ASPIRIN SCH MG: 325 TABLET, DELAYED RELEASE ORAL at 09:38

## 2018-12-13 RX ADMIN — HUMAN INSULIN SCH UNITS: 100 INJECTION, SOLUTION SUBCUTANEOUS at 07:51

## 2018-12-13 RX ADMIN — HUMAN INSULIN SCH: 100 INJECTION, SOLUTION SUBCUTANEOUS at 21:59

## 2018-12-13 RX ADMIN — ENOXAPARIN SODIUM SCH MG: 40 INJECTION SUBCUTANEOUS at 09:38

## 2018-12-13 RX ADMIN — HUMAN INSULIN SCH: 100 INJECTION, SOLUTION SUBCUTANEOUS at 18:13

## 2018-12-13 RX ADMIN — CEFEPIME SCH MLS/HR: 1 INJECTION, SOLUTION INTRAVENOUS at 13:58

## 2018-12-13 RX ADMIN — SACUBITRIL AND VALSARTAN SCH: 49; 51 TABLET, FILM COATED ORAL at 17:07

## 2018-12-13 RX ADMIN — RANOLAZINE SCH MG: 500 TABLET, FILM COATED, EXTENDED RELEASE ORAL at 09:39

## 2018-12-13 RX ADMIN — HUMAN INSULIN SCH UNITS: 100 INJECTION, SOLUTION SUBCUTANEOUS at 12:24

## 2018-12-13 RX ADMIN — SACUBITRIL AND VALSARTAN SCH TAB: 49; 51 TABLET, FILM COATED ORAL at 09:39

## 2018-12-13 NOTE — CP.PCM.PN
Subjective





- Date & Time of Evaluation


Date of Evaluation: 12/13/18


Time of Evaluation: 14:21





- Subjective


Subjective: 





CHIEF COMPLAINTS TODAY :


AFEBRILE


SOB+VE


WEAK/AND DROWSY








ROS.(on observation )


HEENT :  N.


Resp :       No hemoptysis . +VE WHEEZE,RALES


Cardio :     No anginal  CP


GI :           No abd.pain, n/v ,diarrhea or GI bleeding .


CNS : No headache, vertigo, focal deficit.


Musculoskel :  No joint swelling ,


Derm :        No rash 


Psych :     Normal affect.


Ext :  No  swelling ,calf pain 





PE.


Pt. is DROWSY/WEAK, in no distress.


V.S  As noted in the chart 


Head ,ear nose,throat and eyes : Normal.


Neck : Supple with normal carotids.


Lungs: POOR AIR ENTRY WITH  EXP./INSPIRATORY  WHEEZE


Heart : S1 & S2 normal with S4. No murmur.


Abd : Soft non tender with normal bowel sounds.


Neuro : Moves all ext. with no localized deficit.


Ext : No edema with intact pulses.Non tender calves 


Derm : No rashes or decubitus ulcer.





LABS/RADIOLOGY: 


WBC 8.3


H/H LOW





LFTS  N


CREAT 1.7 /BUN 59


GFR 28








Objective





- Vital Signs/Intake and Output


Vital Signs (last 24 hours): 


                                        











Temp Pulse Resp BP Pulse Ox


 


 98.2 F   109 H  18   91/40 L  96 


 


 12/13/18 08:53  12/13/18 12:00  12/13/18 08:53  12/13/18 09:38  12/13/18 08:53








Intake and Output: 


                                        











 12/13/18 12/13/18





 06:59 18:59


 


Intake Total  250


 


Balance  250














- Medications


Medications: 


                               Current Medications





Aspirin (Ecotrin)  325 mg PO DAILY [facility]


   Last Admin: 12/13/18 09:38 Dose:  325 mg


Enoxaparin Sodium (Lovenox)  40 mg SC DAILY [facility]


   Last Admin: 12/13/18 09:38 Dose:  40 mg


Ergocalciferol (Drisdol 50,000 Intl Units Cap)  1 cap PO QWK [facility]


   Last Admin: 12/13/18 09:38 Dose:  1 cap


Furosemide (Lasix)  20 mg IVP DAILY [facility]


   Last Admin: 12/13/18 09:37 Dose:  Not Given


Cefepime HCl (Maxipime Iv 1 Gm Premix)  1 gm in 50 mls @ 100 mls/hr IVPB Q24H 

[facility]; Protocol


   Last Admin: 12/13/18 13:58 Dose:  100 mls/hr


Insulin Human Regular (Novolin R)  0 unit SC ACHS [facility]; Protocol


   Last Admin: 12/13/18 12:24 Dose:  3 units


Metoprolol Tartrate (Lopressor)  25 mg PO BID [facility]


   Last Admin: 12/13/18 09:38 Dose:  Not Given


Montelukast Sodium (Singulair)  10 mg PO DAILY [facility]


   Last Admin: 12/13/18 09:38 Dose:  10 mg


Ranolazine (Ranexa)  500 mg PO BID [facility]


   Last Admin: 12/13/18 09:39 Dose:  500 mg


Repaglinide (Prandin)  2 mg PO DAILY [facility]


   Last Admin: 12/13/18 09:39 Dose:  2 mg


Roflumilast (Daliresp)  500 mcg PO DAILY [facility]


   Last Admin: 12/13/18 09:39 Dose:  500 mcg


Rosuvastatin Calcium (Crestor)  20 mg PO HS [facility]


   Last Admin: 12/12/18 21:32 Dose:  20 mg


Sacubitril/Valsartan (Entresto 49 Mg-51 Mg)  1 tab PO BID [facility]


   Last Admin: 12/13/18 09:39 Dose:  1 tab











- Labs


Labs: 


                                        





                                 12/13/18 12:18 





                                 12/13/18 09:30 





                                        











PT  12.8 SECONDS (9.7-12.2)  H  12/06/18  20:31    


 


INR  1.2   12/06/18  20:31    


 


APTT  33 SECONDS (21-34)   12/06/18  20:31    














Assessment and Plan


(1) Chr obstructive pulmonary disease w/ acute lower respiratory infxn


Status: Acute   





(2) Respiratory distress


Status: Acute   





(3) CHF exacerbation


Status: Acute   





(4) Cardiomyopathy


Status: Acute   





(5) DM type 2 (diabetes mellitus, type 2)


Status: Acute   





(6) CAD (coronary artery disease)


Status: Acute   





(7) Anemia


Assessment & Plan: 


H/H LOW 


FOR I UNIT PRBC AS PER PMD.


Status: Acute   





- Assessment and Plan (Free Text)


Plan: 





DECREASE   iv CEFEPIME 500MG  iv PIGGYBACK ONCE A DAY DAILY 12/10/18 


TO COVER FOR BRONCHOPULMONARY INFECTION AND ACUTE BRONCHITIS.


DIURESIS AS PER PMD.


IV STERIODS AS ORDERED FOR EXACERBATION OF COPD


PULMONARY TOILET.


DUONEB TREATMENTS.


WATCH H/H ? TRANSFUSION





DISCUSSED WITH  AT BEDSIDE.

## 2018-12-13 NOTE — CP.PCM.PN
Subjective





- Date & Time of Evaluation


Date of Evaluation: 12/13/18


Time of Evaluation: 14:03





- Subjective


Subjective: 





CHIEF COMPLAINTS TODAY :


PT. FEELS WEAK AND SOB 


SYSTOLIC BP IS LOW 


no urine output is NOT CHARTED 


hemoglobin has dropped d to 7.6





ROS.


HEENT :  N.


Resp :       No hemoptysis 


Cardio :     No anginal  CPGI :           No abd.pain, n/v ,diarrhea or GI 

bleeding .


CNS : No headache, vertigo, focal deficit.


Musculoskel :  No joint swelling ,


Derm :        No rash 


Psych :     Normal affect.


Ext :  No  swelling ,calf pain 





PE.


Pt. is alert awake in no distress.


V.S  As noted in the chart 


Head ,ear nose,throat and eyes : Normal.


Neck : Supple with normal carotids.


Lungs: POOR AIR ENTRY WITH WHEEZING 


Heart : S1 & S2 normal with S4. No murmur.


Abd : Soft non tender with normal bowel sounds.


Neuro : Moves all ext. with no localized deficit.


Ext : No edema with intact pulses.Non tender calves 


Derm : No rashes or decubitus ulcer.





LABS/RADIOLOGY: sPUTUM SHOWS pSEUDOMO





ASSESSMENT/PLAN : 


DISCUSSED AT LENGTH WITH THE FAMILY MEMBERS THAT THE PATIENT SHOULD DRINK AND 

EAT HER MEALS.  pATIENT IS VERY WEAK WITH LACK OF FOOD.


cONTINUE SMALL DOSE  AND BRONCHODILATORS .


initially we will give 1 unit of packed  in view  of patient's history of CHF 

and unable to give  DUE TO BUT LOW BLOOD PRESSURE





Objective





- Vital Signs/Intake and Output


Vital Signs (last 24 hours): 


                                        











Temp Pulse Resp BP Pulse Ox


 


 98.2 F   109 H  18   91/40 L  96 


 


 12/13/18 08:53  12/13/18 12:00  12/13/18 08:53  12/13/18 09:38  12/13/18 08:53








Intake and Output: 


                                        











 12/13/18 12/13/18





 11:59 23:59


 


Intake Total 0 


 


Balance 0 














- Medications


Medications: 


                               Current Medications





Aspirin (Ecotrin)  325 mg PO DAILY [facility]


   Last Admin: 12/13/18 09:38 Dose:  325 mg


Enoxaparin Sodium (Lovenox)  40 mg SC DAILY [facility]


   Last Admin: 12/13/18 09:38 Dose:  40 mg


Ergocalciferol (Drisdol 50,000 Intl Units Cap)  1 cap PO QWK [facility]


   Last Admin: 12/13/18 09:38 Dose:  1 cap


Furosemide (Lasix)  20 mg IVP DAILY [facility]


   Last Admin: 12/13/18 09:37 Dose:  Not Given


Cefepime HCl (Maxipime Iv 1 Gm Premix)  1 gm in 50 mls @ 100 mls/hr IVPB Q24H 

[facility]; Protocol


   Last Admin: 12/13/18 13:58 Dose:  100 mls/hr


Insulin Human Regular (Novolin R)  0 unit SC ACHS [facility]; Protocol


   Last Admin: 12/13/18 12:24 Dose:  3 units


Metoprolol Tartrate (Lopressor)  25 mg PO BID [facility]


   Last Admin: 12/13/18 09:38 Dose:  Not Given


Montelukast Sodium (Singulair)  10 mg PO DAILY [facility]


   Last Admin: 12/13/18 09:38 Dose:  10 mg


Ranolazine (Ranexa)  500 mg PO BID [facility]


   Last Admin: 12/13/18 09:39 Dose:  500 mg


Repaglinide (Prandin)  2 mg PO DAILY [facility]


   Last Admin: 12/13/18 09:39 Dose:  2 mg


Roflumilast (Daliresp)  500 mcg PO DAILY [facility]


   Last Admin: 12/13/18 09:39 Dose:  500 mcg


Rosuvastatin Calcium (Crestor)  20 mg PO HS [facility]


   Last Admin: 12/12/18 21:32 Dose:  20 mg


Sacubitril/Valsartan (Entresto 49 Mg-51 Mg)  1 tab PO BID [facility]


   Last Admin: 12/13/18 09:39 Dose:  1 tab











- Labs


Labs: 


                                        





                                 12/13/18 12:18 





                                 12/13/18 09:30 





                                        











PT  12.8 SECONDS (9.7-12.2)  H  12/06/18  20:31    


 


INR  1.2   12/06/18  20:31    


 


APTT  33 SECONDS (21-34)   12/06/18  20:31

## 2018-12-14 LAB
ALBUMIN SERPL-MCNC: 2.6 G/DL (ref 3.5–5)
ALBUMIN/GLOB SERPL: 1 {RATIO} (ref 1–2.1)
ALT SERPL-CCNC: 28 U/L (ref 9–52)
ANISOCYTOSIS BLD QL SMEAR: SLIGHT
AST SERPL-CCNC: 36 U/L (ref 14–36)
BASOPHILS # BLD AUTO: 0.1 K/UL (ref 0–0.2)
BASOPHILS NFR BLD: 0.6 % (ref 0–2)
BUN SERPL-MCNC: 60 MG/DL (ref 7–17)
CALCIUM SERPL-MCNC: 9 MG/DL (ref 8.6–10.4)
EOSINOPHIL # BLD AUTO: 0.3 K/UL (ref 0–0.7)
EOSINOPHIL NFR BLD: 2.9 % (ref 0–4)
EOSINOPHIL NFR BLD: 5 % (ref 0–4)
ERYTHROCYTE [DISTWIDTH] IN BLOOD BY AUTOMATED COUNT: 17.1 % (ref 11.5–14.5)
GFR NON-AFRICAN AMERICAN: 24
HGB BLD-MCNC: 9 G/DL (ref 11–16)
HYPOCHROMIC: SLIGHT
LYMPHOCYTE: 1 % (ref 20–40)
LYMPHOCYTES # BLD AUTO: 0.5 K/UL (ref 1–4.3)
LYMPHOCYTES NFR BLD AUTO: 4.5 % (ref 20–40)
MCH RBC QN AUTO: 28.5 PG (ref 27–31)
MCHC RBC AUTO-ENTMCNC: 33.6 G/DL (ref 33–37)
MCV RBC AUTO: 84.9 FL (ref 81–99)
MONOCYTE: 9 % (ref 0–10)
MONOCYTES # BLD: 1.1 K/UL (ref 0–0.8)
MONOCYTES NFR BLD: 10.1 % (ref 0–10)
NEUTROPHILS # BLD: 8.9 K/UL (ref 1.8–7)
NEUTROPHILS NFR BLD AUTO: 81.9 % (ref 50–75)
NEUTROPHILS NFR BLD AUTO: 85 % (ref 50–75)
NRBC BLD AUTO-RTO: 0.1 % (ref 0–2)
PLATELET # BLD EST: NORMAL 10*3/UL
PLATELET # BLD: 126 K/UL (ref 130–400)
PMV BLD AUTO: 10 FL (ref 7.2–11.7)
POLYCHROMIC: SLIGHT
RBC # BLD AUTO: 3.15 MIL/UL (ref 3.8–5.2)
TOTAL CELLS COUNTED BLD: 100
WBC # BLD AUTO: 10.8 K/UL (ref 4.8–10.8)

## 2018-12-14 RX ADMIN — ENOXAPARIN SODIUM SCH MG: 40 INJECTION SUBCUTANEOUS at 10:26

## 2018-12-14 RX ADMIN — RANOLAZINE SCH MG: 500 TABLET, FILM COATED, EXTENDED RELEASE ORAL at 18:11

## 2018-12-14 RX ADMIN — CEFEPIME SCH MLS/HR: 1 INJECTION, SOLUTION INTRAVENOUS at 14:45

## 2018-12-14 RX ADMIN — SACUBITRIL AND VALSARTAN SCH: 49; 51 TABLET, FILM COATED ORAL at 21:49

## 2018-12-14 RX ADMIN — SACUBITRIL AND VALSARTAN SCH TAB: 49; 51 TABLET, FILM COATED ORAL at 10:24

## 2018-12-14 RX ADMIN — HUMAN INSULIN SCH: 100 INJECTION, SOLUTION SUBCUTANEOUS at 17:48

## 2018-12-14 RX ADMIN — HUMAN INSULIN SCH: 100 INJECTION, SOLUTION SUBCUTANEOUS at 09:00

## 2018-12-14 RX ADMIN — HUMAN INSULIN SCH: 100 INJECTION, SOLUTION SUBCUTANEOUS at 21:50

## 2018-12-14 RX ADMIN — ASPIRIN SCH MG: 325 TABLET, DELAYED RELEASE ORAL at 10:20

## 2018-12-14 RX ADMIN — RANOLAZINE SCH MG: 500 TABLET, FILM COATED, EXTENDED RELEASE ORAL at 10:29

## 2018-12-14 RX ADMIN — HUMAN INSULIN SCH UNITS: 100 INJECTION, SOLUTION SUBCUTANEOUS at 12:28

## 2018-12-14 NOTE — CP.PCM.PN
Subjective





- Date & Time of Evaluation


Date of Evaluation: 12/14/18


Time of Evaluation: 23:38





- Subjective


Subjective: 





CHIEF COMPLAINTS TODAY :


EVENTS NOTED.


AFEBRILE


SOB+VE


WEAK/AND DROWSY


SEEN BY RESIDENT AND PLACED ON BIPAP








ROS.(on observation )


HEENT :  N.


Resp :       No hemoptysis . +VE WHEEZE,RALES


Cardio :     No anginal  CP


GI :           No abd.pain, n/v ,diarrhea or GI bleeding .


CNS : No headache, vertigo, focal deficit.


Musculoskel :  No joint swelling ,


Derm :        No rash 


Psych :     Normal affect.


Ext :  No  swelling ,calf pain 





PE.


Pt. is DROWSY/WEAK, in no distress.


V.S  As noted in the chart 


Head ,ear nose,throat and eyes : Normal.


Neck : Supple with normal carotids.


Lungs: POOR AIR ENTRY WITH  EXP./INSPIRATORY  WHEEZE


Heart : S1 & S2 normal with S4. No murmur.


Abd : Soft non tender with normal bowel sounds.


Neuro : Moves all ext. with no localized deficit.


Ext : No edema with intact pulses.Non tender calves 


Derm : No rashes or decubitus ulcer.





LABS/RADIOLOGY: 


WBC 10.8


H/H 9.0/26.7  LOW





LFTS  N


CREAT 2.0  /BUN 66  ON LASIX





Objective





- Vital Signs/Intake and Output


Vital Signs (last 24 hours): 


                                        











Temp Pulse Resp BP Pulse Ox


 


 97.9 F   102 H  22   105/60   100 


 


 12/14/18 15:53  12/14/18 16:00  12/14/18 15:53  12/14/18 18:10  12/14/18 15:53








Intake and Output: 


                                        











 12/14/18 12/15/18





 18:59 06:59


 


Intake Total 450 250


 


Output Total 425 


 


Balance 25 250














- Medications


Medications: 


                               Current Medications





Albuterol Sulfate (Albuterol 0.042% Inhal Sol (1.25mg/3ml) Ud)  1.25 mg INH RQ2 

PRN


   PRN Reason: Wheezing


Albuterol/Ipratropium (Duoneb 3 Mg/0.5 Mg (3 Ml) Ud)  3 ml INH RQ6 PASQUALE


Aspirin (Ecotrin)  325 mg PO DAILY [facility]


   Last Admin: 12/14/18 10:20 Dose:  325 mg


Ergocalciferol (Drisdol 50,000 Intl Units Cap)  1 cap PO QWK [facility]


   Last Admin: 12/13/18 09:38 Dose:  1 cap


Furosemide (Lasix)  20 mg IVP DAILY [facility]


   Last Admin: 12/14/18 10:22 Dose:  20 mg


Heparin Sodium (Porcine) (Heparin)  5,000 units SC Q8 [facility]


   Last Admin: 12/14/18 21:49 Dose:  5,000 units


Cefepime HCl 0.5 gm/ Dextrose  50 mls @ 100 mls/hr IVPB Q24H [facility]; Protocol


   Last Admin: 12/14/18 10:30 Dose:  100 mls/hr


Multivitamins/Vitamin C 10 ml/Chromium/Copper/Manganese/Zinc 1 ml/ Amino Acids  

1,011 mls @ 50 mls/hr IV .[id number] [facility]


   Stop: 12/15/18 14:00


   Last Admin: 12/14/18 17:47 Dose:  50 mls/hr


Amino Acids (Clinimix 4.25/5 % "E" (1000 Ml))  1,000 mls @ 50 mls/hr IV .Q20H 

[facility]


   Stop: 12/15/18 18:00


Insulin Human Regular (Novolin R)  0 unit SC ACHS [facility]; Protocol


   Last Admin: 12/14/18 21:50 Dose:  Not Given


Metoprolol Tartrate (Lopressor)  25 mg PO BID [facility]


   Last Admin: 12/14/18 18:10 Dose:  25 mg


Montelukast Sodium (Singulair)  10 mg PO DAILY [facility]


   Last Admin: 12/14/18 10:33 Dose:  10 mg


Ranolazine (Ranexa)  500 mg PO BID [facility]


   Last Admin: 12/14/18 18:11 Dose:  500 mg


Repaglinide (Prandin)  2 mg PO DAILY [facility]


   Last Admin: 12/14/18 10:24 Dose:  2 mg


Roflumilast (Daliresp)  500 mcg PO DAILY [facility]


   Last Admin: 12/14/18 10:27 Dose:  500 mcg


Rosuvastatin Calcium (Crestor)  20 mg PO HS [facility]


   Last Admin: 12/14/18 21:49 Dose:  Not Given


Sacubitril/Valsartan (Entresto 49 Mg-51 Mg)  1 tab PO BID [facility]


   Last Admin: 12/14/18 21:49 Dose:  Not Given











- Labs


Labs: 


                                        





                                 12/14/18 07:51 





                                 12/14/18 07:51 





                                        











PT  12.8 SECONDS (9.7-12.2)  H  12/06/18  20:31    


 


INR  1.2   12/06/18  20:31    


 


APTT  33 SECONDS (21-34)   12/06/18  20:31    














Assessment and Plan


(1) Chr obstructive pulmonary disease w/ acute lower respiratory infxn


Status: Acute   





(2) Respiratory distress


Status: Acute   





(3) CHF exacerbation


Status: Acute   





(4) Cardiomyopathy


Status: Acute   





(5) DM type 2 (diabetes mellitus, type 2)


Status: Acute   





(6) CAD (coronary artery disease)


Status: Acute   





(7) Anemia


Status: Acute   





- Assessment and Plan (Free Text)


Plan: 





ON iv CEFEPIME 500MG  iv PIGGYBACK ONCE A DAY DAILY 12/10/18 


TO COVER FOR BRONCHOPULMONARY INFECTION AND ACUTE BRONCHITIS.


DIURESIS AS PER PMD.


IV STERIODS AS ORDERED FOR EXACERBATION OF COPD


PULMONARY CONSULT IN PROGRESS





DUONEB TREATMENTS.


PT STARTED ON HYPER ALIMENTATION,AS  REPORTS PT DOES NT WANT TO EAT 

ANYTHING.


F/U CXR.


MONITOR RENAL FUNCTION CLOSELY.

## 2018-12-14 NOTE — CP.PCM.PN
Subjective





- Date & Time of Evaluation


Date of Evaluation: 12/14/18


Time of Evaluation: 13:40





- Subjective


Subjective: 





CHIEF COMPLAINTS TODAY :


PT. FEELS WEAK AND SOB 


Systolic bp now 120's


Hgup to 9.6





ROS.


HEENT :  N.


Resp :       No hemoptysis 


Cardio :     No anginal  CPGI :           No abd.pain, n/v ,diarrhea or GI 

bleeding .


CNS : No headache, vertigo, focal deficit.


Musculoskel :  No joint swelling ,


Derm :        No rash 


Psych :     Normal affect.


Ext :  No  swelling ,calf pain 





PE.


Pt. is alert awake in no distress.


V.S  As noted in the chart 


Head ,ear nose,throat and eyes : Normal.


Neck : Supple with normal carotids.


Lungs: POOR AIR ENTRY WITH WHEEZING 


Heart : S1 & S2 normal with S4. No murmur.


Abd : Soft non tender with normal bowel sounds.


Neuro : Moves all ext. with no localized deficit.


Ext : No edema with intact pulses.Non tender calves 


Derm : No rashes or decubitus ulcer.





LABS/RADIOLOGY: BUN 60, CR 2.0





ASSESSMENT/PLAN : 


D/C DELIRESP


MONITOR OUT PUT 


PERIPHERAL HYPER.





Objective





- Vital Signs/Intake and Output


Vital Signs (last 24 hours): 


                                        











Temp Pulse Resp BP Pulse Ox


 


 97.4 F L  113 H  29 H  128/67   96 


 


 12/14/18 07:00  12/14/18 07:00  12/14/18 07:00  12/14/18 10:22  12/14/18 07:00








Intake and Output: 


                                        











 12/14/18 12/14/18





 11:59 23:59


 


Intake Total 0 


 


Balance 0 














- Medications


Medications: 


                               Current Medications





Aspirin (Ecotrin)  325 mg PO DAILY [facility]


   Last Admin: 12/14/18 10:20 Dose:  325 mg


Enoxaparin Sodium (Lovenox)  40 mg SC DAILY [facility]


Ergocalciferol (Drisdol 50,000 Intl Units Cap)  1 cap PO QWK [facility]


   Last Admin: 12/13/18 09:38 Dose:  1 cap


Furosemide (Lasix)  20 mg IVP DAILY [facility]


   Last Admin: 12/14/18 10:22 Dose:  20 mg


Cefepime HCl (Maxipime Iv 1 Gm Premix)  1 gm in 50 mls @ 100 mls/hr IVPB Q24H 

[facility]; Protocol


   Last Admin: 12/13/18 13:58 Dose:  100 mls/hr


Cefepime HCl 0.5 gm/ Dextrose  50 mls @ 100 mls/hr IVPB Q24H [facility]; Protocol


   Last Admin: 12/14/18 10:30 Dose:  100 mls/hr


Multivitamins/Vitamin C 10 ml/Chromium/Copper/Manganese/Zinc 1 ml/ Amino Acids  

1,011 mls @ 50 mls/hr IV .[id number] [facility]


   Stop: 12/15/18 14:00


Amino Acids (Clinimix 4.25/5 % "E" (1000 Ml))  1,000 mls @ 50 mls/hr IV .Q20H 

[facility]


   Stop: 12/15/18 18:00


Insulin Human Regular (Novolin R)  0 unit SC ACHS [facility]; Protocol


   Last Admin: 12/14/18 12:28 Dose:  2 units


Metoprolol Tartrate (Lopressor)  25 mg PO BID [facility]


   Last Admin: 12/14/18 10:21 Dose:  25 mg


Montelukast Sodium (Singulair)  10 mg PO DAILY [facility]


   Last Admin: 12/14/18 10:33 Dose:  10 mg


Ranolazine (Ranexa)  500 mg PO BID [facility]


   Last Admin: 12/14/18 10:29 Dose:  500 mg


Repaglinide (Prandin)  2 mg PO DAILY [facility]


   Last Admin: 12/14/18 10:24 Dose:  2 mg


Roflumilast (Daliresp)  500 mcg PO DAILY [facility]


   Last Admin: 12/14/18 10:27 Dose:  500 mcg


Rosuvastatin Calcium (Crestor)  20 mg PO HS [facility]


   Last Admin: 12/13/18 21:47 Dose:  20 mg


Sacubitril/Valsartan (Entresto 49 Mg-51 Mg)  1 tab PO BID [facility]


   Last Admin: 12/14/18 10:24 Dose:  1 tab











- Labs


Labs: 


                                        





                                 12/14/18 07:51 





                                 12/14/18 07:51 





                                        











PT  12.8 SECONDS (9.7-12.2)  H  12/06/18  20:31    


 


INR  1.2   12/06/18  20:31    


 


APTT  33 SECONDS (21-34)   12/06/18  20:31

## 2018-12-14 NOTE — CP.PCM.PCO
Addendum


Addendum: 





12/14/18 21:10


House resident paged for increasing SOB. Placed patient on BiPAP and gave stat 

Duoneb treatment. She was more comfortable. SpO2 99%. Ordered Q6H Duoneb 

scheduled and PRN Albuterol. CXR reviewed showinf venous congestion, similar to 

previous. Patient has known CHF and is being treated with appropriate medicatio

ns. No additional Lasix given due to SBP in 90s.

## 2018-12-15 LAB
ALBUMIN SERPL-MCNC: 2.9 G/DL (ref 3.5–5)
ALBUMIN/GLOB SERPL: 1.1 {RATIO} (ref 1–2.1)
ALT SERPL-CCNC: 27 U/L (ref 9–52)
ANISOCYTOSIS BLD QL SMEAR: SLIGHT
ARTERIAL BLOOD GAS HEMOGLOBIN: 11 G/DL (ref 11.7–17.4)
ARTERIAL BLOOD GAS O2 SAT: 96.7 % (ref 95–98)
ARTERIAL BLOOD GAS PCO2: 34 MM/HG (ref 35–45)
ARTERIAL BLOOD GAS TCO2: 22.1 MMOL/L (ref 22–28)
ARTERIAL PATENCY WRIST A: (no result)
AST SERPL-CCNC: 47 U/L (ref 14–36)
BASOPHILS # BLD AUTO: 0 K/UL (ref 0–0.2)
BASOPHILS NFR BLD: 0.4 % (ref 0–2)
BUN SERPL-MCNC: 64 MG/DL (ref 7–17)
CALCIUM SERPL-MCNC: 9.2 MG/DL (ref 8.6–10.4)
EOSINOPHIL # BLD AUTO: 0.1 K/UL (ref 0–0.7)
EOSINOPHIL NFR BLD: 0.8 % (ref 0–4)
EOSINOPHIL NFR BLD: 1 % (ref 0–4)
ERYTHROCYTE [DISTWIDTH] IN BLOOD BY AUTOMATED COUNT: 17.7 % (ref 11.5–14.5)
GFR NON-AFRICAN AMERICAN: 20
HCO3 BLDA-SCNC: 22.4 MMOL/L (ref 21–28)
HGB BLD-MCNC: 8.6 G/DL (ref 11–16)
HYPOCHROMIC: SLIGHT
INHALED O2 CONCENTRATION: 55 %
LG PLATELETS BLD QL SMEAR: PRESENT
LYMPHOCYTE: 2 % (ref 20–40)
LYMPHOCYTES # BLD AUTO: 0.3 K/UL (ref 1–4.3)
LYMPHOCYTES NFR BLD AUTO: 2.4 % (ref 20–40)
MCH RBC QN AUTO: 28.3 PG (ref 27–31)
MCHC RBC AUTO-ENTMCNC: 32.3 G/DL (ref 33–37)
MCV RBC AUTO: 87.6 FL (ref 81–99)
MONOCYTE: 9 % (ref 0–10)
MONOCYTES # BLD: 1 K/UL (ref 0–0.8)
MONOCYTES NFR BLD: 9.5 % (ref 0–10)
NEUTROPHILS # BLD: 9.2 K/UL (ref 1.8–7)
NEUTROPHILS NFR BLD AUTO: 86.9 % (ref 50–75)
NEUTROPHILS NFR BLD AUTO: 88 % (ref 50–75)
NRBC BLD AUTO-RTO: 0.1 % (ref 0–2)
PH BLDA: 7.4 [PH] (ref 7.35–7.45)
PLATELET # BLD EST: (no result) 10*3/UL
PLATELET # BLD: 113 K/UL (ref 130–400)
PMV BLD AUTO: 10 FL (ref 7.2–11.7)
PO2 BLDA: 69 MM/HG (ref 80–100)
POLYCHROMIC: SLIGHT
RBC # BLD AUTO: 3.03 MIL/UL (ref 3.8–5.2)
TOTAL CELLS COUNTED BLD: 100
TOXIC GRANULES BLD QL SMEAR: PRESENT
WBC # BLD AUTO: 10.6 K/UL (ref 4.8–10.8)

## 2018-12-15 RX ADMIN — HUMAN INSULIN SCH UNITS: 100 INJECTION, SOLUTION SUBCUTANEOUS at 07:24

## 2018-12-15 RX ADMIN — IPRATROPIUM BROMIDE AND ALBUTEROL SULFATE SCH ML: .5; 3 SOLUTION RESPIRATORY (INHALATION) at 21:02

## 2018-12-15 RX ADMIN — ASPIRIN SCH MG: 325 TABLET, DELAYED RELEASE ORAL at 09:16

## 2018-12-15 RX ADMIN — RANOLAZINE SCH MG: 500 TABLET, FILM COATED, EXTENDED RELEASE ORAL at 09:16

## 2018-12-15 RX ADMIN — HUMAN INSULIN SCH: 100 INJECTION, SOLUTION SUBCUTANEOUS at 17:30

## 2018-12-15 RX ADMIN — SACUBITRIL AND VALSARTAN SCH TAB: 49; 51 TABLET, FILM COATED ORAL at 12:32

## 2018-12-15 RX ADMIN — IPRATROPIUM BROMIDE AND ALBUTEROL SULFATE SCH ML: .5; 3 SOLUTION RESPIRATORY (INHALATION) at 13:54

## 2018-12-15 RX ADMIN — SACUBITRIL AND VALSARTAN SCH: 49; 51 TABLET, FILM COATED ORAL at 17:27

## 2018-12-15 RX ADMIN — IPRATROPIUM BROMIDE AND ALBUTEROL SULFATE SCH ML: .5; 3 SOLUTION RESPIRATORY (INHALATION) at 08:16

## 2018-12-15 RX ADMIN — HUMAN INSULIN SCH UNITS: 100 INJECTION, SOLUTION SUBCUTANEOUS at 12:31

## 2018-12-15 RX ADMIN — IPRATROPIUM BROMIDE AND ALBUTEROL SULFATE SCH ML: .5; 3 SOLUTION RESPIRATORY (INHALATION) at 02:04

## 2018-12-15 RX ADMIN — RANOLAZINE SCH: 500 TABLET, FILM COATED, EXTENDED RELEASE ORAL at 17:28

## 2018-12-15 RX ADMIN — HUMAN INSULIN SCH: 100 INJECTION, SOLUTION SUBCUTANEOUS at 22:23

## 2018-12-15 NOTE — RAD
Date of service: 



12/14/2018



HISTORY:

 chf 



COMPARISON:

Portable chest 12/11/2018. 



FINDINGS:



LUNGS:

Infiltrate at the right apex is increased in density and may be 

extending into the right inferior right lung zone with left 

retrocardiac patchy density not significantly changed.



PLEURA:

No significant pleural effusion identified, no pneumothorax apparent.



CARDIOVASCULAR:

No aortic atherosclerotic calcification present.



AICD/pacemaker reiterated.  Cardiac silhouette stable.  No pulmonary 

vascular congestion.  Large hiatal hernia reiterated. 



OSSEOUS STRUCTURES:

No significant abnormalities.



VISUALIZED UPPER ABDOMEN:

Normal.



OTHER FINDINGS:

None.



IMPRESSION:

Interval worsening of right-sided infiltrate with stable retrocardiac 

patchy infiltrate.  No pleural effusion bilaterally.

## 2018-12-15 NOTE — CP.PCM.CON
History of Present Illness





- History of Present Illness


History of Present Illness: 





86-year-old female with multiple medical problems including ischemic 

cardiomyopathy, CAD with 2 stents, ejection fraction 30%, type 2 diabetes 

mellitus, COPD, an AICD who is admitted by the emergency room complaining off 

shortness of breath.  Consulted for worsening SOB, acute respiratory failure 

secondary to worsening chronic congestive heart failure.





Review of Systems





- Review of Systems


All systems: reviewed and no additional remarkable complaints except





- Respiratory


Respiratory: Dyspnea





Past Patient History





- Infectious Disease


Hx of Infectious Diseases: None





- Tetanus Immunizations


Tetanus Immunization: Unknown





- Past Medical History & Family History


Past Medical History?: Yes





- Past Social History


Smoking Status: Never Smoked





- CARDIAC


Hx Cardiac Disorders: Yes (CAD)


Hx Hypercholesterolemia: Yes





- PULMONARY


Hx Chronic Obstructive Pulmonary Disease (COPD): Yes





- NEUROLOGICAL


Hx Transient Ischemic Attacks (TIA): Yes





- HEENT


Hx HEENT Problems: No





- RENAL


Hx Chronic Kidney Disease: No





- ENDOCRINE/METABOLIC


Hx Diabetes Mellitus Type 2: Yes





- HEMATOLOGICAL/ONCOLOGICAL


Hx Anemia: Yes





- INTEGUMENTARY


Hx Dermatological Problems: No





- MUSCULOSKELETAL/RHEUMATOLOGICAL


Hx Arthritis: Yes


Hx Falls: No





- GASTROINTESTINAL


Hx Gastrointestinal Disorders: No





- GENITOURINARY/GYNECOLOGICAL


Hx Genitourinary Disorders: No





- PSYCHIATRIC


Hx Anxiety: Yes


Hx Substance Use: No





- SURGICAL HISTORY


Hx Appendectomy: Yes


Hx Coronary Stent: Yes





- ANESTHESIA


Hx Anesthesia: Yes


Hx Anesthesia Reactions: No


Hx Malignant Hyperthermia: No





Meds


Allergies/Adverse Reactions: 


                                    Allergies











Allergy/AdvReac Type Severity Reaction Status Date / Time


 


clopidogrel Allergy Mild SWELLING Verified 06/06/18 13:34














- Medications


Medications: 


                               Current Medications





Albuterol Sulfate (Albuterol 0.042% Inhal Sol (1.25mg/3ml) Ud)  1.25 mg INH RQ2 

PRN


   PRN Reason: Wheezing


Albuterol/Ipratropium (Duoneb 3 Mg/0.5 Mg (3 Ml) Ud)  3 ml INH RQ6 [facility]


   Last Admin: 12/15/18 13:54 Dose:  3 ml


Aspirin (Ecotrin)  325 mg PO DAILY [facility]


   Last Admin: 12/15/18 09:16 Dose:  325 mg


Ergocalciferol (Drisdol 50,000 Intl Units Cap)  1 cap PO QWK [facility]


   Last Admin: 12/13/18 09:38 Dose:  1 cap


Furosemide (Lasix)  20 mg IVP DAILY [facility]


   Last Admin: 12/15/18 09:09 Dose:  20 mg


Furosemide (Lasix)  60 mg IVP Q12 [facility]


Heparin Sodium (Porcine) (Heparin)  5,000 units SC Q8 [facility]


   Last Admin: 12/15/18 14:20 Dose:  5,000 units


Cefepime HCl 0.5 gm/ Dextrose  50 mls @ 100 mls/hr IVPB Q24H [facility]; Protocol


   Last Admin: 12/15/18 09:35 Dose:  100 mls/hr


Amino Acids (Clinimix 4.25/5 % "E" (1000 Ml))  1,000 mls @ 50 mls/hr IV .Q20H 

[facility]


   Stop: 12/15/18 18:00


   Last Admin: 12/15/18 14:04 Dose:  50 mls/hr


Multivitamins/Vitamin C 10 ml/Chromium/Copper/Manganese/Zinc 1 ml/ Amino Acids  

1,011 mls @ 50 mls/hr IV .[id number] [facility]


   Stop: 12/16/18 14:00


Amino Acids (Clinimix 4.25/5 % "E" (1000 Ml))  1,000 mls @ 50 mls/hr IV .Q20H 

[facility]


   Stop: 12/16/18 18:00


Albumin Human (Albumin Human 5% (12.5 Gm/250 Ml))  250 mls @ 25 mls/hr IV ONCE 

ONE


   Stop: 12/16/18 02:59


Insulin Human Regular (Novolin R)  0 unit SC ACHS [facility]; Protocol


   Last Admin: 12/15/18 12:31 Dose:  4 units


Metoprolol Tartrate (Lopressor)  25 mg PO BID [facility]


   Last Admin: 12/15/18 09:11 Dose:  25 mg


Montelukast Sodium (Singulair)  10 mg PO DAILY [facility]


   Last Admin: 12/15/18 09:16 Dose:  10 mg


Morphine Sulfate (Morphine)  2 mg IVP Q4 PRN


   PRN Reason: Anxiety


   Last Admin: 12/15/18 16:16 Dose:  2 mg


Ranolazine (Ranexa)  500 mg PO BID [facility]


   Last Admin: 12/15/18 09:16 Dose:  500 mg


Repaglinide (Prandin)  2 mg PO DAILY [facility]


   Last Admin: 12/15/18 09:16 Dose:  2 mg


Roflumilast (Daliresp)  500 mcg PO DAILY [facility]


   Last Admin: 12/14/18 10:27 Dose:  500 mcg


Rosuvastatin Calcium (Crestor)  20 mg PO HS [facility]


   Last Admin: 12/14/18 21:49 Dose:  Not Given


Sacubitril/Valsartan (Entresto 49 Mg-51 Mg)  1 tab PO BID [facility]


   Last Admin: 12/15/18 12:32 Dose:  1 tab











Physical Exam





- Constitutional


Appears: In Acute Distress





- Head Exam


Head Exam: ATRAUMATIC, NORMAL INSPECTION, NORMOCEPHALIC





- Eye Exam


Eye Exam: EOMI, Normal appearance, PERRL


Pupil Exam: NORMAL ACCOMODATION, PERRL





- ENT Exam


ENT Exam: Mucous Membranes Moist, Normal Exam





- Neck Exam


Neck exam: Positive for: Normal Inspection





- Respiratory Exam


Respiratory Exam: Decreased Breath Sounds, Rales





- Cardiovascular Exam


Cardiovascular Exam: Tachycardia





- GI/Abdominal Exam


GI & Abdominal Exam: Normal Bowel Sounds, Soft.  absent: Tenderness





- Extremities Exam


Extremities exam: Positive for: pedal edema





- Neurological Exam


Neurological exam: Alert





- Psychiatric Exam


Psychiatric exam: Agitated





Results





- Vital Signs


Recent Vital Signs: 


                                Last Vital Signs











Temp  97.5 F L  12/15/18 09:01


 


Pulse  7 L  12/15/18 16:22


 


Resp  18   12/15/18 09:01


 


BP  148/79   12/15/18 09:11


 


Pulse Ox  96   12/15/18 09:01














- Labs


Result Diagrams: 


                                 12/15/18 06:28





                                 12/15/18 06:28


Labs: 


                         Laboratory Results - last 24 hr











  12/14/18 12/14/18 12/15/18





  16:22 21:09 06:28


 


WBC    10.6


 


RBC    3.03 L


 


Hgb    8.6 L


 


Hct    26.5 L


 


MCV    87.6  D


 


MCH    28.3


 


MCHC    32.3 L


 


RDW    17.7 H


 


Plt Count    113 L


 


MPV    10.0


 


Neut % (Auto)    86.9 H


 


Lymph % (Auto)    2.4 L


 


Mono % (Auto)    9.5


 


Eos % (Auto)    0.8


 


Baso % (Auto)    0.4


 


Neut # (Auto)    9.2 H


 


Lymph # (Auto)    0.3 L


 


Mono # (Auto)    1.0 H


 


Eos # (Auto)    0.1


 


Baso # (Auto)    0.0


 


Neutrophils % (Manual)    88 H


 


Lymphocytes % (Manual)    2 L


 


Monocytes % (Manual)    9


 


Eosinophils % (Manual)    1


 


Toxic Granulation    Present


 


Platelet Estimate    Slightly decreased L


 


Large Platelets    Present


 


Polychromasia    Slight


 


Hypochromasia (manual)    Slight


 


Anisocytosis (manual)    Slight


 


Puncture Site   


 


pCO2   


 


pO2   


 


HCO3   


 


ABG pH   


 


ABG Total CO2   


 


ABG O2 Saturation   


 


ABG Base Excess   


 


ABG Hemoglobin   


 


ABG Carboxyhemoglobin   


 


POC ABG HHb (Measured)   


 


ABG Methemoglobin   


 


Benji Test   


 


A-a O2 Difference   


 


Respiratory Index   


 


Hgb O2 Saturation   


 


Vent Mode   


 


FiO2   


 


Inspiratory BiPAP   


 


Expiratory BiPAP   


 


Sodium   


 


Potassium   


 


Chloride   


 


Carbon Dioxide   


 


Anion Gap   


 


BUN   


 


Creatinine   


 


Est GFR ( Amer)   


 


Est GFR (Non-Af Amer)   


 


POC Glucose (mg/dL)  114 H  171 H 


 


Random Glucose   


 


Calcium   


 


Total Bilirubin   


 


AST   


 


ALT   


 


Alkaline Phosphatase   


 


Total Protein   


 


Albumin   


 


Globulin   


 


Albumin/Globulin Ratio   














  12/15/18 12/15/18 12/15/18





  06:28 06:55 08:28


 


WBC   


 


RBC   


 


Hgb   


 


Hct   


 


MCV   


 


MCH   


 


MCHC   


 


RDW   


 


Plt Count   


 


MPV   


 


Neut % (Auto)   


 


Lymph % (Auto)   


 


Mono % (Auto)   


 


Eos % (Auto)   


 


Baso % (Auto)   


 


Neut # (Auto)   


 


Lymph # (Auto)   


 


Mono # (Auto)   


 


Eos # (Auto)   


 


Baso # (Auto)   


 


Neutrophils % (Manual)   


 


Lymphocytes % (Manual)   


 


Monocytes % (Manual)   


 


Eosinophils % (Manual)   


 


Toxic Granulation   


 


Platelet Estimate   


 


Large Platelets   


 


Polychromasia   


 


Hypochromasia (manual)   


 


Anisocytosis (manual)   


 


Puncture Site   


 


pCO2   


 


pO2   


 


HCO3   


 


ABG pH   


 


ABG Total CO2   


 


ABG O2 Saturation   


 


ABG Base Excess   


 


ABG Hemoglobin   


 


ABG Carboxyhemoglobin   


 


POC ABG HHb (Measured)   


 


ABG Methemoglobin   


 


Benji Test   


 


A-a O2 Difference   


 


Respiratory Index   


 


Hgb O2 Saturation   


 


Vent Mode   


 


FiO2   


 


Inspiratory BiPAP   


 


Expiratory BiPAP   


 


Sodium  134  


 


Potassium  5.2  


 


Chloride  102  


 


Carbon Dioxide  22  


 


Anion Gap  15  


 


BUN  64 H  


 


Creatinine  2.3 H  


 


Est GFR ( Amer)  24  


 


Est GFR (Non-Af Amer)  20  


 


POC Glucose (mg/dL)   261 H  278 H


 


Random Glucose  256 H  


 


Calcium  9.2  


 


Total Bilirubin  0.7  


 


AST  47 H D  


 


ALT  27  


 


Alkaline Phosphatase  137 H D  


 


Total Protein  5.6 L  


 


Albumin  2.9 L  


 


Globulin  2.7  


 


Albumin/Globulin Ratio  1.1  














  12/15/18 12/15/18





  11:51 15:55


 


WBC  


 


RBC  


 


Hgb  


 


Hct  


 


MCV  


 


MCH  


 


MCHC  


 


RDW  


 


Plt Count  


 


MPV  


 


Neut % (Auto)  


 


Lymph % (Auto)  


 


Mono % (Auto)  


 


Eos % (Auto)  


 


Baso % (Auto)  


 


Neut # (Auto)  


 


Lymph # (Auto)  


 


Mono # (Auto)  


 


Eos # (Auto)  


 


Baso # (Auto)  


 


Neutrophils % (Manual)  


 


Lymphocytes % (Manual)  


 


Monocytes % (Manual)  


 


Eosinophils % (Manual)  


 


Toxic Granulation  


 


Platelet Estimate  


 


Large Platelets  


 


Polychromasia  


 


Hypochromasia (manual)  


 


Anisocytosis (manual)  


 


Puncture Site   Rbrachial


 


pCO2   34 L


 


pO2   69 L


 


HCO3   22.4


 


ABG pH   7.40


 


ABG Total CO2   22.1


 


ABG O2 Saturation   96.7


 


ABG Base Excess   -3.1 L


 


ABG Hemoglobin   11.0 L


 


ABG Carboxyhemoglobin   2.4 H


 


POC ABG HHb (Measured)   3.1


 


ABG Methemoglobin   2.3


 


Benji Test   Neg


 


A-a O2 Difference   281.0


 


Respiratory Index   4.1


 


Hgb O2 Saturation   92.2 L


 


Vent Mode   Bipap


 


FiO2   55.0


 


Inspiratory BiPAP   12


 


Expiratory BiPAP   6


 


Sodium  


 


Potassium  


 


Chloride  


 


Carbon Dioxide  


 


Anion Gap  


 


BUN  


 


Creatinine  


 


Est GFR ( Amer)  


 


Est GFR (Non-Af Amer)  


 


POC Glucose (mg/dL)  270 H 


 


Random Glucose  


 


Calcium  


 


Total Bilirubin  


 


AST  


 


ALT  


 


Alkaline Phosphatase  


 


Total Protein  


 


Albumin  


 


Globulin  


 


Albumin/Globulin Ratio  














Assessment & Plan


(1) Acute respiratory failure with hypoxia


Assessment and Plan: 


Neuro: alert and in distress.  starting morphine prn to reduce anxiety and 

respiratory distress.





Pulm: acute respiratory failure with hypoxia, barely tolerating BIPAP.  Family 

does not want patient intubated.  had long conversation with daughters and 

.  They have agreed to making the patient DNR/DNI.





CV: hemodynamically stable.





Renal: Worsening FLOR, secondary to diuresis and probable cardiorenal disease. 

Despite this will increase diuretic to 60mg IV q12h, and adding albumin drip.  





vilma for strict I/O's during acute illness


Code status - DNR/DNI





Explained to family, that these interventions may not work, but we will try 

anyway.  If the patient does not improve family wants to make the patient 

comfort care and start a morphine drip.


Status: Acute

## 2018-12-15 NOTE — CP.PCM.PN
Subjective





- Date & Time of Evaluation


Date of Evaluation: 12/15/18


Time of Evaluation: 20:29





- Subjective


Subjective: 





EVENTS NOTED,


PATIENT ON BIPAP,


BARELY AROUSABLE, HYPOTENSIVE


ON DOBUTAMINE





SEEN BY PULMONARY.


EVALUATED BY ICU TEAM.


PATIENT WITH HYPOXIC RESPIRATORY FAILURE/CHRONIC CHF/ISCHEMIC CARDIOMYOPATHY AND

PNEUMONIA.








CXR -WORSENING RIGHT SIDED INFILTRATE STABLE RETROCARDIAC PATCHY INFILTRATE


(see full report )





lABS REVIEWED;


WBC 10.6


cREATININE 2.3/bun 64





fAMILY AT BEDSIDE.


PT NOW DNR/DNI





PLAN 


CONTINUE IV ABX  IV CEFEPIME.


ADD IV ZYVOX 600 MG IV Q [id number]


FOLY CATHETHER TO MONITOR RENAL FUNCTION,


CONTINUE SUPPORTIVE CARE.








Objective





- Vital Signs/Intake and Output


Vital Signs (last 24 hours): 


                                        











Temp Pulse Resp BP Pulse Ox


 


 99.2 F   7 L  20   92/55 L  100 


 


 12/15/18 16:20  12/15/18 16:22  12/15/18 16:20  12/15/18 18:30  12/15/18 16:20











- Medications


Medications: 


                               Current Medications





Albuterol Sulfate (Albuterol 0.042% Inhal Sol (1.25mg/3ml) Ud)  1.25 mg INH RQ2 

PRN


   PRN Reason: Wheezing


Albuterol/Ipratropium (Duoneb 3 Mg/0.5 Mg (3 Ml) Ud)  3 ml INH RQ6 [facility]


   Last Admin: 12/15/18 13:54 Dose:  3 ml


Aspirin (Ecotrin)  325 mg PO DAILY [facility]


   Last Admin: 12/15/18 09:16 Dose:  325 mg


Ergocalciferol (Drisdol 50,000 Intl Units Cap)  1 cap PO QWK [facility]


   Last Admin: 12/13/18 09:38 Dose:  1 cap


Furosemide (Lasix)  20 mg IVP DAILY [facility]


   Last Admin: 12/15/18 09:09 Dose:  20 mg


Furosemide (Lasix)  60 mg IVP Q12 [facility]


   Last Admin: 12/15/18 18:30 Dose:  60 mg


Heparin Sodium (Porcine) (Heparin)  5,000 units SC Q8 [facility]


   Last Admin: 12/15/18 14:20 Dose:  5,000 units


Cefepime HCl 0.5 gm/ Dextrose  50 mls @ 100 mls/hr IVPB Q24H [facility]; Protocol


   Last Admin: 12/15/18 09:35 Dose:  100 mls/hr


Multivitamins/Vitamin C 10 ml/Chromium/Copper/Manganese/Zinc 1 ml/ Amino Acids  

1,011 mls @ 50 mls/hr IV .[id number] [facility]


   Stop: 12/16/18 14:00


   Last Admin: 12/15/18 18:40 Dose:  50 mls/hr


Amino Acids (Clinimix 4.25/5 % "E" (1000 Ml))  1,000 mls @ 50 mls/hr IV .Q20H 

[facility]


   Stop: 12/16/18 18:00


Albumin Human (Albumin Human 5% (12.5 Gm/250 Ml))  250 mls @ 25 mls/hr IV ONCE 

ONE


   Stop: 12/16/18 02:59


   Last Admin: 12/15/18 17:17 Dose:  25 mls/hr


Insulin Human Regular (Novolin R)  0 unit SC ACHS [facility]; Protocol


   Last Admin: 12/15/18 17:30 Dose:  Not Given


Metoprolol Tartrate (Lopressor)  25 mg PO BID [facility]


   Last Admin: 12/15/18 17:27 Dose:  Not Given


Montelukast Sodium (Singulair)  10 mg PO DAILY [facility]


   Last Admin: 12/15/18 09:16 Dose:  10 mg


Morphine Sulfate (Morphine)  2 mg IVP Q4 PRN


   PRN Reason: Anxiety


   Last Admin: 12/15/18 16:16 Dose:  2 mg


Ranolazine (Ranexa)  500 mg PO BID [facility]


   Last Admin: 12/15/18 17:28 Dose:  Not Given


Repaglinide (Prandin)  2 mg PO DAILY [facility]


   Last Admin: 12/15/18 09:16 Dose:  2 mg


Roflumilast (Daliresp)  500 mcg PO DAILY [facility]


   Last Admin: 12/14/18 10:27 Dose:  500 mcg


Rosuvastatin Calcium (Crestor)  20 mg PO HS [facility]


   Last Admin: 12/14/18 21:49 Dose:  Not Given


Sacubitril/Valsartan (Entresto 49 Mg-51 Mg)  1 tab PO BID [facility]


   Last Admin: 12/15/18 17:27 Dose:  Not Given











- Labs


Labs: 


                                        





                                 12/15/18 06:28 





                                 12/15/18 06:28 





                                        











PT  12.8 SECONDS (9.7-12.2)  H  12/06/18  20:31    


 


INR  1.2   12/06/18  20:31    


 


APTT  33 SECONDS (21-34)   12/06/18  20:31    














- Constitutional


Appears: Cachectic, Chronically Ill





- Head Exam


Head Exam: NORMAL INSPECTION





- Eye Exam


Eye Exam: PERRL





- ENT Exam


ENT Exam: Normal Oropharynx





- Neck Exam


Neck Exam: Normal Inspection





- Respiratory Exam


Respiratory Exam: Prolonged Expiratory Phase, Rales, Rhonchi





- Cardiovascular Exam


Cardiovascular Exam: Tachycardia, REGULAR RHYTHM, +S1, +S2





- GI/Abdominal Exam


GI & Abdominal Exam: Soft, Normal Bowel Sounds.  absent: Organomegaly





- Extremities Exam


Extremities Exam: Normal Capillary Refill, Pedal Edema (1+).  absent: Calf 

Tenderness





- Neurological Exam


Neurological Exam: Altered





- Psychiatric Exam


Psychiatric exam: Flat Affect





- Skin


Skin Exam: Dry





Assessment and Plan


(1) Acute respiratory failure with hypoxia


Status: Acute   





(2) Chr obstructive pulmonary disease w/ acute lower respiratory infxn


Status: Acute   





(3) CHF exacerbation


Status: Acute   





(4) Cardiomyopathy


Status: Acute   





(5) DM type 2 (diabetes mellitus, type 2)


Status: Acute   





(6) CAD (coronary artery disease)


Status: Acute   





(7) Anemia


Status: Acute   





(8) Ischemic cardiomyopathy


Status: Acute

## 2018-12-15 NOTE — CP.PCM.PN
Subjective





- Date & Time of Evaluation


Date of Evaluation: 12/15/18


Time of Evaluation: 14:58





- Subjective


Subjective: 





CHIEF COMPLAINTS TODAY :


PATIENT IS DROWSY AND LETHARGIC.


bLOOD PRESSURE IS NOW STABLE


nO URINE OUTPUT DOCUMENTED.


wORSENING RENAL FUNCTION AND RESPIRATORY SYSTEM


ROS.


HEENT :  N.


Resp :       No hemoptysis 


Cardio :     No anginal  CPGI :           No abd.pain, n/v ,diarrhea or GI 

bleeding .


CNS : No headache, vertigo, focal deficit.


Musculoskel :  No joint swelling ,


Derm :        No rash 


Psych :     Normal affect.


Ext :  No  swelling ,calf pain 





PE.


Pt. is alert awake in no distress.


V.S  As noted in the chart 


Head ,ear nose,throat and eyes : Normal.


Neck : Supple with normal carotids.


Lungs: POOR AIR ENTRY WITH WHEEZING 


Heart : S1 & S2 normal with S4. No murmur.


Abd : Soft non tender with normal bowel sounds.


Neuro : Moves all ext. with no localized deficit.


Ext : No edema with intact pulses.Non tender calves 


Derm : No rashes or decubitus ulcer.





LABS/RADIOLOGY: BUN 60, CR 2.0





ASSESSMENT/PLAN : 


LAST NIGHT PATIENT WAS KAITLYN SINCE THEN OXYGENATION HAS IMPROVED


cHEST X-RAY SHOWS WORSENING PNEUMONIA OR RIGHT MORE THAN LEFT


bun AND CREATININE TRENDING UPWARDS


pATIENT ON PERIPHERAL HYPERAL.


dISCUSSED WITH  ABOUT PATIENT'S CONDITION





Objective





- Vital Signs/Intake and Output


Vital Signs (last 24 hours): 


                                        











Temp Pulse Resp BP Pulse Ox


 


 97.5 F L  7 L  18   148/79   96 


 


 12/15/18 09:01  12/15/18 14:13  12/15/18 09:01  12/15/18 09:11  12/15/18 09:01











- Medications


Medications: 


                               Current Medications





Albuterol Sulfate (Albuterol 0.042% Inhal Sol (1.25mg/3ml) Ud)  1.25 mg INH RQ2 

PRN


   PRN Reason: Wheezing


Albuterol/Ipratropium (Duoneb 3 Mg/0.5 Mg (3 Ml) Ud)  3 ml INH RQ6 [facility]


   Last Admin: 12/15/18 13:54 Dose:  3 ml


Aspirin (Ecotrin)  325 mg PO DAILY [facility]


   Last Admin: 12/15/18 09:16 Dose:  325 mg


Ergocalciferol (Drisdol 50,000 Intl Units Cap)  1 cap PO QWK [facility]


   Last Admin: 12/13/18 09:38 Dose:  1 cap


Furosemide (Lasix)  20 mg IVP DAILY [facility]


   Last Admin: 12/15/18 09:09 Dose:  20 mg


Heparin Sodium (Porcine) (Heparin)  5,000 units SC Q8 [facility]


   Last Admin: 12/15/18 14:20 Dose:  5,000 units


Cefepime HCl 0.5 gm/ Dextrose  50 mls @ 100 mls/hr IVPB Q24H [facility]; Protocol


   Last Admin: 12/15/18 09:35 Dose:  100 mls/hr


Amino Acids (Clinimix 4.25/5 % "E" (1000 Ml))  1,000 mls @ 50 mls/hr IV .Q20H 

[facility]


   Stop: 12/15/18 18:00


   Last Admin: 12/15/18 14:04 Dose:  50 mls/hr


Multivitamins/Vitamin C 10 ml/Chromium/Copper/Manganese/Zinc 1 ml/ Amino Acids  

1,011 mls @ 50 mls/hr IV .[id number] [facility]


   Stop: 12/16/18 14:00


Amino Acids (Clinimix 4.25/5 % "E" (1000 Ml))  1,000 mls @ 50 mls/hr IV .Q20H 

[facility]


   Stop: 12/16/18 18:00


Insulin Human Regular (Novolin R)  0 unit SC ACHS [facility]; Protocol


   Last Admin: 12/15/18 12:31 Dose:  4 units


Metoprolol Tartrate (Lopressor)  25 mg PO BID [facility]


   Last Admin: 12/15/18 09:11 Dose:  25 mg


Montelukast Sodium (Singulair)  10 mg PO DAILY [facility]


   Last Admin: 12/15/18 09:16 Dose:  10 mg


Ranolazine (Ranexa)  500 mg PO BID [facility]


   Last Admin: 12/15/18 09:16 Dose:  500 mg


Repaglinide (Prandin)  2 mg PO DAILY [facility]


   Last Admin: 12/15/18 09:16 Dose:  2 mg


Roflumilast (Daliresp)  500 mcg PO DAILY [facility]


   Last Admin: 12/14/18 10:27 Dose:  500 mcg


Rosuvastatin Calcium (Crestor)  20 mg PO HS [facility]


   Last Admin: 12/14/18 21:49 Dose:  Not Given


Sacubitril/Valsartan (Entresto 49 Mg-51 Mg)  1 tab PO BID [facility]


   Last Admin: 12/15/18 12:32 Dose:  1 tab











- Labs


Labs: 


                                        





                                 12/15/18 06:28 





                                 12/15/18 06:28 





                                        











PT  12.8 SECONDS (9.7-12.2)  H  12/06/18  20:31    


 


INR  1.2   12/06/18  20:31    


 


APTT  33 SECONDS (21-34)   12/06/18  20:31

## 2018-12-15 NOTE — CP.PCM.CON
History of Present Illness





- History of Present Illness


History of Present Illness: 





reason for consultation: respiratory distress/pneumonia








86-year-old female with history of ischemic cardiomyopathy with low ejection 

fraction,, coronary artery disease status post stent placement, AICD, diabetes, 

COPD who was admitted with increasing shortness of breath and being treated for 

pneumonia/COPD exacerbation. Yesterday patient went in respiratory distress and 

was placed on BiPAP. Chest x-ray showed worsening pneumonia/infiltrate involving

the whole right lung. Patient lethargic on BiPAP, does not follow commands








Review of Systems





- Review of Systems


Systems not reviewed;Unavailable: Respiratory Distress, Altered Mental Status





Past Patient History





- Infectious Disease


Hx of Infectious Diseases: None





- Tetanus Immunizations


Tetanus Immunization: Unknown





- Past Medical History & Family History


Past Medical History?: Yes





- Past Social History


Smoking Status: Never Smoked





- CARDIAC


Hx Cardiac Disorders: Yes (CAD)


Hx Hypercholesterolemia: Yes





- PULMONARY


Hx Chronic Obstructive Pulmonary Disease (COPD): Yes





- NEUROLOGICAL


Hx Transient Ischemic Attacks (TIA): Yes





- HEENT


Hx HEENT Problems: No





- RENAL


Hx Chronic Kidney Disease: No





- ENDOCRINE/METABOLIC


Hx Diabetes Mellitus Type 2: Yes





- HEMATOLOGICAL/ONCOLOGICAL


Hx Anemia: Yes





- INTEGUMENTARY


Hx Dermatological Problems: No





- MUSCULOSKELETAL/RHEUMATOLOGICAL


Hx Arthritis: Yes


Hx Falls: No





- GASTROINTESTINAL


Hx Gastrointestinal Disorders: No





- GENITOURINARY/GYNECOLOGICAL


Hx Genitourinary Disorders: No





- PSYCHIATRIC


Hx Anxiety: Yes


Hx Substance Use: No





- SURGICAL HISTORY


Hx Appendectomy: Yes


Hx Coronary Stent: Yes





- ANESTHESIA


Hx Anesthesia: Yes


Hx Anesthesia Reactions: No


Hx Malignant Hyperthermia: No





Meds


Allergies/Adverse Reactions: 


                                    Allergies











Allergy/AdvReac Type Severity Reaction Status Date / Time


 


clopidogrel Allergy Mild SWELLING Verified 06/06/18 13:34














- Medications


Medications: 


                               Current Medications





Albuterol Sulfate (Albuterol 0.042% Inhal Sol (1.25mg/3ml) Ud)  1.25 mg INH RQ2 

PRN


   PRN Reason: Wheezing


Albuterol/Ipratropium (Duoneb 3 Mg/0.5 Mg (3 Ml) Ud)  3 ml INH RQ6 [facility]


   Last Admin: 12/15/18 13:54 Dose:  3 ml


Aspirin (Ecotrin)  325 mg PO DAILY [facility]


   Last Admin: 12/15/18 09:16 Dose:  325 mg


Ergocalciferol (Drisdol 50,000 Intl Units Cap)  1 cap PO QWK [facility]


   Last Admin: 12/13/18 09:38 Dose:  1 cap


Furosemide (Lasix)  20 mg IVP DAILY [facility]


   Last Admin: 12/15/18 09:09 Dose:  20 mg


Heparin Sodium (Porcine) (Heparin)  5,000 units SC Q8 [facility]


   Last Admin: 12/15/18 06:38 Dose:  5,000 units


Cefepime HCl 0.5 gm/ Dextrose  50 mls @ 100 mls/hr IVPB Q24H [facility]; Protocol


   Last Admin: 12/15/18 09:35 Dose:  100 mls/hr


Amino Acids (Clinimix 4.25/5 % "E" (1000 Ml))  1,000 mls @ 50 mls/hr IV .Q20H 

[facility]


   Stop: 12/15/18 18:00


Multivitamins/Vitamin C 10 ml/Chromium/Copper/Manganese/Zinc 1 ml/ Amino Acids  

1,011 mls @ 50 mls/hr IV .[id number] [facility]


   Stop: 12/16/18 14:00


Amino Acids (Clinimix 4.25/5 % "E" (1000 Ml))  1,000 mls @ 50 mls/hr IV .Q20H 

[facility]


   Stop: 12/16/18 18:00


Insulin Human Regular (Novolin R)  0 unit SC ACHS [facility]; Protocol


   Last Admin: 12/15/18 12:31 Dose:  4 units


Metoprolol Tartrate (Lopressor)  25 mg PO BID [facility]


   Last Admin: 12/15/18 09:11 Dose:  25 mg


Montelukast Sodium (Singulair)  10 mg PO DAILY [facility]


   Last Admin: 12/15/18 09:16 Dose:  10 mg


Ranolazine (Ranexa)  500 mg PO BID [facility]


   Last Admin: 12/15/18 09:16 Dose:  500 mg


Repaglinide (Prandin)  2 mg PO DAILY [facility]


   Last Admin: 12/15/18 09:16 Dose:  2 mg


Roflumilast (Daliresp)  500 mcg PO DAILY [facility]


   Last Admin: 12/14/18 10:27 Dose:  500 mcg


Rosuvastatin Calcium (Crestor)  20 mg PO HS [facility]


   Last Admin: 12/14/18 21:49 Dose:  Not Given


Sacubitril/Valsartan (Entresto 49 Mg-51 Mg)  1 tab PO BID [facility]


   Last Admin: 12/15/18 12:32 Dose:  1 tab











Physical Exam





- Head Exam


Head Exam: ATRAUMATIC, NORMOCEPHALIC





- ENT Exam


ENT Exam: Mucous Membranes Moist





- Neck Exam


Neck exam: Positive for: Normal Inspection





- Respiratory Exam


Respiratory Exam: Decreased Breath Sounds





- Cardiovascular Exam


Cardiovascular Exam: REGULAR RHYTHM





- GI/Abdominal Exam


GI & Abdominal Exam: Normal Bowel Sounds, Soft





Results





- Vital Signs


Recent Vital Signs: 


                                Last Vital Signs











Temp  97.5 F L  12/15/18 09:01


 


Pulse  103 H  12/15/18 09:01


 


Resp  18   12/15/18 09:01


 


BP  148/79   12/15/18 09:11


 


Pulse Ox  96   12/15/18 09:01














- Labs


Result Diagrams: 


                                 12/15/18 06:28





                                 12/15/18 06:28


Labs: 


                         Laboratory Results - last 24 hr











  12/14/18 12/14/18 12/15/18





  16:22 21:09 06:28


 


WBC    10.6


 


RBC    3.03 L


 


Hgb    8.6 L


 


Hct    26.5 L


 


MCV    87.6  D


 


MCH    28.3


 


MCHC    32.3 L


 


RDW    17.7 H


 


Plt Count    113 L


 


MPV    10.0


 


Neut % (Auto)    86.9 H


 


Lymph % (Auto)    2.4 L


 


Mono % (Auto)    9.5


 


Eos % (Auto)    0.8


 


Baso % (Auto)    0.4


 


Neut # (Auto)    9.2 H


 


Lymph # (Auto)    0.3 L


 


Mono # (Auto)    1.0 H


 


Eos # (Auto)    0.1


 


Baso # (Auto)    0.0


 


Neutrophils % (Manual)    88 H


 


Lymphocytes % (Manual)    2 L


 


Monocytes % (Manual)    9


 


Eosinophils % (Manual)    1


 


Toxic Granulation    Present


 


Platelet Estimate    Slightly decreased L


 


Large Platelets    Present


 


Polychromasia    Slight


 


Hypochromasia (manual)    Slight


 


Anisocytosis (manual)    Slight


 


Sodium   


 


Potassium   


 


Chloride   


 


Carbon Dioxide   


 


Anion Gap   


 


BUN   


 


Creatinine   


 


Est GFR ( Amer)   


 


Est GFR (Non-Af Amer)   


 


POC Glucose (mg/dL)  114 H  171 H 


 


Random Glucose   


 


Calcium   


 


Total Bilirubin   


 


AST   


 


ALT   


 


Alkaline Phosphatase   


 


Total Protein   


 


Albumin   


 


Globulin   


 


Albumin/Globulin Ratio   














  12/15/18 12/15/18 12/15/18





  06:28 06:55 08:28


 


WBC   


 


RBC   


 


Hgb   


 


Hct   


 


MCV   


 


MCH   


 


MCHC   


 


RDW   


 


Plt Count   


 


MPV   


 


Neut % (Auto)   


 


Lymph % (Auto)   


 


Mono % (Auto)   


 


Eos % (Auto)   


 


Baso % (Auto)   


 


Neut # (Auto)   


 


Lymph # (Auto)   


 


Mono # (Auto)   


 


Eos # (Auto)   


 


Baso # (Auto)   


 


Neutrophils % (Manual)   


 


Lymphocytes % (Manual)   


 


Monocytes % (Manual)   


 


Eosinophils % (Manual)   


 


Toxic Granulation   


 


Platelet Estimate   


 


Large Platelets   


 


Polychromasia   


 


Hypochromasia (manual)   


 


Anisocytosis (manual)   


 


Sodium  134  


 


Potassium  5.2  


 


Chloride  102  


 


Carbon Dioxide  22  


 


Anion Gap  15  


 


BUN  64 H  


 


Creatinine  2.3 H  


 


Est GFR ( Amer)  24  


 


Est GFR (Non-Af Amer)  20  


 


POC Glucose (mg/dL)   261 H  278 H


 


Random Glucose  256 H  


 


Calcium  9.2  


 


Total Bilirubin  0.7  


 


AST  47 H D  


 


ALT  27  


 


Alkaline Phosphatase  137 H D  


 


Total Protein  5.6 L  


 


Albumin  2.9 L  


 


Globulin  2.7  


 


Albumin/Globulin Ratio  1.1  














  12/15/18





  11:51


 


WBC 


 


RBC 


 


Hgb 


 


Hct 


 


MCV 


 


MCH 


 


MCHC 


 


RDW 


 


Plt Count 


 


MPV 


 


Neut % (Auto) 


 


Lymph % (Auto) 


 


Mono % (Auto) 


 


Eos % (Auto) 


 


Baso % (Auto) 


 


Neut # (Auto) 


 


Lymph # (Auto) 


 


Mono # (Auto) 


 


Eos # (Auto) 


 


Baso # (Auto) 


 


Neutrophils % (Manual) 


 


Lymphocytes % (Manual) 


 


Monocytes % (Manual) 


 


Eosinophils % (Manual) 


 


Toxic Granulation 


 


Platelet Estimate 


 


Large Platelets 


 


Polychromasia 


 


Hypochromasia (manual) 


 


Anisocytosis (manual) 


 


Sodium 


 


Potassium 


 


Chloride 


 


Carbon Dioxide 


 


Anion Gap 


 


BUN 


 


Creatinine 


 


Est GFR ( Amer) 


 


Est GFR (Non-Af Amer) 


 


POC Glucose (mg/dL)  270 H


 


Random Glucose 


 


Calcium 


 


Total Bilirubin 


 


AST 


 


ALT 


 


Alkaline Phosphatase 


 


Total Protein 


 


Albumin 


 


Globulin 


 


Albumin/Globulin Ratio 














Assessment & Plan


(1) Acute respiratory failure with hypoxia


Status: Acute   


Comment: worsening pneumonia.  Continue IV antibiotics.  Continue BiPAP.  Case 

discussed with family at length and possibility of intubation/CPR,.  according 

to family patient wishes were not to put on life support.  will inform me

after discussing with other family members.  Followup ABG   





(2) CHF exacerbation


Status: Acute   





(3) COPD exacerbation


Status: Acute   





(4) Cardiomyopathy


Status: Acute   





(5) Pneumonia


Status: Acute

## 2018-12-16 RX ADMIN — HUMAN INSULIN SCH UNITS: 100 INJECTION, SOLUTION SUBCUTANEOUS at 13:12

## 2018-12-16 RX ADMIN — HUMAN INSULIN SCH: 100 INJECTION, SOLUTION SUBCUTANEOUS at 22:08

## 2018-12-16 RX ADMIN — HUMAN INSULIN SCH UNITS: 100 INJECTION, SOLUTION SUBCUTANEOUS at 09:04

## 2018-12-16 RX ADMIN — SACUBITRIL AND VALSARTAN SCH: 49; 51 TABLET, FILM COATED ORAL at 18:36

## 2018-12-16 RX ADMIN — ASPIRIN SCH: 325 TABLET, DELAYED RELEASE ORAL at 09:10

## 2018-12-16 RX ADMIN — RANOLAZINE SCH: 500 TABLET, FILM COATED, EXTENDED RELEASE ORAL at 18:36

## 2018-12-16 RX ADMIN — IPRATROPIUM BROMIDE AND ALBUTEROL SULFATE SCH ML: .5; 3 SOLUTION RESPIRATORY (INHALATION) at 01:25

## 2018-12-16 RX ADMIN — IPRATROPIUM BROMIDE AND ALBUTEROL SULFATE SCH ML: .5; 3 SOLUTION RESPIRATORY (INHALATION) at 07:52

## 2018-12-16 RX ADMIN — LINEZOLID SCH MLS/HR: 600 INJECTION, SOLUTION INTRAVENOUS at 01:10

## 2018-12-16 RX ADMIN — RANOLAZINE SCH: 500 TABLET, FILM COATED, EXTENDED RELEASE ORAL at 09:09

## 2018-12-16 RX ADMIN — HUMAN INSULIN SCH UNITS: 100 INJECTION, SOLUTION SUBCUTANEOUS at 18:39

## 2018-12-16 RX ADMIN — LINEZOLID SCH MLS/HR: 600 INJECTION, SOLUTION INTRAVENOUS at 13:00

## 2018-12-16 RX ADMIN — IPRATROPIUM BROMIDE AND ALBUTEROL SULFATE SCH ML: .5; 3 SOLUTION RESPIRATORY (INHALATION) at 13:52

## 2018-12-16 RX ADMIN — IPRATROPIUM BROMIDE AND ALBUTEROL SULFATE SCH ML: .5; 3 SOLUTION RESPIRATORY (INHALATION) at 19:27

## 2018-12-16 RX ADMIN — SACUBITRIL AND VALSARTAN SCH: 49; 51 TABLET, FILM COATED ORAL at 10:00

## 2018-12-16 RX ADMIN — DOBUTAMINE HYDROCHLORIDE SCH MLS/HR: 200 INJECTION INTRAVENOUS at 16:15

## 2018-12-16 NOTE — CP.PCM.PN
Subjective





- Date & Time of Evaluation


Date of Evaluation: 12/16/18


Time of Evaluation: 19:33





- Subjective


Subjective: 


 AFEBRILE,


ON BIPAP


NO ACUTE DISTRESS,


O2 SAT 98%.





OUTPUT BETTER ON DOBUTAMINE.


ON IV CEFIPIME


IV ZYVOX





LABS ;


REVIEWED.














Objective





- Vital Signs/Intake and Output


Vital Signs (last 24 hours): 


                                        











Temp Pulse Resp BP Pulse Ox


 


 97.8 F   90   22   92/52 L  94 L


 


 12/16/18 09:06  12/16/18 19:31  12/16/18 16:15  12/16/18 16:15  12/16/18 16:15








Intake and Output: 


                                        











 12/16/18 12/17/18





 18:59 06:59


 


Intake Total 400 


 


Output Total 425 


 


Balance -25 














- Medications


Medications: 


                               Current Medications





Albuterol Sulfate (Albuterol 0.042% Inhal Sol (1.25mg/3ml) Ud)  1.25 mg INH RQ2 

[facility]


   PRN Reason: Wheezing


Albuterol/Ipratropium (Duoneb 3 Mg/0.5 Mg (3 Ml) Ud)  3 ml INH RQ6 [facility]


   Last Admin: 12/16/18 19:27 Dose:  3 ml


Aspirin (Ecotrin)  325 mg PO [facility]ILY [facility]


   Last Admin: 12/16/18 09:10 Dose:  Not Given


Ergocalciferol (Drisdol 50,000 Intl Units Cap)  1 cap PO QWK [facility]


   Last Admin: 12/13/18 09:38 Dose:  1 cap


Furosemide (Lasix)  20 mg IVP DAILY [facility]


   Last Admin: 12/16/18 09:10 Dose:  Not Given


Furosemide (Lasix)  60 mg IVP Q12 [facility]


   Last Admin: 12/16/18 10:00 Dose:  Not Given


Heparin Sodium (Porcine) (Heparin)  5,000 units SC Q8 [facility]


   Last Admin: 12/16/18 13:19 Dose:  5,000 units


Cefepime HCl 0.5 gm/ Dextrose  50 mls @ 100 mls/hr IVPB Q24H [facility]; Protocol


   Last Admin: 12/16/18 10:30 Dose:  100 mls/hr


Linezolid (Zyvox 600mg/300ml D5w)  600 mg in 300 mls @ 200 mls/hr IVPB Q12H [facility];

Protocol


   Last Admin: 12/16/18 13:00 Dose:  200 mls/hr


Chromium/Copper/Manganese/Zinc 1 ml/ Multivitamins/Vitamin C 10 ml/ Parenteral 

Electrolytes 20 ml/ Amino Acids  1,031 mls @ 50 mls/hr IV .[id number] [facility]


   Stop: 12/17/18 14:00


   Last Admin: 12/16/18 18:39 Dose:  50 mls/hr


Parenteral Electrolytes 20 ml/ (Amino Acids)  1,020 mls @ 50 mls/hr IV .[id number] 

[facility]


   Stop: 12/17/18 18:00


Dobutamine HCl/Dextrose (Dobutamine/Dextrose 5% 500mg/250ml)  500 mg in 250 mls 

@ 7.076 mls/hr IV .Q24H [facility]; Protocol


   Last Admin: 12/16/18 16:15 Dose:  7.076 mls/hr


Insulin Human Regular (Novolin R)  0 unit SC ACHS [facility]; Protocol


   Last Admin: 12/16/18 18:39 Dose:  4 units


Metoprolol Tartrate (Lopressor)  25 mg PO BID [facility]


   Last Admin: 12/16/18 18:35 Dose:  Not Given


Montelukast Sodium (Singulair)  10 mg PO DAILY [facility]


   Last Admin: 12/16/18 10:00 Dose:  Not Given


Morphine Sulfate (Morphine)  2 mg IVP Q4 PRN


   PRN Reason: Anxiety


   Last Admin: 12/15/18 16:16 Dose:  2 mg


Ranolazine (Ranexa)  500 mg PO BID [facility]


   Last Admin: 12/16/18 18:36 Dose:  Not Given


Repaglinide (Prandin)  2 mg PO DAILY [facility]


   Last Admin: 12/16/18 10:00 Dose:  Not Given


Roflumilast (Daliresp)  500 mcg PO DAILY [facility]


   Last Admin: 12/14/18 10:27 Dose:  500 mcg


Rosuvastatin Calcium (Crestor)  20 mg PO HS [facility]


   Last Admin: 12/15/18 22:12 Dose:  Not Given


Sacubitril/Valsartan (Entresto 49 Mg-51 Mg)  1 tab PO BID [facility]


   Last Admin: 12/16/18 18:36 Dose:  Not Given











- Labs


Labs: 


                                        





                                 12/15/18 06:28 





                                 12/15/18 06:28 





                                        











PT  12.8 SECONDS (9.7-12.2)  H  12/06/18  20:31    


 


INR  1.2   12/06/18  20:31    


 


APTT  33 SECONDS (21-34)   12/06/18  20:31    














- Constitutional


Appears: No Acute Distress, Cachectic, Chronically Ill





- Head Exam


Head Exam: NORMAL INSPECTION





- Eye Exam


Eye Exam: EOMI





- ENT Exam


ENT Exam: Mucous Membranes Dry





- Neck Exam


Neck Exam: Normal Inspection





- Respiratory Exam


Respiratory Exam: Prolonged Expiratory Phase, Rales, Rhonchi





- Cardiovascular Exam


Cardiovascular Exam: Tachycardia, +S1, +S2





- GI/Abdominal Exam


GI & Abdominal Exam: Soft, Normal Bowel Sounds





- Extremities Exam


Extremities Exam: Normal Capillary Refill.  absent: Calf Tenderness, Pedal Edema





- Neurological Exam


Neurological Exam: Awake (arousable), CN II-XII Intact





- Psychiatric Exam


Psychiatric exam: Flat Affect





- Skin


Skin Exam: Normal Color, Warm





Assessment and Plan


(1) Acute respiratory failure with hypoxia


Status: Acute   





(2) Chr obstructive pulmonary disease w/ acute lower respiratory infxn


Status: Acute   





(3) CHF exacerbation


Status: Acute   





(4) Cardiomyopathy


Status: Acute   





(5) DM type 2 (diabetes mellitus, type 2)


Status: Acute   





(6) CAD (coronary artery disease)


Status: Acute   





(7) Anemia


Status: Acute   





(8) Ischemic cardiomyopathy


Status: Acute   





- Assessment and Plan (Free Text)


Plan: 





PLAN 


CONTINUE IV ABX  IV CEFEPIME.


CONTINUE IV V ZYVOX 600 MG IV Q [id number]/15/18


OUTPUT IMPROVING.


CONTINUE SUPPORTIVE CARE.


AS PER PULMONARY.


PT DNR/DNI.

## 2018-12-16 NOTE — CP.PCM.PN
Subjective





- Date & Time of Evaluation


Date of Evaluation: 12/16/18


Time of Evaluation: 15:08





- Subjective


Subjective: 





Blood pressure remains low at 90s


Patient has a urine output about 600 mL


BUN/creatinine is stable


Patient on Dobutrex bronchodilators IV antibiotics.


Continue supportive therapy





Objective





- Vital Signs/Intake and Output


Vital Signs (last 24 hours): 


                                        











Temp Pulse Resp BP Pulse Ox


 


 97.8 F   79   18   94/43 L  98 


 


 12/16/18 09:06  12/16/18 13:53  12/16/18 09:06  12/16/18 09:06  12/16/18 09:06








Intake and Output: 


                                        











 12/16/18 12/16/18





 11:59 23:59


 


Intake Total 719 


 


Output Total 200 


 


Balance 519 














- Medications


Medications: 


                               Current Medications





Albuterol Sulfate (Albuterol 0.042% Inhal Sol (1.25mg/3ml) Ud)  1.25 mg INH RQ2 

PRN


   PRN Reason: Wheezing


Albuterol/Ipratropium (Duoneb 3 Mg/0.5 Mg (3 Ml) Ud)  3 ml INH RQ6 [facility]


   Last Admin: 12/16/18 13:52 Dose:  3 ml


Aspirin (Ecotrin)  325 mg PO DAILY [facility]


   Last Admin: 12/16/18 09:10 Dose:  Not Given


Ergocalciferol (Drisdol 50,000 Intl Units Cap)  1 cap PO QWK [facility]


   Last Admin: 12/13/18 09:38 Dose:  1 cap


Furosemide (Lasix)  20 mg IVP DAILY [facility]


   Last Admin: 12/16/18 09:10 Dose:  Not Given


Furosemide (Lasix)  60 mg IVP Q12 [facility]


   Last Admin: 12/16/18 10:00 Dose:  Not Given


Heparin Sodium (Porcine) (Heparin)  5,000 units SC Q8 [facility]


   Last Admin: 12/16/18 13:19 Dose:  5,000 units


Cefepime HCl 0.5 gm/ Dextrose  50 mls @ 100 mls/hr IVPB Q24H [facility]; Protocol


   Last Admin: 12/16/18 10:30 Dose:  100 mls/hr


Amino Acids (Clinimix 4.25/5 % "E" (1000 Ml))  1,000 mls @ 50 mls/hr IV .Q20H 

[facility]


   Stop: 12/16/18 18:00


   Last Admin: 12/16/18 13:35 Dose:  50 mls/hr


Dobutamine HCl/Dextrose (Dobutamine/Dextrose 5% 500mg/250ml)  500 mg in 250 mls 

@ 3.538 mls/hr IV .Q24H [facility]; Protocol


   Last Admin: 12/15/18 23:06 Dose:  2.5 mcg/kg/min, 3.538 mls/hr


Linezolid (Zyvox 600mg/300ml D5w)  600 mg in 300 mls @ 200 mls/hr IVPB Q12H [facility];

Protocol


   Last Admin: 12/16/18 13:00 Dose:  200 mls/hr


Chromium/Copper/Manganese/Zinc 1 ml/ Multivitamins/Vitamin C 10 ml/ Parenteral 

Electrolytes 20 ml/ Amino Acids  1,031 mls @ 50 mls/hr IV .[id number] [facility]


   Stop: 12/17/18 14:00


Parenteral Electrolytes 20 ml/ (Amino Acids)  1,020 mls @ 50 mls/hr IV .[id number] 

[facility]


   Stop: 12/17/18 18:00


Insulin Human Regular (Novolin R)  0 unit SC [facility]; Protocol


   Last Admin: 12/16/18 13:12 Dose:  4 units


Metoprolol Tartrate (Lopressor)  25 mg PO BID [facility]


   Last Admin: 12/16/18 09:10 Dose:  Not Given


Montelukast Sodium (Singulair)  10 mg PO DAILY [facility]


   Last Admin: 12/16/18 10:00 Dose:  Not Given


Morphine Sulfate (Morphine)  2 mg IVP Q4 PRN


   PRN Reason: Anxiety


   Last Admin: 12/15/18 16:16 Dose:  2 mg


Ranolazine (Ranexa)  500 mg PO BID [facility]


   Last Admin: 12/16/18 09:09 Dose:  Not Given


Repaglinide (Prandin)  2 mg PO DAILY [facility]


   Last Admin: 12/16/18 10:00 Dose:  Not Given


Roflumilast (Daliresp)  500 mcg PO DAILY [facility]


   Last Admin: 12/14/18 10:27 Dose:  500 mcg


Rosuvastatin Calcium (Crestor)  20 mg PO HS [facility]


   Last Admin: 12/15/18 22:12 Dose:  Not Given


Sacubitril/Valsartan (Entresto 49 Mg-51 Mg)  1 tab PO BID [facility]


   Last Admin: 12/16/18 10:00 Dose:  Not Given











- Labs


Labs: 


                                        





                                 12/15/18 06:28 





                                 12/15/18 06:28 





                                        











PT  12.8 SECONDS (9.7-12.2)  H  12/06/18  20:31    


 


INR  1.2   12/06/18  20:31    


 


APTT  33 SECONDS (21-34)   12/06/18  20:31

## 2018-12-16 NOTE — CP.PCM.PN
Subjective





- Date & Time of Evaluation


Date of Evaluation: 12/16/18


Time of Evaluation: 14:08





- Subjective


Subjective: 





Reason for consultation: Acute respiratory distress and pneumonia








Pulmonary Follow up, Covering Dr Valentin


The patient was Seen/interviewed and examined by me at the bedside,


Medical records reviewed and Management issues were discussed and formulated 

with the house staff.


Events reviewed





86 Years old Male with PMHx diabetes, COPD, ischemic cardiomyopathy with low 

ejection fraction, coronary artery disease status post stent placement and AICD 


who was admitted with increasing shortness of breath and being treated for p

neumonia/COPD exacerbation.


Patient went in respiratory distress and was placed on BIPAP. Chest x-ray showed

worsening pneumonia/infiltrate involving the whole right lung. 


Evaluated by ICU, started PRN morphine to reduce anxiety and respiratory 

distress.








Patient comfortable, NAD


Afebrile


Saturation 98% on BIPAP


Continue diuretics to optimize fluid status, IV Antibiotics as per ID, Singulair

and Roflumilast (Daliresp).  





Objective





- Vital Signs/Intake and Output


Vital Signs (last 24 hours): 


                                        











Temp Pulse Resp BP Pulse Ox


 


 97.8 F   79   18   94/43 L  98 


 


 12/16/18 09:06  12/16/18 13:53  12/16/18 09:06  12/16/18 09:06  12/16/18 09:06








Intake and Output: 


                                        











 12/16/18 12/16/18





 06:59 18:59


 


Intake Total 1119 


 


Output Total 600 


 


Balance 519 














- Medications


Medications: 


                               Current Medications





Albuterol Sulfate (Albuterol 0.042% Inhal Sol (1.25mg/3ml) Ud)  1.25 mg INH RQ2 

PRN


   PRN Reason: Wheezing


Albuterol/Ipratropium (Duoneb 3 Mg/0.5 Mg (3 Ml) Ud)  3 ml INH RQ6 [facility]


   Last Admin: 12/16/18 13:52 Dose:  3 ml


Aspirin (Ecotrin)  325 mg PO DAILY [facility]


   Last Admin: 12/16/18 09:10 Dose:  Not Given


Ergocalciferol (Drisdol 50,000 Intl Units Cap)  1 cap PO QWK [facility]


   Last Admin: 12/13/18 09:38 Dose:  1 cap


Furosemide (Lasix)  20 mg IVP DAILY [facility]


   Last Admin: 12/16/18 09:10 Dose:  Not Given


Furosemide (Lasix)  60 mg IVP Q12 [facility]


   Last Admin: 12/16/18 10:00 Dose:  Not Given


Heparin Sodium (Porcine) (Heparin)  5,000 units SC Q8 [facility]


   Last Admin: 12/16/18 13:19 Dose:  5,000 units


Cefepime HCl 0.5 gm/ Dextrose  50 mls @ 100 mls/hr IVPB Q24H [facility]; Protocol


   Last Admin: 12/16/18 10:30 Dose:  100 mls/hr


Amino Acids (Clinimix 4.25/5 % "E" (1000 Ml))  1,000 mls @ 50 mls/hr IV .Q20H 

[facility]


   Stop: 12/16/18 18:00


   Last Admin: 12/16/18 13:35 Dose:  50 mls/hr


Dobutamine HCl/Dextrose (Dobutamine/Dextrose 5% 500mg/250ml)  500 mg in 250 mls 

@ 3.538 mls/hr IV .Q24H [facility]; Protocol


   Last Admin: 12/15/18 23:06 Dose:  2.5 mcg/kg/min, 3.538 mls/hr


Linezolid (Zyvox 600mg/300ml D5w)  600 mg in 300 mls @ 200 mls/hr IVPB Q12H [facility];

Protocol


   Last Admin: 12/16/18 13:00 Dose:  200 mls/hr


Chromium/Copper/Manganese/Zinc 1 ml/ Multivitamins/Vitamin C 10 ml/ Parenteral 

Electrolytes 20 ml/ Amino Acids  1,031 mls @ 50 mls/hr IV .[id number] [facility]


   Stop: 12/17/18 14:00


Parenteral Electrolytes 20 ml/ (Amino Acids)  1,020 mls @ 50 mls/hr IV .[id number] 

[facility]


   Stop: 12/17/18 18:00


Insulin Human Regular (Novolin R)  0 unit SC ACHS [facility]; Protocol


   Last Admin: 12/16/18 13:12 Dose:  4 units


Metoprolol Tartrate (Lopressor)  25 mg PO BID [facility]


   Last Admin: 12/16/18 09:10 Dose:  Not Given


Montelukast Sodium (Singulair)  10 mg PO DAILY [facility]


   Last Admin: 12/16/18 10:00 Dose:  Not Given


Morphine Sulfate (Morphine)  2 mg IVP Q4 PRN


   PRN Reason: Anxiety


   Last Admin: 12/15/18 16:16 Dose:  2 mg


Ranolazine (Ranexa)  500 mg PO BID [facility]


   Last Admin: 12/16/18 09:09 Dose:  Not Given


Repaglinide (Prandin)  2 mg PO DAILY [facility]


   Last Admin: 12/16/18 10:00 Dose:  Not Given


Roflumilast (Daliresp)  500 mcg PO DAILY [facility]


   Last Admin: 12/14/18 10:27 Dose:  500 mcg


Rosuvastatin Calcium (Crestor)  20 mg PO HS [facility]


   Last Admin: 12/15/18 22:12 Dose:  Not Given


Sacubitril/Valsartan (Entresto 49 Mg-51 Mg)  1 tab PO BID [facility]


   Last Admin: 12/16/18 10:00 Dose:  Not Given











- Labs


Labs: 


                                        





                                 12/15/18 06:28 





                                 12/15/18 06:28 





                                        











PT  12.8 SECONDS (9.7-12.2)  H  12/06/18  20:31    


 


INR  1.2   12/06/18  20:31    


 


APTT  33 SECONDS (21-34)   12/06/18  20:31

## 2018-12-17 RX ADMIN — HUMAN INSULIN SCH: 100 INJECTION, SOLUTION SUBCUTANEOUS at 18:06

## 2018-12-17 RX ADMIN — HUMAN INSULIN SCH: 100 INJECTION, SOLUTION SUBCUTANEOUS at 22:20

## 2018-12-17 RX ADMIN — RANOLAZINE SCH: 500 TABLET, FILM COATED, EXTENDED RELEASE ORAL at 18:06

## 2018-12-17 RX ADMIN — DOBUTAMINE HYDROCHLORIDE SCH: 200 INJECTION INTRAVENOUS at 16:00

## 2018-12-17 RX ADMIN — HUMAN INSULIN SCH UNITS: 100 INJECTION, SOLUTION SUBCUTANEOUS at 08:24

## 2018-12-17 RX ADMIN — RANOLAZINE SCH: 500 TABLET, FILM COATED, EXTENDED RELEASE ORAL at 09:50

## 2018-12-17 RX ADMIN — SACUBITRIL AND VALSARTAN SCH: 49; 51 TABLET, FILM COATED ORAL at 09:48

## 2018-12-17 RX ADMIN — IPRATROPIUM BROMIDE AND ALBUTEROL SULFATE SCH ML: .5; 3 SOLUTION RESPIRATORY (INHALATION) at 02:54

## 2018-12-17 RX ADMIN — LINEZOLID SCH MLS/HR: 600 INJECTION, SOLUTION INTRAVENOUS at 12:13

## 2018-12-17 RX ADMIN — ASPIRIN SCH: 325 TABLET, DELAYED RELEASE ORAL at 09:47

## 2018-12-17 RX ADMIN — IPRATROPIUM BROMIDE AND ALBUTEROL SULFATE SCH ML: .5; 3 SOLUTION RESPIRATORY (INHALATION) at 07:40

## 2018-12-17 RX ADMIN — HUMAN INSULIN SCH UNITS: 100 INJECTION, SOLUTION SUBCUTANEOUS at 12:12

## 2018-12-17 RX ADMIN — DOBUTAMINE HYDROCHLORIDE SCH MLS/HR: 200 INJECTION INTRAVENOUS at 18:25

## 2018-12-17 RX ADMIN — SACUBITRIL AND VALSARTAN SCH: 49; 51 TABLET, FILM COATED ORAL at 18:05

## 2018-12-17 RX ADMIN — IPRATROPIUM BROMIDE AND ALBUTEROL SULFATE SCH ML: .5; 3 SOLUTION RESPIRATORY (INHALATION) at 13:37

## 2018-12-17 RX ADMIN — IPRATROPIUM BROMIDE AND ALBUTEROL SULFATE SCH ML: .5; 3 SOLUTION RESPIRATORY (INHALATION) at 20:27

## 2018-12-17 NOTE — RAD
Date of service: 



12/17/2018



HISTORY:

 chf 



COMPARISON:

12/14/2018 



FINDINGS:



LUNGS:

There is once again evidence of moderate opacification in the entire 

right lung, grossly unchanged.  There is additionally persistent 

infiltrate and/or atelectasis in the left mid and lower lung field.  

Vasculature is once again congested with mild edema.  Left pacemaker 

is unchanged.  Heart is enlarged.



PLEURA:

No change.



CARDIOVASCULAR:

Mild atherosclerotic calcification of the aorta.



Normal cardiac size. No pulmonary vascular congestion. 



OSSEOUS STRUCTURES:

No significant abnormalities.



VISUALIZED UPPER ABDOMEN:

Normal.



OTHER FINDINGS:

None.



IMPRESSION:

Persistent bilateral right and left lower lung field infiltrates or 

edema.

## 2018-12-17 NOTE — CP.PCM.PN
Subjective





- Date & Time of Evaluation


Date of Evaluation: 12/17/18


Time of Evaluation: 20:55





- Subjective


Subjective: 





AFEBRILE,BP LOW


ON BIPAP +VE SOB


OPENS EYES TO NAME





NO ACUTE DISTRESS,


O2 SAT 98%.





OUTPUT BETTER ON DOBUTAMINE.


ON IV CEFIPIME


IV ZYVOX





-MIDLINE LEAKING-D/C


PERIPHERAL IV OBTAINED.





LABS ;


REVIEWED.





Objective





- Vital Signs/Intake and Output


Vital Signs (last 24 hours): 


                                        











Temp Pulse Resp BP Pulse Ox


 


 97.6 F   78   24   90/56 L  99 


 


 12/17/18 15:52  12/17/18 20:28  12/17/18 18:25  12/17/18 18:25  12/17/18 18:25








Intake and Output: 


                                        











 12/17/18 12/18/18





 18:59 06:59


 


Intake Total 440 


 


Output Total 20 


 


Balance 420 














- Medications


Medications: 


                               Current Medications





Albuterol Sulfate (Albuterol 0.042% Inhal Sol (1.25mg/3ml) Ud)  1.25 mg INH RQ2 

PRN


   PRN Reason: Wheezing


Albuterol/Ipratropium (Duoneb 3 Mg/0.5 Mg (3 Ml) Ud)  3 ml INH RQ6 [facility]


   Last Admin: 12/17/18 20:27 Dose:  3 ml


Aspirin (Ecotrin)  325 mg PO DAILY [facility]


   Last Admin: 12/17/18 09:47 Dose:  Not Given


Ergocalciferol (Drisdol 50,000 Intl Units Cap)  1 cap PO QWK [facility]


   Last Admin: 12/13/18 09:38 Dose:  1 cap


Furosemide (Lasix)  20 mg IVP DAILY [facility]


   Last Admin: 12/17/18 09:48 Dose:  Not Given


Furosemide (Lasix)  60 mg IVP Q12 [facility]


   Last Admin: 12/17/18 09:49 Dose:  Not Given


Heparin Sodium (Porcine) (Heparin)  5,000 units SC Q8 [facility]


   Last Admin: 12/17/18 14:06 Dose:  5,000 units


Cefepime HCl 0.5 gm/ Dextrose  50 mls @ 100 mls/hr IVPB Q24H [facility]; Protocol


   Last Admin: 12/17/18 09:51 Dose:  100 mls/hr


Linezolid (Zyvox 600mg/300ml D5w)  600 mg in 300 mls @ 200 mls/hr IVPB Q12H [facility];

Protocol


   Last Admin: 12/17/18 12:13 Dose:  200 mls/hr


Dobutamine HCl/Dextrose (Dobutamine/Dextrose 5% 500mg/250ml)  500 mg in 250 mls 

@ 7.076 mls/hr IV .Q24H [facility]; Protocol


   Last Admin: 12/17/18 18:25 Dose:  7.076 mls/hr


Multivitamins/Vitamin C 10 ml/Chromium/Copper/Manganese/Zinc 1 ml/ Amino Acids  

1,011 mls @ 50 mls/hr IV .[id number] [facility]


   Stop: 12/18/18 14:13


   Last Admin: 12/17/18 18:24 Dose:  50 mls/hr


Amino Acids (Clinimix 4.25/5 % "E" (1000 Ml))  1,000 mls @ 50 mls/hr IV .Q20H 

[facility]


   Stop: 12/18/18 17:59


Fat Emulsion Intravenous (Intralipid 20%)  500 mls @ 40 mls/hr IV ONCE [facility]


   Stop: 12/18/18 06:29


   Last Admin: 12/17/18 18:24 Dose:  40 mls/hr


Insulin Human Regular (Novolin R)  0 unit SC ACHS [facility]; Protocol


   Last Admin: 12/17/18 18:06 Dose:  Not Given


Metoprolol Tartrate (Lopressor)  25 mg PO BID [facility]


   Last Admin: 12/17/18 18:05 Dose:  Not Given


Montelukast Sodium (Singulair)  10 mg PO DAILY [facility]


   Last Admin: 12/17/18 09:50 Dose:  Not Given


Morphine Sulfate (Morphine)  2 mg IVP Q4 PRN


   PRN Reason: Anxiety


   Last Admin: 12/17/18 19:21 Dose:  2 mg


Ranolazine (Ranexa)  500 mg PO BID [facility]


   Last Admin: 12/17/18 18:06 Dose:  Not Given


Repaglinide (Prandin)  2 mg PO DAILY [facility]


   Last Admin: 12/17/18 09:49 Dose:  Not Given


Roflumilast (Daliresp)  500 mcg PO DAILY [facility]


   Last Admin: 12/14/18 10:27 Dose:  500 mcg


Rosuvastatin Calcium (Crestor)  20 mg PO HS [facility]


   Last Admin: 12/16/18 22:07 Dose:  Not Given


Sacubitril/Valsartan (Entresto 49 Mg-51 Mg)  1 tab PO BID [facility]


   Last Admin: 12/17/18 18:05 Dose:  Not Given











- Labs


Labs: 


                                        





                                 12/15/18 06:28 





                                 12/15/18 06:28 





                                        











PT  12.8 SECONDS (9.7-12.2)  H  12/06/18  20:31    


 


INR  1.2   12/06/18  20:31    


 


APTT  33 SECONDS (21-34)   12/06/18  20:31    














- Constitutional


Appears: No Acute Distress, Cachectic, Chronically Ill





- Head Exam


Head Exam: NORMAL INSPECTION





- Eye Exam


Eye Exam: EOMI, PERRL





- ENT Exam


ENT Exam: Normal Oropharynx





- Neck Exam


Neck Exam: Normal Inspection





- Respiratory Exam


Respiratory Exam: Prolonged Expiratory Phase, Rhonchi, Wheezes





- Cardiovascular Exam


Cardiovascular Exam: Tachycardia, REGULAR RHYTHM, +S1, +S2





- GI/Abdominal Exam


GI & Abdominal Exam: Soft, Normal Bowel Sounds





- Extremities Exam


Extremities Exam: absent: Calf Tenderness, Pedal Edema





- Neurological Exam


Neurological Exam: Awake





- Psychiatric Exam


Psychiatric exam: Normal Affect





- Skin


Skin Exam: Normal Color, Warm





Assessment and Plan


(1) Acute respiratory failure with hypoxia


Status: Acute   





(2) Chr obstructive pulmonary disease w/ acute lower respiratory infxn


Status: Acute   





(3) CHF exacerbation


Status: Acute   





(4) Cardiomyopathy


Status: Acute   





(5) DM type 2 (diabetes mellitus, type 2)


Status: Acute   





(6) CAD (coronary artery disease)


Status: Acute   





(7) Anemia


Status: Acute   





(8) Ischemic cardiomyopathy


Status: Acute   





- Assessment and Plan (Free Text)


Plan: 





CONTINUE IV ABX  IV CEFEPIME.


CONTINUE IV V ZYVOX 600 MG IV Q [id number]/15/18


OUTPUT IMPROVING.


CONTINUE SUPPORTIVE CARE.


AS PER PULMONARY.


PT DNR/DNI.


SPOKE TO FAMILY AND STAFF .

## 2018-12-17 NOTE — CP.PCM.PN
Subjective





- Date & Time of Evaluation


Date of Evaluation: 12/17/18


Time of Evaluation: 14:19





- Subjective


Subjective: 





Patient is waking up.


Blood pressure still in the 90s systolic.


Urine output is increased to 1200 cc after Dobutrex.


BUN creatinine is still up.


Repeat chest x-ray.





Objective





- Vital Signs/Intake and Output


Vital Signs (last 24 hours): 


                                        











Temp Pulse Resp BP Pulse Ox


 


 98.0 F   99 H  18   95/49 L  96 


 


 12/17/18 07:40  12/17/18 13:38  12/17/18 07:40  12/17/18 09:49  12/17/18 07:40








Intake and Output: 


                                        











 12/17/18 12/17/18





 11:59 23:59


 


Intake Total 440 


 


Output Total 450 


 


Balance -10 














- Medications


Medications: 


                               Current Medications





Albuterol Sulfate (Albuterol 0.042% Inhal Sol (1.25mg/3ml) Ud)  1.25 mg INH RQ2 

PRN


   PRN Reason: Wheezing


Albuterol/Ipratropium (Duoneb 3 Mg/0.5 Mg (3 Ml) Ud)  3 ml INH RQ6 [facility]


   Last Admin: 12/17/18 13:37 Dose:  3 ml


Aspirin (Ecotrin)  325 mg PO DAILY [facility]


   Last Admin: 12/17/18 09:47 Dose:  Not Given


Ergocalciferol (Drisdol 50,000 Intl Units Cap)  1 cap PO QWK [facility]


   Last Admin: 12/13/18 09:38 Dose:  1 cap


Furosemide (Lasix)  20 mg IVP DAILY [facility]


   Last Admin: 12/17/18 09:48 Dose:  Not Given


Furosemide (Lasix)  60 mg IVP Q12 [facility]


   Last Admin: 12/17/18 09:49 Dose:  Not Given


Heparin Sodium (Porcine) (Heparin)  5,000 units SC Q8 [facility]


   Last Admin: 12/17/18 14:06 Dose:  5,000 units


Cefepime HCl 0.5 gm/ Dextrose  50 mls @ 100 mls/hr IVPB Q24H [facility]; Protocol


   Last Admin: 12/17/18 09:51 Dose:  100 mls/hr


Linezolid (Zyvox 600mg/300ml D5w)  600 mg in 300 mls @ 200 mls/hr IVPB Q12H [facility];

Protocol


   Last Admin: 12/17/18 12:13 Dose:  200 mls/hr


Parenteral Electrolytes 20 ml/ (Amino Acids)  1,020 mls @ 50 mls/hr IV .[id number] 

[facility]


   Stop: 12/17/18 18:00


Dobutamine HCl/Dextrose (Dobutamine/Dextrose 5% 500mg/250ml)  500 mg in 250 mls 

@ 7.076 mls/hr IV .Q24H [facility]; Protocol


   Last Admin: 12/16/18 16:15 Dose:  7.076 mls/hr


Multivitamins/Vitamin C 10 ml/Chromium/Copper/Manganese/Zinc 1 ml/ Amino Acids  

1,011 mls @ 50 mls/hr IV .[id number] [facility]


   Stop: 12/18/18 14:13


Amino Acids (Clinimix 4.25/5 % "E" (1000 Ml))  1,000 mls @ 50 mls/hr IV .Q20H 

[facility]


   Stop: 12/18/18 17:59


Fat Emulsion Intravenous (Intralipid 20%)  500 mls @ 40 mls/hr IV ONCE [facility]


   Stop: 12/18/18 06:29


Insulin Human Regular (Novolin R)  0 unit SC [facility]; Protocol


   Last Admin: 12/17/18 12:12 Dose:  4 units


Metoprolol Tartrate (Lopressor)  25 mg PO BID [facility]


   Last Admin: 12/17/18 09:49 Dose:  Not Given


Montelukast Sodium (Singulair)  10 mg PO DAILY [facility]


   Last Admin: 12/17/18 09:50 Dose:  Not Given


Morphine Sulfate (Morphine)  2 mg IVP Q4 PRN


   PRN Reason: Anxiety


   Last Admin: 12/17/18 12:36 Dose:  2 mg


Ranolazine (Ranexa)  500 mg PO BID [facility]


   Last Admin: 12/17/18 09:50 Dose:  Not Given


Repaglinide (Prandin)  2 mg PO DAILY [facility]


   Last Admin: 12/17/18 09:49 Dose:  Not Given


Roflumilast (Daliresp)  500 mcg PO DAILY [facility]


   Last Admin: 12/14/18 10:27 Dose:  500 mcg


Rosuvastatin Calcium (Crestor)  20 mg PO HS [facility]


   Last Admin: 12/16/18 22:07 Dose:  Not Given


Sacubitril/Valsartan (Entresto 49 Mg-51 Mg)  1 tab PO BID [facility]


   Last Admin: 12/17/18 09:48 Dose:  Not Given











- Labs


Labs: 


                                        





                                 12/15/18 06:28 





                                 12/15/18 06:28 





                                        











PT  12.8 SECONDS (9.7-12.2)  H  12/06/18  20:31    


 


INR  1.2   12/06/18  20:31    


 


APTT  33 SECONDS (21-34)   12/06/18  20:31

## 2018-12-17 NOTE — CP.PCM.PN
Subjective





- Date & Time of Evaluation


Date of Evaluation: 12/17/18


Time of Evaluation: 09:30





- Subjective


Subjective: 





Patient seen and examined at bedside.


Afebrile.


+SOB


remains on BiPAP


No response to vocal command


Patient is DNR/DNI














Objective





- Vital Signs/Intake and Output


Vital Signs (last 24 hours): 


                                        











Temp Pulse Resp BP Pulse Ox


 


 98.0 F   99 H  18   95/49 L  96 


 


 12/17/18 07:40  12/17/18 13:38  12/17/18 07:40  12/17/18 09:49  12/17/18 07:40








Intake and Output: 


                                        











 12/17/18 12/17/18





 06:59 18:59


 


Intake Total 880 


 


Output Total 700 


 


Balance 180 














- Medications


Medications: 


                               Current Medications





Albuterol Sulfate (Albuterol 0.042% Inhal Sol (1.25mg/3ml) Ud)  1.25 mg INH RQ2 

PRN


   PRN Reason: Wheezing


Albuterol/Ipratropium (Duoneb 3 Mg/0.5 Mg (3 Ml) Ud)  3 ml INH RQ6 [facility]


   Last Admin: 12/17/18 13:37 Dose:  3 ml


Aspirin (Ecotrin)  325 mg PO DAILY [facility]


   Last Admin: 12/17/18 09:47 Dose:  Not Given


Ergocalciferol (Drisdol 50,000 Intl Units Cap)  1 cap PO QWK [facility]


   Last Admin: 12/13/18 09:38 Dose:  1 cap


Furosemide (Lasix)  20 mg IVP DAILY [facility]


   Last Admin: 12/17/18 09:48 Dose:  Not Given


Furosemide (Lasix)  60 mg IVP Q12 [facility]


   Last Admin: 12/17/18 09:49 Dose:  Not Given


Heparin Sodium (Porcine) (Heparin)  5,000 units SC Q8 [facility]


   Last Admin: 12/17/18 05:50 Dose:  5,000 units


Cefepime HCl 0.5 gm/ Dextrose  50 mls @ 100 mls/hr IVPB Q24H [facility]; Protocol


   Last Admin: 12/17/18 09:51 Dose:  100 mls/hr


Linezolid (Zyvox 600mg/300ml D5w)  600 mg in 300 mls @ 200 mls/hr IVPB Q12H [facility];

Protocol


   Last Admin: 12/17/18 12:13 Dose:  200 mls/hr


Chromium/Copper/Manganese/Zinc 1 ml/ Multivitamins/Vitamin C 10 ml/ Parenteral 

Electrolytes 20 ml/ Amino Acids  1,031 mls @ 50 mls/hr IV .[id number] [facility]


   Stop: 12/17/18 14:00


   Last Admin: 12/16/18 18:39 Dose:  50 mls/hr


Parenteral Electrolytes 20 ml/ (Amino Acids)  1,020 mls @ 50 mls/hr IV .[id number] 

[facility]


   Stop: 12/17/18 18:00


Dobutamine HCl/Dextrose (Dobutamine/Dextrose 5% 500mg/250ml)  500 mg in 250 mls 

@ 7.076 mls/hr IV .Q24H [facility]; Protocol


   Last Admin: 12/16/18 16:15 Dose:  7.076 mls/hr


Multivitamins/Vitamin C 10 ml/Chromium/Copper/Manganese/Zinc 1 ml/ Amino Acids  

1,011 mls @ 50 mls/hr IV .[id number] [facility]


   Stop: 12/18/18 14:13


Amino Acids (Clinimix 4.25/5 % "E" (1000 Ml))  1,000 mls @ 50 mls/hr IV .Q20H 

[facility]


   Stop: 12/18/18 17:59


Fat Emulsion Intravenous (Intralipid 20%)  500 mls @ 40 mls/hr IV ONCE ONE


   Stop: 12/18/18 06:29


Insulin Human Regular (Novolin R)  0 unit SC [facility]; Protocol


   Last Admin: 12/17/18 12:12 Dose:  4 units


Metoprolol Tartrate (Lopressor)  25 mg PO BID [facility]


   Last Admin: 12/17/18 09:49 Dose:  Not Given


Montelukast Sodium (Singulair)  10 mg PO DAILY [facility]


   Last Admin: 12/17/18 09:50 Dose:  Not Given


Morphine Sulfate (Morphine)  2 mg IVP Q4 PRN


   PRN Reason: Anxiety


   Last Admin: 12/17/18 12:36 Dose:  2 mg


Ranolazine (Ranexa)  500 mg PO BID [facility]


   Last Admin: 12/17/18 09:50 Dose:  Not Given


Repaglinide (Prandin)  2 mg PO DAILY [facility]


   Last Admin: 12/17/18 09:49 Dose:  Not Given


Roflumilast (Daliresp)  500 mcg PO DAILY [facility]


   Last Admin: 12/14/18 10:27 Dose:  500 mcg


Rosuvastatin Calcium (Crestor)  20 mg PO HS [facility]


   Last Admin: 12/16/18 22:07 Dose:  Not Given


Sacubitril/Valsartan (Entresto 49 Mg-51 Mg)  1 tab PO BID [facility]


   Last Admin: 12/17/18 09:48 Dose:  Not Given











- Labs


Labs: 


                                        





                                 12/15/18 06:28 





                                 12/15/18 06:28 





                                        











PT  12.8 SECONDS (9.7-12.2)  H  12/06/18  20:31    


 


INR  1.2   12/06/18  20:31    


 


APTT  33 SECONDS (21-34)   12/06/18  20:31    














- Head Exam


Head Exam: ATRAUMATIC, NORMOCEPHALIC





- ENT Exam


ENT Exam: Mucous Membranes Moist





- Neck Exam


Neck Exam: Normal Inspection





- Respiratory Exam


Respiratory Exam: Decreased Breath Sounds





- Cardiovascular Exam


Cardiovascular Exam: Irregular Rhythm





- GI/Abdominal Exam


GI & Abdominal Exam: Soft, Normal Bowel Sounds





Assessment and Plan


(1) Acute respiratory failure with hypoxia


Assessment & Plan: 


On BiPAP and Dobutrex bronchodilators IV ABX.


Continue supportive therapy.


Status: Acute   





(2) CHF exacerbation


Status: Acute   





(3) COPD exacerbation


Status: Acute   





(4) Cardiomyopathy


Status: Acute   





(5) Pneumonia


Status: Acute

## 2018-12-18 LAB
ALBUMIN SERPL-MCNC: 3.1 G/DL (ref 3.5–5)
ALBUMIN/GLOB SERPL: 1.1 {RATIO} (ref 1–2.1)
ALT SERPL-CCNC: 39 U/L (ref 9–52)
ANISOCYTOSIS BLD QL SMEAR: SLIGHT
APTT BLD: 30 SECONDS (ref 21–34)
ARTERIAL BLOOD GAS HEMOGLOBIN: 10.9 G/DL (ref 11.7–17.4)
ARTERIAL BLOOD GAS O2 SAT: 88.8 % (ref 95–98)
ARTERIAL BLOOD GAS O2 SAT: 96.1 % (ref 95–98)
ARTERIAL BLOOD GAS PCO2: 60 MM/HG (ref 35–45)
ARTERIAL BLOOD GAS PCO2: 71 MM/HG (ref 35–45)
ARTERIAL BLOOD GAS TCO2: 21.8 MMOL/L (ref 22–28)
ARTERIAL BLOOD GAS TCO2: 24.7 MMOL/L (ref 22–28)
ARTERIAL PATENCY WRIST A: (no result)
ARTERIAL PATENCY WRIST A: (no result)
AST SERPL-CCNC: 55 U/L (ref 14–36)
BASO STIPL BLD QL SMEAR: SLIGHT
BASOPHILS # BLD AUTO: 0.1 K/UL (ref 0–0.2)
BASOPHILS NFR BLD: 0.3 % (ref 0–2)
BUN SERPL-MCNC: 112 MG/DL (ref 7–17)
BURR CELLS BLD QL SMEAR: SLIGHT
CALCIUM SERPL-MCNC: 7.8 MG/DL (ref 8.6–10.4)
EOSINOPHIL # BLD AUTO: 0 K/UL (ref 0–0.7)
EOSINOPHIL NFR BLD: 0.2 % (ref 0–4)
EOSINOPHIL NFR BLD: 2 % (ref 0–4)
ERYTHROCYTE [DISTWIDTH] IN BLOOD BY AUTOMATED COUNT: 17.4 % (ref 11.5–14.5)
GFR NON-AFRICAN AMERICAN: 8
HCO3 BLDA-SCNC: 14.9 MMOL/L (ref 21–28)
HCO3 BLDA-SCNC: 20.4 MMOL/L (ref 21–28)
HGB BLD-MCNC: 7.4 G/DL (ref 11–16)
HYPOCHROMIC: SLIGHT
INHALED O2 CONCENTRATION: 100 %
INHALED O2 CONCENTRATION: 50 %
INR PPP: 1.3
LG PLATELETS BLD QL SMEAR: PRESENT
LYMPHOCYTE: 1 % (ref 20–40)
LYMPHOCYTES # BLD AUTO: 0.2 K/UL (ref 1–4.3)
LYMPHOCYTES NFR BLD AUTO: 0.8 % (ref 20–40)
MCH RBC QN AUTO: 27.4 PG (ref 27–31)
MCHC RBC AUTO-ENTMCNC: 31.3 G/DL (ref 33–37)
MCV RBC AUTO: 87.4 FL (ref 81–99)
MONOCYTE: 8 % (ref 0–10)
MONOCYTES # BLD: 1.6 K/UL (ref 0–0.8)
MONOCYTES NFR BLD: 7.4 % (ref 0–10)
NEUTROPHILS # BLD: 19.7 K/UL (ref 1.8–7)
NEUTROPHILS NFR BLD AUTO: 89 % (ref 50–75)
NEUTROPHILS NFR BLD AUTO: 91.3 % (ref 50–75)
NRBC BLD AUTO-RTO: 0.4 % (ref 0–2)
OVALOCYTES BLD QL SMEAR: SLIGHT
PH BLDA: 7.05 [PH] (ref 7.35–7.45)
PH BLDA: 7.19 [PH] (ref 7.35–7.45)
PLATELET # BLD EST: NORMAL 10*3/UL
PLATELET # BLD: 150 K/UL (ref 130–400)
PMV BLD AUTO: 10.6 FL (ref 7.2–11.7)
PO2 BLDA: 53 MM/HG (ref 80–100)
PO2 BLDA: 90 MM/HG (ref 80–100)
POIKILOCYTOSIS BLD QL SMEAR: SLIGHT
PROTHROMBIN TIME: 14 SECONDS (ref 9.7–12.2)
RBC # BLD AUTO: 2.71 MIL/UL (ref 3.8–5.2)
TEARDROP CELLS: SLIGHT
TOTAL CELLS COUNTED BLD: 100
WBC # BLD AUTO: 21.6 K/UL (ref 4.8–10.8)

## 2018-12-18 PROCEDURE — 5A1945Z RESPIRATORY VENTILATION, 24-96 CONSECUTIVE HOURS: ICD-10-PCS | Performed by: INTERNAL MEDICINE

## 2018-12-18 PROCEDURE — 0BH17EZ INSERTION OF ENDOTRACHEAL AIRWAY INTO TRACHEA, VIA NATURAL OR ARTIFICIAL OPENING: ICD-10-PCS | Performed by: INTERNAL MEDICINE

## 2018-12-18 RX ADMIN — IPRATROPIUM BROMIDE AND ALBUTEROL SULFATE SCH ML: .5; 3 SOLUTION RESPIRATORY (INHALATION) at 01:25

## 2018-12-18 RX ADMIN — LINEZOLID SCH MLS/HR: 600 INJECTION, SOLUTION INTRAVENOUS at 00:26

## 2018-12-18 RX ADMIN — IPRATROPIUM BROMIDE AND ALBUTEROL SULFATE SCH ML: .5; 3 SOLUTION RESPIRATORY (INHALATION) at 08:57

## 2018-12-18 RX ADMIN — HUMAN INSULIN SCH: 100 INJECTION, SOLUTION SUBCUTANEOUS at 17:40

## 2018-12-18 RX ADMIN — SACUBITRIL AND VALSARTAN SCH: 49; 51 TABLET, FILM COATED ORAL at 10:02

## 2018-12-18 RX ADMIN — RANOLAZINE SCH: 500 TABLET, FILM COATED, EXTENDED RELEASE ORAL at 17:41

## 2018-12-18 RX ADMIN — SACUBITRIL AND VALSARTAN SCH TAB: 49; 51 TABLET, FILM COATED ORAL at 17:38

## 2018-12-18 RX ADMIN — ASPIRIN SCH: 325 TABLET, DELAYED RELEASE ORAL at 10:02

## 2018-12-18 RX ADMIN — LINEZOLID SCH MLS/HR: 600 INJECTION, SOLUTION INTRAVENOUS at 13:04

## 2018-12-18 RX ADMIN — IPRATROPIUM BROMIDE AND ALBUTEROL SULFATE SCH ML: .5; 3 SOLUTION RESPIRATORY (INHALATION) at 13:47

## 2018-12-18 RX ADMIN — SACUBITRIL AND VALSARTAN SCH: 49; 51 TABLET, FILM COATED ORAL at 17:42

## 2018-12-18 RX ADMIN — DOBUTAMINE HYDROCHLORIDE SCH: 200 INJECTION INTRAVENOUS at 15:30

## 2018-12-18 RX ADMIN — RANOLAZINE SCH: 500 TABLET, FILM COATED, EXTENDED RELEASE ORAL at 10:02

## 2018-12-18 RX ADMIN — DEXMEDETOMIDINE HYDROCHLORIDE PRN MLS/HR: 100 INJECTION, SOLUTION, CONCENTRATE INTRAVENOUS at 19:30

## 2018-12-18 RX ADMIN — HUMAN INSULIN SCH UNITS: 100 INJECTION, SOLUTION SUBCUTANEOUS at 08:50

## 2018-12-18 RX ADMIN — HUMAN INSULIN SCH UNITS: 100 INJECTION, SOLUTION SUBCUTANEOUS at 12:30

## 2018-12-18 NOTE — CP.PCM.PN
Subjective





- Date & Time of Evaluation


Date of Evaluation: 12/18/18


Time of Evaluation: 22:11





- Subjective


Subjective: 





CHIEF COMPLAINTS TODAY :


events noted,


Patient transferred to ICU,due to respiratory distress


Patient presently intubated.








s/p pigtail chest tube catheter between ribs 3 and 4 right-sided.


s/p triple lumen catheter right-sided neck.








ROS. ( on observation only )


HEENT :  N.


Resp :       No cough, wheezing ,pleuritic CP ,or hemoptysis +vr RT -PIGTAIL 

CATHETER


                   RIGHT CHEST WALL.


Cardio :     No anginal  CP, PND, orthopnea, palpitation 


GI :           No abd.pain, n/v ,diarrhea or GI bleeding .


CNS : No headache, vertigo, focal deficit.


Musculoskel :  No joint swelling ,


Derm :        No rash 


Psych :     Normal affect.


Ext :  No  swelling ,calf pain 





PE.


Pt. INTUBATED,ON VENTILATOR


SEDATED


V.S  As noted in the chart 


Head ,ear nose,throat and eyes : Normal.


Neck : Supple with normal carotids.


Lungs: DECREASED BREATH SOUNDS RIGHT SIDE.


            vr RT -PIGTAIL CATHETER  RIGHT CHEST WALL.


Heart : S1 & S2 normal with S4. No murmur.


Abd : Soft non tender with normal bowel sounds.


Neuro : Moves all ext. with no localized deficit.(sedated )


Ext : No edema with intact pulses.Non tender calves 


Derm : No rashes or decubitus ulcer.





LABS/RADIOLOGY:


reviewed.


WBC 21.6


H/H 7.4/23.7.


creat 4.9/bun 112








Objective





- Vital Signs/Intake and Output


Vital Signs (last 24 hours): 


                                        











Temp Pulse Resp BP Pulse Ox


 


 98 F   95 H  22   93/73 L  94 L


 


 12/18/18 15:39  12/18/18 21:41  12/18/18 21:41  12/18/18 21:41  12/18/18 21:41








Intake and Output: 


                                        











 12/18/18 12/19/18





 18:59 06:59


 


Intake Total 400 6


 


Output Total 10 


 


Balance 390 6














- Medications


Medications: 


                               Current Medications





Albuterol Sulfate (Albuterol 0.042% Inhal Sol (1.25mg/3ml) Ud)  1.25 mg INH RQ2 

PRN


   PRN Reason: Wheezing


Albuterol/Ipratropium (Duoneb 3 Mg/0.5 Mg (3 Ml) Ud)  3 ml INH RQ6 [facility]


   Last Admin: 12/18/18 13:47 Dose:  3 ml


Aspirin (Ecotrin)  325 mg PO DAILY [facility]


   Last Admin: 12/18/18 10:02 Dose:  Not Given


Ergocalciferol (Drisdol 50,000 Intl Units Cap)  1 cap PO QWK [facility]


   Last Admin: 12/13/18 09:38 Dose:  1 cap


Furosemide (Lasix)  20 mg IVP DAILY [facility]


   Last Admin: 12/18/18 10:01 Dose:  Not Given


Cefepime HCl 0.5 gm/ Dextrose  50 mls @ 100 mls/hr IVPB Q24H [facility]; Protocol


   Last Admin: 12/18/18 10:01 Dose:  100 mls/hr


Linezolid (Zyvox 600mg/300ml D5w)  600 mg in 300 mls @ 200 mls/hr IVPB Q12H [facility];

Protocol


   Last Admin: 12/18/18 13:04 Dose:  200 mls/hr


Dobutamine HCl/Dextrose (Dobutamine/Dextrose 5% 500mg/250ml)  500 mg in 250 mls 

@ 7.076 mls/hr IV .Q24H [facility]; Protocol


   Last Admin: 12/18/18 15:30 Dose:  Not Given


Dexmedetomidine HCl 200 mcg/ (Sodium Chloride)  50 mls @ 2.34 mls/hr IV TITR 

PRN; Protocol


   PRN Reason: Sedation


   Last Titration: 12/18/18 21:49 Dose:  0.4 mcg/kg/hr, 4.7 mls/hr


Norepinephrine Bitartrate 4 mg (/ Sodium Chloride)  254 mls @ 15.24 mls/hr IV 

.[id number] PRN; Protocol


   PRN Reason: TITRATE PER MD ORDER


   Last Admin: 12/18/18 21:41 Dose:  3.93 mcg/min, 15 mls/hr


Insulin Human Regular (Novolin R)  0 unit SC ACHS [facility]; Protocol


   Last Admin: 12/18/18 17:40 Dose:  Not Given


Metoprolol Tartrate (Lopressor)  25 mg PO BID [facility]


   Last Admin: 12/18/18 17:39 Dose:  Not Given


Montelukast Sodium (Singulair)  10 mg PO DAILY [facility]


   Last Admin: 12/18/18 10:03 Dose:  Not Given


Morphine Sulfate (Morphine)  2 mg IVP Q4 PRN


   PRN Reason: Anxiety


   Last Admin: 12/18/18 13:16 Dose:  2 mg


Ranolazine (Ranexa)  500 mg PO BID [facility]


   Last Admin: 12/18/18 17:41 Dose:  Not Given


Repaglinide (Prandin)  2 mg PO DAILY [facility]


   Last Admin: 12/18/18 10:00 Dose:  Not Given


Roflumilast (Daliresp)  500 mcg PO DAILY [facility]


   Last Admin: 12/14/18 10:27 Dose:  500 mcg


Rosuvastatin Calcium (Crestor)  20 mg PO HS [facility]


   Last Admin: 12/18/18 21:40 Dose:  Not Given


Sacubitril/Valsartan (Entresto 49 Mg-51 Mg)  1 tab PO BID [facility]


   Last Admin: 12/18/18 17:42 Dose:  Not Given











- Labs


Labs: 


                                        





                                 12/18/18 20:53 





                                 12/18/18 20:53 





                                        











PT  14.0 SECONDS (9.7-12.2)  H  12/18/18  20:53    


 


INR  1.3   12/18/18  20:53    


 


APTT  30 SECONDS (21-34)   12/18/18  20:53    














Assessment and Plan


(1) Acute respiratory failure with hypoxia


Status: Acute   





(2) Chr obstructive pulmonary disease w/ acute lower respiratory infxn


Status: Acute   





(3) CHF exacerbation


Status: Acute   





(4) Cardiomyopathy


Status: Acute   





(5) DM type 2 (diabetes mellitus, type 2)


Status: Acute   





(6) CAD (coronary artery disease)


Status: Acute   





(7) Anemia


Status: Acute   





(8) Ischemic cardiomyopathy


Status: Acute   





(9) Acute kidney failure


Assessment & Plan: 


SEC TO HYPOTENSION/SEPSIS/DRUGS


Status: Acute   





- Assessment and Plan (Free Text)


Plan: 





CONTINUE IV ABX  IV CEFEPIME.


CONTINUE IV V ZYVOX 600 MG IV Q [id number]


PT HAS RT.PTX, RT CT IN PLACE AS PER PULMONARY


MONITOR H/H.





CONTINUE SUPPORTIVE CARE.


F/U CXR


AS PER PULMONARY.


PROGNOSIS GUARDED.


PT DNR/DNI. ( recinded )

## 2018-12-18 NOTE — CP.PCM.PN
Subjective





- Date & Time of Evaluation


Date of Evaluation: 12/18/18


Time of Evaluation: 17:03





Objective





- Vital Signs/Intake and Output


Vital Signs (last 24 hours): 


                                        











Temp Pulse Resp BP Pulse Ox


 


 98 F   89   24   76/38 L  98 


 


 12/18/18 15:39  12/18/18 15:39  12/18/18 15:39  12/18/18 15:39  12/18/18 15:39








Intake and Output: 


                                        











 12/18/18 12/18/18





 06:59 18:59


 


Output Total 100 


 


Balance -100 














- Medications


Medications: 


                               Current Medications





Albuterol Sulfate (Albuterol 0.042% Inhal Sol (1.25mg/3ml) Ud)  1.25 mg INH RQ2 

PRN


   PRN Reason: Wheezing


Albuterol/Ipratropium (Duoneb 3 Mg/0.5 Mg (3 Ml) Ud)  3 ml INH RQ6 [facility]


   Last Admin: 12/18/18 13:47 Dose:  3 ml


Aspirin (Ecotrin)  325 mg PO DAILY [facility]


   Last Admin: 12/18/18 10:02 Dose:  Not Given


Ergocalciferol (Drisdol 50,000 Intl Units Cap)  1 cap PO QWK [facility]


   Last Admin: 12/13/18 09:38 Dose:  1 cap


Furosemide (Lasix)  20 mg IVP DAILY [facility]


   Last Admin: 12/18/18 10:01 Dose:  Not Given


Furosemide (Lasix)  60 mg IVP Q12 [facility]


   Last Admin: 12/18/18 10:00 Dose:  Not Given


Cefepime HCl 0.5 gm/ Dextrose  50 mls @ 100 mls/hr IVPB Q24H [facility]; Protocol


   Last Admin: 12/18/18 10:01 Dose:  100 mls/hr


Linezolid (Zyvox 600mg/300ml D5w)  600 mg in 300 mls @ 200 mls/hr IVPB Q12H [facility];

Protocol


   Last Admin: 12/18/18 00:26 Dose:  200 mls/hr


Dobutamine HCl/Dextrose (Dobutamine/Dextrose 5% 500mg/250ml)  500 mg in 250 mls 

@ 7.076 mls/hr IV .Q24H [facility]; Protocol


   Last Admin: 12/17/18 18:25 Dose:  7.076 mls/hr


Amino Acids (Clinimix 4.25/5 % "E" (1000 Ml))  1,000 mls @ 50 mls/hr IV .Q20H 

[facility]


   Stop: 12/18/18 17:59


Multivitamins/Vitamin C 10 ml/Chromium/Copper/Manganese/Zinc 1 ml/ Amino Acids  

1,011 mls @ 50 mls/hr IV .[id number] [facility]


   Stop: 12/19/18 14:00


Amino Acids (Clinimix 4.25/5 % "E" (1000 Ml))  1,000 mls @ 50 mls/hr IV .Q20H 

[facility]


   Stop: 12/19/18 18:00


Insulin Human Regular (Novolin R[facility]  [facility]; Protocol


   Last Admin: 12/18/18 12:30 Dose:  8 units


Metoprolol Tartrate (Lopressor)  25 mg PO BID [facility]


   Last Admin: 12/18/18 10:02 Dose:  Not Given


Montelukast Sodium (Singulair)  10 mg PO DAILY [facility]


   Last Admin: 12/18/18 10:03 Dose:  Not Given


Morphine Sulfate (Morphine)  2 mg IVP Q4 PRN


   PRN Reason: Anxiety


   Last Admin: 12/18/18 13:16 Dose:  2 mg


Ranolazine (Ranexa)  500 mg PO BID [facility]


   Last Admin: 12/18/18 10:02 Dose:  Not Given


Repaglinide (Prandin)  2 mg PO DAILY [facility]


   Last Admin: 12/18/18 10:00 Dose:  Not Given


Roflumilast (Daliresp)  500 mcg PO DAILY [facility]


   Last Admin: 12/14/18 10:27 Dose:  500 mcg


Rosuvastatin Calcium (Crestor)  20 mg PO HS [facility]


   Last Admin: 12/17/18 22:20 Dose:  Not Given


Sacubitril/Valsartan (Entresto 49 Mg-51 Mg)  1 tab PO BID [facility]


   Last Admin: 12/18/18 10:02 Dose:  Not Given











- Labs


Labs: 


                                        





                                 12/15/18 06:28 





                                 12/15/18 06:28 





                                        











PT  12.8 SECONDS (9.7-12.2)  H  12/06/18  20:31    


 


INR  1.2   12/06/18  20:31    


 


APTT  33 SECONDS (21-34)   12/06/18  20:31    














Assessment and Plan


(1) Acute respiratory failure with hypoxia


Status: Acute   





(2) Chr obstructive pulmonary disease w/ acute lower respiratory infxn


Status: Acute   





(3) CHF exacerbation


Status: Acute   





(4) Cardiomyopathy


Status: Acute   





(5) DM type 2 (diabetes mellitus, type 2)


Status: Acute   





(6) CAD (coronary artery disease)


Status: Acute   





(7) Anemia


Status: Acute   





(8) Ischemic cardiomyopathy


Status: Acute

## 2018-12-18 NOTE — CP.PCM.CON
History of Present Illness





- History of Present Illness


History of Present Illness: 





Vascular Surgery Consult Note. Dr. Dasilva service.





Consulted for possible Triple Lumen Access. 





87yo F with multiple comorbidities, who was admitted on 12/6/18 with acute 

exacerbation of CHF. Patient has had an extended stay in the hospital since for 

medical management. Patient was recently made DNR/DNI by the family and is curre

ntly on Med/Surg floor on BiPAP. Patient is in visible respiratory distress even

while being on he BiPAP. Patient had evidence of right upper extremity basilic 

vein thrombus as noted on the ultrasound on 12/13. Patient had a PICC on the 

left upper extremity however, was removed due to leaking and concern for left 

upper extremity DVT. Patient is currently unable to obtain further venous 

Ultrasound study to r/o DVT due to her respiratory status. Vascular surgery 

consulted for possible TLC placement. 





History obtained from chart review, staff and family at bedside. A complete 12-

point ROS unable to be obtained due to patient's acute respiratory status. 





Review of Systems





- Review of Systems


Systems not reviewed;Unavailable: Respiratory Distress





Past Patient History





- Infectious Disease


Hx of Infectious Diseases: None





- Tetanus Immunizations


Tetanus Immunization: Unknown





- Past Medical History & Family History


Past Medical History?: Yes





- Past Social History


Smoking Status: Never Smoked





- CARDIAC


Hx Cardiac Disorders: Yes (CAD)


Hx Hypercholesterolemia: Yes





- PULMONARY


Hx Chronic Obstructive Pulmonary Disease (COPD): Yes





- NEUROLOGICAL


Hx Transient Ischemic Attacks (TIA): Yes





- HEENT


Hx HEENT Problems: No





- RENAL


Hx Chronic Kidney Disease: No





- ENDOCRINE/METABOLIC


Hx Diabetes Mellitus Type 2: Yes





- HEMATOLOGICAL/ONCOLOGICAL


Hx Anemia: Yes





- INTEGUMENTARY


Hx Dermatological Problems: No





- MUSCULOSKELETAL/RHEUMATOLOGICAL


Hx Arthritis: Yes


Hx Falls: No





- GASTROINTESTINAL


Hx Gastrointestinal Disorders: No





- GENITOURINARY/GYNECOLOGICAL


Hx Genitourinary Disorders: No





- PSYCHIATRIC


Hx Anxiety: Yes


Hx Substance Use: No





- SURGICAL HISTORY


Hx Appendectomy: Yes


Hx Coronary Stent: Yes





- ANESTHESIA


Hx Anesthesia: Yes


Hx Anesthesia Reactions: No


Hx Malignant Hyperthermia: No





Meds


Allergies/Adverse Reactions: 


                                    Allergies











Allergy/AdvReac Type Severity Reaction Status Date / Time


 


clopidogrel Allergy Mild SWELLING Verified 06/06/18 13:34














- Medications


Medications: 


                               Current Medications





Albuterol Sulfate (Albuterol 0.042% Inhal Sol (1.25mg/3ml) Ud)  1.25 mg INH RQ2 

PRN


   PRN Reason: Wheezing


Albuterol/Ipratropium (Duoneb 3 Mg/0.5 Mg (3 Ml) Ud)  3 ml INH RQ6 [facility]


   Last Admin: 12/18/18 13:47 Dose:  3 ml


Aspirin (Ecotrin)  325 mg PO DAILY [facility]


   Last Admin: 12/18/18 10:02 Dose:  Not Given


Ergocalciferol (Drisdol 50,000 Intl Units Cap)  1 cap PO QWK [facility]


   Last Admin: 12/13/18 09:38 Dose:  1 cap


Furosemide (Lasix)  20 mg IVP DAILY [facility]


   Last Admin: 12/18/18 10:01 Dose:  Not Given


Furosemide (Lasix)  60 mg IVP Q12 [facility]


   Last Admin: 12/18/18 10:00 Dose:  Not Given


Cefepime HCl 0.5 gm/ Dextrose  50 mls @ 100 mls/hr IVPB Q24H [facility]; Protocol


   Last Admin: 12/18/18 10:01 Dose:  100 mls/hr


Linezolid (Zyvox 600mg/300ml D5w)  600 mg in 300 mls @ 200 mls/hr IVPB Q12H [facility];

Protocol


   Last Admin: 12/18/18 00:26 Dose:  200 mls/hr


Dobutamine HCl/Dextrose (Dobutamine/Dextrose 5% 500mg/250ml)  500 mg in 250 mls 

@ 7.076 mls/hr IV .Q24H [facility]; Protocol


   Last Admin: 12/17/18 18:25 Dose:  7.076 mls/hr


Amino Acids (Clinimix 4.25/5 % "E" (1000 Ml))  1,000 mls @ 50 mls/hr IV .Q20H 

[facility]


   Stop: 12/18/18 17:59


Multivitamins/Vitamin C 10 ml/Chromium/Copper/Manganese/Zinc 1 ml/ Amino Acids  

1,011 mls @ 50 mls/hr IV .[id number] [facility]


   Stop: 12/19/18 14:00


Amino Acids (Clinimix 4.25/5 % "E" (1000 Ml))  1,000 mls @ 50 mls/hr IV .Q20H 

[facility]


   Stop: 12/19/18 18:00


Insulin Human Regular (Novolin R)  0 unit SC ACHS [facility]; Protocol


   Last Admin: 12/18/18 12:30 Dose:  8 units


Metoprolol Tartrate (Lopressor)  25 mg PO BID [facility]


   Last Admin: 12/18/18 10:02 Dose:  Not Given


Montelukast Sodium (Singulair)  10 mg PO DAILY [facility]


   Last Admin: 12/18/18 10:03 Dose:  Not Given


Morphine Sulfate (Morphine)  2 mg IVP Q4 PRN


   PRN Reason: Anxiety


   Last Admin: 12/18/18 13:16 Dose:  2 mg


Ranolazine (Ranexa)  500 mg PO BID [facility]


   Last Admin: 12/18/18 10:02 Dose:  Not Given


Repaglinide (Prandin)  2 mg PO DAILY [facility]


   Last Admin: 12/18/18 10:00 Dose:  Not Given


Roflumilast (Daliresp)  500 mcg PO DAILY [facility]


   Last Admin: 12/14/18 10:27 Dose:  500 mcg


Rosuvastatin Calcium (Crestor)  20 mg PO HS [facility]


   Last Admin: 12/17/18 22:20 Dose:  Not Given


Sacubitril/Valsartan (Entresto 49 Mg-51 Mg)  1 tab PO BID [facility]


   Last Admin: 12/18/18 10:02 Dose:  Not Given











Physical Exam





- Constitutional


Appears: In Acute Distress





- Eye Exam


Eye Exam: EOMI.  absent: Scleral icterus





- ENT Exam


Additional comments: 





On BiPAP. 





- Respiratory Exam


Additional comments: 





On BiPAP. With marked accessory muscle use. In apparent increased respiratory e

ffort. 





- Cardiovascular Exam


Cardiovascular Exam: JVD





- GI/Abdominal Exam


GI & Abdominal Exam: Soft.  absent: Distended, Guarding, Tenderness





- Extremities Exam


Extremities exam: Positive for: normal inspection





- Neurological Exam


Additional comments: 





Awake. On BiPAP





Results





- Vital Signs


Recent Vital Signs: 


                                Last Vital Signs











Temp  98.0 F   12/18/18 08:52


 


Pulse  98 H  12/18/18 13:49


 


Resp  24   12/18/18 12:55


 


BP  130/99 H  12/18/18 12:55


 


Pulse Ox  98   12/18/18 08:52














- Labs


Result Diagrams: 


                                 12/15/18 06:28





                                 12/15/18 06:28


Labs: 


                         Laboratory Results - last 24 hr











  12/17/18 12/17/18 12/18/18





  16:36 21:09 06:40


 


POC Glucose (mg/dL)  251 H  269 H  374 H














  12/18/18





  11:35


 


POC Glucose (mg/dL)  350 H














Assessment & Plan





- Assessment and Plan (Free Text)


Assessment: 





87yo F with multiple comorbidities. Surgery consulted for possible TLC 

placement. 


Plan: 





- Patient will not tolerate placement of TLC in IJ or Subclavian as she will not

tolerate patient positioning


- A Femoral puncture is not ideal as it comes with greater risk of infection and

bleeding as patient will not be able to be positioned in the ideal supine 

position for proper exposure of surgical site


- At this time, the risks of the procedure outweigh the benefits


- No surgical intervention at this time


- Discussed with family in detail regarding the recommendations





Further recs as per Dr. Brown Caceres PGY2


surgery

## 2018-12-18 NOTE — CP.PCM.CON
<Melody Sauceda - Last Filed: 12/18/18 19:39>





History of Present Illness





- History of Present Illness


History of Present Illness: 





ICU Consult Note for Dr. Valentin





CC: SOB and Hypotension





HPI: 85 y/o female with PMHx of CAD w/ stents, ischemic CM EF 30% with AICD, 

DM2, COPD, HTN, asthma, arthritis, anxiety was admitted to hospital due to 

shortness of breath with cough on 12/7/18. On admission CXR showed mild CHF. ID 

consulted for PNA and COPD exacerbation. Started on IV cefipime on 12/14. Dr. Valentin (pulm) and critical care consulted. Family wanted patient to be DNR/DNI. 

However, patient had increasing SOB and became hypotensive. The family had the 

DNR/DNI status rescind. Patient being transferred from [facility] to ICU.





Due to patient condition, ROS unable to be obtained.





PMHx: as stated above


PSHx: unable to be obtained due to patient condition - see admission note 12/7


FHx: unable to be obtained due to patient condition - see admission note 12/7


SocHx: unable to be obtained due to patient condition - see admission note 12/7


Meds: unable to be obtained due to patient condition - see admission note 12/7


Allergies: clopidogrel





Past Patient History





- Infectious Disease


Hx of Infectious Diseases: None





- Tetanus Immunizations


Tetanus Immunization: Unknown





- Past Medical History & Family History


Past Medical History?: Yes





- Past Social History


Smoking Status: Never Smoked





- CARDIAC


Hx Cardiac Disorders: Yes (CAD)


Hx Hypercholesterolemia: Yes





- PULMONARY


Hx Chronic Obstructive Pulmonary Disease (COPD): Yes





- NEUROLOGICAL


Hx Transient Ischemic Attacks (TIA): Yes





- HEENT


Hx HEENT Problems: No





- RENAL


Hx Chronic Kidney Disease: No





- ENDOCRINE/METABOLIC


Hx Diabetes Mellitus Type 2: Yes





- HEMATOLOGICAL/ONCOLOGICAL


Hx Anemia: Yes





- INTEGUMENTARY


Hx Dermatological Problems: No





- MUSCULOSKELETAL/RHEUMATOLOGICAL


Hx Arthritis: Yes


Hx Falls: No





- GASTROINTESTINAL


Hx Gastrointestinal Disorders: No





- GENITOURINARY/GYNECOLOGICAL


Hx Genitourinary Disorders: No





- PSYCHIATRIC


Hx Anxiety: Yes


Hx Substance Use: No





- SURGICAL HISTORY


Hx Appendectomy: Yes


Hx Coronary Stent: Yes





- ANESTHESIA


Hx Anesthesia: Yes


Hx Anesthesia Reactions: No


Hx Malignant Hyperthermia: No





Meds


Allergies/Adverse Reactions: 


                                    Allergies











Allergy/AdvReac Type Severity Reaction Status Date / Time


 


clopidogrel Allergy Mild SWELLING Verified 06/06/18 13:34














- Medications


Medications: 


                               Current Medications





Albuterol Sulfate (Albuterol 0.042% Inhal Sol (1.25mg/3ml) Ud)  1.25 mg INH RQ2 

PRN


   PRN Reason: Wheezing


Albuterol/Ipratropium (Duoneb 3 Mg/0.5 Mg (3 Ml) Ud)  3 ml INH RQ6 [facility]


   Last Admin: 12/18/18 13:47 Dose:  3 ml


Aspirin (Ecotrin)  325 mg PO DAILY [facility]


   Last Admin: 12/18/18 10:02 Dose:  Not Given


Ergocalciferol (Drisdol 50,000 Intl Units Cap)  1 cap PO QWK [facility]


   Last Admin: 12/13/18 09:38 Dose:  1 cap


Furosemide (Lasix)  20 mg IVP DAILY [facility]


   Last Admin: 12/18/18 10:01 Dose:  Not Given


Furosemide (Lasix)  60 mg IVP Q12 [facility]


   Last Admin: 12/18/18 10:00 Dose:  Not Given


Cefepime HCl 0.5 gm/ Dextrose  50 mls @ 100 mls/hr IVPB Q24H [facility]; Protocol


   Last Admin: 12/18/18 10:01 Dose:  100 mls/hr


Linezolid (Zyvox 600mg/300ml D5w)  600 mg in 300 mls @ 200 mls/hr IVPB Q12H [facility];

Protocol


   Last Admin: 12/18/18 13:04 Dose:  200 mls/hr


Dobutamine HCl/Dextrose (Dobutamine/Dextrose 5% 500mg/250ml)  500 mg in 250 mls 

@ 7.076 mls/hr IV .Q24H [facility]; Protocol


   Last Admin: 12/17/18 18:25 Dose:  7.076 mls/hr


Multivitamins/Vitamin C 10 ml/Chromium/Copper/Manganese/Zinc 1 ml/ Amino Acids  

1,011 mls @ 50 mls/hr IV .[id number] [facility]


   Stop: 12/19/18 14:00


   Last Admin: 12/18/18 17:40 Dose:  Not Given


Amino Acids (Clinimix 4.25/5 % "E" (1000 Ml))  1,000 mls @ 50 mls/hr IV .Q20H 

[facility]


   Stop: 12/19/18 18:00


Insulin Human Regular ([facility] R)  [facility]; Protocol


   Last Admin: 12/18/18 17:40 Dose:  Not Given


Metoprolol Tartrate (Lopressor)  25 mg PO BID [facility]


   Last Admin: 12/18/18 17:39 Dose:  Not Given


Montelukast Sodium (Singulair)  10 mg PO DAILY [facility]


   Last Admin: 12/18/18 10:03 Dose:  Not Given


Morphine Sulfate (Morphine)  2 mg IVP Q4 PRN


   PRN Reason: Anxiety


   Last Admin: 12/18/18 13:16 Dose:  2 mg


Ranolazine (Ranexa)  500 mg PO BID [facility]


   Last Admin: 12/18/18 17:41 Dose:  Not Given


Repaglinide (Prandin)  2 mg PO DAILY [facility]


   Last Admin: 12/18/18 10:00 Dose:  Not Given


Roflumilast (Daliresp)  500 mcg PO DAILY [facility]


   Last Admin: 12/14/18 10:27 Dose:  500 mcg


Rosuvastatin Calcium (Crestor)  20 mg PO HS [facility]


   Last Admin: 12/17/18 22:20 Dose:  Not Given


Sacubitril/Valsartan (Entresto 49 Mg-51 Mg)  1 tab PO BID [facility]


   Last Admin: 12/18/18 17:42 Dose:  Not Given











Physical Exam





- Constitutional


Appears: In Acute Distress





- Head Exam


Head Exam: ATRAUMATIC, NORMAL INSPECTION, NORMOCEPHALIC





- Eye Exam


Eye Exam: absent: Conjunctival injection, Nystagmus, Scleral icterus





- ENT Exam


ENT Exam: Mucous Membranes Dry





- Respiratory Exam


Respiratory Exam: Decreased Breath Sounds (right), Rhonchi.  absent: Clear to 

Auscultation Bilateral, NORMAL BREATHING PATTERN





- Cardiovascular Exam


Cardiovascular Exam: REGULAR RHYTHM, +S1, +S2.  absent: Tachycardia





- GI/Abdominal Exam


GI & Abdominal Exam: Normal Bowel Sounds.  absent: Diminished Bowel Sounds, 

Distended, Firm, Guarding, Rebound





- Extremities Exam


Extremities exam: Positive for: normal capillary refill, normal inspection.  

Negative for: calf tenderness, pedal edema, tenderness





- Neurological Exam


Neurological exam: Altered





- Skin


Skin Exam: Intact, Normal Color


Additional comments: 





pigtail midclavicular between rib 3 and 4 right side


triple lumen catheter right side neck near SCM





Results





- Vital Signs


Recent Vital Signs: 


                                Last Vital Signs











Temp  98 F   12/18/18 15:39


 


Pulse  89   12/18/18 15:39


 


Resp  24   12/18/18 15:39


 


BP  76/38 L  12/18/18 15:39


 


Pulse Ox  98   12/18/18 15:39














- Labs


Result Diagrams: 


                                 12/15/18 06:28





                                 12/15/18 06:28


Labs: 


                         Laboratory Results - last 24 hr











  12/17/18 12/18/18 12/18/18





  21:09 06:40 11:35


 


POC Glucose (mg/dL)  269 H  374 H  350 H














Assessment & Plan





- Assessment and Plan (Free Text)


Assessment: 





85 y/o female with PMHx of CAD w/ stents, ischemic CM EF 30% with AICD, DM2, 

COPD, HTN, asthma, arthritis, anxiety was admitted to hospital due to shortness 

of breath with cough on 12/8/18. In ICU for increasing SOB and hypotension.





Neuro


-sedated on precedex drip


-not alert, awake, non-responsive





Pulm


-intubated, ventilated 


-CXR shows pneumothorax of left lung, chest tube placed


-during intubation, patient found to be congested - duoneb and mucomist


-daliresp 500 mcg PO daily on hold due to hypotension


-ABG pending





CV


-dobutamine drip for hypotension


-aspirin 325 mg daily


-crestor 20 mg qhs 


-lasix IV daily on hold due to hypotension


-metoprolol held due to hypotension


-ranexa 500 mg PO BID on hold 


-entresto 1 tab PO BID on hold due to hypotension





GI


-no active issues





Renal


-trend CMP


-BUN/Cr elevated and uptrending, last taken 12/15 - 64/2.3 (admission was within

normal limits: 11/1.1)





ID


-sputum culture taken on 12/8 positive for pseudomonas aeruginosa


-cefipime started 12/14


-zyvox started 12/16





Endo


-h/o DM


-ISS


-accuchecks ACHS


-repaglinide 2 mg PO daily held


-hypoglycemia protocol





Heme


-normocytic anemia


-trend CBC





code: FULL





case discussed with Dr. Homero Sauceda PGY1











<Sung Valentin - Last Filed: 12/19/18 08:48>





Meds





- Medications


Medications: 


                               Current Medications





Albuterol Sulfate (Albuterol 0.042% Inhal Sol (1.25mg/3ml) Ud)  1.25 mg INH RQ2 

PRN


   PRN Reason: Wheezing


Albuterol/Ipratropium (Duoneb 3 Mg/0.5 Mg (3 Ml) Ud)  3 ml INH RQ6 [facility]


   Last Admin: 12/19/18 07:47 Dose:  3 ml


Calcium Acetate (Phoslo)  667 mg PO BIDCC [facility]


Ergocalciferol (Drisdol 50,000 Intl Units Cap)  1 cap PO QWK [facility]


   Last Admin: 12/13/18 09:38 Dose:  1 cap


Furosemide (Lasix)  20 mg IVP DAILY [facility]


   Last Admin: 12/18/18 10:01 Dose:  Not Given


Cefepime HCl 0.5 gm/ Dextrose  50 mls @ 100 mls/hr IVPB Q24H [facility]; Protocol


   Last Admin: 12/18/18 10:01 Dose:  100 mls/hr


Linezolid (Zyvox 600mg/300ml D5w)  600 mg in 300 mls @ 200 mls/hr IVPB Q12H [facility];

Protocol


   Last Admin: 12/19/18 00:26 Dose:  200 mls/hr


Dobutamine HCl/Dextrose (Dobutamine/Dextrose 5% 500mg/250ml)  500 mg in 250 mls 

@ 7.076 mls/hr IV .Q24H [facility]; Protocol


   Last Admin: 12/19/18 02:01 Dose:  7.076 mls/hr


Dexmedetomidine HCl 200 mcg/ (Sodium Chloride)  50 mls @ 2.34 mls/hr IV TITR 

PRN; Protocol


   PRN Reason: Sedation


   Last Admin: 12/19/18 07:24 Dose:  1.49 mcg/kg/hr, 17.5 mls/hr


Norepinephrine Bitartrate 4 mg (/ Sodium Chloride)  254 mls @ 15.24 mls/hr IV 

.[id number] PRN; Protocol


   PRN Reason: TITRATE PER MD ORDER


   Last Titration: 12/19/18 07:00 Dose:  4.93 mcg/min, 18.78 mls/hr


Sodium Bicarbonate 100 meq/ (Sodium Chloride)  1,100 mls @ 100 mls/hr IV .Q11H 

[facility]


   Last Admin: 12/19/18 02:25 Dose:  100 mls/hr


Insulin Human Regular (Novolin R)  0 unit SC Q6H [facility]; Protocol


   Last Admin: 12/19/18 06:24 Dose:  6 u


Metoprolol Tartrate (Lopressor)  25 mg PO BID [facility]


   Last Admin: 12/18/18 17:39 Dose:  Not Given


Montelukast Sodium (Singulair)  10 mg PO DAILY [facility]


   Last Admin: 12/18/18 10:03 Dose:  Not Given


Morphine Sulfate (Morphine)  2 mg IVP Q4 PRN


   PRN Reason: Anxiety


   Last Admin: 12/18/18 13:16 Dose:  2 mg


Pantoprazole Sodium (Protonix Inj)  40 mg IVP DAILY [facility]


Ranolazine (Ranexa)  500 mg PO BID [facility]


   Last Admin: 12/18/18 17:41 Dose:  Not Given


Repaglinide (Prandin)  2 mg PO DAILY [facility]


   Last Admin: 12/18/18 10:00 Dose:  Not Given


Roflumilast (Daliresp)  500 mcg PO DAILY [facility]


   Last Admin: 12/14/18 10:27 Dose:  500 mcg


Rosuvastatin Calcium (Crestor)  20 mg PO HS [facility]


   Last Admin: 12/18/18 21:40 Dose:  Not Given


Sacubitril/Valsartan (Entresto 49 Mg-51 Mg)  1 tab PO BID [facility]


   Last Admin: 12/18/18 17:42 Dose:  Not Given











Results





- Vital Signs


Recent Vital Signs: 


                                Last Vital Signs











Temp  97.4 F L  12/19/18 04:00


 


Pulse  107 H  12/19/18 06:50


 


Resp  24   12/19/18 06:50


 


BP  126/53 L  12/19/18 06:33


 


Pulse Ox  100   12/19/18 06:50














- Labs


Result Diagrams: 


                                 12/19/18 05:37





                                 12/19/18 05:35


Labs: 


                         Laboratory Results - last 24 hr











  12/18/18 12/18/18 12/18/18





  00:38 11:35 17:14


 


WBC   


 


RBC   


 


Hgb   


 


Hct   


 


MCV   


 


MCH   


 


MCHC   


 


RDW   


 


Plt Count   


 


MPV   


 


Neut % (Auto)   


 


Lymph % (Auto)   


 


Mono % (Auto)   


 


Eos % (Auto)   


 


Baso % (Auto)   


 


Neut # (Auto)   


 


Lymph # (Auto)   


 


Mono # (Auto)   


 


Eos # (Auto)   


 


Baso # (Auto)   


 


Neutrophils % (Manual)   


 


Lymphocytes % (Manual)   


 


Monocytes % (Manual)   


 


Eosinophils % (Manual)   


 


Platelet Estimate   


 


Large Platelets   


 


Hypochromasia (manual)   


 


Poikilocytosis (manual   


 


Basophilic Stippling   


 


Anisocytosis (manual)   


 


Tear Drop Cells   


 


Ovalocytes   


 


Fanny Cells   


 


PT   


 


INR   


 


APTT   


 


Puncture Site  Rb  


 


pCO2  64 H  


 


pO2  147 H  


 


HCO3  18.5 L  


 


ABG pH  7.12 L*  


 


ABG Total CO2  22.8  


 


ABG O2 Saturation  99.3 H  


 


ABG Base Excess  -8.2 L  


 


ABG Hemoglobin  7.3 L  


 


ABG Carboxyhemoglobin  1.8 H  


 


POC ABG HHb (Measured)  0.7  


 


ABG Methemoglobin  1.4  


 


Benji Test  Na  


 


ABG Potassium   


 


A-a O2 Difference  486.0  


 


Respiratory Index  3.3  


 


Hgb O2 Saturation  96.2  


 


Glucose   


 


Lactate   


 


Vent Mode  Prvc  


 


Mechanical Rate  20  


 


FiO2  100.0  


 


Tidal Volume  360  


 


PEEP  3  


 


Crit Value Called To  Dr. hall  


 


Crit Value Called By  Suzanne rrt  


 


Crit Value Read Back  Y  


 


Blood Gas Notified Time  119  


 


Sodium   


 


Potassium   


 


Chloride   


 


Carbon Dioxide   


 


Anion Gap   


 


BUN   


 


Creatinine   


 


Est GFR ( Amer)   


 


Est GFR (Non-Af Amer)   


 


POC Glucose (mg/dL)   350 H  283 H


 


Random Glucose   


 


Calcium   


 


Phosphorus   


 


Magnesium   


 


Total Bilirubin   


 


AST   


 


ALT   


 


Alkaline Phosphatase   


 


Total Protein   


 


Albumin   


 


Globulin   


 


Albumin/Globulin Ratio   


 


Arterial Blood Potassium   


 


Blood Type   


 


Antibody Screen   














  12/18/18 12/18/18 12/18/18





  20:53 20:53 20:53


 


WBC  21.6 H D  


 


RBC  2.71 L  


 


Hgb  7.4 L  


 


Hct  23.7 L  


 


MCV  87.4  


 


MCH  27.4  


 


MCHC  31.3 L  


 


RDW  17.4 H  


 


Plt Count  150  


 


MPV  10.6  


 


Neut % (Auto)  91.3 H  


 


Lymph % (Auto)  0.8 L  


 


Mono % (Auto)  7.4  


 


Eos % (Auto)  0.2  


 


Baso % (Auto)  0.3  


 


Neut # (Auto)  19.7 H  


 


Lymph # (Auto)  0.2 L  


 


Mono # (Auto)  1.6 H  


 


Eos # (Auto)  0.0  


 


Baso # (Auto)  0.1  


 


Neutrophils % (Manual)  89 H  


 


Lymphocytes % (Manual)  1 L  


 


Monocytes % (Manual)  8  


 


Eosinophils % (Manual)  2  


 


Platelet Estimate  Normal  


 


Large Platelets  Present  


 


Hypochromasia (manual)  Slight  


 


Poikilocytosis (manual  Slight  


 


Basophilic Stippling  Slight  


 


Anisocytosis (manual)  Slight  


 


Tear Drop Cells  Slight  


 


Ovalocytes  Slight  


 


Fanny Cells  Slight  


 


PT   14.0 H 


 


INR   1.3 


 


APTT   30 


 


Puncture Site   


 


pCO2   


 


pO2   


 


HCO3   


 


ABG pH   


 


ABG Total CO2   


 


ABG O2 Saturation   


 


ABG Base Excess   


 


ABG Hemoglobin   


 


ABG Carboxyhemoglobin   


 


POC ABG HHb (Measured)   


 


ABG Methemoglobin   


 


Benji Test   


 


ABG Potassium   


 


A-a O2 Difference   


 


Respiratory Index   


 


Hgb O2 Saturation   


 


Glucose   


 


Lactate   


 


Vent Mode   


 


Mechanical Rate   


 


FiO2   


 


Tidal Volume   


 


PEEP   


 


Crit Value Called To   


 


Crit Value Called By   


 


Crit Value Read Back   


 


Blood Gas Notified Time   


 


Sodium    128 L


 


Potassium    6.6 H* D


 


Chloride    97 L


 


Carbon Dioxide    19 L


 


Anion Gap    18


 


BUN    112 H* D


 


Creatinine    4.9 H


 


Est GFR ( Amer)    10


 


Est GFR (Non-Af Amer)    8


 


POC Glucose (mg/dL)   


 


Random Glucose    245 H


 


Calcium    7.8 L


 


Phosphorus    8.3 H


 


Magnesium    2.6 H


 


Total Bilirubin    0.8


 


AST    55 H


 


ALT    39


 


Alkaline Phosphatase    163 H


 


Total Protein    5.9 L


 


Albumin    3.1 L


 


Globulin    2.8


 


Albumin/Globulin Ratio    1.1


 


Arterial Blood Potassium   


 


Blood Type   


 


Antibody Screen   














  12/18/18 12/18/18 12/18/18





  21:00 22:05 23:58


 


WBC   


 


RBC   


 


Hgb   


 


Hct   


 


MCV   


 


MCH   


 


MCHC   


 


RDW   


 


Plt Count   


 


MPV   


 


Neut % (Auto)   


 


Lymph % (Auto)   


 


Mono % (Auto)   


 


Eos % (Auto)   


 


Baso % (Auto)   


 


Neut # (Auto)   


 


Lymph # (Auto)   


 


Mono # (Auto)   


 


Eos # (Auto)   


 


Baso # (Auto)   


 


Neutrophils % (Manual)   


 


Lymphocytes % (Manual)   


 


Monocytes % (Manual)   


 


Eosinophils % (Manual)   


 


Platelet Estimate   


 


Large Platelets   


 


Hypochromasia (manual)   


 


Poikilocytosis (manual   


 


Basophilic Stippling   


 


Anisocytosis (manual)   


 


Tear Drop Cells   


 


Ovalocytes   


 


Euless Cells   


 


PT   


 


INR   


 


APTT   


 


Puncture Site  Lfem  Rradial 


 


pCO2  71 H*  60 H 


 


pO2  53 L  90 


 


HCO3  14.9 L  20.4 L 


 


ABG pH  7.05 L*  7.19 L* 


 


ABG Total CO2  21.8 L  24.7 


 


ABG O2 Saturation  88.8 L  96.1 


 


ABG Base Excess  -11.9 L  -5.8 L 


 


ABG Hemoglobin   10.9 L 


 


ABG Carboxyhemoglobin   0.6 


 


POC ABG HHb (Measured)   3.9 


 


ABG Methemoglobin   0.5 


 


Benji Test  Neg  Pos 


 


ABG Potassium  6.5 H*  


 


A-a O2 Difference  571.0  192.0 


 


Respiratory Index  10.8  2.1 


 


Hgb O2 Saturation   95.0 


 


Glucose  261 H  


 


Lactate  1.4  


 


Vent Mode  Prvc  


 


Mechanical Rate  12  


 


FiO2  100.0  50.0 


 


Tidal Volume  360  


 


PEEP  3  


 


Crit Value Called To  Dr. rafael hall 


 


Crit Value Called By  benita Zhou rcp 


 


Crit Value Read Back  Y  Y 


 


Blood Gas Notified Time  2112 2220 


 


Sodium  129.0 L  


 


Potassium   


 


Chloride  101.0  


 


Carbon Dioxide   


 


Anion Gap   


 


BUN   


 


Creatinine   


 


Est GFR ( Amer)   


 


Est GFR (Non-Af Amer)   


 


POC Glucose (mg/dL)    229 H


 


Random Glucose   


 


Calcium   


 


Phosphorus   


 


Magnesium   


 


Total Bilirubin   


 


AST   


 


ALT   


 


Alkaline Phosphatase   


 


Total Protein   


 


Albumin   


 


Globulin   


 


Albumin/Globulin Ratio   


 


Arterial Blood Potassium  6.5 H*  


 


Blood Type   


 


Antibody Screen   














  12/19/18 12/19/18 12/19/18





  00:37 05:06 05:21


 


WBC   


 


RBC   


 


Hgb   


 


Hct   


 


MCV   


 


MCH   


 


MCHC   


 


RDW   


 


Plt Count   


 


MPV   


 


Neut % (Auto)   


 


Lymph % (Auto)   


 


Mono % (Auto)   


 


Eos % (Auto)   


 


Baso % (Auto)   


 


Neut # (Auto)   


 


Lymph # (Auto)   


 


Mono # (Auto)   


 


Eos # (Auto)   


 


Baso # (Auto)   


 


Neutrophils % (Manual)   


 


Lymphocytes % (Manual)   


 


Monocytes % (Manual)   


 


Eosinophils % (Manual)   


 


Platelet Estimate   


 


Large Platelets   


 


Hypochromasia (manual)   


 


Poikilocytosis (manual   


 


Basophilic Stippling   


 


Anisocytosis (manual)   


 


Tear Drop Cells   


 


Ovalocytes   


 


Fanny Cells   


 


PT   


 


INR   


 


APTT   


 


Puncture Site   Rb 


 


pCO2   64 H 


 


pO2   123 H 


 


HCO3   21.7 


 


ABG pH   7.20 L 


 


ABG Total CO2   27.0 


 


ABG O2 Saturation   99.3 H 


 


ABG Base Excess   -4.2 L 


 


ABG Hemoglobin   


 


ABG Carboxyhemoglobin   


 


POC ABG HHb (Measured)   


 


ABG Methemoglobin   


 


Benji Test   Na 


 


ABG Potassium   5.2 


 


A-a O2 Difference   439.0 


 


Respiratory Index   3.6 


 


Hgb O2 Saturation   


 


Glucose   308 H 


 


Lactate   1.0 


 


Vent Mode   Prvc 


 


Mechanical Rate   20 


 


FiO2   90.0 


 


Tidal Volume   400 


 


PEEP   3 


 


Crit Value Called To   


 


Crit Value Called By   


 


Crit Value Read Back   


 


Blood Gas Notified Time   


 


Sodium  129 L  132.0 


 


Potassium  6.1 H  


 


Chloride  97 L  101.0 


 


Carbon Dioxide  22  


 


Anion Gap  17  


 


BUN  114 H*  


 


Creatinine  5.1 H  


 


Est GFR ( Amer)  10  


 


Est GFR (Non-Af Amer)  8  


 


POC Glucose (mg/dL)    309 H


 


Random Glucose  204 H  


 


Calcium  7.7 L  


 


Phosphorus   


 


Magnesium   


 


Total Bilirubin   


 


AST   


 


ALT   


 


Alkaline Phosphatase   


 


Total Protein   


 


Albumin   


 


Globulin   


 


Albumin/Globulin Ratio   


 


Arterial Blood Potassium   5.2 


 


Blood Type   


 


Antibody Screen   














  12/19/18 12/19/18 12/19/18





  05:35 05:37 05:46


 


WBC   16.2 H 


 


RBC   2.55 L 


 


Hgb   7.1 L 


 


Hct   22.1 L 


 


MCV   86.8 


 


MCH   27.9 


 


MCHC   32.1 L 


 


RDW   17.7 H 


 


Plt Count   138 


 


MPV   11.6 


 


Neut % (Auto)   90.9 H 


 


Lymph % (Auto)   1.4 L 


 


Mono % (Auto)   6.0 


 


Eos % (Auto)   0.4 


 


Baso % (Auto)   1.3 


 


Neut # (Auto)   14.7 H 


 


Lymph # (Auto)   0.2 L 


 


Mono # (Auto)   1.0 H 


 


Eos # (Auto)   0.1 


 


Baso # (Auto)   0.2 


 


Neutrophils % (Manual)   


 


Lymphocytes % (Manual)   


 


Monocytes % (Manual)   


 


Eosinophils % (Manual)   


 


Platelet Estimate   


 


Large Platelets   


 


Hypochromasia (manual)   


 


Poikilocytosis (manual   


 


Basophilic Stippling   


 


Anisocytosis (manual)   


 


Tear Drop Cells   


 


Ovalocytes   


 


Fanny Cells   


 


PT   


 


INR   


 


APTT   


 


Puncture Site   


 


pCO2   


 


pO2   


 


HCO3   


 


ABG pH   


 


ABG Total CO2   


 


ABG O2 Saturation   


 


ABG Base Excess   


 


ABG Hemoglobin   


 


ABG Carboxyhemoglobin   


 


POC ABG HHb (Measured)   


 


ABG Methemoglobin   


 


Benji Test   


 


ABG Potassium   


 


A-a O2 Difference   


 


Respiratory Index   


 


Hgb O2 Saturation   


 


Glucose   


 


Lactate   


 


Vent Mode   


 


Mechanical Rate   


 


FiO2   


 


Tidal Volume   


 


PEEP   


 


Crit Value Called To   


 


Crit Value Called By   


 


Crit Value Read Back   


 


Blood Gas Notified Time   


 


Sodium  133  


 


Potassium  5.4 H  


 


Chloride  95 L  


 


Carbon Dioxide  25  


 


Anion Gap  17  


 


BUN  111 H*  


 


Creatinine  4.9 H  


 


Est GFR ( Amer)  10  


 


Est GFR (Non-Af Amer)  8  


 


POC Glucose (mg/dL)   


 


Random Glucose  293 H  


 


Calcium  7.5 L  


 


Phosphorus  7.6 H  


 


Magnesium  2.4 H  


 


Total Bilirubin  0.6  


 


AST  44 H  


 


ALT  37  


 


Alkaline Phosphatase  158 H  


 


Total Protein  5.5 L  


 


Albumin  2.8 L  


 


Globulin  2.7  


 


Albumin/Globulin Ratio  1.0  


 


Arterial Blood Potassium   


 


Blood Type    A POSITIVE


 


Antibody Screen    Negative














Assessment & Plan


(1) Acute respiratory failure with hypoxia


Status: Acute   





(2) CHF exacerbation


Status: Acute   





(3) COPD exacerbation


Status: Acute   





(4) Cardiomyopathy


Status: Acute   





(5) Pneumonia


Status: Acute   





Attending/Attestation





- Attestation


I have personally seen and examined this patient.: Yes


I have fully participated in the care of the patient.: Yes


I have reviewed all pertinent clinical information: Yes


Notes (Text): 








Patient seen and examined.


patient was transferred to intensive care unit for hypotension and shortness of 

breath after family rescended DNR/DNI. patient very lethargic and not responding

to any verbal command.


on 12/15/2018 I had extensive discussion with the family about patient condition

and need for intubation and other resuscitative measures and also consulted ICU 

for possible transfer which family refused and signed DNR/DNI.


Patient on arrival to intensive care unit was immediately intubated and placed 

on ventilatory support. Copious thick secretions through the ET tube suctioned 

out. Chest x-ray done post intubation showed right-sided pneumothorax . Chest 

was immediately placed with reexpansion of the lung. triple lumen catheter 

inserted and right subclavian vein without any complication. Patient was placed 

on 100% FiO2


on IV antibiotics and dobutrex


labs were drawn, ABG and chest x-ray was ordered and was endorsed to night 

intensivist


discussed with family at length with the prognosis

## 2018-12-18 NOTE — PCM.PROC
Procedures


Attestation:: I certify that I have explained the specified Operation(s) or 

Procedure(s), risks, benefits and reasonable alternatives to the Patient and/or 

other person responsible. The opportunity was given to ask questions and all 

questions answered





- Chest Tube


Chest Tube Location: Mid-Axillary Right


Size of Tube (cm): 10


Chest Tube Procedure: Chlorhexidine


Tube Sutured to Skin: Yes


Sterile Dressing Applied: Yes


Post Procedure: sutured to skin, sterile dressing applied, air occlusive 

dressing


Rush of Air Ceiba: Yes


Tube Drainage: fluid


Amount of Initial Drainage: 10


Post Procedure CXR?: Yes


Patient Tolerated Procedure: Yes


Progress: 





Right 8Fr chest tube dislodged. CXR noted with reaccumulation of right 

Pneumothorax


Placed right sided 8Fr Chest tube without any immediate complications.

## 2018-12-18 NOTE — CP.PCM.PN
Subjective





- Date & Time of Evaluation


Date of Evaluation: 12/18/18


Time of Evaluation: 17:00





- Subjective


Subjective: 





Patient seen and examined at bedside





on BiPAP and very tachypneic


Hypotensive


no response to tactile stimuli





 





CXR 12/17: Persistent bilateral right and left lower lung field infiltrates or 

edema. Heart is enlarged.





 





Continue supportive therapy





Patient is DNR/DNI.





Objective





- Vital Signs/Intake and Output


Vital Signs (last 24 hours): 


                                        











Temp Pulse Resp BP Pulse Ox


 


 98 F   89   24   76/38 L  98 


 


 12/18/18 15:39  12/18/18 15:39  12/18/18 15:39  12/18/18 15:39  12/18/18 15:39








Intake and Output: 


                                        











 12/18/18 12/18/18





 06:59 18:59


 


Output Total 100 


 


Balance -100 














- Medications


Medications: 


                               Current Medications





Albuterol Sulfate (Albuterol 0.042% Inhal Sol (1.25mg/3ml) Ud)  1.25 mg INH RQ2 

PRN


   PRN Reason: Wheezing


Albuterol/Ipratropium (Duoneb 3 Mg/0.5 Mg (3 Ml) Ud)  3 ml INH RQ6 [facility]


   Last Admin: 12/18/18 13:47 Dose:  3 ml


Aspirin (Ecotrin)  325 mg PO DAILY [facility]


   Last Admin: 12/18/18 10:02 Dose:  Not Given


Ergocalciferol (Drisdol 50,000 Intl Units Cap)  1 cap PO QWK [facility]


   Last Admin: 12/13/18 09:38 Dose:  1 cap


Furosemide (Lasix)  20 mg IVP DAILY [facility]


   Last Admin: 12/18/18 10:01 Dose:  Not Given


Furosemide (Lasix)  60 mg IVP Q12 [facility]


   Last Admin: 12/18/18 10:00 Dose:  Not Given


Cefepime HCl 0.5 gm/ Dextrose  50 mls @ 100 mls/hr IVPB Q24H [facility]; Protocol


   Last Admin: 12/18/18 10:01 Dose:  100 mls/hr


Linezolid (Zyvox 600mg/300ml D5w)  600 mg in 300 mls @ 200 mls/hr IVPB Q12H [facility];

Protocol


   Last Admin: 12/18/18 13:04 Dose:  200 mls/hr


Dobutamine HCl/Dextrose (Dobutamine/Dextrose 5% 500mg/250ml)  500 mg in 250 mls 

@ 7.076 mls/hr IV .Q24H [facility]; Protocol


   Last Admin: 12/17/18 18:25 Dose:  7.076 mls/hr


Amino Acids (Clinimix 4.25/5 % "E" (1000 Ml))  1,000 mls @ 50 mls/hr IV .Q20H 

[facility]


   Stop: 12/18/18 17:59


Multivitamins/Vitamin C 10 ml/Chromium/Copper/Manganese/Zinc 1 ml/ Amino Acids  

1,011 mls @ 50 mls/hr IV .[id number] [facility]


   Stop: 12/19/18 14:00


Amino Acids (Clinimix 4.25/5 % "E" (1000 Ml))  1,000 mls @ 50 mls/hr IV .Q20H 

[facility]


   Stop: 12/19/18 18:00


Insulin Human Regular (Nov[facility] R)  [facility]; Protocol


   Last Admin: 12/18/18 12:30 Dose:  8 units


Metoprolol Tartrate (Lopressor)  25 mg PO BID [facility]


   Last Admin: 12/18/18 10:02 Dose:  Not Given


Montelukast Sodium (Singulair)  10 mg PO DAILY [facility]


   Last Admin: 12/18/18 10:03 Dose:  Not Given


Morphine Sulfate (Morphine)  2 mg IVP Q4 PRN


   PRN Reason: Anxiety


   Last Admin: 12/18/18 13:16 Dose:  2 mg


Ranolazine (Ranexa)  500 mg PO BID [facility]


   Last Admin: 12/18/18 10:02 Dose:  Not Given


Repaglinide (Prandin)  2 mg PO DAILY [facility]


   Last Admin: 12/18/18 10:00 Dose:  Not Given


Roflumilast (Daliresp)  500 mcg PO DAILY [facility]


   Last Admin: 12/14/18 10:27 Dose:  500 mcg


Rosuvastatin Calcium (Crestor)  20 mg PO HS [facility]


   Last Admin: 12/17/18 22:20 Dose:  Not Given


Sacubitril/Valsartan (Entresto 49 Mg-51 Mg)  1 tab PO BID [facility]


   Last Admin: 12/18/18 10:02 Dose:  Not Given











- Labs


Labs: 


                                        





                                 12/15/18 06:28 





                                 12/15/18 06:28 





                                        











PT  12.8 SECONDS (9.7-12.2)  H  12/06/18  20:31    


 


INR  1.2   12/06/18  20:31    


 


APTT  33 SECONDS (21-34)   12/06/18  20:31    














Assessment and Plan


(1) Acute respiratory failure with hypoxia


Assessment & Plan: 


 patient is DNR and DNI


On morphine


Continue present care for now


prognosis poor


Status: Acute   





(2) CHF exacerbation


Status: Acute   





(3) COPD exacerbation


Status: Acute   





(4) Cardiomyopathy


Status: Acute   





(5) Pneumonia


Status: Acute

## 2018-12-18 NOTE — CP.PCM.PN
Subjective





- Date & Time of Evaluation


Date of Evaluation: 12/18/18


Time of Evaluation: 14:37





- Subjective


Subjective: 





Clinically patient remained same





A meeting was held with the family members.


Present in the meeting is the patient's proxy, and 1 of the 's from the 

family.  Intensivist Dr. Masterson was also present.


A full discussion was done with the family members about the patient's condition

and the central issue was lack of intubation patient's condition is 

deteriorating.  As the patient's wishes were not to be intubated and DNR was 

signed 2 days ago patient currently medically is deteriorating.


After full discussion with the intensivist and the family the family will come 

back later if they want to rescind the DNR.  In the meanwhile will continue the 

present supportive care and BiPAP.














Objective





- Vital Signs/Intake and Output


Vital Signs (last 24 hours): 


                                        











Temp Pulse Resp BP Pulse Ox


 


 98.0 F   98 H  24   130/99 H  98 


 


 12/18/18 08:52  12/18/18 13:49  12/18/18 12:55  12/18/18 12:55  12/18/18 08:52











- Medications


Medications: 


                               Current Medications





Albuterol Sulfate (Albuterol 0.042% Inhal Sol (1.25mg/3ml) Ud)  1.25 mg INH RQ2 

PRN


   PRN Reason: Wheezing


Albuterol/Ipratropium (Duoneb 3 Mg/0.5 Mg (3 Ml) Ud)  3 ml INH RQ6 [facility]


   Last Admin: 12/18/18 13:47 Dose:  3 ml


Aspirin (Ecotrin)  325 mg PO DAILY [facility]


   Last Admin: 12/18/18 10:02 Dose:  Not Given


Ergocalciferol (Drisdol 50,000 Intl Units Cap)  1 cap PO QWK [facility]


   Last Admin: 12/13/18 09:38 Dose:  1 cap


Furosemide (Lasix)  20 mg IVP DAILY [facility]


   Last Admin: 12/18/18 10:01 Dose:  Not Given


Furosemide (Lasix)  60 mg IVP Q12 [facility]


   Last Admin: 12/18/18 10:00 Dose:  Not Given


Cefepime HCl 0.5 gm/ Dextrose  50 mls @ 100 mls/hr IVPB Q24H [facility]; Protocol


   Last Admin: 12/18/18 10:01 Dose:  100 mls/hr


Linezolid (Zyvox 600mg/300ml D5w)  600 mg in 300 mls @ 200 mls/hr IVPB Q12H [facility];

Protocol


   Last Admin: 12/18/18 00:26 Dose:  200 mls/hr


Dobutamine HCl/Dextrose (Dobutamine/Dextrose 5% 500mg/250ml)  500 mg in 250 mls 

@ 7.076 mls/hr IV .Q24H [facility]; Protocol


   Last Admin: 12/17/18 18:25 Dose:  7.076 mls/hr


Amino Acids (Clinimix 4.25/5 % "E" (1000 Ml))  1,000 mls @ 50 mls/hr IV .Q20H 

[facility]


   Stop: 12/18/18 17:59


Multivitamins/Vitamin C 10 ml/Chromium/Copper/Manganese/Zinc 1 ml/ Amino Acids  

1,011 mls @ 50 mls/hr IV .[id number] [facility]


   Stop: 12/19/18 14:00


Amino Acids (Clinimix 4.25/5 % "E" (1000 Ml))  1,000 mls @ 50 mls/hr IV .Q20H 

[facility]


   Stop: 12/19/18 18:00


Insulin Human Regular (Novolin R)  0 unit SC [facility]; Protocol


   Last Admin: 12/18/18 12:30 Dose:  8 units


Metoprolol Tartrate (Lopressor)  25 mg PO BID [facility]


   Last Admin: 12/18/18 10:02 Dose:  Not Given


Montelukast Sodium (Singulair)  10 mg PO DAILY [facility]


   Last Admin: 12/18/18 10:03 Dose:  Not Given


Morphine Sulfate (Morphine)  2 mg IVP Q4 PRN


   PRN Reason: Anxiety


   Last Admin: 12/18/18 13:16 Dose:  2 mg


Ranolazine (Ranexa)  500 mg PO BID [facility]


   Last Admin: 12/18/18 10:02 Dose:  Not Given


Repaglinide (Prandin)  2 mg PO DAILY [facility]


   Last Admin: 12/18/18 10:00 Dose:  Not Given


Roflumilast (Daliresp)  500 mcg PO DAILY [facility]


   Last Admin: 12/14/18 10:27 Dose:  500 mcg


Rosuvastatin Calcium (Crestor)  20 mg PO [facility]


   Last Admin: 12/17/18 22:20 Dose:  Not Given


Sacubitril/Valsartan (Entresto 49 Mg-51 Mg)  1 tab PO BID [facility]


   Last Admin: 12/18/18 10:02 Dose:  Not Given











- Labs


Labs: 


                                        





                                 12/15/18 06:28 





                                 12/15/18 06:28 





                                        











PT  12.8 SECONDS (9.7-12.2)  H  12/06/18  20:31    


 


INR  1.2   12/06/18  20:31    


 


APTT  33 SECONDS (21-34)   12/06/18  20:31

## 2018-12-19 VITALS — TEMPERATURE: 96.7 F

## 2018-12-19 VITALS — OXYGEN SATURATION: 67 % | DIASTOLIC BLOOD PRESSURE: 33 MMHG | SYSTOLIC BLOOD PRESSURE: 83 MMHG | HEART RATE: 65 BPM

## 2018-12-19 LAB
ALBUMIN SERPL-MCNC: 2.5 G/DL (ref 3.5–5)
ALBUMIN SERPL-MCNC: 2.8 G/DL (ref 3.5–5)
ALBUMIN/GLOB SERPL: 0.9 {RATIO} (ref 1–2.1)
ALBUMIN/GLOB SERPL: 1 {RATIO} (ref 1–2.1)
ALT SERPL-CCNC: 37 U/L (ref 9–52)
ALT SERPL-CCNC: 37 U/L (ref 9–52)
ANISOCYTOSIS BLD QL SMEAR: SLIGHT
ARTERIAL BLOOD GAS HEMOGLOBIN: 7.3 G/DL (ref 11.7–17.4)
ARTERIAL BLOOD GAS O2 SAT: 98.5 % (ref 95–98)
ARTERIAL BLOOD GAS O2 SAT: 99.3 % (ref 95–98)
ARTERIAL BLOOD GAS O2 SAT: 99.3 % (ref 95–98)
ARTERIAL BLOOD GAS PCO2: 54 MM/HG (ref 35–45)
ARTERIAL BLOOD GAS PCO2: 64 MM/HG (ref 35–45)
ARTERIAL BLOOD GAS PCO2: 64 MM/HG (ref 35–45)
ARTERIAL BLOOD GAS TCO2: 22.8 MMOL/L (ref 22–28)
ARTERIAL BLOOD GAS TCO2: 25.4 MMOL/L (ref 22–28)
ARTERIAL BLOOD GAS TCO2: 27 MMOL/L (ref 22–28)
AST SERPL-CCNC: 34 U/L (ref 14–36)
AST SERPL-CCNC: 44 U/L (ref 14–36)
BASO STIPL BLD QL SMEAR: SLIGHT
BASOPHILS # BLD AUTO: 0.2 K/UL (ref 0–0.2)
BASOPHILS NFR BLD: 1.3 % (ref 0–2)
BUN SERPL-MCNC: 105 MG/DL (ref 7–17)
BUN SERPL-MCNC: 111 MG/DL (ref 7–17)
BUN SERPL-MCNC: 114 MG/DL (ref 7–17)
BURR CELLS BLD QL SMEAR: SLIGHT
CALCIUM SERPL-MCNC: 7.3 MG/DL (ref 8.6–10.4)
CALCIUM SERPL-MCNC: 7.5 MG/DL (ref 8.6–10.4)
CALCIUM SERPL-MCNC: 7.7 MG/DL (ref 8.6–10.4)
EOSINOPHIL # BLD AUTO: 0.1 K/UL (ref 0–0.7)
EOSINOPHIL NFR BLD: 0.4 % (ref 0–4)
EOSINOPHIL NFR BLD: 1 % (ref 0–4)
ERYTHROCYTE [DISTWIDTH] IN BLOOD BY AUTOMATED COUNT: 17.7 % (ref 11.5–14.5)
GFR NON-AFRICAN AMERICAN: 8
GFR NON-AFRICAN AMERICAN: 8
GFR NON-AFRICAN AMERICAN: 9
HCO3 BLDA-SCNC: 18.5 MMOL/L (ref 21–28)
HCO3 BLDA-SCNC: 21.6 MMOL/L (ref 21–28)
HCO3 BLDA-SCNC: 21.7 MMOL/L (ref 21–28)
HGB BLD-MCNC: 7.1 G/DL (ref 11–16)
HYPOCHROMIC: SLIGHT
INHALED O2 CONCENTRATION: 100 %
INHALED O2 CONCENTRATION: 80 %
INHALED O2 CONCENTRATION: 90 %
LG PLATELETS BLD QL SMEAR: PRESENT
LYMPHOCYTE: 1 % (ref 20–40)
LYMPHOCYTES # BLD AUTO: 0.2 K/UL (ref 1–4.3)
LYMPHOCYTES NFR BLD AUTO: 1.4 % (ref 20–40)
MCH RBC QN AUTO: 27.9 PG (ref 27–31)
MCHC RBC AUTO-ENTMCNC: 32.1 G/DL (ref 33–37)
MCV RBC AUTO: 86.8 FL (ref 81–99)
MONOCYTE: 2 % (ref 0–10)
MONOCYTES # BLD: 1 K/UL (ref 0–0.8)
MONOCYTES NFR BLD: 6 % (ref 0–10)
MYELOCYTES NFR BLD: 2 % (ref 0–0)
NEUTROPHILS # BLD: 14.7 K/UL (ref 1.8–7)
NEUTROPHILS NFR BLD AUTO: 90.9 % (ref 50–75)
NEUTROPHILS NFR BLD AUTO: 93 % (ref 50–75)
NEUTS BAND NFR BLD: 1 % (ref 0–2)
NRBC BLD AUTO-RTO: 0.6 % (ref 0–2)
NRBC BLD AUTO-RTO: 1 % (ref 0–0)
OVALOCYTES BLD QL SMEAR: SLIGHT
PH BLDA: 7.12 [PH] (ref 7.35–7.45)
PH BLDA: 7.2 [PH] (ref 7.35–7.45)
PH BLDA: 7.25 [PH] (ref 7.35–7.45)
PLATELET # BLD EST: NORMAL 10*3/UL
PLATELET # BLD: 138 K/UL (ref 130–400)
PMV BLD AUTO: 11.6 FL (ref 7.2–11.7)
PO2 BLDA: 123 MM/HG (ref 80–100)
PO2 BLDA: 147 MM/HG (ref 80–100)
PO2 BLDA: 89 MM/HG (ref 80–100)
POIKILOCYTOSIS BLD QL SMEAR: SLIGHT
RBC # BLD AUTO: 2.55 MIL/UL (ref 3.8–5.2)
TEARDROP CELLS: SLIGHT
TOTAL CELLS COUNTED BLD: 100
WBC # BLD AUTO: 16.2 K/UL (ref 4.8–10.8)

## 2018-12-19 RX ADMIN — HUMAN INSULIN SCH U: 100 INJECTION, SOLUTION SUBCUTANEOUS at 00:40

## 2018-12-19 RX ADMIN — RANOLAZINE SCH: 500 TABLET, FILM COATED, EXTENDED RELEASE ORAL at 17:42

## 2018-12-19 RX ADMIN — SACUBITRIL AND VALSARTAN SCH: 49; 51 TABLET, FILM COATED ORAL at 17:41

## 2018-12-19 RX ADMIN — DOBUTAMINE HYDROCHLORIDE SCH MLS/HR: 200 INJECTION INTRAVENOUS at 02:01

## 2018-12-19 RX ADMIN — SACUBITRIL AND VALSARTAN SCH: 49; 51 TABLET, FILM COATED ORAL at 10:20

## 2018-12-19 RX ADMIN — HUMAN INSULIN SCH U: 100 INJECTION, SOLUTION SUBCUTANEOUS at 12:37

## 2018-12-19 RX ADMIN — IPRATROPIUM BROMIDE AND ALBUTEROL SULFATE SCH: .5; 3 SOLUTION RESPIRATORY (INHALATION) at 13:56

## 2018-12-19 RX ADMIN — HUMAN INSULIN SCH U: 100 INJECTION, SOLUTION SUBCUTANEOUS at 17:38

## 2018-12-19 RX ADMIN — RANOLAZINE SCH: 500 TABLET, FILM COATED, EXTENDED RELEASE ORAL at 10:21

## 2018-12-19 RX ADMIN — DEXMEDETOMIDINE HYDROCHLORIDE PRN MLS/HR: 100 INJECTION, SOLUTION, CONCENTRATE INTRAVENOUS at 01:01

## 2018-12-19 RX ADMIN — IPRATROPIUM BROMIDE AND ALBUTEROL SULFATE SCH ML: .5; 3 SOLUTION RESPIRATORY (INHALATION) at 07:47

## 2018-12-19 RX ADMIN — DOBUTAMINE HYDROCHLORIDE SCH: 200 INJECTION INTRAVENOUS at 15:25

## 2018-12-19 RX ADMIN — DEXMEDETOMIDINE HYDROCHLORIDE PRN MLS/HR: 100 INJECTION, SOLUTION, CONCENTRATE INTRAVENOUS at 03:50

## 2018-12-19 RX ADMIN — DEXMEDETOMIDINE HYDROCHLORIDE PRN MLS/HR: 100 INJECTION, SOLUTION, CONCENTRATE INTRAVENOUS at 17:37

## 2018-12-19 RX ADMIN — DEXMEDETOMIDINE HYDROCHLORIDE PRN MLS/HR: 100 INJECTION, SOLUTION, CONCENTRATE INTRAVENOUS at 10:18

## 2018-12-19 RX ADMIN — DEXMEDETOMIDINE HYDROCHLORIDE PRN MLS/HR: 100 INJECTION, SOLUTION, CONCENTRATE INTRAVENOUS at 14:39

## 2018-12-19 RX ADMIN — HUMAN INSULIN SCH U: 100 INJECTION, SOLUTION SUBCUTANEOUS at 06:24

## 2018-12-19 RX ADMIN — IPRATROPIUM BROMIDE AND ALBUTEROL SULFATE SCH ML: .5; 3 SOLUTION RESPIRATORY (INHALATION) at 01:36

## 2018-12-19 RX ADMIN — LINEZOLID SCH MLS/HR: 600 INJECTION, SOLUTION INTRAVENOUS at 00:26

## 2018-12-19 RX ADMIN — DEXMEDETOMIDINE HYDROCHLORIDE PRN MLS/HR: 100 INJECTION, SOLUTION, CONCENTRATE INTRAVENOUS at 07:24

## 2018-12-19 RX ADMIN — LINEZOLID SCH MLS/HR: 600 INJECTION, SOLUTION INTRAVENOUS at 13:10

## 2018-12-19 NOTE — CP.CCUPN
<Melody Sauceda - Last Filed: 12/19/18 11:01>





CCU Subjective





- Physician Review


Subjective (Free Text): 





12/19/18 10:51








ICU Progress Note for Dr. Valentin








Patient seen and examined at bedside this morning. Patient resting in bed 

comfortably. She is still not responsive. Due to patient condition, ROS unable 

to be obtained.





Per RN urine output poor last night at 20cc overnight. Kayexalate given due to 

hyperkalemia, AM labs still 5.4. s/p Kayexalate 30g NG and 30g HI last night and

this morning before the 5.4 reading. Novolin 6U given this morning at 6:24am. 

Patient has not had BM.











12/19/18 10:52








CCU Objective





- Vital Signs / Intake & Output


Vital Signs (Last 4 hours): 


Vital Signs











  Temp Pulse Resp BP Pulse Ox


 


 12/19/18 08:46  97.3 F L  107 H  24  124/53 L 


 


 12/19/18 06:50   107 H  24   100


 


 12/19/18 06:40   107 H  24   100


 


 12/19/18 06:33   107 H  24  126/53 L  99


 


 12/19/18 06:30   107 H  24   100


 


 12/19/18 06:20   107 H  24   100


 


 12/19/18 06:10   104 H  25 H   100


 


 12/19/18 06:03   104 H  24  131/52 L  100


 


 12/19/18 06:00   104 H  24   100


 


 12/19/18 05:50   104 H  20   100


 


 12/19/18 05:40   103 H  21   100


 


 12/19/18 05:34   103 H   111/48 L  98


 


 12/19/18 05:30   101 H    96


 


 12/19/18 05:20   103 H  24   100


 


 12/19/18 05:10   104 H  20   100


 


 12/19/18 05:03   105 H  21  122/43 L 


 


 12/19/18 05:00   104 H  20   100


 


 12/19/18 04:50   105 H  20   100











Intake and Output (Last 8hrs): 


                                 Intake & Output











 12/18/18 12/19/18 12/19/18





 22:59 06:59 14:59


 


Intake Total 469.7 1222.0 155


 


Output Total 10 0 


 


Balance 459.7 1222.0 155


 


Weight  115 lb 


 


Intake:   


 


  IV 23 171 155


 


  Intake, IV Amount 46.7 1051.0 


 


    rt ij tlc distal port  700 


 


    rt ij tlc middle port 21 56 


 


    rt ij tlc middle portt y 15 172.5 


 


    rt ij tlc proximal port 10.7 122.5 


 


  Oral 0  


 


  TPN/  


 


  Blood Product   0


 


    Red Blood Cells Cpd As1   0





    Lr  Unit [id number]   


 


Output:   


 


  Urine 10 0 


 


    Urethral (Gilbert) 10 0 














- Physical Exam


Physical Exam Limitations: Positive for: Other (Patient intubated and not 

responsive. Eyes did not open.)


Head: Positive for: Atraumatic, Normocephalic


Pupils: Positive for: PERRL


Conjunctiva: Positive for: Normal


Respiratory/Chest: Positive for: Accessory Muscle Use, Wheezes, Decreased Breath

Sounds (right side decreased breath sounds), Other.  Negative for: Clear to 

Auscultation, Good Air Exchange


Cardiovascular: Positive for: Murmurs (holosystolic)


Abdomen: Positive for: Normal Bowel Sounds.  Negative for: Tenderness, 

Distention, Rebound, Guarding


Upper Extremity: Positive for: Capillary Refill < 2s


Neurological: Negative for: GCS=15


Skin: Positive for: Other (pigtail right chest midaxillary c/d/i, central line 

by right SCM c/d/i )


Psychiatric: Negative for: Alert, Oriented x 3





- Medications


Active Medications: 


Active Medications











Generic Name Dose Route Start Last Admin





  Trade Name Freq  PRN Reason Stop Dose Admin


 


Albuterol Sulfate  1.25 mg  12/14/18 21:02  





  Albuterol 0.042% Inhal Sol (1.25mg/3ml) Ud  INH   





  RQ2 PRN   





  Wheezing   





     





     





     


 


Albuterol/Ipratropium  3 ml  12/15/18 02:00  12/19/18 07:47





  Duoneb 3 Mg/0.5 Mg (3 Ml) Ud  INH   3 ml





  RQ6 PASQUALE   Administration





     





     





     





     


 


Calcium Acetate  667 mg  12/19/18 08:00  





  Phoslo  PO   





  BIDCC PASQUALE   





     





     





     





     


 


Ergocalciferol  1 cap  12/13/18 10:00  12/13/18 09:38





  Drisdol 50,000 Intl Units Cap  PO   1 cap





  QWK PASQUALE   Administration





     





     





     





     


 


Furosemide  20 mg  12/09/18 13:13  12/18/18 10:01





  Lasix  IVP   Not Given





  DAILY PASQUALE   





     





     





     





     


 


Cefepime HCl 0.5 gm/ Dextrose  50 mls @ 100 mls/hr  12/14/18 10:00  12/18/18 

10:01





  IVPB   100 mls/hr





  Q24H PASQUALE   Administration





     





     





  Protocol   





     


 


Linezolid  600 mg in 300 mls @ 200 mls/hr  12/16/18 01:00  12/19/18 00:26





  Zyvox 600mg/300ml D5w  IVPB   200 mls/hr





  Q12H PASQUALE   Administration





     





     





  Protocol   





     


 


Dobutamine HCl/Dextrose  500 mg in 250 mls @ 7.076 mls/hr  12/16/18 15:10  

12/19/18 02:01





  Dobutamine/Dextrose 5% 500mg/250ml  IV   7.076 mls/hr





  .Q24H PASQUALE   Administration





     





     





  Protocol   





  5 MCG/KG/MIN   


 


Dexmedetomidine HCl 200 mcg/  50 mls @ 2.34 mls/hr  12/18/18 18:39  12/19/18 

07:24





  Sodium Chloride  IV   1.49 mcg/kg/hr





  TITR PRN   17.5 mls/hr





  Sedation   Administration





     





  Protocol   





  0.2 MCG/KG/HR   


 


Norepinephrine Bitartrate 4 mg  254 mls @ 15.24 mls/hr  12/18/18 20:32  12/19/18

07:00





  / Sodium Chloride  IV   4.93 mcg/min





  .[id number] PRN   18.78 mls/hr





  TITRATE PER MD ORDER   Titration





     





  Protocol   





  4 MCG/MIN   


 


Sodium Bicarbonate 100 meq/  1,100 mls @ 100 mls/hr  12/19/18 01:30  12/19/18 

02:25





  Sodium Chloride  IV   100 mls/hr





  .Q11H PASQUALE   Administration





     





     





     





     


 


Insulin Human Regular  0 unit  12/19/18 00:00  12/19/18 06:24





  Novolin R  SC   6 u





  Q6H PASQUALE   Administration





     





     





  Protocol   





     


 


Metoprolol Tartrate  25 mg  12/07/18 10:00  12/18/18 17:39





  Lopressor  PO   Not Given





  BID PASQUALE   





     





     





     





     


 


Montelukast Sodium  10 mg  12/07/18 10:00  12/18/18 10:03





  Singulair  PO   Not Given





  DAILY PASQUALE   





     





     





     





     


 


Morphine Sulfate  2 mg  12/15/18 15:58  12/18/18 13:16





  Morphine  IVP   2 mg





  Q4 PRN   Administration





  Anxiety   





     





     





     


 


Pantoprazole Sodium  40 mg  12/19/18 10:00  





  Protonix Inj  IVP   





  DAILY PASQUALE   





     





     





     





     


 


Ranolazine  500 mg  12/07/18 10:00  12/18/18 17:41





  Ranexa  PO   Not Given





  BID PASQUALE   





     





     





     





     


 


Repaglinide  2 mg  12/07/18 10:00  12/18/18 10:00





  Prandin  PO   Not Given





  DAILY PASQUALE   





     





     





     





     


 


Roflumilast  500 mcg  12/07/18 10:00  12/14/18 10:27





  Daliresp  PO   500 mcg





  DAILY PASQUALE   Administration





     





     





     





     


 


Rosuvastatin Calcium  20 mg  12/06/18 22:00  12/18/18 21:40





  Crestor  PO   Not Given





  HS PASQUALE   





     





     





     





     


 


Sacubitril/Valsartan  1 tab  12/07/18 18:00  12/18/18 17:42





  Entresto 49 Mg-51 Mg  PO   Not Given





  BID PASQUALE   





     





     





     





     














- Patient Studies


Lab Studies: 


                                   Lab Studies











  12/19/18 12/19/18 12/19/18 Range/Units





  05:46 05:37 05:35 


 


WBC   16.2 H   (4.8-10.8)  K/uL


 


RBC   2.55 L   (3.80-5.20)  Mil/uL


 


Hgb   7.1 L   (11.0-16.0)  g/dL


 


Hct   22.1 L   (34.0-47.0)  %


 


MCV   86.8   (81.0-99.0)  fL


 


MCH   27.9   (27.0-31.0)  pg


 


MCHC   32.1 L   (33.0-37.0)  g/dL


 


RDW   17.7 H   (11.5-14.5)  %


 


Plt Count   138   (130-400)  K/uL


 


MPV   11.6   (7.2-11.7)  fL


 


Neut % (Auto)   90.9 H   (50.0-75.0)  %


 


Lymph % (Auto)   1.4 L   (20.0-40.0)  %


 


Mono % (Auto)   6.0   (0.0-10.0)  %


 


Eos % (Auto)   0.4   (0.0-4.0)  %


 


Baso % (Auto)   1.3   (0.0-2.0)  %


 


Neut # (Auto)   14.7 H   (1.8-7.0)  K/uL


 


Lymph # (Auto)   0.2 L   (1.0-4.3)  K/uL


 


Mono # (Auto)   1.0 H   (0.0-0.8)  K/uL


 


Eos # (Auto)   0.1   (0.0-0.7)  K/uL


 


Baso # (Auto)   0.2   (0.0-0.2)  K/uL


 


Neutrophils % (Manual)   93 H   (50-75)  %


 


Band Neutrophils %   1   (0-2)  %


 


Lymphocytes % (Manual)   1 L   (20-40)  %


 


Monocytes % (Manual)   2   (0-10)  %


 


Eosinophils % (Manual)   1   (0-4)  %


 


Myelocytes %   2 H   (0-0)  %


 


Nucleated RBC %   1 H   (0-0)  %


 


Platelet Estimate   Normal   (NORMAL)  


 


Large Platelets   Present   


 


Hypochromasia (manual)   Slight   


 


Poikilocytosis (manual   Slight   


 


Basophilic Stippling   Slight   


 


Anisocytosis (manual)   Slight   


 


Tear Drop Cells   Slight   


 


Ovalocytes   Slight   


 


Fanny Cells   Slight   


 


PT     (9.7-12.2)  SECONDS


 


INR     


 


APTT     (21-34)  SECONDS


 


Puncture Site     


 


pCO2     (35-45)  mm/Hg


 


pO2     ()  mm/Hg


 


HCO3     (21-28)  mmol/L


 


ABG pH     (7.35-7.45)  


 


ABG Total CO2     (22-28)  mmol/L


 


ABG O2 Saturation     (95-98)  %


 


ABG Base Excess     (-2.0-3.0)  mmol/L


 


ABG Hemoglobin     (11.7-17.4)  g/dL


 


ABG Carboxyhemoglobin     (0.5-1.5)  %


 


POC ABG HHb (Measured)     (0.0-5.0)  %


 


ABG Methemoglobin     (0.0-3.0)  %


 


Benji Test     


 


ABG Potassium     (3.6-5.2)  mmol/L


 


A-a O2 Difference     mm/Hg


 


Respiratory Index     


 


Hgb O2 Saturation     (95.0-98.0)  %


 


Glucose     ()  mg/dl


 


Lactate     (0.7-2.1)  mmol/L


 


Vent Mode     


 


Mechanical Rate     


 


FiO2     %


 


Tidal Volume     


 


PEEP     


 


Crit Value Called To     


 


Crit Value Called By     


 


Crit Value Read Back     


 


Blood Gas Notified Time     


 


Sodium    133  (132-148)  mmol/L


 


Potassium    5.4 H  (3.6-5.2)  mmol/L


 


Chloride    95 L  ()  mmol/L


 


Carbon Dioxide    25  (22-30)  mmol/L


 


Anion Gap    17  (10-20)  


 


BUN    111 H*  (7-17)  mg/dL


 


Creatinine    4.9 H  (0.7-1.2)  mg/dL


 


Est GFR ( Amer)    10  


 


Est GFR (Non-Af Amer)    8  


 


POC Glucose (mg/dL)     ()  mg/dL


 


Random Glucose    293 H  ()  mg/dL


 


Calcium    7.5 L  (8.6-10.4)  mg/dl


 


Phosphorus    7.6 H  (2.5-4.5)  mg/dL


 


Magnesium    2.4 H  (1.6-2.3)  mg/dL


 


Total Bilirubin    0.6  (0.2-1.3)  mg/dL


 


AST    44 H  (14-36)  U/L


 


ALT    37  (9-52)  U/L


 


Alkaline Phosphatase    158 H  ()  U/L


 


Total Protein    5.5 L  (6.3-8.3)  g/dL


 


Albumin    2.8 L  (3.5-5.0)  g/dL


 


Globulin    2.7  (2.2-3.9)  gm/dL


 


Albumin/Globulin Ratio    1.0  (1.0-2.1)  


 


Arterial Blood Potassium     (3.6-5.2)  mmol/L


 


Blood Type  A POSITIVE    


 


Antibody Screen  Negative    














  12/19/18 12/19/18 12/19/18 Range/Units





  05:21 05:06 00:37 


 


WBC     (4.8-10.8)  K/uL


 


RBC     (3.80-5.20)  Mil/uL


 


Hgb     (11.0-16.0)  g/dL


 


Hct     (34.0-47.0)  %


 


MCV     (81.0-99.0)  fL


 


MCH     (27.0-31.0)  pg


 


MCHC     (33.0-37.0)  g/dL


 


RDW     (11.5-14.5)  %


 


Plt Count     (130-400)  K/uL


 


MPV     (7.2-11.7)  fL


 


Neut % (Auto)     (50.0-75.0)  %


 


Lymph % (Auto)     (20.0-40.0)  %


 


Mono % (Auto)     (0.0-10.0)  %


 


Eos % (Auto)     (0.0-4.0)  %


 


Baso % (Auto)     (0.0-2.0)  %


 


Neut # (Auto)     (1.8-7.0)  K/uL


 


Lymph # (Auto)     (1.0-4.3)  K/uL


 


Mono # (Auto)     (0.0-0.8)  K/uL


 


Eos # (Auto)     (0.0-0.7)  K/uL


 


Baso # (Auto)     (0.0-0.2)  K/uL


 


Neutrophils % (Manual)     (50-75)  %


 


Band Neutrophils %     (0-2)  %


 


Lymphocytes % (Manual)     (20-40)  %


 


Monocytes % (Manual)     (0-10)  %


 


Eosinophils % (Manual)     (0-4)  %


 


Myelocytes %     (0-0)  %


 


Nucleated RBC %     (0-0)  %


 


Platelet Estimate     (NORMAL)  


 


Large Platelets     


 


Hypochromasia (manual)     


 


Poikilocytosis (manual     


 


Basophilic Stippling     


 


Anisocytosis (manual)     


 


Tear Drop Cells     


 


Ovalocytes     


 


[place] Cells     


 


PT     (9.7-12.2)  SECONDS


 


INR     


 


APTT     (21-34)  SECONDS


 


Puncture Site   Rb   


 


pCO2   64 H   (35-45)  mm/Hg


 


pO2   123 H   ()  mm/Hg


 


HCO3   21.7   (21-28)  mmol/L


 


ABG pH   7.20 L   (7.35-7.45)  


 


ABG Total CO2   27.0   (22-28)  mmol/L


 


ABG O2 Saturation   99.3 H   (95-98)  %


 


ABG Base Excess   -4.2 L   (-2.0-3.0)  mmol/L


 


ABG Hemoglobin     (11.7-17.4)  g/dL


 


ABG Carboxyhemoglobin     (0.5-1.5)  %


 


POC ABG HHb (Measured)     (0.0-5.0)  %


 


ABG Methemoglobin     (0.0-3.0)  %


 


Benji Test   Na   


 


ABG Potassium   5.2   (3.6-5.2)  mmol/L


 


A-a O2 Difference   439.0   mm/Hg


 


Respiratory Index   3.6   


 


Hgb O2 Saturation     (95.0-98.0)  %


 


Glucose   308 H   ()  mg/dl


 


Lactate   1.0   (0.7-2.1)  mmol/L


 


Vent Mode   Prvc   


 


Mechanical Rate   20   


 


FiO2   90.0   %


 


Tidal Volume   400   


 


PEEP   3   


 


Crit Value Called To     


 


Crit Value Called By     


 


Crit Value Read Back     


 


Blood Gas Notified Time     


 


Sodium   132.0  129 L  (132-148)  mmol/L


 


Potassium    6.1 H  (3.6-5.2)  mmol/L


 


Chloride   101.0  97 L  ()  mmol/L


 


Carbon Dioxide    22  (22-30)  mmol/L


 


Anion Gap    17  (10-20)  


 


BUN    114 H*  (7-17)  mg/dL


 


Creatinine    5.1 H  (0.7-1.2)  mg/dL


 


Est GFR ( Amer)    10  


 


Est GFR (Non-Af Amer)    8  


 


POC Glucose (mg/dL)  309 H    ()  mg/dL


 


Random Glucose    204 H  ()  mg/dL


 


Calcium    7.7 L  (8.6-10.4)  mg/dl


 


Phosphorus     (2.5-4.5)  mg/dL


 


Magnesium     (1.6-2.3)  mg/dL


 


Total Bilirubin     (0.2-1.3)  mg/dL


 


AST     (14-36)  U/L


 


ALT     (9-52)  U/L


 


Alkaline Phosphatase     ()  U/L


 


Total Protein     (6.3-8.3)  g/dL


 


Albumin     (3.5-5.0)  g/dL


 


Globulin     (2.2-3.9)  gm/dL


 


Albumin/Globulin Ratio     (1.0-2.1)  


 


Arterial Blood Potassium   5.2   (3.6-5.2)  mmol/L


 


Blood Type     


 


Antibody Screen     














  12/18/18 12/18/18 12/18/18 Range/Units





  23:58 22:05 21:00 


 


WBC     (4.8-10.8)  K/uL


 


RBC     (3.80-5.20)  Mil/uL


 


Hgb     (11.0-16.0)  g/dL


 


Hct     (34.0-47.0)  %


 


MCV     (81.0-99.0)  fL


 


MCH     (27.0-31.0)  pg


 


MCHC     (33.0-37.0)  g/dL


 


RDW     (11.5-14.5)  %


 


Plt Count     (130-400)  K/uL


 


MPV     (7.2-11.7)  fL


 


Neut % (Auto)     (50.0-75.0)  %


 


Lymph % (Auto)     (20.0-40.0)  %


 


Mono % (Auto)     (0.0-10.0)  %


 


Eos % (Auto)     (0.0-4.0)  %


 


Baso % (Auto)     (0.0-2.0)  %


 


Neut # (Auto)     (1.8-7.0)  K/uL


 


Lymph # (Auto)     (1.0-4.3)  K/uL


 


Mono # (Auto)     (0.0-0.8)  K/uL


 


Eos # (Auto)     (0.0-0.7)  K/uL


 


Baso # (Auto)     (0.0-0.2)  K/uL


 


Neutrophils % (Manual)     (50-75)  %


 


Band Neutrophils %     (0-2)  %


 


Lymphocytes % (Manual)     (20-40)  %


 


Monocytes % (Manual)     (0-10)  %


 


Eosinophils % (Manual)     (0-4)  %


 


Myelocytes %     (0-0)  %


 


Nucleated RBC %     (0-0)  %


 


Platelet Estimate     (NORMAL)  


 


Large Platelets     


 


Hypochromasia (manual)     


 


Poikilocytosis (manual     


 


Basophilic Stippling     


 


Anisocytosis (manual)     


 


Tear Drop Cells     


 


Ovalocytes     


 


[place] Cells     


 


PT     (9.7-12.2)  SECONDS


 


INR     


 


APTT     (21-34)  SECONDS


 


Puncture Site   Rradial  Lfem  


 


pCO2   60 H  71 H*  (35-45)  mm/Hg


 


pO2   90  53 L  ()  mm/Hg


 


HCO3   20.4 L  14.9 L  (21-28)  mmol/L


 


ABG pH   7.19 L*  7.05 L*  (7.35-7.45)  


 


ABG Total CO2   24.7  21.8 L  (22-28)  mmol/L


 


ABG O2 Saturation   96.1  88.8 L  (95-98)  %


 


ABG Base Excess   -5.8 L  -11.9 L  (-2.0-3.0)  mmol/L


 


ABG Hemoglobin   10.9 L   (11.7-17.4)  g/dL


 


ABG Carboxyhemoglobin   0.6   (0.5-1.5)  %


 


POC ABG HHb (Measured)   3.9   (0.0-5.0)  %


 


ABG Methemoglobin   0.5   (0.0-3.0)  %


 


Benji Test   Pos  Neg  


 


ABG Potassium    6.5 H*  (3.6-5.2)  mmol/L


 


A-a O2 Difference   192.0  571.0  mm/Hg


 


Respiratory Index   2.1  10.8  


 


Hgb O2 Saturation   95.0   (95.0-98.0)  %


 


Glucose    261 H  ()  mg/dl


 


Lactate    1.4  (0.7-2.1)  mmol/L


 


Vent Mode    Prvc  


 


Mechanical Rate    12  


 


FiO2   50.0  100.0  %


 


Tidal Volume    360  


 


PEEP    3  


 


Crit Value Called To   Dr. rafael hall  


 


Crit Value Called By   benita Zhou rcp  


 


Crit Value Read Back   Y  Y  


 


Blood Gas Notified Time   2220 2112  


 


Sodium    129.0 L  (132-148)  mmol/L


 


Potassium     (3.6-5.2)  mmol/L


 


Chloride    101.0  ()  mmol/L


 


Carbon Dioxide     (22-30)  mmol/L


 


Anion Gap     (10-20)  


 


BUN     (7-17)  mg/dL


 


Creatinine     (0.7-1.2)  mg/dL


 


Est GFR (African Amer)     


 


Est GFR (Non-Af Amer)     


 


POC Glucose (mg/dL)  229 H    ()  mg/dL


 


Random Glucose     ()  mg/dL


 


Calcium     (8.6-10.4)  mg/dl


 


Phosphorus     (2.5-4.5)  mg/dL


 


Magnesium     (1.6-2.3)  mg/dL


 


Total Bilirubin     (0.2-1.3)  mg/dL


 


AST     (14-36)  U/L


 


ALT     (9-52)  U/L


 


Alkaline Phosphatase     ()  U/L


 


Total Protein     (6.3-8.3)  g/dL


 


Albumin     (3.5-5.0)  g/dL


 


Globulin     (2.2-3.9)  gm/dL


 


Albumin/Globulin Ratio     (1.0-2.1)  


 


Arterial Blood Potassium    6.5 H*  (3.6-5.2)  mmol/L


 


Blood Type     


 


Antibody Screen     














  12/18/18 12/18/18 12/18/18 Range/Units





  20:53 20:53 20:53 


 


WBC    21.6 H D  (4.8-10.8)  K/uL


 


RBC    2.71 L  (3.80-5.20)  Mil/uL


 


Hgb    7.4 L  (11.0-16.0)  g/dL


 


Hct    23.7 L  (34.0-47.0)  %


 


MCV    87.4  (81.0-99.0)  fL


 


MCH    27.4  (27.0-31.0)  pg


 


MCHC    31.3 L  (33.0-37.0)  g/dL


 


RDW    17.4 H  (11.5-14.5)  %


 


Plt Count    150  (130-400)  K/uL


 


MPV    10.6  (7.2-11.7)  fL


 


Neut % (Auto)    91.3 H  (50.0-75.0)  %


 


Lymph % (Auto)    0.8 L  (20.0-40.0)  %


 


Mono % (Auto)    7.4  (0.0-10.0)  %


 


Eos % (Auto)    0.2  (0.0-4.0)  %


 


Baso % (Auto)    0.3  (0.0-2.0)  %


 


Neut # (Auto)    19.7 H  (1.8-7.0)  K/uL


 


Lymph # (Auto)    0.2 L  (1.0-4.3)  K/uL


 


Mono # (Auto)    1.6 H  (0.0-0.8)  K/uL


 


Eos # (Auto)    0.0  (0.0-0.7)  K/uL


 


Baso # (Auto)    0.1  (0.0-0.2)  K/uL


 


Neutrophils % (Manual)    89 H  (50-75)  %


 


Band Neutrophils %     (0-2)  %


 


Lymphocytes % (Manual)    1 L  (20-40)  %


 


Monocytes % (Manual)    8  (0-10)  %


 


Eosinophils % (Manual)    2  (0-4)  %


 


Myelocytes %     (0-0)  %


 


Nucleated RBC %     (0-0)  %


 


Platelet Estimate    Normal  (NORMAL)  


 


Large Platelets    Present  


 


Hypochromasia (manual)    Slight  


 


Poikilocytosis (manual    Slight  


 


Basophilic Stippling    Slight  


 


Anisocytosis (manual)    Slight  


 


Tear Drop Cells    Slight  


 


Ovalocytes    Slight  


 


Fanny Cells    Slight  


 


PT   14.0 H   (9.7-12.2)  SECONDS


 


INR   1.3   


 


APTT   30   (21-34)  SECONDS


 


Puncture Site     


 


pCO2     (35-45)  mm/Hg


 


pO2     ()  mm/Hg


 


HCO3     (21-28)  mmol/L


 


ABG pH     (7.35-7.45)  


 


ABG Total CO2     (22-28)  mmol/L


 


ABG O2 Saturation     (95-98)  %


 


ABG Base Excess     (-2.0-3.0)  mmol/L


 


ABG Hemoglobin     (11.7-17.4)  g/dL


 


ABG Carboxyhemoglobin     (0.5-1.5)  %


 


POC ABG HHb (Measured)     (0.0-5.0)  %


 


ABG Methemoglobin     (0.0-3.0)  %


 


Bneji Test     


 


ABG Potassium     (3.6-5.2)  mmol/L


 


A-a O2 Difference     mm/Hg


 


Respiratory Index     


 


Hgb O2 Saturation     (95.0-98.0)  %


 


Glucose     ()  mg/dl


 


Lactate     (0.7-2.1)  mmol/L


 


Vent Mode     


 


Mechanical Rate     


 


FiO2     %


 


Tidal Volume     


 


PEEP     


 


Crit Value Called To     


 


Crit Value Called By     


 


Crit Value Read Back     


 


Blood Gas Notified Time     


 


Sodium  128 L    (132-148)  mmol/L


 


Potassium  6.6 H* D    (3.6-5.2)  mmol/L


 


Chloride  97 L    ()  mmol/L


 


Carbon Dioxide  19 L    (22-30)  mmol/L


 


Anion Gap  18    (10-20)  


 


BUN  112 H* D    (7-17)  mg/dL


 


Creatinine  4.9 H    (0.7-1.2)  mg/dL


 


Est GFR ( Amer)  10    


 


Est GFR (Non-Af Amer)  8    


 


POC Glucose (mg/dL)     ()  mg/dL


 


Random Glucose  245 H    ()  mg/dL


 


Calcium  7.8 L    (8.6-10.4)  mg/dl


 


Phosphorus  8.3 H    (2.5-4.5)  mg/dL


 


Magnesium  2.6 H    (1.6-2.3)  mg/dL


 


Total Bilirubin  0.8    (0.2-1.3)  mg/dL


 


AST  55 H    (14-36)  U/L


 


ALT  39    (9-52)  U/L


 


Alkaline Phosphatase  163 H    ()  U/L


 


Total Protein  5.9 L    (6.3-8.3)  g/dL


 


Albumin  3.1 L    (3.5-5.0)  g/dL


 


Globulin  2.8    (2.2-3.9)  gm/dL


 


Albumin/Globulin Ratio  1.1    (1.0-2.1)  


 


Arterial Blood Potassium     (3.6-5.2)  mmol/L


 


Blood Type     


 


Antibody Screen     














  12/18/18 12/18/18 12/18/18 Range/Units





  17:14 11:35 00:38 


 


WBC     (4.8-10.8)  K/uL


 


RBC     (3.80-5.20)  Mil/uL


 


Hgb     (11.0-16.0)  g/dL


 


Hct     (34.0-47.0)  %


 


MCV     (81.0-99.0)  fL


 


MCH     (27.0-31.0)  pg


 


MCHC     (33.0-37.0)  g/dL


 


RDW     (11.5-14.5)  %


 


Plt Count     (130-400)  K/uL


 


MPV     (7.2-11.7)  fL


 


Neut % (Auto)     (50.0-75.0)  %


 


Lymph % (Auto)     (20.0-40.0)  %


 


Mono % (Auto)     (0.0-10.0)  %


 


Eos % (Auto)     (0.0-4.0)  %


 


Baso % (Auto)     (0.0-2.0)  %


 


Neut # (Auto)     (1.8-7.0)  K/uL


 


Lymph # (Auto)     (1.0-4.3)  K/uL


 


Mono # (Auto)     (0.0-0.8)  K/uL


 


Eos # (Auto)     (0.0-0.7)  K/uL


 


Baso # (Auto)     (0.0-0.2)  K/uL


 


Neutrophils % (Manual)     (50-75)  %


 


Band Neutrophils %     (0-2)  %


 


Lymphocytes % (Manual)     (20-40)  %


 


Monocytes % (Manual)     (0-10)  %


 


Eosinophils % (Manual)     (0-4)  %


 


Myelocytes %     (0-0)  %


 


Nucleated RBC %     (0-0)  %


 


Platelet Estimate     (NORMAL)  


 


Large Platelets     


 


Hypochromasia (manual)     


 


Poikilocytosis (manual     


 


Basophilic Stippling     


 


Anisocytosis (manual)     


 


Tear Drop Cells     


 


Ovalocytes     


 


[place] Cells     


 


PT     (9.7-12.2)  SECONDS


 


INR     


 


APTT     (21-34)  SECONDS


 


Puncture Site    Rb  


 


pCO2    64 H  (35-45)  mm/Hg


 


pO2    147 H  ()  mm/Hg


 


HCO3    18.5 L  (21-28)  mmol/L


 


ABG pH    7.12 L*  (7.35-7.45)  


 


ABG Total CO2    22.8  (22-28)  mmol/L


 


ABG O2 Saturation    99.3 H  (95-98)  %


 


ABG Base Excess    -8.2 L  (-2.0-3.0)  mmol/L


 


ABG Hemoglobin    7.3 L  (11.7-17.4)  g/dL


 


ABG Carboxyhemoglobin    1.8 H  (0.5-1.5)  %


 


POC ABG HHb (Measured)    0.7  (0.0-5.0)  %


 


ABG Methemoglobin    1.4  (0.0-3.0)  %


 


Benji Test    Na  


 


ABG Potassium     (3.6-5.2)  mmol/L


 


A-a O2 Difference    486.0  mm/Hg


 


Respiratory Index    3.3  


 


Hgb O2 Saturation    96.2  (95.0-98.0)  %


 


Glucose     ()  mg/dl


 


Lactate     (0.7-2.1)  mmol/L


 


Vent Mode    Prvc  


 


Mechanical Rate    20  


 


FiO2    100.0  %


 


Tidal Volume    360  


 


PEEP    3  


 


Crit Value Called To    Dr. hall  


 


Crit Value Called By    Suzanne rrt  


 


Crit Value Read Back    Y  


 


Blood Gas Notified Time    119  


 


Sodium     (132-148)  mmol/L


 


Potassium     (3.6-5.2)  mmol/L


 


Chloride     ()  mmol/L


 


Carbon Dioxide     (22-30)  mmol/L


 


Anion Gap     (10-20)  


 


BUN     (7-17)  mg/dL


 


Creatinine     (0.7-1.2)  mg/dL


 


Est GFR (African Amer)     


 


Est GFR (Non-Af Amer)     


 


POC Glucose (mg/dL)  283 H  350 H   ()  mg/dL


 


Random Glucose     ()  mg/dL


 


Calcium     (8.6-10.4)  mg/dl


 


Phosphorus     (2.5-4.5)  mg/dL


 


Magnesium     (1.6-2.3)  mg/dL


 


Total Bilirubin     (0.2-1.3)  mg/dL


 


AST     (14-36)  U/L


 


ALT     (9-52)  U/L


 


Alkaline Phosphatase     ()  U/L


 


Total Protein     (6.3-8.3)  g/dL


 


Albumin     (3.5-5.0)  g/dL


 


Globulin     (2.2-3.9)  gm/dL


 


Albumin/Globulin Ratio     (1.0-2.1)  


 


Arterial Blood Potassium     (3.6-5.2)  mmol/L


 


Blood Type     


 


Antibody Screen     








                         Laboratory Results - last 24 hr











  12/18/18 12/18/18 12/18/18





  00:38 11:35 17:14


 


WBC   


 


RBC   


 


Hgb   


 


Hct   


 


MCV   


 


MCH   


 


MCHC   


 


RDW   


 


Plt Count   


 


MPV   


 


Neut % (Auto)   


 


Lymph % (Auto)   


 


Mono % (Auto)   


 


Eos % (Auto)   


 


Baso % (Auto)   


 


Neut # (Auto)   


 


Lymph # (Auto)   


 


Mono # (Auto)   


 


Eos # (Auto)   


 


Baso # (Auto)   


 


Neutrophils % (Manual)   


 


Band Neutrophils %   


 


Lymphocytes % (Manual)   


 


Monocytes % (Manual)   


 


Eosinophils % (Manual)   


 


Myelocytes %   


 


Nucleated RBC %   


 


Platelet Estimate   


 


Large Platelets   


 


Hypochromasia (manual)   


 


Poikilocytosis (manual   


 


Basophilic Stippling   


 


Anisocytosis (manual)   


 


Tear Drop Cells   


 


Ovalocytes   


 


[place] Cells   


 


PT   


 


INR   


 


APTT   


 


Puncture Site  Rb  


 


pCO2  64 H  


 


pO2  147 H  


 


HCO3  18.5 L  


 


ABG pH  7.12 L*  


 


ABG Total CO2  22.8  


 


ABG O2 Saturation  99.3 H  


 


ABG Base Excess  -8.2 L  


 


ABG Hemoglobin  7.3 L  


 


ABG Carboxyhemoglobin  1.8 H  


 


POC ABG HHb (Measured)  0.7  


 


ABG Methemoglobin  1.4  


 


Benji Test  Na  


 


ABG Potassium   


 


A-a O2 Difference  486.0  


 


Respiratory Index  3.3  


 


Hgb O2 Saturation  96.2  


 


Glucose   


 


Lactate   


 


Vent Mode  Prvc  


 


Mechanical Rate  20  


 


FiO2  100.0  


 


Tidal Volume  360  


 


PEEP  3  


 


Crit Value Called To  Dr. hall  


 


Crit Value Called By  Suzanne rrt  


 


Crit Value Read Back  Y  


 


Blood Gas Notified Time  119  


 


Sodium   


 


Potassium   


 


Chloride   


 


Carbon Dioxide   


 


Anion Gap   


 


BUN   


 


Creatinine   


 


Est GFR ( Amer)   


 


Est GFR (Non-Af Amer)   


 


POC Glucose (mg/dL)   350 H  283 H


 


Random Glucose   


 


Calcium   


 


Phosphorus   


 


Magnesium   


 


Total Bilirubin   


 


AST   


 


ALT   


 


Alkaline Phosphatase   


 


Total Protein   


 


Albumin   


 


Globulin   


 


Albumin/Globulin Ratio   


 


Arterial Blood Potassium   


 


Blood Type   


 


Antibody Screen   














  12/18/18 12/18/18 12/18/18





  20:53 20:53 20:53


 


WBC  21.6 H D  


 


RBC  2.71 L  


 


Hgb  7.4 L  


 


Hct  23.7 L  


 


MCV  87.4  


 


MCH  27.4  


 


MCHC  31.3 L  


 


RDW  17.4 H  


 


Plt Count  150  


 


MPV  10.6  


 


Neut % (Auto)  91.3 H  


 


Lymph % (Auto)  0.8 L  


 


Mono % (Auto)  7.4  


 


Eos % (Auto)  0.2  


 


Baso % (Auto)  0.3  


 


Neut # (Auto)  19.7 H  


 


Lymph # (Auto)  0.2 L  


 


Mono # (Auto)  1.6 H  


 


Eos # (Auto)  0.0  


 


Baso # (Auto)  0.1  


 


Neutrophils % (Manual)  89 H  


 


Band Neutrophils %   


 


Lymphocytes % (Manual)  1 L  


 


Monocytes % (Manual)  8  


 


Eosinophils % (Manual)  2  


 


Myelocytes %   


 


Nucleated RBC %   


 


Platelet Estimate  Normal  


 


Large Platelets  Present  


 


Hypochromasia (manual)  Slight  


 


Poikilocytosis (manual  Slight  


 


Basophilic Stippling  Slight  


 


Anisocytosis (manual)  Slight  


 


Tear Drop Cells  Slight  


 


Ovalocytes  Slight  


 


[place] Cells  Slight  


 


PT   14.0 H 


 


INR   1.3 


 


APTT   30 


 


Puncture Site   


 


pCO2   


 


pO2   


 


HCO3   


 


ABG pH   


 


ABG Total CO2   


 


ABG O2 Saturation   


 


ABG Base Excess   


 


ABG Hemoglobin   


 


ABG Carboxyhemoglobin   


 


POC ABG HHb (Measured)   


 


ABG Methemoglobin   


 


Benji Test   


 


ABG Potassium   


 


A-a O2 Difference   


 


Respiratory Index   


 


Hgb O2 Saturation   


 


Glucose   


 


Lactate   


 


Vent Mode   


 


Mechanical Rate   


 


FiO2   


 


Tidal Volume   


 


PEEP   


 


Crit Value Called To   


 


Crit Value Called By   


 


Crit Value Read Back   


 


Blood Gas Notified Time   


 


Sodium    128 L


 


Potassium    6.6 H* D


 


Chloride    97 L


 


Carbon Dioxide    19 L


 


Anion Gap    18


 


BUN    112 H* D


 


Creatinine    4.9 H


 


Est GFR ( Amer)    10


 


Est GFR (Non-Af Amer)    8


 


POC Glucose (mg/dL)   


 


Random Glucose    245 H


 


Calcium    7.8 L


 


Phosphorus    8.3 H


 


Magnesium    2.6 H


 


Total Bilirubin    0.8


 


AST    55 H


 


ALT    39


 


Alkaline Phosphatase    163 H


 


Total Protein    5.9 L


 


Albumin    3.1 L


 


Globulin    2.8


 


Albumin/Globulin Ratio    1.1


 


Arterial Blood Potassium   


 


Blood Type   


 


Antibody Screen   














  12/18/18 12/18/18 12/18/18





  21:00 22:05 23:58


 


WBC   


 


RBC   


 


Hgb   


 


Hct   


 


MCV   


 


MCH   


 


MCHC   


 


RDW   


 


Plt Count   


 


MPV   


 


Neut % (Auto)   


 


Lymph % (Auto)   


 


Mono % (Auto)   


 


Eos % (Auto)   


 


Baso % (Auto)   


 


Neut # (Auto)   


 


Lymph # (Auto)   


 


Mono # (Auto)   


 


Eos # (Auto)   


 


Baso # (Auto)   


 


Neutrophils % (Manual)   


 


Band Neutrophils %   


 


Lymphocytes % (Manual)   


 


Monocytes % (Manual)   


 


Eosinophils % (Manual)   


 


Myelocytes %   


 


Nucleated RBC %   


 


Platelet Estimate   


 


Large Platelets   


 


Hypochromasia (manual)   


 


Poikilocytosis (manual   


 


Basophilic Stippling   


 


Anisocytosis (manual)   


 


Tear Drop Cells   


 


Ovalocytes   


 


Fanny Cells   


 


PT   


 


INR   


 


APTT   


 


Puncture Site  Lfem  Rradial 


 


pCO2  71 H*  60 H 


 


pO2  53 L  90 


 


HCO3  14.9 L  20.4 L 


 


ABG pH  7.05 L*  7.19 L* 


 


ABG Total CO2  21.8 L  24.7 


 


ABG O2 Saturation  88.8 L  96.1 


 


ABG Base Excess  -11.9 L  -5.8 L 


 


ABG Hemoglobin   10.9 L 


 


ABG Carboxyhemoglobin   0.6 


 


POC ABG HHb (Measured)   3.9 


 


ABG Methemoglobin   0.5 


 


Benji Test  Neg  Pos 


 


ABG Potassium  6.5 H*  


 


A-a O2 Difference  571.0  192.0 


 


Respiratory Index  10.8  2.1 


 


Hgb O2 Saturation   95.0 


 


Glucose  261 H  


 


Lactate  1.4  


 


Vent Mode  Prvc  


 


Mechanical Rate  12  


 


FiO2  100.0  50.0 


 


Tidal Volume  360  


 


PEEP  3  


 


Crit Value Called To  Dr. rafael hall 


 


Crit Value Called By  benita Zhou rcp 


 


Crit Value Read Back  Y  Y 


 


Blood Gas Notified Time  2112 2220 


 


Sodium  129.0 L  


 


Potassium   


 


Chloride  101.0  


 


Carbon Dioxide   


 


Anion Gap   


 


BUN   


 


Creatinine   


 


Est GFR ( Amer)   


 


Est GFR (Non-Af Amer)   


 


POC Glucose (mg/dL)    229 H


 


Random Glucose   


 


Calcium   


 


Phosphorus   


 


Magnesium   


 


Total Bilirubin   


 


AST   


 


ALT   


 


Alkaline Phosphatase   


 


Total Protein   


 


Albumin   


 


Globulin   


 


Albumin/Globulin Ratio   


 


Arterial Blood Potassium  6.5 H*  


 


Blood Type   


 


Antibody Screen   














  12/19/18 12/19/18 12/19/18





  00:37 05:06 05:21


 


WBC   


 


RBC   


 


Hgb   


 


Hct   


 


MCV   


 


MCH   


 


MCHC   


 


RDW   


 


Plt Count   


 


MPV   


 


Neut % (Auto)   


 


Lymph % (Auto)   


 


Mono % (Auto)   


 


Eos % (Auto)   


 


Baso % (Auto)   


 


Neut # (Auto)   


 


Lymph # (Auto)   


 


Mono # (Auto)   


 


Eos # (Auto)   


 


Baso # (Auto)   


 


Neutrophils % (Manual)   


 


Band Neutrophils %   


 


Lymphocytes % (Manual)   


 


Monocytes % (Manual)   


 


Eosinophils % (Manual)   


 


Myelocytes %   


 


Nucleated RBC %   


 


Platelet Estimate   


 


Large Platelets   


 


Hypochromasia (manual)   


 


Poikilocytosis (manual   


 


Basophilic Stippling   


 


Anisocytosis (manual)   


 


Tear Drop Cells   


 


Ovalocytes   


 


Fanny Cells   


 


PT   


 


INR   


 


APTT   


 


Puncture Site   Rb 


 


pCO2   64 H 


 


pO2   123 H 


 


HCO3   21.7 


 


ABG pH   7.20 L 


 


ABG Total CO2   27.0 


 


ABG O2 Saturation   99.3 H 


 


ABG Base Excess   -4.2 L 


 


ABG Hemoglobin   


 


ABG Carboxyhemoglobin   


 


POC ABG HHb (Measured)   


 


ABG Methemoglobin   


 


Benji Test   Na 


 


ABG Potassium   5.2 


 


A-a O2 Difference   439.0 


 


Respiratory Index   3.6 


 


Hgb O2 Saturation   


 


Glucose   308 H 


 


Lactate   1.0 


 


Vent Mode   Prvc 


 


Mechanical Rate   20 


 


FiO2   90.0 


 


Tidal Volume   400 


 


PEEP   3 


 


Crit Value Called To   


 


Crit Value Called By   


 


Crit Value Read Back   


 


Blood Gas Notified Time   


 


Sodium  129 L  132.0 


 


Potassium  6.1 H  


 


Chloride  97 L  101.0 


 


Carbon Dioxide  22  


 


Anion Gap  17  


 


BUN  114 H*  


 


Creatinine  5.1 H  


 


Est GFR ( Amer)  10  


 


Est GFR (Non-Af Amer)  8  


 


POC Glucose (mg/dL)    309 H


 


Random Glucose  204 H  


 


Calcium  7.7 L  


 


Phosphorus   


 


Magnesium   


 


Total Bilirubin   


 


AST   


 


ALT   


 


Alkaline Phosphatase   


 


Total Protein   


 


Albumin   


 


Globulin   


 


Albumin/Globulin Ratio   


 


Arterial Blood Potassium   5.2 


 


Blood Type   


 


Antibody Screen   














  12/19/18 12/19/18 12/19/18





  05:35 05:37 05:46


 


WBC   16.2 H 


 


RBC   2.55 L 


 


Hgb   7.1 L 


 


Hct   22.1 L 


 


MCV   86.8 


 


MCH   27.9 


 


MCHC   32.1 L 


 


RDW   17.7 H 


 


Plt Count   138 


 


MPV   11.6 


 


Neut % (Auto)   90.9 H 


 


Lymph % (Auto)   1.4 L 


 


Mono % (Auto)   6.0 


 


Eos % (Auto)   0.4 


 


Baso % (Auto)   1.3 


 


Neut # (Auto)   14.7 H 


 


Lymph # (Auto)   0.2 L 


 


Mono # (Auto)   1.0 H 


 


Eos # (Auto)   0.1 


 


Baso # (Auto)   0.2 


 


Neutrophils % (Manual)   93 H 


 


Band Neutrophils %   1 


 


Lymphocytes % (Manual)   1 L 


 


Monocytes % (Manual)   2 


 


Eosinophils % (Manual)   1 


 


Myelocytes %   2 H 


 


Nucleated RBC %   1 H 


 


Platelet Estimate   Normal 


 


Large Platelets   Present 


 


Hypochromasia (manual)   Slight 


 


Poikilocytosis (manual   Slight 


 


Basophilic Stippling   Slight 


 


Anisocytosis (manual)   Slight 


 


Tear Drop Cells   Slight 


 


Ovalocytes   Slight 


 


Fanny Cells   Slight 


 


PT   


 


INR   


 


APTT   


 


Puncture Site   


 


pCO2   


 


pO2   


 


HCO3   


 


ABG pH   


 


ABG Total CO2   


 


ABG O2 Saturation   


 


ABG Base Excess   


 


ABG Hemoglobin   


 


ABG Carboxyhemoglobin   


 


POC ABG HHb (Measured)   


 


ABG Methemoglobin   


 


Benji Test   


 


ABG Potassium   


 


A-a O2 Difference   


 


Respiratory Index   


 


Hgb O2 Saturation   


 


Glucose   


 


Lactate   


 


Vent Mode   


 


Mechanical Rate   


 


FiO2   


 


Tidal Volume   


 


PEEP   


 


Crit Value Called To   


 


Crit Value Called By   


 


Crit Value Read Back   


 


Blood Gas Notified Time   


 


Sodium  133  


 


Potassium  5.4 H  


 


Chloride  95 L  


 


Carbon Dioxide  25  


 


Anion Gap  17  


 


BUN  111 H*  


 


Creatinine  4.9 H  


 


Est GFR ( Amer)  10  


 


Est GFR (Non-Af Amer)  8  


 


POC Glucose (mg/dL)   


 


Random Glucose  293 H  


 


Calcium  7.5 L  


 


Phosphorus  7.6 H  


 


Magnesium  2.4 H  


 


Total Bilirubin  0.6  


 


AST  44 H  


 


ALT  37  


 


Alkaline Phosphatase  158 H  


 


Total Protein  5.5 L  


 


Albumin  2.8 L  


 


Globulin  2.7  


 


Albumin/Globulin Ratio  1.0  


 


Arterial Blood Potassium   


 


Blood Type    A POSITIVE


 


Antibody Screen    Negative











Radiology Impressions: 


                              Radiology Impressions





Chest X-Ray  12/18/18 18:28


IMPRESSION:


Large right pneumothorax and small left pneumothorax.  


Pneumomediastinum.  Endotracheal tube and nasogastric tube.  AICD.  


Extensive subcutaneous emphysema.  


 


 











Fingerstick Blood Sugar Results: 309





Critical Care Progress Note





- Nutrition


Nutrition: 


                                    Nutrition











 Category Date Time Status


 


 NPO Diet [DIET] Diets  12/19/18 Breakfast Active














Assessment/Plan





- Assessment and Plan (Free Text)


Assessment: 





87 y/o female with PMHx of CAD w/ stents, ischemic CM EF 30% with AICD, DM2, 

COPD, HTN, asthma, arthritis, anxiety was admitted to hospital due to shortness 

of breath with cough on 12/8/18. In ICU for increasing SOB and hypotension, 

transferred 12/18 from medicine floor.





Neuro


-sedated on precedex drip


-not alert, awake, non-responsive





Pulm


-intubated, ventilated: current settings FiO2 80%, rate 24, vol 0.4, PEEP 3


-CXR 12/18 showed pneumothorax of left lung, chest tube placed


-during intubation, patient found to be congested - duoneb and mucomist


-daliresp 500 mcg PO daily on hold due to hypotension


-ABG and CXR qAM





CV


-dobutamine drip for hypotension


-aspirin 325 mg daily


-crestor 20 mg qhs 


-lasix IV daily on hold due to hypotension


-metoprolol held due to hypotension


-ranexa 500 mg PO BID on hold 


-entresto 1 tab PO BID on hold due to hypotension





GI


-200 cc of coffee ground substance suctioned from OG tube during intubation 12/ 18


-patient without current coffee ground suctioning


-resume diet while intubated - glucerna ordered 12/19





Renal


-Kidney failure - this morning BUN/Cr 111/4.9


-BUN/Cr elevated and uptrending, last taken before ICU admission on 12/15 - 

64/2.3 (admission was within normal limits: 11/1.1)


-trend CMP





ID


-sputum culture taken on 12/8 positive for pseudomonas aeruginosa


-cefipime started 12/14


-zyvox started 12/16





Endo


-h/o DM


-ISS


-accuchecks ACHS


-repaglinide 2 mg PO daily held


-hypoglycemia protocol





Heme


-normocytic anemia


-trend CBC





DVT ppx: SCDs


GI ppx: protonix 40 mg IV


Diet: glucerna OG tube feed


code: FULL





dispo: Palliative care consult ordered to discuss goals of care with family.





case discussed with Dr. Homero Sauceda PGY1





<Sung Valentin - Last Filed: 12/19/18 17:38>





CCU Subjective





- Physician Review


Critical Care Time Spent (in minutes): 45





CCU Objective





- Vital Signs / Intake & Output


Vital Signs (Last 4 hours): 


Vital Signs











  Temp Pulse Resp BP Pulse Ox


 


 12/19/18 17:00   107 H  16   99


 


 12/19/18 16:59   106 H  21  99/42 L  100


 


 12/19/18 16:45   106 H  23   100


 


 12/19/18 16:30   106 H  22   100


 


 12/19/18 16:29   106 H  15  101/38 L  100


 


 12/19/18 16:15   106 H  24   100


 


 12/19/18 16:00  96.7 F L  106 H  23   100


 


 12/19/18 15:59   107 H  18  112/39 L  100


 


 12/19/18 15:45   106 H  14   100


 


 12/19/18 15:30   105 H  23   100


 


 12/19/18 15:29   106 H  23  100/34 L  99


 


 12/19/18 15:28   105 H  24  107/33 L  100


 


 12/19/18 15:15   103 H  23   95


 


 12/19/18 15:00   102 H  30 H   100


 


 12/19/18 14:59   102 H  22  101/43 L  100


 


 12/19/18 14:45   103 H  25 H  99/40 L  99


 


 12/19/18 14:30   106 H  24  97/39 L  100


 


 12/19/18 14:15   107 H  24  97/42 L  100


 


 12/19/18 14:00   107 H  24  99/36 L  100


 


 12/19/18 13:45   106 H  17  94/28 L  97


 


 12/19/18 13:36   103 H  21  93/44 L  97











Intake and Output (Last 8hrs): 


                                 Intake & Output











 12/19/18 12/19/18 12/19/18





 06:59 14:59 22:59


 


Intake Total 1222.0 2285.4 547.2


 


Output Total 0 20 5


 


Balance 1222.0 2265.4 542.2


 


Weight 115 lb  


 


Intake:   


 


   330 


 


  Intake, IV Amount 1051.0 1470.4 442.2


 


    rt ij tlc distal port 700 800 300


 


    rt ij tlc middle port 56 56.8 21.3


 


    rt ij tlc middle portt y 172.5 296.1 120.9


 


    rt ij tlc proximal port 122.5 317.5 


 


  Oral  80 30


 


  Tube Feeding  80 75


 


  Blood Product  325 


 


    Red Blood Cells Cpd As1  325 





    Lr  Unit [id number]   


 


Output:   


 


  Urine 0 20 5


 


    Urethral (Gilbert) 0 20 5


 


Other:   


 


  # Bowel Movements  0 0














- Medications


Active Medications: 


Active Medications











Generic Name Dose Route Start Last Admin





  Trade Name Freq  PRN Reason Stop Dose Admin


 


Albuterol Sulfate  1.25 mg  12/14/18 21:02  





  Albuterol 0.042% Inhal Sol (1.25mg/3ml) Ud  INH   





  RQ2 PRN   





  Wheezing   





     





     





     


 


Albuterol/Ipratropium  3 ml  12/15/18 02:00  12/19/18 13:56





  Duoneb 3 Mg/0.5 Mg (3 Ml) Ud  INH   Not Given





  RQ6 PASQUALE   





     





     





     





     


 


Calcium Acetate  667 mg  12/19/18 08:00  12/19/18 16:40





  Phoslo  PO   667 mg





  BIDCC PASQUALE   Administration





     





     





     





     


 


Ergocalciferol  1 cap  12/13/18 10:00  12/13/18 09:38





  Drisdol 50,000 Intl Units Cap  PO   1 cap





  QWK PASQUALE   Administration





     





     





     





     


 


Furosemide  20 mg  12/09/18 13:13  12/19/18 10:20





  Lasix  IVP   Not Given





  DAILY PASQUALE   





     





     





     





     


 


Cefepime HCl 0.5 gm/ Dextrose  50 mls @ 100 mls/hr  12/14/18 10:00  12/19/18 

10:28





  IVPB   100 mls/hr





  Q24H PASQUALE   Administration





     





     





  Protocol   





     


 


Linezolid  600 mg in 300 mls @ 200 mls/hr  12/16/18 01:00  12/19/18 13:10





  Zyvox 600mg/300ml D5w  IVPB   200 mls/hr





  Q12H PASQUALE   Administration





     





     





  Protocol   





     


 


Dobutamine HCl/Dextrose  500 mg in 250 mls @ 7.076 mls/hr  12/16/18 15:10  

12/19/18 15:25





  Dobutamine/Dextrose 5% 500mg/250ml  IV   Not Given





  .Q24H PASQUALE   





     





     





  Protocol   





  5 MCG/KG/MIN   


 


Dexmedetomidine HCl 200 mcg/  50 mls @ 2.34 mls/hr  12/18/18 18:39  12/19/18 

14:39





  Sodium Chloride  IV   1.49 mcg/kg/hr





  TITR PRN   17.5 mls/hr





  Sedation   Administration





     





  Protocol   





  0.2 MCG/KG/HR   


 


Norepinephrine Bitartrate 4 mg  254 mls @ 15.24 mls/hr  12/18/18 20:32  12/19/18

 11:35





  / Sodium Chloride  IV   6 mcg/min





  .[id number] PRN   22.86 mls/hr





  TITRATE PER MD ORDER   Titration





     





  Protocol   





  4 MCG/MIN   


 


Sodium Bicarbonate 100 meq/  1,100 mls @ 100 mls/hr  12/19/18 01:30  12/19/18 

13:14





  Sodium Chloride  IV   100 mls/hr





  .Q11H PASQUALE   Administration





     





     





     





     


 


Amino Acids  1,000 mls @ 50 mls/hr  12/20/18 14:13  





  Clinimix 4.25/5 % "E" (1000 Ml)  IV  12/20/18 18:00  





  .Q20H PASQUALE   





     





     





     





     


 


Insulin Human Regular  0 unit  12/19/18 00:00  12/19/18 12:37





  Novolin R  SC   2 u





  Q6H PASQUALE   Administration





     





     





  Protocol   





     


 


Metoprolol Tartrate  25 mg  12/07/18 10:00  12/19/18 10:20





  Lopressor  PO   Not Given





  BID PASQUALE   





     





     





     





     


 


Montelukast Sodium  10 mg  12/07/18 10:00  12/19/18 10:19





  Singulair  PO   10 mg





  DAILY PASQUALE   Administration





     





     





     





     


 


Morphine Sulfate  2 mg  12/15/18 15:58  12/19/18 15:25





  Morphine  IVP   2 mg





  Q4 PRN   Administration





  Anxiety   





     





     





     


 


Pantoprazole Sodium  40 mg  12/19/18 10:00  12/19/18 10:13





  Protonix Inj  IVP   40 mg





  DAILY PASQUALE   Administration





     





     





     





     


 


Ranolazine  500 mg  12/07/18 10:00  12/19/18 10:21





  Ranexa  PO   Not Given





  BID PASQUALE   





     





     





     





     


 


Repaglinide  2 mg  12/07/18 10:00  12/19/18 10:21





  Prandin  PO   Not Given





  DAILY PASQUALE   





     





     





     





     


 


Roflumilast  500 mcg  12/07/18 10:00  12/14/18 10:27





  Daliresp  PO   500 mcg





  DAILY PASQUALE   Administration





     





     





     





     


 


Rosuvastatin Calcium  20 mg  12/06/18 22:00  12/18/18 21:40





  Crestor  PO   Not Given





  HS PASQUALE   





     





     





     





     


 


Sacubitril/Valsartan  1 tab  12/07/18 18:00  12/19/18 10:20





  Entresto 49 Mg-51 Mg  PO   Not Given





  BID PASQUALE   





     





     





     





     














- Patient Studies


Lab Studies: 


                                   Lab Studies











  12/19/18 12/19/18 12/19/18 Range/Units





  11:38 11:00 10:51 


 


WBC     (4.8-10.8)  K/uL


 


RBC     (3.80-5.20)  Mil/uL


 


Hgb     (11.0-16.0)  g/dL


 


Hct     (34.0-47.0)  %


 


MCV     (81.0-99.0)  fL


 


MCH     (27.0-31.0)  pg


 


MCHC     (33.0-37.0)  g/dL


 


RDW     (11.5-14.5)  %


 


Plt Count     (130-400)  K/uL


 


MPV     (7.2-11.7)  fL


 


Neut % (Auto)     (50.0-75.0)  %


 


Lymph % (Auto)     (20.0-40.0)  %


 


Mono % (Auto)     (0.0-10.0)  %


 


Eos % (Auto)     (0.0-4.0)  %


 


Baso % (Auto)     (0.0-2.0)  %


 


Neut # (Auto)     (1.8-7.0)  K/uL


 


Lymph # (Auto)     (1.0-4.3)  K/uL


 


Mono # (Auto)     (0.0-0.8)  K/uL


 


Eos # (Auto)     (0.0-0.7)  K/uL


 


Baso # (Auto)     (0.0-0.2)  K/uL


 


Neutrophils % (Manual)     (50-75)  %


 


Band Neutrophils %     (0-2)  %


 


Lymphocytes % (Manual)     (20-40)  %


 


Monocytes % (Manual)     (0-10)  %


 


Eosinophils % (Manual)     (0-4)  %


 


Myelocytes %     (0-0)  %


 


Nucleated RBC %     (0-0)  %


 


Platelet Estimate     (NORMAL)  


 


Large Platelets     


 


Hypochromasia (manual)     


 


Poikilocytosis (manual     


 


Basophilic Stippling     


 


Anisocytosis (manual)     


 


Tear Drop Cells     


 


Ovalocytes     


 


[place] Cells     


 


PT     (9.7-12.2)  SECONDS


 


INR     


 


APTT     (21-34)  SECONDS


 


Puncture Site    Lfa  


 


pCO2    54 H  (35-45)  mm/Hg


 


pO2    89  ()  mm/Hg


 


HCO3    21.6  (21-28)  mmol/L


 


ABG pH    7.25 L  (7.35-7.45)  


 


ABG Total CO2    25.4  (22-28)  mmol/L


 


ABG O2 Saturation    98.5 H  (95-98)  %


 


ABG Base Excess    -4.2 L  (-2.0-3.0)  mmol/L


 


ABG Hemoglobin     (11.7-17.4)  g/dL


 


ABG Carboxyhemoglobin     (0.5-1.5)  %


 


POC ABG HHb (Measured)     (0.0-5.0)  %


 


ABG Methemoglobin     (0.0-3.0)  %


 


Benji Test    Na  


 


ABG Potassium    4.3  (3.6-5.2)  mmol/L


 


A-a O2 Difference    414.0  mm/Hg


 


Respiratory Index    4.7  


 


Hgb O2 Saturation     (95.0-98.0)  %


 


Glucose    233 H  ()  mg/dl


 


Lactate    1.8  (0.7-2.1)  mmol/L


 


Vent Mode    Prvc  


 


Mechanical Rate     


 


FiO2    80.0  %


 


Tidal Volume    400  


 


PEEP    3  


 


Crit Value Called To     


 


Crit Value Called By     


 


Crit Value Read Back     


 


Blood Gas Notified Time     


 


Sodium   134  136.0  (132-148)  mmol/L


 


Potassium   4.6   (3.6-5.2)  mmol/L


 


Chloride   96 L  103.0  ()  mmol/L


 


Carbon Dioxide   26   (22-30)  mmol/L


 


Anion Gap   16   (10-20)  


 


BUN   105 H*   (7-17)  mg/dL


 


Creatinine   4.6 H   (0.7-1.2)  mg/dL


 


Est GFR ( Amer)   11   


 


Est GFR (Non-Af Amer)   9   


 


POC Glucose (mg/dL)  193 H    ()  mg/dL


 


Random Glucose   218 H   ()  mg/dL


 


Calcium   7.3 L   (8.6-10.4)  mg/dl


 


Phosphorus     (2.5-4.5)  mg/dL


 


Magnesium     (1.6-2.3)  mg/dL


 


Total Bilirubin   0.9   (0.2-1.3)  mg/dL


 


AST   34   (14-36)  U/L


 


ALT   37   (9-52)  U/L


 


Alkaline Phosphatase   138 H   ()  U/L


 


Total Protein   5.2 L   (6.3-8.3)  g/dL


 


Albumin   2.5 L   (3.5-5.0)  g/dL


 


Globulin   2.7   (2.2-3.9)  gm/dL


 


Albumin/Globulin Ratio   0.9 L   (1.0-2.1)  


 


Arterial Blood Potassium    4.3  (3.6-5.2)  mmol/L


 


Blood Type     


 


Antibody Screen     














  12/19/18 12/19/18 12/19/18 Range/Units





  10:45 05:46 05:37 


 


WBC    16.2 H  (4.8-10.8)  K/uL


 


RBC    2.55 L  (3.80-5.20)  Mil/uL


 


Hgb    7.1 L  (11.0-16.0)  g/dL


 


Hct    22.1 L  (34.0-47.0)  %


 


MCV    86.8  (81.0-99.0)  fL


 


MCH    27.9  (27.0-31.0)  pg


 


MCHC    32.1 L  (33.0-37.0)  g/dL


 


RDW    17.7 H  (11.5-14.5)  %


 


Plt Count    138  (130-400)  K/uL


 


MPV    11.6  (7.2-11.7)  fL


 


Neut % (Auto)    90.9 H  (50.0-75.0)  %


 


Lymph % (Auto)    1.4 L  (20.0-40.0)  %


 


Mono % (Auto)    6.0  (0.0-10.0)  %


 


Eos % (Auto)    0.4  (0.0-4.0)  %


 


Baso % (Auto)    1.3  (0.0-2.0)  %


 


Neut # (Auto)    14.7 H  (1.8-7.0)  K/uL


 


Lymph # (Auto)    0.2 L  (1.0-4.3)  K/uL


 


Mono # (Auto)    1.0 H  (0.0-0.8)  K/uL


 


Eos # (Auto)    0.1  (0.0-0.7)  K/uL


 


Baso # (Auto)    0.2  (0.0-0.2)  K/uL


 


Neutrophils % (Manual)    93 H  (50-75)  %


 


Band Neutrophils %    1  (0-2)  %


 


Lymphocytes % (Manual)    1 L  (20-40)  %


 


Monocytes % (Manual)    2  (0-10)  %


 


Eosinophils % (Manual)    1  (0-4)  %


 


Myelocytes %    2 H  (0-0)  %


 


Nucleated RBC %    1 H  (0-0)  %


 


Platelet Estimate    Normal  (NORMAL)  


 


Large Platelets    Present  


 


Hypochromasia (manual)    Slight  


 


Poikilocytosis (manual    Slight  


 


Basophilic Stippling    Slight  


 


Anisocytosis (manual)    Slight  


 


Tear Drop Cells    Slight  


 


Ovalocytes    Slight  


 


Fanny Cells    Slight  


 


PT     (9.7-12.2)  SECONDS


 


INR     


 


APTT     (21-34)  SECONDS


 


Puncture Site     


 


pCO2     (35-45)  mm/Hg


 


pO2     ()  mm/Hg


 


HCO3     (21-28)  mmol/L


 


ABG pH     (7.35-7.45)  


 


ABG Total CO2     (22-28)  mmol/L


 


ABG O2 Saturation     (95-98)  %


 


ABG Base Excess     (-2.0-3.0)  mmol/L


 


ABG Hemoglobin     (11.7-17.4)  g/dL


 


ABG Carboxyhemoglobin     (0.5-1.5)  %


 


POC ABG HHb (Measured)     (0.0-5.0)  %


 


ABG Methemoglobin     (0.0-3.0)  %


 


Benji Test     


 


ABG Potassium     (3.6-5.2)  mmol/L


 


A-a O2 Difference     mm/Hg


 


Respiratory Index     


 


Hgb O2 Saturation     (95.0-98.0)  %


 


Glucose     ()  mg/dl


 


Lactate     (0.7-2.1)  mmol/L


 


Vent Mode     


 


Mechanical Rate     


 


FiO2     %


 


Tidal Volume     


 


PEEP     


 


Crit Value Called To     


 


Crit Value Called By     


 


Crit Value Read Back     


 


Blood Gas Notified Time     


 


Sodium     (132-148)  mmol/L


 


Potassium     (3.6-5.2)  mmol/L


 


Chloride     ()  mmol/L


 


Carbon Dioxide     (22-30)  mmol/L


 


Anion Gap     (10-20)  


 


BUN     (7-17)  mg/dL


 


Creatinine     (0.7-1.2)  mg/dL


 


Est GFR (African Amer)     


 


Est GFR (Non-Af Amer)     


 


POC Glucose (mg/dL)  253 H    ()  mg/dL


 


Random Glucose     ()  mg/dL


 


Calcium     (8.6-10.4)  mg/dl


 


Phosphorus     (2.5-4.5)  mg/dL


 


Magnesium     (1.6-2.3)  mg/dL


 


Total Bilirubin     (0.2-1.3)  mg/dL


 


AST     (14-36)  U/L


 


ALT     (9-52)  U/L


 


Alkaline Phosphatase     ()  U/L


 


Total Protein     (6.3-8.3)  g/dL


 


Albumin     (3.5-5.0)  g/dL


 


Globulin     (2.2-3.9)  gm/dL


 


Albumin/Globulin Ratio     (1.0-2.1)  


 


Arterial Blood Potassium     (3.6-5.2)  mmol/L


 


Blood Type   A POSITIVE   


 


Antibody Screen   Negative   














  12/19/18 12/19/18 12/19/18 Range/Units





  05:35 05:21 05:06 


 


WBC     (4.8-10.8)  K/uL


 


RBC     (3.80-5.20)  Mil/uL


 


Hgb     (11.0-16.0)  g/dL


 


Hct     (34.0-47.0)  %


 


MCV     (81.0-99.0)  fL


 


MCH     (27.0-31.0)  pg


 


MCHC     (33.0-37.0)  g/dL


 


RDW     (11.5-14.5)  %


 


Plt Count     (130-400)  K/uL


 


MPV     (7.2-11.7)  fL


 


Neut % (Auto)     (50.0-75.0)  %


 


Lymph % (Auto)     (20.0-40.0)  %


 


Mono % (Auto)     (0.0-10.0)  %


 


Eos % (Auto)     (0.0-4.0)  %


 


Baso % (Auto)     (0.0-2.0)  %


 


Neut # (Auto)     (1.8-7.0)  K/uL


 


Lymph # (Auto)     (1.0-4.3)  K/uL


 


Mono # (Auto)     (0.0-0.8)  K/uL


 


Eos # (Auto)     (0.0-0.7)  K/uL


 


Baso # (Auto)     (0.0-0.2)  K/uL


 


Neutrophils % (Manual)     (50-75)  %


 


Band Neutrophils %     (0-2)  %


 


Lymphocytes % (Manual)     (20-40)  %


 


Monocytes % (Manual)     (0-10)  %


 


Eosinophils % (Manual)     (0-4)  %


 


Myelocytes %     (0-0)  %


 


Nucleated RBC %     (0-0)  %


 


Platelet Estimate     (NORMAL)  


 


Large Platelets     


 


Hypochromasia (manual)     


 


Poikilocytosis (manual     


 


Basophilic Stippling     


 


Anisocytosis (manual)     


 


Tear Drop Cells     


 


Ovalocytes     


 


Fanny Cells     


 


PT     (9.7-12.2)  SECONDS


 


INR     


 


APTT     (21-34)  SECONDS


 


Puncture Site    Rb  


 


pCO2    64 H  (35-45)  mm/Hg


 


pO2    123 H  ()  mm/Hg


 


HCO3    21.7  (21-28)  mmol/L


 


ABG pH    7.20 L  (7.35-7.45)  


 


ABG Total CO2    27.0  (22-28)  mmol/L


 


ABG O2 Saturation    99.3 H  (95-98)  %


 


ABG Base Excess    -4.2 L  (-2.0-3.0)  mmol/L


 


ABG Hemoglobin     (11.7-17.4)  g/dL


 


ABG Carboxyhemoglobin     (0.5-1.5)  %


 


POC ABG HHb (Measured)     (0.0-5.0)  %


 


ABG Methemoglobin     (0.0-3.0)  %


 


Benji Test    Na  


 


ABG Potassium    5.2  (3.6-5.2)  mmol/L


 


A-a O2 Difference    439.0  mm/Hg


 


Respiratory Index    3.6  


 


Hgb O2 Saturation     (95.0-98.0)  %


 


Glucose    308 H  ()  mg/dl


 


Lactate    1.0  (0.7-2.1)  mmol/L


 


Vent Mode    Prvc  


 


Mechanical Rate    20  


 


FiO2    90.0  %


 


Tidal Volume    400  


 


PEEP    3  


 


Crit Value Called To     


 


Crit Value Called By     


 


Crit Value Read Back     


 


Blood Gas Notified Time     


 


Sodium  133   132.0  (132-148)  mmol/L


 


Potassium  5.4 H    (3.6-5.2)  mmol/L


 


Chloride  95 L   101.0  ()  mmol/L


 


Carbon Dioxide  25    (22-30)  mmol/L


 


Anion Gap  17    (10-20)  


 


BUN  111 H*    (7-17)  mg/dL


 


Creatinine  4.9 H    (0.7-1.2)  mg/dL


 


Est GFR ( Amer)  10    


 


Est GFR (Non-Af Amer)  8    


 


POC Glucose (mg/dL)   309 H   ()  mg/dL


 


Random Glucose  293 H    ()  mg/dL


 


Calcium  7.5 L    (8.6-10.4)  mg/dl


 


Phosphorus  7.6 H    (2.5-4.5)  mg/dL


 


Magnesium  2.4 H    (1.6-2.3)  mg/dL


 


Total Bilirubin  0.6    (0.2-1.3)  mg/dL


 


AST  44 H    (14-36)  U/L


 


ALT  37    (9-52)  U/L


 


Alkaline Phosphatase  158 H    ()  U/L


 


Total Protein  5.5 L    (6.3-8.3)  g/dL


 


Albumin  2.8 L    (3.5-5.0)  g/dL


 


Globulin  2.7    (2.2-3.9)  gm/dL


 


Albumin/Globulin Ratio  1.0    (1.0-2.1)  


 


Arterial Blood Potassium    5.2  (3.6-5.2)  mmol/L


 


Blood Type     


 


Antibody Screen     














  12/19/18 12/18/18 12/18/18 Range/Units





  00:37 23:58 22:05 


 


WBC     (4.8-10.8)  K/uL


 


RBC     (3.80-5.20)  Mil/uL


 


Hgb     (11.0-16.0)  g/dL


 


Hct     (34.0-47.0)  %


 


MCV     (81.0-99.0)  fL


 


MCH     (27.0-31.0)  pg


 


MCHC     (33.0-37.0)  g/dL


 


RDW     (11.5-14.5)  %


 


Plt Count     (130-400)  K/uL


 


MPV     (7.2-11.7)  fL


 


Neut % (Auto)     (50.0-75.0)  %


 


Lymph % (Auto)     (20.0-40.0)  %


 


Mono % (Auto)     (0.0-10.0)  %


 


Eos % (Auto)     (0.0-4.0)  %


 


Baso % (Auto)     (0.0-2.0)  %


 


Neut # (Auto)     (1.8-7.0)  K/uL


 


Lymph # (Auto)     (1.0-4.3)  K/uL


 


Mono # (Auto)     (0.0-0.8)  K/uL


 


Eos # (Auto)     (0.0-0.7)  K/uL


 


Baso # (Auto)     (0.0-0.2)  K/uL


 


Neutrophils % (Manual)     (50-75)  %


 


Band Neutrophils %     (0-2)  %


 


Lymphocytes % (Manual)     (20-40)  %


 


Monocytes % (Manual)     (0-10)  %


 


Eosinophils % (Manual)     (0-4)  %


 


Myelocytes %     (0-0)  %


 


Nucleated RBC %     (0-0)  %


 


Platelet Estimate     (NORMAL)  


 


Large Platelets     


 


Hypochromasia (manual)     


 


Poikilocytosis (manual     


 


Basophilic Stippling     


 


Anisocytosis (manual)     


 


Tear Drop Cells     


 


Ovalocytes     


 


Fanny Cells     


 


PT     (9.7-12.2)  SECONDS


 


INR     


 


APTT     (21-34)  SECONDS


 


Puncture Site    Rradial  


 


pCO2    60 H  (35-45)  mm/Hg


 


pO2    90  ()  mm/Hg


 


HCO3    20.4 L  (21-28)  mmol/L


 


ABG pH    7.19 L*  (7.35-7.45)  


 


ABG Total CO2    24.7  (22-28)  mmol/L


 


ABG O2 Saturation    96.1  (95-98)  %


 


ABG Base Excess    -5.8 L  (-2.0-3.0)  mmol/L


 


ABG Hemoglobin    10.9 L  (11.7-17.4)  g/dL


 


ABG Carboxyhemoglobin    0.6  (0.5-1.5)  %


 


POC ABG HHb (Measured)    3.9  (0.0-5.0)  %


 


ABG Methemoglobin    0.5  (0.0-3.0)  %


 


Benji Test    Pos  


 


ABG Potassium     (3.6-5.2)  mmol/L


 


A-a O2 Difference    192.0  mm/Hg


 


Respiratory Index    2.1  


 


Hgb O2 Saturation    95.0  (95.0-98.0)  %


 


Glucose     ()  mg/dl


 


Lactate     (0.7-2.1)  mmol/L


 


Vent Mode     


 


Mechanical Rate     


 


FiO2    50.0  %


 


Tidal Volume     


 


PEEP     


 


Crit Value Called To    Dr. hall  


 


Crit Value Called By    Brii edwards rcp  


 


Crit Value Read Back    Y  


 


Blood Gas Notified Time    2220  


 


Sodium  129 L    (132-148)  mmol/L


 


Potassium  6.1 H    (3.6-5.2)  mmol/L


 


Chloride  97 L    ()  mmol/L


 


Carbon Dioxide  22    (22-30)  mmol/L


 


Anion Gap  17    (10-20)  


 


BUN  114 H*    (7-17)  mg/dL


 


Creatinine  5.1 H    (0.7-1.2)  mg/dL


 


Est GFR ( Amer)  10    


 


Est GFR (Non-Af Amer)  8    


 


POC Glucose (mg/dL)   229 H   ()  mg/dL


 


Random Glucose  204 H    ()  mg/dL


 


Calcium  7.7 L    (8.6-10.4)  mg/dl


 


Phosphorus     (2.5-4.5)  mg/dL


 


Magnesium     (1.6-2.3)  mg/dL


 


Total Bilirubin     (0.2-1.3)  mg/dL


 


AST     (14-36)  U/L


 


ALT     (9-52)  U/L


 


Alkaline Phosphatase     ()  U/L


 


Total Protein     (6.3-8.3)  g/dL


 


Albumin     (3.5-5.0)  g/dL


 


Globulin     (2.2-3.9)  gm/dL


 


Albumin/Globulin Ratio     (1.0-2.1)  


 


Arterial Blood Potassium     (3.6-5.2)  mmol/L


 


Blood Type     


 


Antibody Screen     














  12/18/18 12/18/18 12/18/18 Range/Units





  21:00 20:53 20:53 


 


WBC     (4.8-10.8)  K/uL


 


RBC     (3.80-5.20)  Mil/uL


 


Hgb     (11.0-16.0)  g/dL


 


Hct     (34.0-47.0)  %


 


MCV     (81.0-99.0)  fL


 


MCH     (27.0-31.0)  pg


 


MCHC     (33.0-37.0)  g/dL


 


RDW     (11.5-14.5)  %


 


Plt Count     (130-400)  K/uL


 


MPV     (7.2-11.7)  fL


 


Neut % (Auto)     (50.0-75.0)  %


 


Lymph % (Auto)     (20.0-40.0)  %


 


Mono % (Auto)     (0.0-10.0)  %


 


Eos % (Auto)     (0.0-4.0)  %


 


Baso % (Auto)     (0.0-2.0)  %


 


Neut # (Auto)     (1.8-7.0)  K/uL


 


Lymph # (Auto)     (1.0-4.3)  K/uL


 


Mono # (Auto)     (0.0-0.8)  K/uL


 


Eos # (Auto)     (0.0-0.7)  K/uL


 


Baso # (Auto)     (0.0-0.2)  K/uL


 


Neutrophils % (Manual)     (50-75)  %


 


Band Neutrophils %     (0-2)  %


 


Lymphocytes % (Manual)     (20-40)  %


 


Monocytes % (Manual)     (0-10)  %


 


Eosinophils % (Manual)     (0-4)  %


 


Myelocytes %     (0-0)  %


 


Nucleated RBC %     (0-0)  %


 


Platelet Estimate     (NORMAL)  


 


Large Platelets     


 


Hypochromasia (manual)     


 


Poikilocytosis (manual     


 


Basophilic Stippling     


 


Anisocytosis (manual)     


 


Tear Drop Cells     


 


Ovalocytes     


 


[place] Cells     


 


PT    14.0 H  (9.7-12.2)  SECONDS


 


INR    1.3  


 


APTT    30  (21-34)  SECONDS


 


Puncture Site  Lfem    


 


pCO2  71 H*    (35-45)  mm/Hg


 


pO2  53 L    ()  mm/Hg


 


HCO3  14.9 L    (21-28)  mmol/L


 


ABG pH  7.05 L*    (7.35-7.45)  


 


ABG Total CO2  21.8 L    (22-28)  mmol/L


 


ABG O2 Saturation  88.8 L    (95-98)  %


 


ABG Base Excess  -11.9 L    (-2.0-3.0)  mmol/L


 


ABG Hemoglobin     (11.7-17.4)  g/dL


 


ABG Carboxyhemoglobin     (0.5-1.5)  %


 


POC ABG HHb (Measured)     (0.0-5.0)  %


 


ABG Methemoglobin     (0.0-3.0)  %


 


Benji Test  Neg    


 


ABG Potassium  6.5 H*    (3.6-5.2)  mmol/L


 


A-a O2 Difference  571.0    mm/Hg


 


Respiratory Index  10.8    


 


Hgb O2 Saturation     (95.0-98.0)  %


 


Glucose  261 H    ()  mg/dl


 


Lactate  1.4    (0.7-2.1)  mmol/L


 


Vent Mode  Prvc    


 


Mechanical Rate  12    


 


FiO2  100.0    %


 


Tidal Volume  360    


 


PEEP  3    


 


Crit Value Called To  Dr. hall    


 


Crit Value Called By  Brii edwards rcp    


 


Crit Value Read Back  Y    


 


Blood Gas Notified Time  2112    


 


Sodium  129.0 L  128 L   (132-148)  mmol/L


 


Potassium   6.6 H* D   (3.6-5.2)  mmol/L


 


Chloride  101.0  97 L   ()  mmol/L


 


Carbon Dioxide   19 L   (22-30)  mmol/L


 


Anion Gap   18   (10-20)  


 


BUN   112 H* D   (7-17)  mg/dL


 


Creatinine   4.9 H   (0.7-1.2)  mg/dL


 


Est GFR ( Amer)   10   


 


Est GFR (Non-Af Amer)   8   


 


POC Glucose (mg/dL)     ()  mg/dL


 


Random Glucose   245 H   ()  mg/dL


 


Calcium   7.8 L   (8.6-10.4)  mg/dl


 


Phosphorus   8.3 H   (2.5-4.5)  mg/dL


 


Magnesium   2.6 H   (1.6-2.3)  mg/dL


 


Total Bilirubin   0.8   (0.2-1.3)  mg/dL


 


AST   55 H   (14-36)  U/L


 


ALT   39   (9-52)  U/L


 


Alkaline Phosphatase   163 H   ()  U/L


 


Total Protein   5.9 L   (6.3-8.3)  g/dL


 


Albumin   3.1 L   (3.5-5.0)  g/dL


 


Globulin   2.8   (2.2-3.9)  gm/dL


 


Albumin/Globulin Ratio   1.1   (1.0-2.1)  


 


Arterial Blood Potassium  6.5 H*    (3.6-5.2)  mmol/L


 


Blood Type     


 


Antibody Screen     














  12/18/18 12/18/18 12/18/18 Range/Units





  20:53 17:14 00:38 


 


WBC  21.6 H D    (4.8-10.8)  K/uL


 


RBC  2.71 L    (3.80-5.20)  Mil/uL


 


Hgb  7.4 L    (11.0-16.0)  g/dL


 


Hct  23.7 L    (34.0-47.0)  %


 


MCV  87.4    (81.0-99.0)  fL


 


MCH  27.4    (27.0-31.0)  pg


 


MCHC  31.3 L    (33.0-37.0)  g/dL


 


RDW  17.4 H    (11.5-14.5)  %


 


Plt Count  150    (130-400)  K/uL


 


MPV  10.6    (7.2-11.7)  fL


 


Neut % (Auto)  91.3 H    (50.0-75.0)  %


 


Lymph % (Auto)  0.8 L    (20.0-40.0)  %


 


Mono % (Auto)  7.4    (0.0-10.0)  %


 


Eos % (Auto)  0.2    (0.0-4.0)  %


 


Baso % (Auto)  0.3    (0.0-2.0)  %


 


Neut # (Auto)  19.7 H    (1.8-7.0)  K/uL


 


Lymph # (Auto)  0.2 L    (1.0-4.3)  K/uL


 


Mono # (Auto)  1.6 H    (0.0-0.8)  K/uL


 


Eos # (Auto)  0.0    (0.0-0.7)  K/uL


 


Baso # (Auto)  0.1    (0.0-0.2)  K/uL


 


Neutrophils % (Manual)  89 H    (50-75)  %


 


Band Neutrophils %     (0-2)  %


 


Lymphocytes % (Manual)  1 L    (20-40)  %


 


Monocytes % (Manual)  8    (0-10)  %


 


Eosinophils % (Manual)  2    (0-4)  %


 


Myelocytes %     (0-0)  %


 


Nucleated RBC %     (0-0)  %


 


Platelet Estimate  Normal    (NORMAL)  


 


Large Platelets  Present    


 


Hypochromasia (manual)  Slight    


 


Poikilocytosis (manual  Slight    


 


Basophilic Stippling  Slight    


 


Anisocytosis (manual)  Slight    


 


Tear Drop Cells  Slight    


 


Ovalocytes  Slight    


 


[place] Cells  Slight    


 


PT     (9.7-12.2)  SECONDS


 


INR     


 


APTT     (21-34)  SECONDS


 


Puncture Site    Rb  


 


pCO2    64 H  (35-45)  mm/Hg


 


pO2    147 H  ()  mm/Hg


 


HCO3    18.5 L  (21-28)  mmol/L


 


ABG pH    7.12 L*  (7.35-7.45)  


 


ABG Total CO2    22.8  (22-28)  mmol/L


 


ABG O2 Saturation    99.3 H  (95-98)  %


 


ABG Base Excess    -8.2 L  (-2.0-3.0)  mmol/L


 


ABG Hemoglobin    7.3 L  (11.7-17.4)  g/dL


 


ABG Carboxyhemoglobin    1.8 H  (0.5-1.5)  %


 


POC ABG HHb (Measured)    0.7  (0.0-5.0)  %


 


ABG Methemoglobin    1.4  (0.0-3.0)  %


 


Benji Test    Na  


 


ABG Potassium     (3.6-5.2)  mmol/L


 


A-a O2 Difference    486.0  mm/Hg


 


Respiratory Index    3.3  


 


Hgb O2 Saturation    96.2  (95.0-98.0)  %


 


Glucose     ()  mg/dl


 


Lactate     (0.7-2.1)  mmol/L


 


Vent Mode    Prvc  


 


Mechanical Rate    20  


 


FiO2    100.0  %


 


Tidal Volume    360  


 


PEEP    3  


 


Crit Value Called To    Dr. hall  


 


Crit Value Called By    Suzanne rrt  


 


Crit Value Read Back    Y  


 


Blood Gas Notified Time    119  


 


Sodium     (132-148)  mmol/L


 


Potassium     (3.6-5.2)  mmol/L


 


Chloride     ()  mmol/L


 


Carbon Dioxide     (22-30)  mmol/L


 


Anion Gap     (10-20)  


 


BUN     (7-17)  mg/dL


 


Creatinine     (0.7-1.2)  mg/dL


 


Est GFR (African Amer)     


 


Est GFR (Non-Af Amer)     


 


POC Glucose (mg/dL)   283 H   ()  mg/dL


 


Random Glucose     ()  mg/dL


 


Calcium     (8.6-10.4)  mg/dl


 


Phosphorus     (2.5-4.5)  mg/dL


 


Magnesium     (1.6-2.3)  mg/dL


 


Total Bilirubin     (0.2-1.3)  mg/dL


 


AST     (14-36)  U/L


 


ALT     (9-52)  U/L


 


Alkaline Phosphatase     ()  U/L


 


Total Protein     (6.3-8.3)  g/dL


 


Albumin     (3.5-5.0)  g/dL


 


Globulin     (2.2-3.9)  gm/dL


 


Albumin/Globulin Ratio     (1.0-2.1)  


 


Arterial Blood Potassium     (3.6-5.2)  mmol/L


 


Blood Type     


 


Antibody Screen     








                         Laboratory Results - last 24 hr











  12/18/18 12/18/18 12/18/18





  00:38 17:14 20:53


 


WBC    21.6 H D


 


RBC    2.71 L


 


Hgb    7.4 L


 


Hct    23.7 L


 


MCV    87.4


 


MCH    27.4


 


MCHC    31.3 L


 


RDW    17.4 H


 


Plt Count    150


 


MPV    10.6


 


Neut % (Auto)    91.3 H


 


Lymph % (Auto)    0.8 L


 


Mono % (Auto)    7.4


 


Eos % (Auto)    0.2


 


Baso % (Auto)    0.3


 


Neut # (Auto)    19.7 H


 


Lymph # (Auto)    0.2 L


 


Mono # (Auto)    1.6 H


 


Eos # (Auto)    0.0


 


Baso # (Auto)    0.1


 


Neutrophils % (Manual)    89 H


 


Band Neutrophils %   


 


Lymphocytes % (Manual)    1 L


 


Monocytes % (Manual)    8


 


Eosinophils % (Manual)    2


 


Myelocytes %   


 


Nucleated RBC %   


 


Platelet Estimate    Normal


 


Large Platelets    Present


 


Hypochromasia (manual)    Slight


 


Poikilocytosis (manual    Slight


 


Basophilic Stippling    Slight


 


Anisocytosis (manual)    Slight


 


Tear Drop Cells    Slight


 


Ovalocytes    Slight


 


Fanny Cells    Slight


 


PT   


 


INR   


 


APTT   


 


Puncture Site  Rb  


 


pCO2  64 H  


 


pO2  147 H  


 


HCO3  18.5 L  


 


ABG pH  7.12 L*  


 


ABG Total CO2  22.8  


 


ABG O2 Saturation  99.3 H  


 


ABG Base Excess  -8.2 L  


 


ABG Hemoglobin  7.3 L  


 


ABG Carboxyhemoglobin  1.8 H  


 


POC ABG HHb (Measured)  0.7  


 


ABG Methemoglobin  1.4  


 


Benji Test  Na  


 


ABG Potassium   


 


A-a O2 Difference  486.0  


 


Respiratory Index  3.3  


 


Hgb O2 Saturation  96.2  


 


Glucose   


 


Lactate   


 


Vent Mode  Prvc  


 


Mechanical Rate  20  


 


FiO2  100.0  


 


Tidal Volume  360  


 


PEEP  3  


 


Crit Value Called To  Dr. hall  


 


Crit Value Called By  Suzanne rrt  


 


Crit Value Read Back  Y  


 


Blood Gas Notified Time  119  


 


Sodium   


 


Potassium   


 


Chloride   


 


Carbon Dioxide   


 


Anion Gap   


 


BUN   


 


Creatinine   


 


Est GFR ( Amer)   


 


Est GFR (Non-Af Amer)   


 


POC Glucose (mg/dL)   283 H 


 


Random Glucose   


 


Calcium   


 


Phosphorus   


 


Magnesium   


 


Total Bilirubin   


 


AST   


 


ALT   


 


Alkaline Phosphatase   


 


Total Protein   


 


Albumin   


 


Globulin   


 


Albumin/Globulin Ratio   


 


Arterial Blood Potassium   


 


Blood Type   


 


Antibody Screen   














  12/18/18 12/18/18 12/18/18





  20:53 20:53 21:00


 


WBC   


 


RBC   


 


Hgb   


 


Hct   


 


MCV   


 


MCH   


 


MCHC   


 


RDW   


 


Plt Count   


 


MPV   


 


Neut % (Auto)   


 


Lymph % (Auto)   


 


Mono % (Auto)   


 


Eos % (Auto)   


 


Baso % (Auto)   


 


Neut # (Auto)   


 


Lymph # (Auto)   


 


Mono # (Auto)   


 


Eos # (Auto)   


 


Baso # (Auto)   


 


Neutrophils % (Manual)   


 


Band Neutrophils %   


 


Lymphocytes % (Manual)   


 


Monocytes % (Manual)   


 


Eosinophils % (Manual)   


 


Myelocytes %   


 


Nucleated RBC %   


 


Platelet Estimate   


 


Large Platelets   


 


Hypochromasia (manual)   


 


Poikilocytosis (manual   


 


Basophilic Stippling   


 


Anisocytosis (manual)   


 


Tear Drop Cells   


 


Ovalocytes   


 


[place] Cells   


 


PT  14.0 H  


 


INR  1.3  


 


APTT  30  


 


Puncture Site    Lfem


 


pCO2    71 H*


 


pO2    53 L


 


HCO3    14.9 L


 


ABG pH    7.05 L*


 


ABG Total CO2    21.8 L


 


ABG O2 Saturation    88.8 L


 


ABG Base Excess    -11.9 L


 


ABG Hemoglobin   


 


ABG Carboxyhemoglobin   


 


POC ABG HHb (Measured)   


 


ABG Methemoglobin   


 


Benji Test    Neg


 


ABG Potassium    6.5 H*


 


A-a O2 Difference    571.0


 


Respiratory Index    10.8


 


Hgb O2 Saturation   


 


Glucose    261 H


 


Lactate    1.4


 


Vent Mode    Prvc


 


Mechanical Rate    12


 


FiO2    100.0


 


Tidal Volume    360


 


PEEP    3


 


Crit Value Called To    Dr. hall


 


Crit Value Called By    Brii edwards rcp


 


Crit Value Read Back    Y


 


Blood Gas Notified Time    2112


 


Sodium   128 L  129.0 L


 


Potassium   6.6 H* D 


 


Chloride   97 L  101.0


 


Carbon Dioxide   19 L 


 


Anion Gap   18 


 


BUN   112 H* D 


 


Creatinine   4.9 H 


 


Est GFR ( Amer)   10 


 


Est GFR (Non-Af Amer)   8 


 


POC Glucose (mg/dL)   


 


Random Glucose   245 H 


 


Calcium   7.8 L 


 


Phosphorus   8.3 H 


 


Magnesium   2.6 H 


 


Total Bilirubin   0.8 


 


AST   55 H 


 


ALT   39 


 


Alkaline Phosphatase   163 H 


 


Total Protein   5.9 L 


 


Albumin   3.1 L 


 


Globulin   2.8 


 


Albumin/Globulin Ratio   1.1 


 


Arterial Blood Potassium    6.5 H*


 


Blood Type   


 


Antibody Screen   














  12/18/18 12/18/18 12/19/18





  22:05 23:58 00:37


 


WBC   


 


RBC   


 


Hgb   


 


Hct   


 


MCV   


 


MCH   


 


MCHC   


 


RDW   


 


Plt Count   


 


MPV   


 


Neut % (Auto)   


 


Lymph % (Auto)   


 


Mono % (Auto)   


 


Eos % (Auto)   


 


Baso % (Auto)   


 


Neut # (Auto)   


 


Lymph # (Auto)   


 


Mono # (Auto)   


 


Eos # (Auto)   


 


Baso # (Auto)   


 


Neutrophils % (Manual)   


 


Band Neutrophils %   


 


Lymphocytes % (Manual)   


 


Monocytes % (Manual)   


 


Eosinophils % (Manual)   


 


Myelocytes %   


 


Nucleated RBC %   


 


Platelet Estimate   


 


Large Platelets   


 


Hypochromasia (manual)   


 


Poikilocytosis (manual   


 


Basophilic Stippling   


 


Anisocytosis (manual)   


 


Tear Drop Cells   


 


Ovalocytes   


 


Fanny Cells   


 


PT   


 


INR   


 


APTT   


 


Puncture Site  Rradial  


 


pCO2  60 H  


 


pO2  90  


 


HCO3  20.4 L  


 


ABG pH  7.19 L*  


 


ABG Total CO2  24.7  


 


ABG O2 Saturation  96.1  


 


ABG Base Excess  -5.8 L  


 


ABG Hemoglobin  10.9 L  


 


ABG Carboxyhemoglobin  0.6  


 


POC ABG HHb (Measured)  3.9  


 


ABG Methemoglobin  0.5  


 


Benji Test  Pos  


 


ABG Potassium   


 


A-a O2 Difference  192.0  


 


Respiratory Index  2.1  


 


Hgb O2 Saturation  95.0  


 


Glucose   


 


Lactate   


 


Vent Mode   


 


Mechanical Rate   


 


FiO2  50.0  


 


Tidal Volume   


 


PEEP   


 


Crit Value Called To  Dr. hall  


 


Crit Value Called By  Brii edwards rcp  


 


Crit Value Read Back  Y  


 


Blood Gas Notified Time  2220  


 


Sodium    129 L


 


Potassium    6.1 H


 


Chloride    97 L


 


Carbon Dioxide    22


 


Anion Gap    17


 


BUN    114 H*


 


Creatinine    5.1 H


 


Est GFR ( Amer)    10


 


Est GFR (Non-Af Amer)    8


 


POC Glucose (mg/dL)   229 H 


 


Random Glucose    204 H


 


Calcium    7.7 L


 


Phosphorus   


 


Magnesium   


 


Total Bilirubin   


 


AST   


 


ALT   


 


Alkaline Phosphatase   


 


Total Protein   


 


Albumin   


 


Globulin   


 


Albumin/Globulin Ratio   


 


Arterial Blood Potassium   


 


Blood Type   


 


Antibody Screen   














  12/19/18 12/19/18 12/19/18





  05:06 05:21 05:35


 


WBC   


 


RBC   


 


Hgb   


 


Hct   


 


MCV   


 


MCH   


 


MCHC   


 


RDW   


 


Plt Count   


 


MPV   


 


Neut % (Auto)   


 


Lymph % (Auto)   


 


Mono % (Auto)   


 


Eos % (Auto)   


 


Baso % (Auto)   


 


Neut # (Auto)   


 


Lymph # (Auto)   


 


Mono # (Auto)   


 


Eos # (Auto)   


 


Baso # (Auto)   


 


Neutrophils % (Manual)   


 


Band Neutrophils %   


 


Lymphocytes % (Manual)   


 


Monocytes % (Manual)   


 


Eosinophils % (Manual)   


 


Myelocytes %   


 


Nucleated RBC %   


 


Platelet Estimate   


 


Large Platelets   


 


Hypochromasia (manual)   


 


Poikilocytosis (manual   


 


Basophilic Stippling   


 


Anisocytosis (manual)   


 


Tear Drop Cells   


 


Ovalocytes   


 


[place] Cells   


 


PT   


 


INR   


 


APTT   


 


Puncture Site  Rb  


 


pCO2  64 H  


 


pO2  123 H  


 


HCO3  21.7  


 


ABG pH  7.20 L  


 


ABG Total CO2  27.0  


 


ABG O2 Saturation  99.3 H  


 


ABG Base Excess  -4.2 L  


 


ABG Hemoglobin   


 


ABG Carboxyhemoglobin   


 


POC ABG HHb (Measured)   


 


ABG Methemoglobin   


 


Benji Test  Na  


 


ABG Potassium  5.2  


 


A-a O2 Difference  439.0  


 


Respiratory Index  3.6  


 


Hgb O2 Saturation   


 


Glucose  308 H  


 


Lactate  1.0  


 


Vent Mode  Prvc  


 


Mechanical Rate  20  


 


FiO2  90.0  


 


Tidal Volume  400  


 


PEEP  3  


 


Crit Value Called To   


 


Crit Value Called By   


 


Crit Value Read Back   


 


Blood Gas Notified Time   


 


Sodium  132.0   133


 


Potassium    5.4 H


 


Chloride  101.0   95 L


 


Carbon Dioxide    25


 


Anion Gap    17


 


BUN    111 H*


 


Creatinine    4.9 H


 


Est GFR ( Amer)    10


 


Est GFR (Non-Af Amer)    8


 


POC Glucose (mg/dL)   309 H 


 


Random Glucose    293 H


 


Calcium    7.5 L


 


Phosphorus    7.6 H


 


Magnesium    2.4 H


 


Total Bilirubin    0.6


 


AST    44 H


 


ALT    37


 


Alkaline Phosphatase    158 H


 


Total Protein    5.5 L


 


Albumin    2.8 L


 


Globulin    2.7


 


Albumin/Globulin Ratio    1.0


 


Arterial Blood Potassium  5.2  


 


Blood Type   


 


Antibody Screen   














  12/19/18 12/19/18 12/19/18





  05:37 05:46 10:45


 


WBC  16.2 H  


 


RBC  2.55 L  


 


Hgb  7.1 L  


 


Hct  22.1 L  


 


MCV  86.8  


 


MCH  27.9  


 


MCHC  32.1 L  


 


RDW  17.7 H  


 


Plt Count  138  


 


MPV  11.6  


 


Neut % (Auto)  90.9 H  


 


Lymph % (Auto)  1.4 L  


 


Mono % (Auto)  6.0  


 


Eos % (Auto)  0.4  


 


Baso % (Auto)  1.3  


 


Neut # (Auto)  14.7 H  


 


Lymph # (Auto)  0.2 L  


 


Mono # (Auto)  1.0 H  


 


Eos # (Auto)  0.1  


 


Baso # (Auto)  0.2  


 


Neutrophils % (Manual)  93 H  


 


Band Neutrophils %  1  


 


Lymphocytes % (Manual)  1 L  


 


Monocytes % (Manual)  2  


 


Eosinophils % (Manual)  1  


 


Myelocytes %  2 H  


 


Nucleated RBC %  1 H  


 


Platelet Estimate  Normal  


 


Large Platelets  Present  


 


Hypochromasia (manual)  Slight  


 


Poikilocytosis (manual  Slight  


 


Basophilic Stippling  Slight  


 


Anisocytosis (manual)  Slight  


 


Tear Drop Cells  Slight  


 


Ovalocytes  Slight  


 


Fanny Cells  Slight  


 


PT   


 


INR   


 


APTT   


 


Puncture Site   


 


pCO2   


 


pO2   


 


HCO3   


 


ABG pH   


 


ABG Total CO2   


 


ABG O2 Saturation   


 


ABG Base Excess   


 


ABG Hemoglobin   


 


ABG Carboxyhemoglobin   


 


POC ABG HHb (Measured)   


 


ABG Methemoglobin   


 


Benji Test   


 


ABG Potassium   


 


A-a O2 Difference   


 


Respiratory Index   


 


Hgb O2 Saturation   


 


Glucose   


 


Lactate   


 


Vent Mode   


 


Mechanical Rate   


 


FiO2   


 


Tidal Volume   


 


PEEP   


 


Crit Value Called To   


 


Crit Value Called By   


 


Crit Value Read Back   


 


Blood Gas Notified Time   


 


Sodium   


 


Potassium   


 


Chloride   


 


Carbon Dioxide   


 


Anion Gap   


 


BUN   


 


Creatinine   


 


Est GFR ( Amer)   


 


Est GFR (Non-Af Amer)   


 


POC Glucose (mg/dL)    253 H


 


Random Glucose   


 


Calcium   


 


Phosphorus   


 


Magnesium   


 


Total Bilirubin   


 


AST   


 


ALT   


 


Alkaline Phosphatase   


 


Total Protein   


 


Albumin   


 


Globulin   


 


Albumin/Globulin Ratio   


 


Arterial Blood Potassium   


 


Blood Type   A POSITIVE 


 


Antibody Screen   Negative 














  12/19/18 12/19/18 12/19/18





  10:51 11:00 11:38


 


WBC   


 


RBC   


 


Hgb   


 


Hct   


 


MCV   


 


MCH   


 


MCHC   


 


RDW   


 


Plt Count   


 


MPV   


 


Neut % (Auto)   


 


Lymph % (Auto)   


 


Mono % (Auto)   


 


Eos % (Auto)   


 


Baso % (Auto)   


 


Neut # (Auto)   


 


Lymph # (Auto)   


 


Mono # (Auto)   


 


Eos # (Auto)   


 


Baso # (Auto)   


 


Neutrophils % (Manual)   


 


Band Neutrophils %   


 


Lymphocytes % (Manual)   


 


Monocytes % (Manual)   


 


Eosinophils % (Manual)   


 


Myelocytes %   


 


Nucleated RBC %   


 


Platelet Estimate   


 


Large Platelets   


 


Hypochromasia (manual)   


 


Poikilocytosis (manual   


 


Basophilic Stippling   


 


Anisocytosis (manual)   


 


Tear Drop Cells   


 


Ovalocytes   


 


[place] Cells   


 


PT   


 


INR   


 


APTT   


 


Puncture Site  Lfa  


 


pCO2  54 H  


 


pO2  89  


 


HCO3  21.6  


 


ABG pH  7.25 L  


 


ABG Total CO2  25.4  


 


ABG O2 Saturation  98.5 H  


 


ABG Base Excess  -4.2 L  


 


ABG Hemoglobin   


 


ABG Carboxyhemoglobin   


 


POC ABG HHb (Measured)   


 


ABG Methemoglobin   


 


Benji Test  Na  


 


ABG Potassium  4.3  


 


A-a O2 Difference  414.0  


 


Respiratory Index  4.7  


 


Hgb O2 Saturation   


 


Glucose  233 H  


 


Lactate  1.8  


 


Vent Mode  Prvc  


 


Mechanical Rate   


 


FiO2  80.0  


 


Tidal Volume  400  


 


PEEP  3  


 


Crit Value Called To   


 


Crit Value Called By   


 


Crit Value Read Back   


 


Blood Gas Notified Time   


 


Sodium  136.0  134 


 


Potassium   4.6 


 


Chloride  103.0  96 L 


 


Carbon Dioxide   26 


 


Anion Gap   16 


 


BUN   105 H* 


 


Creatinine   4.6 H 


 


Est GFR ( Amer)   11 


 


Est GFR (Non-Af Amer)   9 


 


POC Glucose (mg/dL)    193 H


 


Random Glucose   218 H 


 


Calcium   7.3 L 


 


Phosphorus   


 


Magnesium   


 


Total Bilirubin   0.9 


 


AST   34 


 


ALT   37 


 


Alkaline Phosphatase   138 H 


 


Total Protein   5.2 L 


 


Albumin   2.5 L 


 


Globulin   2.7 


 


Albumin/Globulin Ratio   0.9 L 


 


Arterial Blood Potassium  4.3  


 


Blood Type   


 


Antibody Screen   











Radiology Impressions: 


                              Radiology Impressions





Chest X-Ray  12/18/18 18:28


IMPRESSION:


Large right pneumothorax and small left pneumothorax.  


Pneumomediastinum.  Endotracheal tube and nasogastric tube.  AICD.  


Extensive subcutaneous emphysema.  


 


 








Chest X-Ray  12/18/18 19:02


IMPRESSION:


Right pleural drainage catheter.  Decreased right pneumothorax.  No 


left pneumothorax appreciated.  Pneumomediastinum again noted.  


Extensive subcutaneous emphysema.  ET tube, NG tube unchanged.  New 


right subclavian central venous catheter.  AICD.  Extensive 


right-sided opacity at left mid/basilar pulmonary opacity.  Possible 


pneumonia.  


 


 








Chest X-Ray  12/18/18 22:30


IMPRESSION:


Removal of right pleural catheter.  Large right pneumothorax.  


Pneumomediastinum.  Markedly increased diffuse subcutaneous emphysema 


about the neck chest and abdomen.  Removal of right subclavian 


central venous catheter.  Diffuse right-sided pulmonary opacity and 


left mid and lower lung opacity.  AICD.  ET tube and NG tube. 


 


 








Chest X-Ray  12/18/18 23:30


IMPRESSION:


New right chest tube.  Resolved right pneumothorax.  Increasing 


pneumomediastinum.  Cannot rule out pneumopericardium.  No left 


pneumothorax.  New right subclavian central venous catheter.  


Extensive subcutaneous emphysema about the neck chest and abdomen.  


 


The preliminary findings for this examination were reported by [facility] 


Radiology at 12:37 a.m. on 12/19/2018.  There is concurrence of this 


report with the preliminary findings.


 


 








Chest X-Ray  12/19/18 04:00


IMPRESSION:


Small right pneumothorax.  No left pneumothorax.  


Pneumomediastinum/pneumopericardium.  Subcutaneous emphysema.  Lines 


and tubes unchanged.  


 


 














Assessment/Plan


(1) Acute respiratory failure with hypoxia


Current Visit: Yes   Status: Acute   





(2) CHF exacerbation


Current Visit: Yes   Status: Acute   





(3) COPD exacerbation


Current Visit: Yes   Status: Acute   





(4) Cardiomyopathy


Current Visit: No   Status: Acute   





(5) Pneumonia


Current Visit: No   Status: Acute   





Attending/Attestation





- Attestation


I have personally seen and examined this patient.: Yes


I have fully participated in the care of the patient.: Yes


I have reviewed all pertinent clinical information: Yes


Notes (Text): 





12/19/18 17:34


patient seen and examined in the intensive care unit.


On ventilatory support with elevated peak pressure and FiO2 100%


Chest tube in place for right pneumothorax


Improving subcutaneous emphysema


Hypotensive on Levophed, transfuse 1 unit packed RBCs


Bicarbonate drip for metabolic acidosis


Continue IV antibiotics


Elevated BUN/creatinine and not candidate for dialysis because of hypotension


Case discussed with family at length and does not want dialysis or other 

aggressive intervention


Palliative consult 


Prognosis poor

## 2018-12-19 NOTE — RAD
Date of service: 



12/18/2018



HISTORY:

 vent sub q emphysema 



COMPARISON:

12/18/2018 at 10:03 p.m. 



FINDINGS:



LUNGS:

Diffuse opacity of right lung.  Opacity mid and lower left lung.  No 

significant change.  It comparison is limited.



PLEURA:

Recurrent large right pneumothorax.  No definite left pneumothorax.  

Right pleural pigtail catheter is seen coiled within the subcutaneous 

soft tissues of the right lateral chest wall, approximatelyl 5.4 cm 

lateral to the ribs... Pneumomediastinum persists.



Markedly increased subcutaneous emphysema about the entire chest wall 

and neck extending over the abdominal wall, circumferentially. 



CARDIOVASCULAR:

Normal heart size.  ET tube and NG tube unchanged.  Right subclavian 

central venous catheter has been removed.



No pulmonary vascular congestion. 



OSSEOUS STRUCTURES:

No significant abnormalities.



VISUALIZED UPPER ABDOMEN:

Normal.



OTHER FINDINGS:

None.



IMPRESSION:

Removal of right pleural catheter.  Large right pneumothorax.  

Pneumomediastinum.  Markedly increased diffuse subcutaneous emphysema 

about the neck chest and abdomen.  Removal of right subclavian 

central venous catheter.  Diffuse right-sided pulmonary opacity and 

left mid and lower lung opacity.  AICD.  ET tube and NG tube.

## 2018-12-19 NOTE — CP.PCM.PN
Subjective





- Date & Time of Evaluation


Date of Evaluation: 12/19/18


Time of Evaluation: 14:06





- Subjective


Subjective: 





Patient's clinical status is deteriorating with new onset of renal failure with 

anuria.  BUN/creatinine is now trending up.


The family has rescinded the DNR and patient was intubated last night.


Patient is on vasopressors with a blood pressure last, 112 systolic.


Chest x-ray shows slight improvement from the previous x-rays.  Patient has 

subcutaneous emphysema also some air in the mediastinum and pericardium.  

Patient had a chest tube inserted due to pneumothorax.  Which is subsiding.


We will continue the supportive therapy ICU protocol respiratory treatment 

vasopressors and get a nephrology evaluation.





Objective





- Vital Signs/Intake and Output


Vital Signs (last 24 hours): 


                                        











Temp Pulse Resp BP Pulse Ox


 


 97.0 F L  106 H  24   112/44 L  100 


 


 12/19/18 12:36  12/19/18 12:36  12/19/18 12:36  12/19/18 12:36  12/19/18 12:33








Intake and Output: 


                                        











 12/19/18 12/19/18





 11:59 23:59


 


Intake Total 2301.2 492.4


 


Output Total 20 0


 


Balance 2281.2 492.4














- Medications


Medications: 


                               Current Medications





Albuterol Sulfate (Albuterol 0.042% Inhal Sol (1.25mg/3ml) Ud)  1.25 mg INH RQ2 

PRN


   PRN Reason: Wheezing


Albuterol/Ipratropium (Duoneb 3 Mg/0.5 Mg (3 Ml) Ud)  3 ml INH RQ6 [facility]


   Last Admin: 12/19/18 13:56 Dose:  Not Given


Calcium Acetate (Phoslo)  667 mg PO BIDCC [facility]


   Last Admin: 12/19/18 10:19 Dose:  667 mg


Ergocalciferol (Drisdol 50,000 Intl Units Cap)  1 cap PO QWK [facility]


   Last Admin: 12/13/18 09:38 Dose:  1 cap


Furosemide (Lasix)  20 mg IVP DAILY [facility]


   Last Admin: 12/19/18 10:20 Dose:  Not Given


Cefepime HCl 0.5 gm/ Dextrose  50 mls @ 100 mls/hr IVPB Q24H [facility]; Protocol


   Last Admin: 12/19/18 10:28 Dose:  100 mls/hr


Linezolid (Zyvox 600mg/300ml D5w)  600 mg in 300 mls @ 200 mls/hr IVPB Q12H [facility];

Protocol


   Last Admin: 12/19/18 00:26 Dose:  200 mls/hr


Dobutamine HCl/Dextrose (Dobutamine/Dextrose 5% 500mg/250ml)  500 mg in 250 mls 

@ 7.076 mls/hr IV .Q24H [facility]; Protocol


   Last Admin: 12/19/18 02:01 Dose:  7.076 mls/hr


Dexmedetomidine HCl 200 mcg/ (Sodium Chloride)  50 mls @ 2.34 mls/hr IV TITR 

PRN; Protocol


   PRN Reason: Sedation


   Last Admin: 12/19/18 10:18 Dose:  1.49 mcg/kg/hr, 17.5 mls/hr


Norepinephrine Bitartrate 4 mg (/ Sodium Chloride)  254 mls @ 15.24 mls/hr IV 

[id number] PRN; Protocol


   PRN Reason: TITRATE PER MD ORDER


   Last Titration: 12/19/18 11:35 Dose:  6 mcg/min, 22.86 mls/hr


Sodium Bicarbonate 100 meq/ (Sodium Chloride)  1,100 mls @ 100 mls/hr IV .Q11H 

[facility]


   Last Admin: 12/19/18 13:14 Dose:  100 mls/hr


Amino Acids (Clinimix 4.25/5 % "E" (1000 Ml))  1,000 mls @ 50 mls/hr IV .Q20H 

[facility]


   Stop: 12/20/18 18:00


Insulin Human Regular (Novolin R)  0 unit SC Q6H [facility]; Protocol


   Last Admin: 12/19/18 12:37 Dose:  2 u


Metoprolol Tartrate (Lopressor)  25 mg PO BID [facility]


   Last Admin: 12/19/18 10:20 Dose:  Not Given


Montelukast Sodium (Singulair)  10 mg PO DAILY [facility]


   Last Admin: 12/19/18 10:19 Dose:  10 mg


Morphine Sulfate (Morphine)  2 mg IVP Q4 PRN


   PRN Reason: Anxiety


   Last Admin: 12/18/18 13:16 Dose:  2 mg


Pantoprazole Sodium (Protonix Inj)  40 mg IVP DAILY [facility]


   Last Admin: 12/19/18 10:13 Dose:  40 mg


Ranolazine (Ranexa)  500 mg PO BID [facility]


   Last Admin: 12/19/18 10:21 Dose:  Not Given


Repaglinide (Prandin)  2 mg PO DAILY [facility]


   Last Admin: 12/19/18 10:21 Dose:  Not Given


Roflumilast (Daliresp)  500 mcg PO DAILY [facility]


   Last Admin: 12/14/18 10:27 Dose:  500 mcg


Rosuvastatin Calcium (Crestor)  20 mg PO HS [facility]


   Last Admin: 12/18/18 21:40 Dose:  Not Given


Sacubitril/Valsartan (Entresto 49 Mg-51 Mg)  1 tab PO BID [facility]


   Last Admin: 12/19/18 10:20 Dose:  Not Given











- Labs


Labs: 


                                        





                                 12/19/18 05:37 





                                 12/19/18 11:00 





                                        











PT  14.0 SECONDS (9.7-12.2)  H  12/18/18  20:53    


 


INR  1.3   12/18/18  20:53    


 


APTT  30 SECONDS (21-34)   12/18/18  20:53

## 2018-12-19 NOTE — [email or web address].CON
History of Present Illness





- History of Present Illness


History of Present Illness: 





consult for oligoanuric FLOR





85 yo H female with systolic ischemic cardiomyopathy with stent and AICD, COPD, 

DM, presented 2 weeks ago with increased sob. Pt treated for decompensated heart

failure and COPD exacerbation. Treated with diuretics and steroids. Also 

received AB for pseudomonas in sputum. Course notable for worsening mental 

status and need for bipap. Pt with severe hypotension, on entresto and other 

medicines. Pt made DNR/DNI, but it was rescinded yesterday with increased 

dyspnea. Pt required intubation. Noted to have right pneumothorax, s/p chest 

tube. Presently on 2 pressors, 80%FIO2 on vent. Pt with oligoanuria and rising 

BUN and creatinine at this time. Pt with normal creatinine on admission.


No family present at this time.





Review of Systems





- Review of Systems


Systems not reviewed;Unavailable: Intubated





Past Patient History





- Infectious Disease


Hx of Infectious Diseases: None





- Tetanus Immunizations


Tetanus Immunization: Unknown





- Past Medical History & Family History


Past Medical History?: Yes





- Past Social History


Smoking Status: Never Smoked





- CARDIAC


Hx Cardiac Disorders: Yes (CAD)


Hx Hypercholesterolemia: Yes





- PULMONARY


Hx Chronic Obstructive Pulmonary Disease (COPD): Yes





- NEUROLOGICAL


Hx Transient Ischemic Attacks (TIA): Yes





- HEENT


Hx HEENT Problems: No





- RENAL


Hx Chronic Kidney Disease: No





- ENDOCRINE/METABOLIC


Hx Diabetes Mellitus Type 2: Yes





- HEMATOLOGICAL/ONCOLOGICAL


Hx Anemia: Yes





- INTEGUMENTARY


Hx Dermatological Problems: No





- MUSCULOSKELETAL/RHEUMATOLOGICAL


Hx Arthritis: Yes


Hx Falls: No





- GASTROINTESTINAL


Hx Gastrointestinal Disorders: No





- GENITOURINARY/GYNECOLOGICAL


Hx Genitourinary Disorders: No





- PSYCHIATRIC


Hx Anxiety: Yes


Hx Substance Use: No





- SURGICAL HISTORY


Hx Appendectomy: Yes


Hx Coronary Stent: Yes





- ANESTHESIA


Hx Anesthesia: Yes


Hx Anesthesia Reactions: No


Hx Malignant Hyperthermia: No





Meds


Allergies/Adverse Reactions: 


                                    Allergies











Allergy/AdvReac Type Severity Reaction Status Date / Time


 


clopidogrel Allergy Mild SWELLING Verified 06/06/18 13:34














- Medications


Medications: 


                               Current Medications





Albuterol Sulfate (Albuterol 0.042% Inhal Sol (1.25mg/3ml) Ud)  1.25 mg INH RQ2 

PRN


   PRN Reason: Wheezing


Albuterol/Ipratropium (Duoneb 3 Mg/0.5 Mg (3 Ml) Ud)  3 ml INH RQ6 [facility]


   Last Admin: 12/19/18 07:47 Dose:  3 ml


Calcium Acetate (Phoslo)  667 mg PO BIDCC [facility]


   Last Admin: 12/19/18 10:19 Dose:  667 mg


Ergocalciferol (Drisdol 50,000 Intl Units Cap)  1 cap PO QWK [facility]


   Last Admin: 12/13/18 09:38 Dose:  1 cap


Furosemide (Lasix)  20 mg IVP DAILY [facility]


   Last Admin: 12/19/18 10:20 Dose:  Not Given


Cefepime HCl 0.5 gm/ Dextrose  50 mls @ 100 mls/hr IVPB Q24H [facility]; Protocol


   Last Admin: 12/19/18 10:28 Dose:  100 mls/hr


Linezolid (Zyvox 600mg/300ml D5w)  600 mg in 300 mls @ 200 mls/hr IVPB Q12H [facility];

Protocol


   Last Admin: 12/19/18 00:26 Dose:  200 mls/hr


Dobutamine HCl/Dextrose (Dobutamine/Dextrose 5% 500mg/250ml)  500 mg in 250 mls 

@ 7.076 mls/hr IV .Q24H [facility]; Protocol


   Last Admin: 12/19/18 02:01 Dose:  7.076 mls/hr


Dexmedetomidine HCl 200 mcg/ (Sodium Chloride)  50 mls @ 2.34 mls/hr IV TITR 

PRN; Protocol


   PRN Reason: Sedation


   Last Admin: 12/19/18 10:18 Dose:  1.49 mcg/kg/hr, 17.5 mls/hr


Norepinephrine Bitartrate 4 mg (/ Sodium Chloride)  254 mls @ 15.24 mls/hr IV 

[id number] PRN; Protocol


   PRN Reason: TITRATE PER MD ORDER


   Last Titration: 12/19/18 11:35 Dose:  6 mcg/min, 22.86 mls/hr


Sodium Bicarbonate 100 meq/ (Sodium Chloride)  1,100 mls @ 100 mls/hr IV .Q11H 

[facility]


   Last Admin: 12/19/18 02:25 Dose:  100 mls/hr


Amino Acids (Clinimix 4.25/5 % "E" (1000 Ml))  1,000 mls @ 50 mls/hr IV .Q20H 

[facility]


   Stop: 12/20/18 18:00


Insulin Human Regular (Novolin R)  0 unit SC Q6H [facility]; Protocol


   Last Admin: 12/19/18 12:37 Dose:  2 u


Metoprolol Tartrate (Lopressor)  25 mg PO BID [facility]


   Last Admin: 12/19/18 10:20 Dose:  Not Given


Montelukast Sodium (Singulair)  10 mg PO DAILY [facility]


   Last Admin: 12/19/18 10:19 Dose:  10 mg


Morphine Sulfate (Morphine)  2 mg IVP Q4 PRN


   PRN Reason: Anxiety


   Last Admin: 12/18/18 13:16 Dose:  2 mg


Pantoprazole Sodium (Protonix Inj)  40 mg IVP DAILY [facility]


   Last Admin: 12/19/18 10:13 Dose:  40 mg


Ranolazine (Ranexa)  500 mg PO BID [facility]


   Last Admin: 12/19/18 10:21 Dose:  Not Given


Repaglinide (Prandin)  2 mg PO DAILY [facility]


   Last Admin: 12/19/18 10:21 Dose:  Not Given


Roflumilast (Daliresp)  500 mcg PO DAILY [facility]


   Last Admin: 12/14/18 10:27 Dose:  500 mcg


Rosuvastatin Calcium (Crestor)  20 mg PO HS [facility]


   Last Admin: 12/18/18 21:40 Dose:  Not Given


Sacubitril/Valsartan (Entresto 49 Mg-51 Mg)  1 tab PO BID [facility]


   Last Admin: 12/19/18 10:20 Dose:  Not Given











Physical Exam





- Constitutional


Appears: In Acute Distress


Additional comments: 





intubated, grimaces to pain





- Neck Exam


Neck exam: Negative for: Lymphadenopathy





- Respiratory Exam


Respiratory Exam: Wheezes


Additional comments: 





subcutaneous emphysema noted





- Cardiovascular Exam


Additional comments: 





rapid rhythm





- GI/Abdominal Exam


GI & Abdominal Exam: Distended.  absent: Guarding, Rebound





- Extremities Exam


Extremities exam: Positive for: pedal edema





Results





- Vital Signs


Recent Vital Signs: 


                                Last Vital Signs











Temp  97.0 F L  12/19/18 12:36


 


Pulse  106 H  12/19/18 12:36


 


Resp  24   12/19/18 12:36


 


BP  112/44 L  12/19/18 12:36


 


Pulse Ox  100   12/19/18 12:33














- Labs


Result Diagrams: 


                                 12/19/18 05:37





                                 12/19/18 11:00


Labs: 


                         Laboratory Results - last 24 hr











  12/18/18 12/18/18 12/18/18





  00:38 17:14 20:53


 


WBC    21.6 H D


 


RBC    2.71 L


 


Hgb    7.4 L


 


Hct    23.7 L


 


MCV    87.4


 


MCH    27.4


 


MCHC    31.3 L


 


RDW    17.4 H


 


Plt Count    150


 


MPV    10.6


 


Neut % (Auto)    91.3 H


 


Lymph % (Auto)    0.8 L


 


Mono % (Auto)    7.4


 


Eos % (Auto)    0.2


 


Baso % (Auto)    0.3


 


Neut # (Auto)    19.7 H


 


Lymph # (Auto)    0.2 L


 


Mono # (Auto)    1.6 H


 


Eos # (Auto)    0.0


 


Baso # (Auto)    0.1


 


Neutrophils % (Manual)    89 H


 


Band Neutrophils %   


 


Lymphocytes % (Manual)    1 L


 


Monocytes % (Manual)    8


 


Eosinophils % (Manual)    2


 


Myelocytes %   


 


Nucleated RBC %   


 


Platelet Estimate    Normal


 


Large Platelets    Present


 


Hypochromasia (manual)    Slight


 


Poikilocytosis (manual    Slight


 


Basophilic Stippling    Slight


 


Anisocytosis (manual)    Slight


 


Tear Drop Cells    Slight


 


Ovalocytes    Slight


 


Kim Cells    Slight


 


PT   


 


INR   


 


APTT   


 


Puncture Site  Rb  


 


pCO2  64 H  


 


pO2  147 H  


 


HCO3  18.5 L  


 


ABG pH  7.12 L*  


 


ABG Total CO2  22.8  


 


ABG O2 Saturation  99.3 H  


 


ABG Base Excess  -8.2 L  


 


ABG Hemoglobin  7.3 L  


 


ABG Carboxyhemoglobin  1.8 H  


 


POC ABG HHb (Measured)  0.7  


 


ABG Methemoglobin  1.4  


 


Benji Test  Na  


 


ABG Potassium   


 


A-a O2 Difference  486.0  


 


Respiratory Index  3.3  


 


Hgb O2 Saturation  96.2  


 


Glucose   


 


Lactate   


 


Vent Mode  Prvc  


 


Mechanical Rate  20  


 


FiO2  100.0  


 


Tidal Volume  360  


 


PEEP  3  


 


Crit Value Called To  Dr. hall  


 


Crit Value Called By  Suzanne rrt  


 


Crit Value Read Back  Y  


 


Blood Gas Notified Time  119  


 


Sodium   


 


Potassium   


 


Chloride   


 


Carbon Dioxide   


 


Anion Gap   


 


BUN   


 


Creatinine   


 


Est GFR ( Amer)   


 


Est GFR (Non-Af Amer)   


 


POC Glucose (mg/dL)   283 H 


 


Random Glucose   


 


Calcium   


 


Phosphorus   


 


Magnesium   


 


Total Bilirubin   


 


AST   


 


ALT   


 


Alkaline Phosphatase   


 


Total Protein   


 


Albumin   


 


Globulin   


 


Albumin/Globulin Ratio   


 


Arterial Blood Potassium   


 


Blood Type   


 


Antibody Screen   














  12/18/18 12/18/18 12/18/18





  20:53 20:53 21:00


 


WBC   


 


RBC   


 


Hgb   


 


Hct   


 


MCV   


 


MCH   


 


MCHC   


 


RDW   


 


Plt Count   


 


MPV   


 


Neut % (Auto)   


 


Lymph % (Auto)   


 


Mono % (Auto)   


 


Eos % (Auto)   


 


Baso % (Auto)   


 


Neut # (Auto)   


 


Lymph # (Auto)   


 


Mono # (Auto)   


 


Eos # (Auto)   


 


Baso # (Auto)   


 


Neutrophils % (Manual)   


 


Band Neutrophils %   


 


Lymphocytes % (Manual)   


 


Monocytes % (Manual)   


 


Eosinophils % (Manual)   


 


Myelocytes %   


 


Nucleated RBC %   


 


Platelet Estimate   


 


Large Platelets   


 


Hypochromasia (manual)   


 


Poikilocytosis (manual   


 


Basophilic Stippling   


 


Anisocytosis (manual)   


 


Tear Drop Cells   


 


Ovalocytes   


 


Fanny Cells   


 


PT  14.0 H  


 


INR  1.3  


 


APTT  30  


 


Puncture Site    Lfem


 


pCO2    71 H*


 


pO2    53 L


 


HCO3    14.9 L


 


ABG pH    7.05 L*


 


ABG Total CO2    21.8 L


 


ABG O2 Saturation    88.8 L


 


ABG Base Excess    -11.9 L


 


ABG Hemoglobin   


 


ABG Carboxyhemoglobin   


 


POC ABG HHb (Measured)   


 


ABG Methemoglobin   


 


Benji Test    Neg


 


ABG Potassium    6.5 H*


 


A-a O2 Difference    571.0


 


Respiratory Index    10.8


 


Hgb O2 Saturation   


 


Glucose    261 H


 


Lactate    1.4


 


Vent Mode    Prvc


 


Mechanical Rate    12


 


FiO2    100.0


 


Tidal Volume    360


 


PEEP    3


 


Crit Value Called To    Dr. hall


 


Crit Value Called By    Brii edwards rcp


 


Crit Value Read Back    Y


 


Blood Gas Notified Time    2112


 


Sodium   128 L  129.0 L


 


Potassium   6.6 H* D 


 


Chloride   97 L  101.0


 


Carbon Dioxide   19 L 


 


Anion Gap   18 


 


BUN   112 H* D 


 


Creatinine   4.9 H 


 


Est GFR ( Amer)   10 


 


Est GFR (Non-Af Amer)   8 


 


POC Glucose (mg/dL)   


 


Random Glucose   245 H 


 


Calcium   7.8 L 


 


Phosphorus   8.3 H 


 


Magnesium   2.6 H 


 


Total Bilirubin   0.8 


 


AST   55 H 


 


ALT   39 


 


Alkaline Phosphatase   163 H 


 


Total Protein   5.9 L 


 


Albumin   3.1 L 


 


Globulin   2.8 


 


Albumin/Globulin Ratio   1.1 


 


Arterial Blood Potassium    6.5 H*


 


Blood Type   


 


Antibody Screen   














  12/18/18 12/18/18 12/19/18





  22:05 23:58 00:37


 


WBC   


 


RBC   


 


Hgb   


 


Hct   


 


MCV   


 


MCH   


 


MCHC   


 


RDW   


 


Plt Count   


 


MPV   


 


Neut % (Auto)   


 


Lymph % (Auto)   


 


Mono % (Auto)   


 


Eos % (Auto)   


 


Baso % (Auto)   


 


Neut # (Auto)   


 


Lymph # (Auto)   


 


Mono # (Auto)   


 


Eos # (Auto)   


 


Baso # (Auto)   


 


Neutrophils % (Manual)   


 


Band Neutrophils %   


 


Lymphocytes % (Manual)   


 


Monocytes % (Manual)   


 


Eosinophils % (Manual)   


 


Myelocytes %   


 


Nucleated RBC %   


 


Platelet Estimate   


 


Large Platelets   


 


Hypochromasia (manual)   


 


Poikilocytosis (manual   


 


Basophilic Stippling   


 


Anisocytosis (manual)   


 


Tear Drop Cells   


 


Ovalocytes   


 


Fanny Cells   


 


PT   


 


INR   


 


APTT   


 


Puncture Site  Rradial  


 


pCO2  60 H  


 


pO2  90  


 


HCO3  20.4 L  


 


ABG pH  7.19 L*  


 


ABG Total CO2  24.7  


 


ABG O2 Saturation  96.1  


 


ABG Base Excess  -5.8 L  


 


ABG Hemoglobin  10.9 L  


 


ABG Carboxyhemoglobin  0.6  


 


POC ABG HHb (Measured)  3.9  


 


ABG Methemoglobin  0.5  


 


Benji Test  Pos  


 


ABG Potassium   


 


A-a O2 Difference  192.0  


 


Respiratory Index  2.1  


 


Hgb O2 Saturation  95.0  


 


Glucose   


 


Lactate   


 


Vent Mode   


 


Mechanical Rate   


 


FiO2  50.0  


 


Tidal Volume   


 


PEEP   


 


Crit Value Called To  Dr. hall  


 


Crit Value Called By  Brii edwards rcp  


 


Crit Value Read Back  Y  


 


Blood Gas Notified Time  2220  


 


Sodium    129 L


 


Potassium    6.1 H


 


Chloride    97 L


 


Carbon Dioxide    22


 


Anion Gap    17


 


BUN    114 H*


 


Creatinine    5.1 H


 


Est GFR ( Amer)    10


 


Est GFR (Non-Af Amer)    8


 


POC Glucose (mg/dL)   229 H 


 


Random Glucose    204 H


 


Calcium    7.7 L


 


Phosphorus   


 


Magnesium   


 


Total Bilirubin   


 


AST   


 


ALT   


 


Alkaline Phosphatase   


 


Total Protein   


 


Albumin   


 


Globulin   


 


Albumin/Globulin Ratio   


 


Arterial Blood Potassium   


 


Blood Type   


 


Antibody Screen   














  12/19/18 12/19/18 12/19/18





  05:06 05:21 05:35


 


WBC   


 


RBC   


 


Hgb   


 


Hct   


 


MCV   


 


MCH   


 


MCHC   


 


RDW   


 


Plt Count   


 


MPV   


 


Neut % (Auto)   


 


Lymph % (Auto)   


 


Mono % (Auto)   


 


Eos % (Auto)   


 


Baso % (Auto)   


 


Neut # (Auto)   


 


Lymph # (Auto)   


 


Mono # (Auto)   


 


Eos # (Auto)   


 


Baso # (Auto)   


 


Neutrophils % (Manual)   


 


Band Neutrophils %   


 


Lymphocytes % (Manual)   


 


Monocytes % (Manual)   


 


Eosinophils % (Manual)   


 


Myelocytes %   


 


Nucleated RBC %   


 


Platelet Estimate   


 


Large Platelets   


 


Hypochromasia (manual)   


 


Poikilocytosis (manual   


 


Basophilic Stippling   


 


Anisocytosis (manual)   


 


Tear Drop Cells   


 


Ovalocytes   


 


Kim Cells   


 


PT   


 


INR   


 


APTT   


 


Puncture Site  Rb  


 


pCO2  64 H  


 


pO2  123 H  


 


HCO3  21.7  


 


ABG pH  7.20 L  


 


ABG Total CO2  27.0  


 


ABG O2 Saturation  99.3 H  


 


ABG Base Excess  -4.2 L  


 


ABG Hemoglobin   


 


ABG Carboxyhemoglobin   


 


POC ABG HHb (Measured)   


 


ABG Methemoglobin   


 


Benji Test  Na  


 


ABG Potassium  5.2  


 


A-a O2 Difference  439.0  


 


Respiratory Index  3.6  


 


Hgb O2 Saturation   


 


Glucose  308 H  


 


Lactate  1.0  


 


Vent Mode  Prvc  


 


Mechanical Rate  20  


 


FiO2  90.0  


 


Tidal Volume  400  


 


PEEP  3  


 


Crit Value Called To   


 


Crit Value Called By   


 


Crit Value Read Back   


 


Blood Gas Notified Time   


 


Sodium  132.0   133


 


Potassium    5.4 H


 


Chloride  101.0   95 L


 


Carbon Dioxide    25


 


Anion Gap    17


 


BUN    111 H*


 


Creatinine    4.9 H


 


Est GFR ( Amer)    10


 


Est GFR (Non-Af Amer)    8


 


POC Glucose (mg/dL)   309 H 


 


Random Glucose    293 H


 


Calcium    7.5 L


 


Phosphorus    7.6 H


 


Magnesium    2.4 H


 


Total Bilirubin    0.6


 


AST    44 H


 


ALT    37


 


Alkaline Phosphatase    158 H


 


Total Protein    5.5 L


 


Albumin    2.8 L


 


Globulin    2.7


 


Albumin/Globulin Ratio    1.0


 


Arterial Blood Potassium  5.2  


 


Blood Type   


 


Antibody Screen   














  12/19/18 12/19/18 12/19/18





  05:37 05:46 10:51


 


WBC  16.2 H  


 


RBC  2.55 L  


 


Hgb  7.1 L  


 


Hct  22.1 L  


 


MCV  86.8  


 


MCH  27.9  


 


MCHC  32.1 L  


 


RDW  17.7 H  


 


Plt Count  138  


 


MPV  11.6  


 


Neut % (Auto)  90.9 H  


 


Lymph % (Auto)  1.4 L  


 


Mono % (Auto)  6.0  


 


Eos % (Auto)  0.4  


 


Baso % (Auto)  1.3  


 


Neut # (Auto)  14.7 H  


 


Lymph # (Auto)  0.2 L  


 


Mono # (Auto)  1.0 H  


 


Eos # (Auto)  0.1  


 


Baso # (Auto)  0.2  


 


Neutrophils % (Manual)  93 H  


 


Band Neutrophils %  1  


 


Lymphocytes % (Manual)  1 L  


 


Monocytes % (Manual)  2  


 


Eosinophils % (Manual)  1  


 


Myelocytes %  2 H  


 


Nucleated RBC %  1 H  


 


Platelet Estimate  Normal  


 


Large Platelets  Present  


 


Hypochromasia (manual)  Slight  


 


Poikilocytosis (manual  Slight  


 


Basophilic Stippling  Slight  


 


Anisocytosis (manual)  Slight  


 


Tear Drop Cells  Slight  


 


Ovalocytes  Slight  


 


Kim Cells  Slight  


 


PT   


 


INR   


 


APTT   


 


Puncture Site    Lfa


 


pCO2    54 H


 


pO2    89


 


HCO3    21.6


 


ABG pH    7.25 L


 


ABG Total CO2    25.4


 


ABG O2 Saturation    98.5 H


 


ABG Base Excess    -4.2 L


 


ABG Hemoglobin   


 


ABG Carboxyhemoglobin   


 


POC ABG HHb (Measured)   


 


ABG Methemoglobin   


 


Benji Test    Na


 


ABG Potassium    4.3


 


A-a O2 Difference    414.0


 


Respiratory Index    4.7


 


Hgb O2 Saturation   


 


Glucose    233 H


 


Lactate    1.8


 


Vent Mode    Prvc


 


Mechanical Rate   


 


FiO2    80.0


 


Tidal Volume    400


 


PEEP    3


 


Crit Value Called To   


 


Crit Value Called By   


 


Crit Value Read Back   


 


Blood Gas Notified Time   


 


Sodium    136.0


 


Potassium   


 


Chloride    103.0


 


Carbon Dioxide   


 


Anion Gap   


 


BUN   


 


Creatinine   


 


Est GFR ( Amer)   


 


Est GFR (Non-Af Amer)   


 


POC Glucose (mg/dL)   


 


Random Glucose   


 


Calcium   


 


Phosphorus   


 


Magnesium   


 


Total Bilirubin   


 


AST   


 


ALT   


 


Alkaline Phosphatase   


 


Total Protein   


 


Albumin   


 


Globulin   


 


Albumin/Globulin Ratio   


 


Arterial Blood Potassium    4.3


 


Blood Type   A POSITIVE 


 


Antibody Screen   Negative 














  12/19/18 12/19/18





  11:00 11:38


 


WBC  


 


RBC  


 


Hgb  


 


Hct  


 


MCV  


 


MCH  


 


MCHC  


 


RDW  


 


Plt Count  


 


MPV  


 


Neut % (Auto)  


 


Lymph % (Auto)  


 


Mono % (Auto)  


 


Eos % (Auto)  


 


Baso % (Auto)  


 


Neut # (Auto)  


 


Lymph # (Auto)  


 


Mono # (Auto)  


 


Eos # (Auto)  


 


Baso # (Auto)  


 


Neutrophils % (Manual)  


 


Band Neutrophils %  


 


Lymphocytes % (Manual)  


 


Monocytes % (Manual)  


 


Eosinophils % (Manual)  


 


Myelocytes %  


 


Nucleated RBC %  


 


Platelet Estimate  


 


Large Platelets  


 


Hypochromasia (manual)  


 


Poikilocytosis (manual  


 


Basophilic Stippling  


 


Anisocytosis (manual)  


 


Tear Drop Cells  


 


Ovalocytes  


 


Fanny Cells  


 


PT  


 


INR  


 


APTT  


 


Puncture Site  


 


pCO2  


 


pO2  


 


HCO3  


 


ABG pH  


 


ABG Total CO2  


 


ABG O2 Saturation  


 


ABG Base Excess  


 


ABG Hemoglobin  


 


ABG Carboxyhemoglobin  


 


POC ABG HHb (Measured)  


 


ABG Methemoglobin  


 


Benji Test  


 


ABG Potassium  


 


A-a O2 Difference  


 


Respiratory Index  


 


Hgb O2 Saturation  


 


Glucose  


 


Lactate  


 


Vent Mode  


 


Mechanical Rate  


 


FiO2  


 


Tidal Volume  


 


PEEP  


 


Crit Value Called To  


 


Crit Value Called By  


 


Crit Value Read Back  


 


Blood Gas Notified Time  


 


Sodium  134 


 


Potassium  4.6 


 


Chloride  96 L 


 


Carbon Dioxide  26 


 


Anion Gap  16 


 


BUN  105 H* 


 


Creatinine  4.6 H 


 


Est GFR ( Amer)  11 


 


Est GFR (Non-Af Amer)  9 


 


POC Glucose (mg/dL)   193 H


 


Random Glucose  218 H 


 


Calcium  7.3 L 


 


Phosphorus  


 


Magnesium  


 


Total Bilirubin  0.9 


 


AST  34 


 


ALT  37 


 


Alkaline Phosphatase  138 H 


 


Total Protein  5.2 L 


 


Albumin  2.5 L 


 


Globulin  2.7 


 


Albumin/Globulin Ratio  0.9 L 


 


Arterial Blood Potassium  


 


Blood Type  


 


Antibody Screen  














Assessment & Plan





- Assessment and Plan (Free Text)


Assessment: 





oligoanuric FLOR in setting of hypotension, decoimpensted CHF


pt is pressor dependent, has PTX


poor mental status noted for several days





continue pressor support


electrolytes q8


pt is not a candidate for dialysis due to underlying condition


pallliative care to see

## 2018-12-19 NOTE — CP.PCM.PN
Subjective





- Date & Time of Evaluation


Date of Evaluation: 12/19/18


Time of Evaluation: 14:32





- Subjective


Subjective: 








CHIEF COMPLAINTS TODAY :


events noted OVERNIGHT


Patient IN ICU,INTUBATED.


UNRESPONSIVE,


 FAMILY AT BEDSIDE





SEEN BY NEPHROLOGY FOR ARF


SEEN BY PALLIATIVE CARE.





ROS. ( on observation only )


HEENT :  N.


Resp :       No cough, wheezing ,pleuritic CP ,or hemoptysis +vr RT CT IN PLACE


                  


Cardio :     No anginal  CP, PND, orthopnea, palpitation 


GI :           No abd.pain, n/v ,diarrhea or GI bleeding .


CNS : No headache, vertigo, focal deficit.


Musculoskel :  No joint swelling ,


Derm :        No rash 


Psych :     Normal affect.


Ext :  No  swelling ,calf pain 





PE.


Pt. INTUBATED,ON VENTILATOR


UNRESPONSIVE


V.S  As noted in the chart 


Head ,ear nose,throat and eyes : Normal.


Neck : Supple with normal carotids.


Lungs: DECREASED BREATH SOUNDS RIGHT SIDE.


            vr RT -CT IN PLACE  


Heart : S1 & S2 normal with S4. No murmur.


Abd : Soft non tender with normal bowel sounds.


Neuro : Moves all ext. with no localized deficit.(sedated )


Ext : No edema with intact pulses.Non tender calves 


Derm : No rashes or decubitus ulcer.





LABS/RADIOLOGY:


reviewed.











Objective





- Vital Signs/Intake and Output


Vital Signs (last 24 hours): 


                                        











Temp Pulse Resp BP Pulse Ox


 


 97.0 F L  106 H  24   112/44 L  100 


 


 12/19/18 12:36  12/19/18 12:36  12/19/18 12:36  12/19/18 12:36  12/19/18 12:33








Intake and Output: 


                                        











 12/19/18 12/19/18





 06:59 18:59


 


Intake Total 1291.7 1600.6


 


Output Total 0 20


 


Balance 1291.7 1580.6














- Medications


Medications: 


                               Current Medications





Albuterol Sulfate (Albuterol 0.042% Inhal Sol (1.25mg/3ml) Ud)  1.25 mg INH RQ2 

PRN


   PRN Reason: Wheezing


Albuterol/Ipratropium (Duoneb 3 Mg/0.5 Mg (3 Ml) Ud)  3 ml INH RQ6 [facility]


   Last Admin: 12/19/18 13:56 Dose:  Not Given


Calcium Acetate (Phoslo)  667 mg PO BIDCC [facility]


   Last Admin: 12/19/18 10:19 Dose:  667 mg


Ergocalciferol (Drisdol 50,000 Intl Units Cap)  1 cap PO QWK [facility]


   Last Admin: 12/13/18 09:38 Dose:  1 cap


Furosemide (Lasix)  20 mg IVP DAILY [facility]


   Last Admin: 12/19/18 10:20 Dose:  Not Given


Cefepime HCl 0.5 gm/ Dextrose  50 mls @ 100 mls/hr IVPB Q24H [facility]; Protocol


   Last Admin: 12/19/18 10:28 Dose:  100 mls/hr


Linezolid (Zyvox 600mg/300ml D5w)  600 mg in 300 mls @ 200 mls/hr IVPB Q12H [facility];

Protocol


   Last Admin: 12/19/18 00:26 Dose:  200 mls/hr


Dobutamine HCl/Dextrose (Dobutamine/Dextrose 5% 500mg/250ml)  500 mg in 250 mls 

@ 7.076 mls/hr IV .Q24H [facility]; Protocol


   Last Admin: 12/19/18 02:01 Dose:  7.076 mls/hr


Dexmedetomidine HCl 200 mcg/ (Sodium Chloride)  50 mls @ 2.34 mls/hr IV TITR 

PRN; Protocol


   PRN Reason: Sedation


   Last Admin: 12/19/18 10:18 Dose:  1.49 mcg/kg/hr, 17.5 mls/hr


Norepinephrine Bitartrate 4 mg (/ Sodium Chloride)  254 mls @ 15.24 mls/hr IV 

[id number] PRN; Protocol


   PRN Reason: TITRATE PER MD ORDER


   Last Titration: 12/19/18 11:35 Dose:  6 mcg/min, 22.86 mls/hr


Sodium Bicarbonate 100 meq/ (Sodium Chloride)  1,100 mls @ 100 mls/hr IV .Q11H 

[facility]


   Last Admin: 12/19/18 13:14 Dose:  100 mls/hr


Amino Acids (Clinimix 4.25/5 % "E" (1000 Ml))  1,000 mls @ 50 mls/hr IV .Q20H 

[facility]


   Stop: 12/20/18 18:00


Insulin Human Regular (Novolin R)  0 unit SC Q6H [facility]; Protocol


   Last Admin: 12/19/18 12:37 Dose:  2 u


Metoprolol Tartrate (Lopressor)  25 mg PO BID [facility]


   Last Admin: 12/19/18 10:20 Dose:  Not Given


Montelukast Sodium (Singulair)  10 mg PO DAILY [facility]


   Last Admin: 12/19/18 10:19 Dose:  10 mg


Morphine Sulfate (Morphine)  2 mg IVP Q4 PRN


   PRN Reason: Anxiety


   Last Admin: 12/18/18 13:16 Dose:  2 mg


Pantoprazole Sodium (Protonix Inj)  40 mg IVP DAILY [facility]


   Last Admin: 12/19/18 10:13 Dose:  40 mg


Ranolazine (Ranexa)  500 mg PO BID [facility]


   Last Admin: 12/19/18 10:21 Dose:  Not Given


Repaglinide (Prandin)  2 mg PO DAILY [facility]


   Last Admin: 12/19/18 10:21 Dose:  Not Given


Roflumilast (Daliresp)  500 mcg PO DAILY [facility]


   Last Admin: 12/14/18 10:27 Dose:  500 mcg


Rosuvastatin Calcium (Crestor)  20 mg PO HS [facility]


   Last Admin: 12/18/18 21:40 Dose:  Not Given


Sacubitril/Valsartan (Entresto 49 Mg-51 Mg)  1 tab PO BID [facility]


   Last Admin: 12/19/18 10:20 Dose:  Not Given











- Labs


Labs: 


                                        





                                 12/19/18 05:37 





                                 12/19/18 11:00 





                                        











PT  14.0 SECONDS (9.7-12.2)  H  12/18/18  20:53    


 


INR  1.3   12/18/18  20:53    


 


APTT  30 SECONDS (21-34)   12/18/18  20:53    














Assessment and Plan


(1) Multiorgan failure


Status: Acute   





(2) Acute respiratory failure with hypoxia


Status: Acute   





(3) Ischemic cardiomyopathy


Status: Acute   





(4) Chr obstructive pulmonary disease w/ acute lower respiratory infxn


Status: Acute   





(5) CHF exacerbation


Status: Acute   





(6) Cardiomyopathy


Status: Acute   





(7) Pneumonia due to gram-negative bacteria


Status: Acute   





(8) CAD (coronary artery disease)


Status: Acute   





(9) Anemia


Status: Acute   





(10) Acute kidney failure


Status: Acute   





(11) DM type 2 (diabetes mellitus, type 2)


Status: Acute   





- Assessment and Plan (Free Text)


Plan: 





CONTINUE IV ABX  IV CEFEPIME.


CONTINUE IV V ZYVOX 600 MG IV Q [id number]


PT HAS RT.PTX, RT CT IN PLACE AS PER PULMONARY


MONITOR H/H.





CONTINUE SUPPORTIVE CARE.


F/U CXR


AS PER PULMONARY.


PROGNOSIS GUARDED.


DISCUSSED WITH  AND FAMILY ABOUT THE GRIM PROGNOSIS


WITH MULTIPLE ORGAN FAILURE.

## 2018-12-19 NOTE — RAD
Date of service: 



12/19/2018



HISTORY:

 chest tube 



COMPARISON:

12/18/2018 at 11:36 p.m. 



FINDINGS:



LUNGS:

Extensive opacity of right lung.  Opacity mid and lower left lung.



PLEURA:

Small right pneumothorax again visualized.  No definite left 

pneumothorax.  Pneumomediastinum and possible pneumopericardium.  

Right chest tube again identified at lung base.



Extensive subcutaneous emphysema seen about neck chest and abdomen 

circumferentially. 



CARDIOVASCULAR:

No aortic atherosclerotic calcification present.



Normal heart size.  ET tube, NG tube, right subclavian central venous 

catheter and AICD are all grossly unchanged.  No pulmonary vascular 

congestion. 



OSSEOUS STRUCTURES:

No significant abnormalities.



VISUALIZED UPPER ABDOMEN:

Normal.



OTHER FINDINGS:

None.



IMPRESSION:

Small right pneumothorax.  No left pneumothorax.  

Pneumomediastinum/pneumopericardium.  Subcutaneous emphysema.  Lines 

and tubes unchanged.

## 2018-12-19 NOTE — RAD
Date of service: 



12/18/2018



HISTORY:

 ET tube placement 



COMPARISON:

12/17/2018 



FINDINGS:



LUNGS:

Extensive opacity of right lung.  This may be due in part to 

atelectasis associated with pneumothorax.  There is extensive opacity 

in the mid and lower left lung.



PLEURA:

Large right pneumothorax.  Small left pneumothorax.  

Pneumomediastinum noted.  These are all new finding since the prior 

examination.



CARDIOVASCULAR:

Normal heart size.  AICD noted.  ET tube noted with its tip situated 

in grossly appropriate position, although the tracheal debbie cannot 

be adequately visualized on this examination.  Nasogastric tube 

extends to left upper quadrant of abdomen. 



No pulmonary vascular congestion. 



OSSEOUS STRUCTURES:

No significant abnormalities.



VISUALIZED UPPER ABDOMEN:

Normal.



OTHER FINDINGS:

Extensive subcutaneous emphysema about the neck and chest, right 

greater than left.



IMPRESSION:

Large right pneumothorax and small left pneumothorax.  

Pneumomediastinum.  Endotracheal tube and nasogastric tube.  AICD.  

Extensive subcutaneous emphysema.

## 2018-12-19 NOTE — RAD
Date of service: 



12/18/2018



HISTORY:

 Right CT placement 



COMPARISON:

12/18/2018 at 10:38 p.m. 



FINDINGS:



LUNGS:

Diffuse increased opacity of right lung.  Opacity in mid and lower 

left lung.  Possible pneumonia.



PLEURA:

New right pleural pigtail catheter inserted, tip seen at right lung 

base.  No residual right pneumothorax.  There is increasing 

pneumomediastinum.  Cannot rule out pneumopericardium.  No left 

pneumothorax.



Extensive subcutaneous emphysema seen circumferentially about the 

neck chest and abdomen. 



CARDIOVASCULAR:

No aortic atherosclerotic calcification present.



Normal heart size.  ET tube and NG tube unchanged.  New right 

subclavian central venous catheter noted.  No pulmonary vascular 

congestion. 



OSSEOUS STRUCTURES:

No significant abnormalities.



VISUALIZED UPPER ABDOMEN:

Normal.



OTHER FINDINGS:

None.



IMPRESSION:

New right chest tube.  Resolved right pneumothorax.  Increasing 

pneumomediastinum.  Cannot rule out pneumopericardium.  No left 

pneumothorax.  New right subclavian central venous catheter.  

Extensive subcutaneous emphysema about the neck chest and abdomen.  



The preliminary findings for this examination were reported by [facility] 

Radiology at 12:37 a.m. on 12/19/2018.  There is concurrence of this 

report with the preliminary findings.

## 2018-12-19 NOTE — RAD
Date of service: 



12/18/2018



HISTORY:

 chest tube 



COMPARISON:

12/18/2018 at 6:54 p.m. 



FINDINGS:



LUNGS:

Extensive right-sided opacity.  Left basilar opacity.



PLEURA:

Markedly decreased right pneumothorax status post insertion of right 

pleural pigtail catheter.  There is no coil of the pigtail within the 

pleural space on this examination, however.  There is no definite 

left pneumothorax seen on this examination.  Pneumomediastinum 

persists.  Extensive right-sided subcutaneous emphysema seen over the 

chest and neck.



CARDIOVASCULAR:

Normal heart size.  ET tube appropriately positioned approximately 

3.3 cm above the tracheal debbie.  Nasogastric tube noted.  Right 

subclavian central venous catheter noted.  AICD noted.



OSSEOUS STRUCTURES:

No significant abnormalities.



VISUALIZED UPPER ABDOMEN:

Normal.



OTHER FINDINGS:

None.



IMPRESSION:

Right pleural drainage catheter.  Decreased right pneumothorax.  No 

left pneumothorax appreciated.  Pneumomediastinum again noted.  

Extensive subcutaneous emphysema.  ET tube, NG tube unchanged.  New 

right subclavian central venous catheter.  AICD.  Extensive 

right-sided opacity at left mid/basilar pulmonary opacity.  Possible 

pneumonia.

## 2018-12-19 NOTE — CP.PCM.CON
History of Present Illness





- History of Present Illness


History of Present Illness: 





Palliative consult called by Doctor Randolph for goals of care discussion and 

family support





Patient is a 87 yo female admitted on 12/6/2018 with SOB and cough X 2 days. CXR

showed mild CHF. In ED Lasix and Neb. Tx given with improvement. Patient was 

placed on Maxipime, Zyvox and Lasix IV. However, despite all these prudent 

interventions , patient's condition has worsened and required BiPap support with

possibility to need Mv support. 





As per notes, family discussed End of life care and agreed to respect patient's 

wishes against aggressive interventions, including CPR and MV support. Last 

night patient's condition got even more complex with the need for immediate 

intubation due to respiratory distress. At that time, family decided to rescind 

the DNR/DNI and make patient a Full Code with hope for recovery.





Unfortunately, patient underwent multi organ failure  with no urine output.





Palliative care was asked to revisit goals of care.








PMH: COPD, CAD, LED stent, EF 20%, asthma, TIA





Soc. Hx: , lives at home





Fam. Hx: Unknown at this time








Review of Systems





- Review of Systems


All systems: reviewed and no additional remarkable complaints except


Review of Systems: 





ROS unobtainable from patient due to intubation and sedation. ROS obtained from 

nursing. Per nursing, patient had no output since last night. 





Past Patient History





- Infectious Disease


Hx of Infectious Diseases: None





- Tetanus Immunizations


Tetanus Immunization: Unknown





- Past Medical History & Family History


Past Medical History?: Yes





- Past Social History


Smoking Status: Never Smoked





- CARDIAC


Hx Cardiac Disorders: Yes (CAD)


Hx Hypercholesterolemia: Yes





- PULMONARY


Hx Chronic Obstructive Pulmonary Disease (COPD): Yes





- NEUROLOGICAL


Hx Transient Ischemic Attacks (TIA): Yes





- HEENT


Hx HEENT Problems: No





- RENAL


Hx Chronic Kidney Disease: No





- ENDOCRINE/METABOLIC


Hx Diabetes Mellitus Type 2: Yes





- HEMATOLOGICAL/ONCOLOGICAL


Hx Anemia: Yes





- INTEGUMENTARY


Hx Dermatological Problems: No





- MUSCULOSKELETAL/RHEUMATOLOGICAL


Hx Arthritis: Yes


Hx Falls: No





- GASTROINTESTINAL


Hx Gastrointestinal Disorders: No





- GENITOURINARY/GYNECOLOGICAL


Hx Genitourinary Disorders: No





- PSYCHIATRIC


Hx Anxiety: Yes


Hx Substance Use: No





- SURGICAL HISTORY


Hx Appendectomy: Yes


Hx Coronary Stent: Yes





- ANESTHESIA


Hx Anesthesia: Yes


Hx Anesthesia Reactions: No


Hx Malignant Hyperthermia: No





Meds


Allergies/Adverse Reactions: 


                                    Allergies











Allergy/AdvReac Type Severity Reaction Status Date / Time


 


clopidogrel Allergy Mild SWELLING Verified 06/06/18 13:34














- Medications


Medications: 


                               Current Medications





Albuterol Sulfate (Albuterol 0.042% Inhal Sol (1.25mg/3ml) Ud)  1.25 mg INH RQ2 

PRN


   PRN Reason: Wheezing


Albuterol/Ipratropium (Duoneb 3 Mg/0.5 Mg (3 Ml) Ud)  3 ml INH RQ6 [facility]


   Last Admin: 12/19/18 07:47 Dose:  3 ml


Calcium Acetate (Phoslo)  667 mg PO BIDCC [facility]


   Last Admin: 12/19/18 10:19 Dose:  667 mg


Ergocalciferol (Drisdol 50,000 Intl Units Cap)  1 cap PO QWK [facility]


   Last Admin: 12/13/18 09:38 Dose:  1 cap


Furosemide (Lasix)  20 mg IVP DAILY [facility]


   Last Admin: 12/19/18 10:20 Dose:  Not Given


Cefepime HCl 0.5 gm/ Dextrose  50 mls @ 100 mls/hr IVPB Q24H [facility]; Protocol


   Last Admin: 12/19/18 10:28 Dose:  100 mls/hr


Linezolid (Zyvox 600mg/300ml D5w)  600 mg in 300 mls @ 200 mls/hr IVPB Q12H [facility];

Protocol


   Last Admin: 12/19/18 00:26 Dose:  200 mls/hr


Dobutamine HCl/Dextrose (Dobutamine/Dextrose 5% 500mg/250ml)  500 mg in 250 mls 

@ 7.076 mls/hr IV .Q24H [facility]; Protocol


   Last Admin: 12/19/18 02:01 Dose:  7.076 mls/hr


Dexmedetomidine HCl 200 mcg/ (Sodium Chloride)  50 mls @ 2.34 mls/hr IV TITR PRN

; Protocol


   PRN Reason: Sedation


   Last Admin: 12/19/18 10:18 Dose:  1.49 mcg/kg/hr, 17.5 mls/hr


Norepinephrine Bitartrate 4 mg (/ Sodium Chloride)  254 mls @ 15.24 mls/hr IV 

.[id number] PRN; Protocol


   PRN Reason: TITRATE PER MD ORDER


   Last Titration: 12/19/18 11:35 Dose:  6 mcg/min, 22.86 mls/hr


Sodium Bicarbonate 100 meq/ (Sodium Chloride)  1,100 mls @ 100 mls/hr IV .Q11H 

[facility]


   Last Admin: 12/19/18 02:25 Dose:  100 mls/hr


Amino Acids (Clinimix 4.25/5 % "E" (1000 Ml))  1,000 mls @ 50 mls/hr IV .Q20H 

[facility]


   Stop: 12/20/18 18:00


Insulin Human Regular (Novolin R)  0 unit SC Q6H [facility]; Protocol


   Last Admin: 12/19/18 12:37 Dose:  2 u


Metoprolol Tartrate (Lopressor)  25 mg PO BID [facility]


   Last Admin: 12/19/18 10:20 Dose:  Not Given


Montelukast Sodium (Singulair)  10 mg PO DAILY [facility]


   Last Admin: 12/19/18 10:19 Dose:  10 mg


Morphine Sulfate (Morphine)  2 mg IVP Q4 PRN


   PRN Reason: Anxiety


   Last Admin: 12/18/18 13:16 Dose:  2 mg


Pantoprazole Sodium (Protonix Inj)  40 mg IVP DAILY [facility]


   Last Admin: 12/19/18 10:13 Dose:  40 mg


Ranolazine (Ranexa)  500 mg PO BID [facility]


   Last Admin: 12/19/18 10:21 Dose:  Not Given


Repaglinide (Prandin)  2 mg PO DAILY [facility]


   Last Admin: 12/19/18 10:21 Dose:  Not Given


Roflumilast (Daliresp)  500 mcg PO DAILY [facility]


   Last Admin: 12/14/18 10:27 Dose:  500 mcg


Rosuvastatin Calcium (Crestor)  20 mg PO HS [facility]


   Last Admin: 12/18/18 21:40 Dose:  Not Given


Sacubitril/Valsartan (Entresto 49 Mg-51 Mg)  1 tab PO BID [facility]


   Last Admin: 12/19/18 10:20 Dose:  Not Given











Physical Exam





- Constitutional


Appears: In Acute Distress, Chronically Ill





- Head Exam


Head Exam: ATRAUMATIC, NORMAL INSPECTION, NORMOCEPHALIC





- Eye Exam


Eye Exam: EOMI, Normal appearance, PERRL


Pupil Exam: NORMAL ACCOMODATION, PERRL





- ENT Exam


Additional comments: 





ETT





- Neck Exam


Neck exam: Positive for: Normal Inspection





- Respiratory Exam


Additional comments: 





On MV support





- Cardiovascular Exam


Cardiovascular Exam: Tachycardia





- GI/Abdominal Exam


GI & Abdominal Exam: Hypoactive Bowel Sounds, Soft





- Rectal Exam


Rectal Exam: Deferred





-  Exam


Additional comments: 





anuria





- Extremities Exam


Extremities exam: Positive for: pedal edema





- Back Exam


Back exam: NORMAL INSPECTION





- Neurological Exam


Neurological exam: Motor Sensory Deficit





- Psychiatric Exam


Psychiatric exam: Flat Affect





- Skin


Skin Exam: Pallor





Results





- Vital Signs


Recent Vital Signs: 


                                Last Vital Signs











Temp  97.0 F L  12/19/18 12:36


 


Pulse  106 H  12/19/18 12:36


 


Resp  24   12/19/18 12:36


 


BP  112/44 L  12/19/18 12:36


 


Pulse Ox  100   12/19/18 12:33














- Labs


Result Diagrams: 


                                 12/19/18 05:37





                                 12/19/18 11:00


Labs: 


                         Laboratory Results - last 24 hr











  12/18/18 12/18/18 12/18/18





  00:38 17:14 20:53


 


WBC    21.6 H D


 


RBC    2.71 L


 


Hgb    7.4 L


 


Hct    23.7 L


 


MCV    87.4


 


MCH    27.4


 


MCHC    31.3 L


 


RDW    17.4 H


 


Plt Count    150


 


MPV    10.6


 


Neut % (Auto)    91.3 H


 


Lymph % (Auto)    0.8 L


 


Mono % (Auto)    7.4


 


Eos % (Auto)    0.2


 


Baso % (Auto)    0.3


 


Neut # (Auto)    19.7 H


 


Lymph # (Auto)    0.2 L


 


Mono # (Auto)    1.6 H


 


Eos # (Auto)    0.0


 


Baso # (Auto)    0.1


 


Neutrophils % (Manual)    89 H


 


Band Neutrophils %   


 


Lymphocytes % (Manual)    1 L


 


Monocytes % (Manual)    8


 


Eosinophils % (Manual)    2


 


Myelocytes %   


 


Nucleated RBC %   


 


Platelet Estimate    Normal


 


Large Platelets    Present


 


Hypochromasia (manual)    Slight


 


Poikilocytosis (manual    Slight


 


Basophilic Stippling    Slight


 


Anisocytosis (manual)    Slight


 


Tear Drop Cells    Slight


 


Ovalocytes    Slight


 


Felton Cells    Slight


 


PT   


 


INR   


 


APTT   


 


Puncture Site  Rb  


 


pCO2  64 H  


 


pO2  147 H  


 


HCO3  18.5 L  


 


ABG pH  7.12 L*  


 


ABG Total CO2  22.8  


 


ABG O2 Saturation  99.3 H  


 


ABG Base Excess  -8.2 L  


 


ABG Hemoglobin  7.3 L  


 


ABG Carboxyhemoglobin  1.8 H  


 


POC ABG HHb (Measured)  0.7  


 


ABG Methemoglobin  1.4  


 


Benji Test  Na  


 


ABG Potassium   


 


A-a O2 Difference  486.0  


 


Respiratory Index  3.3  


 


Hgb O2 Saturation  96.2  


 


Glucose   


 


Lactate   


 


Vent Mode  Prvc  


 


Mechanical Rate  20  


 


FiO2  100.0  


 


Tidal Volume  360  


 


PEEP  3  


 


Crit Value Called To  Dr. hall  


 


Crit Value Called By  Suzanne rrt  


 


Crit Value Read Back  Y  


 


Blood Gas Notified Time  119  


 


Sodium   


 


Potassium   


 


Chloride   


 


Carbon Dioxide   


 


Anion Gap   


 


BUN   


 


Creatinine   


 


Est GFR ( Amer)   


 


Est GFR (Non-Af Amer)   


 


POC Glucose (mg/dL)   283 H 


 


Random Glucose   


 


Calcium   


 


Phosphorus   


 


Magnesium   


 


Total Bilirubin   


 


AST   


 


ALT   


 


Alkaline Phosphatase   


 


Total Protein   


 


Albumin   


 


Globulin   


 


Albumin/Globulin Ratio   


 


Arterial Blood Potassium   


 


Blood Type   


 


Antibody Screen   














  12/18/18 12/18/18 12/18/18





  20:53 20:53 21:00


 


WBC   


 


RBC   


 


Hgb   


 


Hct   


 


MCV   


 


MCH   


 


MCHC   


 


RDW   


 


Plt Count   


 


MPV   


 


Neut % (Auto)   


 


Lymph % (Auto)   


 


Mono % (Auto)   


 


Eos % (Auto)   


 


Baso % (Auto)   


 


Neut # (Auto)   


 


Lymph # (Auto)   


 


Mono # (Auto)   


 


Eos # (Auto)   


 


Baso # (Auto)   


 


Neutrophils % (Manual)   


 


Band Neutrophils %   


 


Lymphocytes % (Manual)   


 


Monocytes % (Manual)   


 


Eosinophils % (Manual)   


 


Myelocytes %   


 


Nucleated RBC %   


 


Platelet Estimate   


 


Large Platelets   


 


Hypochromasia (manual)   


 


Poikilocytosis (manual   


 


Basophilic Stippling   


 


Anisocytosis (manual)   


 


Tear Drop Cells   


 


Ovalocytes   


 


Felton Cells   


 


PT  14.0 H  


 


INR  1.3  


 


APTT  30  


 


Puncture Site    Lfem


 


pCO2    71 H*


 


pO2    53 L


 


HCO3    14.9 L


 


ABG pH    7.05 L*


 


ABG Total CO2    21.8 L


 


ABG O2 Saturation    88.8 L


 


ABG Base Excess    -11.9 L


 


ABG Hemoglobin   


 


ABG Carboxyhemoglobin   


 


POC ABG HHb (Measured)   


 


ABG Methemoglobin   


 


Benji Test    Neg


 


ABG Potassium    6.5 H*


 


A-a O2 Difference    571.0


 


Respiratory Index    10.8


 


Hgb O2 Saturation   


 


Glucose    261 H


 


Lactate    1.4


 


Vent Mode    Prvc


 


Mechanical Rate    12


 


FiO2    100.0


 


Tidal Volume    360


 


PEEP    3


 


Crit Value Called To    Dr. hall


 


Crit Value Called By    Brii edwards rcp


 


Crit Value Read Back    Y


 


Blood Gas Notified Time    2112


 


Sodium   128 L  129.0 L


 


Potassium   6.6 H* D 


 


Chloride   97 L  101.0


 


Carbon Dioxide   19 L 


 


Anion Gap   18 


 


BUN   112 H* D 


 


Creatinine   4.9 H 


 


Est GFR ( Amer)   10 


 


Est GFR (Non-Af Amer)   8 


 


POC Glucose (mg/dL)   


 


Random Glucose   245 H 


 


Calcium   7.8 L 


 


Phosphorus   8.3 H 


 


Magnesium   2.6 H 


 


Total Bilirubin   0.8 


 


AST   55 H 


 


ALT   39 


 


Alkaline Phosphatase   163 H 


 


Total Protein   5.9 L 


 


Albumin   3.1 L 


 


Globulin   2.8 


 


Albumin/Globulin Ratio   1.1 


 


Arterial Blood Potassium    6.5 H*


 


Blood Type   


 


Antibody Screen   














  12/18/18 12/18/18 12/19/18





  22:05 23:58 00:37


 


WBC   


 


RBC   


 


Hgb   


 


Hct   


 


MCV   


 


MCH   


 


MCHC   


 


RDW   


 


Plt Count   


 


MPV   


 


Neut % (Auto)   


 


Lymph % (Auto)   


 


Mono % (Auto)   


 


Eos % (Auto)   


 


Baso % (Auto)   


 


Neut # (Auto)   


 


Lymph # (Auto)   


 


Mono # (Auto)   


 


Eos # (Auto)   


 


Baso # (Auto)   


 


Neutrophils % (Manual)   


 


Band Neutrophils %   


 


Lymphocytes % (Manual)   


 


Monocytes % (Manual)   


 


Eosinophils % (Manual)   


 


Myelocytes %   


 


Nucleated RBC %   


 


Platelet Estimate   


 


Large Platelets   


 


Hypochromasia (manual)   


 


Poikilocytosis (manual   


 


Basophilic Stippling   


 


Anisocytosis (manual)   


 


Tear Drop Cells   


 


Ovalocytes   


 


Felton Cells   


 


PT   


 


INR   


 


APTT   


 


Puncture Site  Rradial  


 


pCO2  60 H  


 


pO2  90  


 


HCO3  20.4 L  


 


ABG pH  7.19 L*  


 


ABG Total CO2  24.7  


 


ABG O2 Saturation  96.1  


 


ABG Base Excess  -5.8 L  


 


ABG Hemoglobin  10.9 L  


 


ABG Carboxyhemoglobin  0.6  


 


POC ABG HHb (Measured)  3.9  


 


ABG Methemoglobin  0.5  


 


Benji Test  Pos  


 


ABG Potassium   


 


A-a O2 Difference  192.0  


 


Respiratory Index  2.1  


 


Hgb O2 Saturation  95.0  


 


Glucose   


 


Lactate   


 


Vent Mode   


 


Mechanical Rate   


 


FiO2  50.0  


 


Tidal Volume   


 


PEEP   


 


Crit Value Called To  Dr. hall  


 


Crit Value Called By  Brii edwards rcp  


 


Crit Value Read Back  Y  


 


Blood Gas Notified Time  2220  


 


Sodium    129 L


 


Potassium    6.1 H


 


Chloride    97 L


 


Carbon Dioxide    22


 


Anion Gap    17


 


BUN    114 H*


 


Creatinine    5.1 H


 


Est GFR ( Amer)    10


 


Est GFR (Non-Af Amer)    8


 


POC Glucose (mg/dL)   229 H 


 


Random Glucose    204 H


 


Calcium    7.7 L


 


Phosphorus   


 


Magnesium   


 


Total Bilirubin   


 


AST   


 


ALT   


 


Alkaline Phosphatase   


 


Total Protein   


 


Albumin   


 


Globulin   


 


Albumin/Globulin Ratio   


 


Arterial Blood Potassium   


 


Blood Type   


 


Antibody Screen   














  12/19/18 12/19/18 12/19/18





  05:06 05:21 05:35


 


WBC   


 


RBC   


 


Hgb   


 


Hct   


 


MCV   


 


MCH   


 


MCHC   


 


RDW   


 


Plt Count   


 


MPV   


 


Neut % (Auto)   


 


Lymph % (Auto)   


 


Mono % (Auto)   


 


Eos % (Auto)   


 


Baso % (Auto)   


 


Neut # (Auto)   


 


Lymph # (Auto)   


 


Mono # (Auto)   


 


Eos # (Auto)   


 


Baso # (Auto)   


 


Neutrophils % (Manual)   


 


Band Neutrophils %   


 


Lymphocytes % (Manual)   


 


Monocytes % (Manual)   


 


Eosinophils % (Manual)   


 


Myelocytes %   


 


Nucleated RBC %   


 


Platelet Estimate   


 


Large Platelets   


 


Hypochromasia (manual)   


 


Poikilocytosis (manual   


 


Basophilic Stippling   


 


Anisocytosis (manual)   


 


Tear Drop Cells   


 


Ovalocytes   


 


Fanny Cells   


 


PT   


 


INR   


 


APTT   


 


Puncture Site  Rb  


 


pCO2  64 H  


 


pO2  123 H  


 


HCO3  21.7  


 


ABG pH  7.20 L  


 


ABG Total CO2  27.0  


 


ABG O2 Saturation  99.3 H  


 


ABG Base Excess  -4.2 L  


 


ABG Hemoglobin   


 


ABG Carboxyhemoglobin   


 


POC ABG HHb (Measured)   


 


ABG Methemoglobin   


 


Benji Test  Na  


 


ABG Potassium  5.2  


 


A-a O2 Difference  439.0  


 


Respiratory Index  3.6  


 


Hgb O2 Saturation   


 


Glucose  308 H  


 


Lactate  1.0  


 


Vent Mode  Prvc  


 


Mechanical Rate  20  


 


FiO2  90.0  


 


Tidal Volume  400  


 


PEEP  3  


 


Crit Value Called To   


 


Crit Value Called By   


 


Crit Value Read Back   


 


Blood Gas Notified Time   


 


Sodium  132.0   133


 


Potassium    5.4 H


 


Chloride  101.0   95 L


 


Carbon Dioxide    25


 


Anion Gap    17


 


BUN    111 H*


 


Creatinine    4.9 H


 


Est GFR ( Amer)    10


 


Est GFR (Non-Af Amer)    8


 


POC Glucose (mg/dL)   309 H 


 


Random Glucose    293 H


 


Calcium    7.5 L


 


Phosphorus    7.6 H


 


Magnesium    2.4 H


 


Total Bilirubin    0.6


 


AST    44 H


 


ALT    37


 


Alkaline Phosphatase    158 H


 


Total Protein    5.5 L


 


Albumin    2.8 L


 


Globulin    2.7


 


Albumin/Globulin Ratio    1.0


 


Arterial Blood Potassium  5.2  


 


Blood Type   


 


Antibody Screen   














  12/19/18 12/19/18 12/19/18





  05:37 05:46 10:51


 


WBC  16.2 H  


 


RBC  2.55 L  


 


Hgb  7.1 L  


 


Hct  22.1 L  


 


MCV  86.8  


 


MCH  27.9  


 


MCHC  32.1 L  


 


RDW  17.7 H  


 


Plt Count  138  


 


MPV  11.6  


 


Neut % (Auto)  90.9 H  


 


Lymph % (Auto)  1.4 L  


 


Mono % (Auto)  6.0  


 


Eos % (Auto)  0.4  


 


Baso % (Auto)  1.3  


 


Neut # (Auto)  14.7 H  


 


Lymph # (Auto)  0.2 L  


 


Mono # (Auto)  1.0 H  


 


Eos # (Auto)  0.1  


 


Baso # (Auto)  0.2  


 


Neutrophils % (Manual)  93 H  


 


Band Neutrophils %  1  


 


Lymphocytes % (Manual)  1 L  


 


Monocytes % (Manual)  2  


 


Eosinophils % (Manual)  1  


 


Myelocytes %  2 H  


 


Nucleated RBC %  1 H  


 


Platelet Estimate  Normal  


 


Large Platelets  Present  


 


Hypochromasia (manual)  Slight  


 


Poikilocytosis (manual  Slight  


 


Basophilic Stippling  Slight  


 


Anisocytosis (manual)  Slight  


 


Tear Drop Cells  Slight  


 


Ovalocytes  Slight  


 


Felton Cells  Slight  


 


PT   


 


INR   


 


APTT   


 


Puncture Site    Lfa


 


pCO2    54 H


 


pO2    89


 


HCO3    21.6


 


ABG pH    7.25 L


 


ABG Total CO2    25.4


 


ABG O2 Saturation    98.5 H


 


ABG Base Excess    -4.2 L


 


ABG Hemoglobin   


 


ABG Carboxyhemoglobin   


 


POC ABG HHb (Measured)   


 


ABG Methemoglobin   


 


Benji Test    Na


 


ABG Potassium    4.3


 


A-a O2 Difference    414.0


 


Respiratory Index    4.7


 


Hgb O2 Saturation   


 


Glucose    233 H


 


Lactate    1.8


 


Vent Mode    Prvc


 


Mechanical Rate   


 


FiO2    80.0


 


Tidal Volume    400


 


PEEP    3


 


Crit Value Called To   


 


Crit Value Called By   


 


Crit Value Read Back   


 


Blood Gas Notified Time   


 


Sodium    136.0


 


Potassium   


 


Chloride    103.0


 


Carbon Dioxide   


 


Anion Gap   


 


BUN   


 


Creatinine   


 


Est GFR ( Amer)   


 


Est GFR (Non-Af Amer)   


 


POC Glucose (mg/dL)   


 


Random Glucose   


 


Calcium   


 


Phosphorus   


 


Magnesium   


 


Total Bilirubin   


 


AST   


 


ALT   


 


Alkaline Phosphatase   


 


Total Protein   


 


Albumin   


 


Globulin   


 


Albumin/Globulin Ratio   


 


Arterial Blood Potassium    4.3


 


Blood Type   A POSITIVE 


 


Antibody Screen   Negative 














  12/19/18 12/19/18





  11:00 11:38


 


WBC  


 


RBC  


 


Hgb  


 


Hct  


 


MCV  


 


MCH  


 


MCHC  


 


RDW  


 


Plt Count  


 


MPV  


 


Neut % (Auto)  


 


Lymph % (Auto)  


 


Mono % (Auto)  


 


Eos % (Auto)  


 


Baso % (Auto)  


 


Neut # (Auto)  


 


Lymph # (Auto)  


 


Mono # (Auto)  


 


Eos # (Auto)  


 


Baso # (Auto)  


 


Neutrophils % (Manual)  


 


Band Neutrophils %  


 


Lymphocytes % (Manual)  


 


Monocytes % (Manual)  


 


Eosinophils % (Manual)  


 


Myelocytes %  


 


Nucleated RBC %  


 


Platelet Estimate  


 


Large Platelets  


 


Hypochromasia (manual)  


 


Poikilocytosis (manual  


 


Basophilic Stippling  


 


Anisocytosis (manual)  


 


Tear Drop Cells  


 


Ovalocytes  


 


Fanny Cells  


 


PT  


 


INR  


 


APTT  


 


Puncture Site  


 


pCO2  


 


pO2  


 


HCO3  


 


ABG pH  


 


ABG Total CO2  


 


ABG O2 Saturation  


 


ABG Base Excess  


 


ABG Hemoglobin  


 


ABG Carboxyhemoglobin  


 


POC ABG HHb (Measured)  


 


ABG Methemoglobin  


 


Benji Test  


 


ABG Potassium  


 


A-a O2 Difference  


 


Respiratory Index  


 


Hgb O2 Saturation  


 


Glucose  


 


Lactate  


 


Vent Mode  


 


Mechanical Rate  


 


FiO2  


 


Tidal Volume  


 


PEEP  


 


Crit Value Called To  


 


Crit Value Called By  


 


Crit Value Read Back  


 


Blood Gas Notified Time  


 


Sodium  134 


 


Potassium  4.6 


 


Chloride  96 L 


 


Carbon Dioxide  26 


 


Anion Gap  16 


 


BUN  105 H* 


 


Creatinine  4.6 H 


 


Est GFR ( Amer)  11 


 


Est GFR (Non-Af Amer)  9 


 


POC Glucose (mg/dL)   193 H


 


Random Glucose  218 H 


 


Calcium  7.3 L 


 


Phosphorus  


 


Magnesium  


 


Total Bilirubin  0.9 


 


AST  34 


 


ALT  37 


 


Alkaline Phosphatase  138 H 


 


Total Protein  5.2 L 


 


Albumin  2.5 L 


 


Globulin  2.7 


 


Albumin/Globulin Ratio  0.9 L 


 


Arterial Blood Potassium  


 


Blood Type  


 


Antibody Screen  














Assessment & Plan





- Assessment and Plan (Free Text)


Assessment: 





Palliative consult


Full Code, POLST on chart rescinded by the family, PPS 10%


I reviewed Medical records, all diagnostic studies, examined patient in the bed.








Patient is intubated, on Precedx, unresponsive to stimuli. Skin pale. 

Respirations fully supported by MV. ABG gasses abnormal. Pneumothorax partially 

resolved with chest tube.Abdomen softly distended. There is no urine output 

since last night. , Crt 4.0. There is no active ROM to any extremities.





ICU team is concerned with multi organ failure where further aggressive 

interventions are not any longer beneficial for the patent. 





I spoke to patient's  Mr. West over the phone at 1 pm. He said he and 

the rest of the family would  come in 1 hr for a family meeting.





2 PM





Family meeting attended by patient's , daughter in law and two other 

family members. Doctor Valentin discussed patient's critical clinical condition. The

 who is mostly Citizen of [place] speaking needed assistance with translation. 

Doctor Valentin explained that there was multi organ failure and prognosis was 

grave.





 had difficulties accepting the poor prognosis and was asking for 

reassurance if his wife would improve " by tomorrow". Wit family and  

present I reviewed again the critical condition of the patient. I encourage 

 to spend as much time as he wishes with the wife and advised family to 

allow  enough time toabsorb all these informations.








Impression





* Chronically ill lady in acute respiratory distress


* Multi organ failure


* Anuria


* Grave prognosis


*  has difficulties accepting the poor prognosis 


* Spiritual distress











Suggestions





* Continue supportive care


* Pastoral visit for spiritual support to a  and family


* Consider promotion of natural death as patient is not responding to current 

  medical interventions 


* Patient should be DNr DNI








Palliative care will remain on board for support





Advance care planing 46 min

## 2018-12-19 NOTE — CP.PCM.PCO
Physician Communication Note





- Physician Communication Note


Physician Communication Note: see above

## 2018-12-20 NOTE — CARD
--------------- APPROVED REPORT --------------





Date of service: 12/19/2018



EXAM: Two-dimensional and M-mode echocardiogram with Doppler and 

color Doppler.



Other Information 

Quality : AverageTechnically LimitedRhythm : 



INDICATION

Cardiac Disease: CAD Rule out subacute bacterial endocarditis COPD 



RISK FACTORS

Hypertension 

Hyperlipidemia

Diabetes



2D DIMENSIONS 

LA Qpqphs96   (18-58mL)LVEF (Patel's)35.18 %

IVC0.00 cm



M-Mode DIMENSIONS 

IVSd1.37   (0.7-1.1cm)LVDd5.97   (4.0-5.6cm)

PWd0.94   (0.7-1.1cm)FS (%) 22   %

LVDs4.65   (2.0-3.8cm)LVEF (%)44   (>50%)



Aortic Valve

AoV Peak Gcyuoakl504.2cm/sAoV VTI34.1cmAO Peak GR.15mmHg

LVOT Peak Xlpdeixr898.0cm/sLVOT VTI21.44cmAO Mean GR.7mmHg

AI P 1/2 [id number]



Mitral Valve

MV E Kxdyaghw10.9cm/sMV A Xgrkenfa38.3cm/sE/A ratio0.9

RBHJ134.35 cm/s



TDI

Lateral E' Peak V8.93cm/sMedial E' Peak V5.25cm/sE/Lateral E'8.3

E/Medial E'14.1



Tricuspid Valve

TR Peak Loipggbk602ko/sTR Peak Gr.76zzWlWIPA34bsWn



 LEFT VENTRICLE 

The Left Ventricle is moderately dilated.

There is normal left ventricular wall thickness. 

Left ventricle systolic function is moderately to severely impaired.

The Ejection Fraction is 30-35%.

There is global hypokinesis of the left ventricle.

No left ventricle thrombus noted on this study.

There is no ventricular septal defect visualized.

There is no left ventricular aneurysm. 

There is no mass noted in the left ventricle.



 RIGHT VENTRICLE 

The right ventricle is normal size.

There is normal right ventricular wall thickness.

The right ventricular systolic function is normal.



 ATRIA 

The left atrium is moderately dilated.

The right atrium size is normal.

The interatrial septum is intact with no evidence for an atrial 

septal defect.



 AORTIC VALVE 

The aortic valve is normal in structure and function.

There is moderate aortic regurgitation.

There is no aortic valvular stenosis. 

There is no aortic valvular vegetation.



 MITRAL VALVE 

The mitral valve is normal in structure and function.

There is no evidence of mitral valve prolapse.

There is no mitral valve stenosis. 

Mitral regurgitation is moderate.



 TRICUSPID VALVE 

The tricuspid valve is normal in structure and function.

There is mild to moderate tricuspid regurgitation.

Right ventricular systolic pressure is estimated at 50-60 mmHg. 

There is moderate-severe pulmonary hypertension.

There is no tricuspid valve prolapse or vegetation.

There is no tricuspid valve stenosis. 



 PULMONIC VALVE 

The pulmonary valve is normal in structure and function.

There is no pulmonic valvular regurgitation. 

There is no pulmonic valvular stenosis.



 GREAT VESSELS 

The aortic root is normal in size.

The ascending aorta is normal in size.

The pulmonary artery is normal.

The IVC is normal in size and collapses >50% with inspiration.



 PERICARDIAL EFFUSION 

The pericardium appears normal.

There is no pleural effusion.



<Conclusion>

The Left Ventricle is moderately dilated.

Left ventricle systolic function is moderately to severely impaired.

The Ejection Fraction is 30-35%.

There is global hypokinesis of the left ventricle.

The right atrium size is normal.

There is moderate aortic regurgitation.

Mitral regurgitation is moderate.

There is mild to moderate tricuspid regurgitation.

Right ventricular systolic pressure is estimated at 50-60 mmHg. 

There is moderate-severe pulmonary hypertension.

## 2018-12-20 NOTE — CP.PCM.DIS
Provider





- Provider


Date of Admission: 


18 21:20





Attending physician: 


Osmar Fierro MD





Consults: 








12/10/18 12:37


Infectious Disease Consult Routine 


   Comment: 


   Consulting Provider: Ruth Baugh


   Consulting Physician: Ruth Baugh


   Reason for Consult: + VE SPUTUM PSEUDOMONAS





18 16:54


Pulmonology Consult Routine 


   Comment: 


   Consulting Provider: Sung Valentin


   Consulting Physician: Sung Valentin


   Reason for Consult: sob





12/15/18 15:00


Critical Care Consult Routine 


   Comment: RESP DISTRESS


   Consulting Provider: Aaron Freeman


   Consulting Physician: Aaron Freeman


   Reason for Consult: RESP DISTRESS





18 13:27


General Surgery Consult Routine 


   Comment: 


   Consulting Provider: Shlomo Dasilva Jr.


   Consulting Physician: Shlomo Dasilva Jr.


   Reason for Consult: needs TLC





18 09:29


Palliative Care Consult Routine 


   Comment: 


   Consulting Provider: Mahnaz Rojas


   Physician Instructions: 


   Reason For Exam: goals of care











Time Spent in preparation of Discharge (in minutes): 35





Hospital Course





- Lab Results


Lab Results: 


                                  Micro Results





18 20:53   Naris   MRSA Culture (Admit) - Final


                            MRSA NOT DETECTED


18 22:06   Blood   Blood Culture - Final


                            NO GROWTH AFTER 5 DAYS


18 22:06   Blood   Gram Stain - Final


                            TEST NOT PERFORMED


18 20:30   Blood   Blood Culture - Final


                            NO GROWTH AFTER 5 DAYS


18 20:30   Blood   Gram Stain - Final


                            TEST NOT PERFORMED


18 18:07   Sputum   Gram Stain - Final


18 18:07   Sputum   Sputum Culture - Final


                            Pseudomonas Aeruginosa





                             Most Recent Lab Values











WBC  16.2 K/uL (4.8-10.8)  H  18  05:37    


 


RBC  2.55 Mil/uL (3.80-5.20)  L  18  05:37    


 


Hgb  7.1 g/dL (11.0-16.0)  L  18  05:37    


 


Hct  22.1 % (34.0-47.0)  L  18  05:37    


 


MCV  86.8 fL (81.0-99.0)   18  05:37    


 


MCH  27.9 pg (27.0-31.0)   18  05:37    


 


MCHC  32.1 g/dL (33.0-37.0)  L  18  05:37    


 


RDW  17.7 % (11.5-14.5)  H  18  05:37    


 


Plt Count  138 K/uL (130-400)   18  05:37    


 


MPV  11.6 fL (7.2-11.7)   18  05:37    


 


Neut % (Auto)  90.9 % (50.0-75.0)  H  18  05:37    


 


Lymph % (Auto)  1.4 % (20.0-40.0)  L  18  05:37    


 


Mono % (Auto)  6.0 % (0.0-10.0)   18  05:37    


 


Eos % (Auto)  0.4 % (0.0-4.0)   18  05:37    


 


Baso % (Auto)  1.3 % (0.0-2.0)   18  05:37    


 


Neut # (Auto)  14.7 K/uL (1.8-7.0)  H  18  05:37    


 


Lymph # (Auto)  0.2 K/uL (1.0-4.3)  L  18  05:37    


 


Mono # (Auto)  1.0 K/uL (0.0-0.8)  H  18  05:37    


 


Eos # (Auto)  0.1 K/uL (0.0-0.7)   18  05:37    


 


Baso # (Auto)  0.2 K/uL (0.0-0.2)   18  05:37    


 


Neutrophils % (Manual)  93 % (50-75)  H  18  05:37    


 


Band Neutrophils %  1 % (0-2)   18  05:37    


 


Lymphocytes % (Manual)  1 % (20-40)  L  18  05:37    


 


Monocytes % (Manual)  2 % (0-10)   18  05:37    


 


Eosinophils % (Manual)  1 % (0-4)   18  05:37    


 


Myelocytes %  2 % (0-0)  H  18  05:37    


 


Nucleated RBC %  1 % (0-0)  H  18  05:37    


 


Toxic Granulation  Present   12/15/18  06:28    


 


Platelet Estimate  Normal  (NORMAL)   18  05:37    


 


Large Platelets  Present   18  05:37    


 


Polychromasia  Slight   12/15/18  06:28    


 


Hypochromasia (manual)  Slight   18  05:37    


 


Poikilocytosis (manual  Slight   18  05:37    


 


Basophilic Stippling  Slight   18  05:37    


 


Anisocytosis (manual)  Slight   18  05:37    


 


Target Cells  Slight   18  09:30    


 


Tear Drop Cells  Slight   18  05:37    


 


Ovalocytes  Slight   18  05:37    


 


Luray Cells  Slight   18  05:37    


 


PT  14.0 SECONDS (9.7-12.2)  H  18  20:53    


 


INR  1.3   18  20:53    


 


APTT  30 SECONDS (21-34)   18  20:53    


 


Puncture Site  Lfa   18  10:51    


 


pCO2  54 mm/Hg (35-45)  H  18  10:51    


 


pO2  89 mm/Hg ()   18  10:51    


 


HCO3  21.6 mmol/L (21-28)   18  10:51    


 


ABG pH  7.25  (7.35-7.45)  L  18  10:51    


 


ABG Total CO2  25.4 mmol/L (22-28)   18  10:51    


 


ABG O2 Saturation  98.5 % (95-98)  H  18  10:51    


 


ABG Base Excess  -4.2 mmol/L (-2.0-3.0)  L  18  10:51    


 


ABG Hemoglobin  10.9 g/dL (11.7-17.4)  L  18  22:05    


 


ABG Carboxyhemoglobin  0.6 % (0.5-1.5)   18  22:05    


 


POC ABG HHb (Measured)  3.9 % (0.0-5.0)   18  22:05    


 


ABG Methemoglobin  0.5 % (0.0-3.0)   18  22:05    


 


Benji Test  Na   18  10:51    


 


ABG Potassium  4.3 mmol/L (3.6-5.2)   18  10:51    


 


A-a O2 Difference  414.0 mm/Hg  18  10:51    


 


Respiratory Index  4.7   18  10:51    


 


Hgb O2 Saturation  95.0 % (95.0-98.0)   18  22:05    


 


Sodium  136.0 mmol/l (132-148)   18  10:51    


 


Chloride  103.0 mmol/L ()   18  10:51    


 


Glucose  233 mg/dl ()  H  18  10:51    


 


Lactate  1.8 mmol/L (0.7-2.1)   18  10:51    


 


Vent Mode  Prvc   18  10:51    


 


Mechanical Rate  20   18  05:06    


 


FiO2  80.0 %  18  10:51    


 


Tidal Volume  400   18  10:51    


 


PEEP  3   18  10:51    


 


Inspiratory BiPAP  12   12/15/18  15:55    


 


Expiratory BiPAP  6   12/15/18  15:55    


 


Crit Value Called To  Dr. hall   18  22:05    


 


Crit Value Called By  Brii edwards rcp   18  22:05    


 


Crit Value Read Back  Y   18  22:05    


 


Blood Gas Notified Time  2220   18  22:05    


 


Sodium  134 mmol/L (132-148)   18  11:00    


 


Potassium  4.6 mmol/L (3.6-5.2)   18  11:00    


 


Chloride  96 mmol/L ()  L  18  11:00    


 


Carbon Dioxide  26 mmol/L (22-30)   18  11:00    


 


Anion Gap  16  (10-20)   18  11:00    


 


BUN  105 mg/dL (7-17)  H*  18  11:00    


 


Creatinine  4.6 mg/dL (0.7-1.2)  H  18  11:00    


 


Est GFR ( Amer)  11   18  11:00    


 


Est GFR (Non-Af Amer)  9   18  11:00    


 


POC Glucose (mg/dL)  166 mg/dL ()  H  18  17:37    


 


Random Glucose  218 mg/dL ()  H  18  11:00    


 


Calcium  7.3 mg/dl (8.6-10.4)  L  18  11:00    


 


Phosphorus  7.6 mg/dL (2.5-4.5)  H  18  05:35    


 


Magnesium  2.4 mg/dL (1.6-2.3)  H  18  05:35    


 


Total Bilirubin  0.9 mg/dL (0.2-1.3)   18  11:00    


 


Direct Bilirubin  0.5 mg/dL (0.0-0.4)  H  18  07:08    


 


AST  34 U/L (14-36)   18  11:00    


 


ALT  37 U/L (9-52)   18  11:00    


 


Alkaline Phosphatase  138 U/L ()  H  18  11:00    


 


Total Creatine Kinase  59 U/L ()   18  14:10    


 


CK-MB (Mass)  2.76 ng/mL (0.0-3.38)   18  14:10    


 


Troponin I  0.0620 ng/mL (0.00-0.120)   18  14:10    


 


NT-Pro-B Natriuret Pep  06188 pg/mL (0-900)  H  12/10/18  08:05    


 


Total Protein  5.2 g/dL (6.3-8.3)  L  18  11:00    


 


Albumin  2.5 g/dL (3.5-5.0)  L  18  11:00    


 


Globulin  2.7 gm/dL (2.2-3.9)   18  11:00    


 


Albumin/Globulin Ratio  0.9  (1.0-2.1)  L  18  11:00    


 


Arterial Blood Potassium  4.3 mmol/L (3.6-5.2)   18  10:51    


 


Blood Type  A POSITIVE   18  05:46    


 


Antibody Screen  Negative   18  05:46    














- Hospital Course


Hospital Course: 





86 years old  woman with cardiopulmonary disorder complaining of 

shortness of breath cough expectoration for the last 2 days increase in 

intensity and severity presented to [facility] ER with systolic heart 

failure.  Patient was given Lasix nebulizer with partial improvement.





PAST HIST.


Patient has history of coronary artery disease with stent and ischemic 

cardiomyopathy with left ventricle ejection fraction of less than 30%.  Patient 

also has a moderate mitral regurg.  History of type 2 diabetes COPD and recently

had an infection with pertussis.





Patient was admitted on the telemetry bed.


Guideline based patient received IV antibiotics and the therapy for congestive 

heart failure which was dictated by the low blood pressure.


Pulmonary consult was also obtain ID consult was obtained.


Patient was put on BiPAP with much improvement but continued to have low blood 

pressure.  After IV Dobutrex there was improvement in urine output and the blood

pressure.  BUN and creatinine also trended upwards, 64/2.4.


Patient's chest x-ray did not improve and patient was getting more hypoxemic and

hypotensive.  The pulmonary consultant had an extensive discussion with the 

family of intubation.  As per the family, the patient wishes were not to be 

intubated and patient was maintained on BiPAP and IV medications.  48 hours 

later patient was getting more obtunded and family were concerned about any last

option.


After discussing with the pulmonary and critical care family rescinded the DNR 

DNI status and patient was intubated.


After all the aggressive therapy in the ICU patient's blood pressure did not 

respond to the vasopressors hypoxemia and hypotension persisted and patient went

to renal failure.  Nephrology consult was obtained.  Patient's family further 

declined any more intervention of dialysis.


On 2018 patient passed away in ICU.





Discharge Exam





- Head Exam


Head Exam: ATRAUMATIC, NORMAL INSPECTION, NORMOCEPHALIC





Discharge Plan





- Follow Up Plan


Condition: GUARDED


Disposition:  WITH WITHOUT AUTOPSY

## 2020-04-02 NOTE — VASCLAB
Date of service: 



12/14/2018



PROCEDURE:  Right Upper Extremity Venous Duplex Exam



HISTORY:

Arm swelling, r/O thrombus 



PRIORS:

None. 



TECHNIQUE:

Right upper extremity, internal jugular, subclavian, axillary, 

brachial, ulnar, radial, basilic and upper cephalic veins were 

evaluated. Flow was assessed with color Doppler, compressibility, 

assessment of phasic flow and augmentation response.



Report prepared by   Rosas Hitchcock, MERNA, RVT



FINDINGS:



RIGHT:

1. Internal Jugular: 

1.1. Compressibility - Fully compressible: Thrombus -  None : Flow - 

Phasic: Augmentation -Normal: Reflux - None.





2. Subclavian:



2.1. Compressibility - Fully compressible: Thrombus -  None : Flow - 

Phasic: Augmentation -Normal: Reflux - None.



3. Axillary: 



3.1. Compressibility - Fully compressible: Thrombus - None :  Flow - 

Phasic: Augmentation -Normal: Reflux - None.



4. Brachial: 



4.1. Compressibility - Fully compressible: Thrombus -  None: Flow - 

Phasic: Augmentation -Normal: Reflux - None.



5. Ulnar:



5.1. Compressibility - Fully compressible: Thrombus -  None: Flow - 

Phasic: Augmentation -Normal: Reflux - None.



6. Radial:



6.1. Compressibility - Fully compressible: Thrombus - None: Flow - 

Phasic: Augmentation - Normal: Reflux - None.



7. Cephalic:



7.1. Compressibility - Fully compressible: Thrombus - None: Flow - 

Phasic: Augmentation -Normal: Reflux - None.



8. Basilic:

8.1.  Compressibility - Partial: Thrombus - Acute: Flow - Absent : 

Augmentation - None: Reflux - None.





OTHER FINDINGS:  VIV Huang notified about the findings. 



IMPRESSION:

Right: 



Acute thrombosis of the right basilic vein with severe reduction of 

the venous return.     



Normal venous flow noted in the left internal jugular and left 

subclavian veins. Patient verbally agreed and understands her apt for 4/8 will be a telephone visit.

## 2022-11-14 NOTE — RAD
[address] [facility] -Phone: [phone] Fax: [phone]    Visit  Discharge Instructions / Physician Orders     DATE: 11/17/2022     Home Care: [facility]     SUPPLIES ORDERED THRU: [employer]     Wound Location: Left Heel     Cleanse with: Liquid antibacterial soap and water, rinse well      Dressing Orders: Antonella to wound-may moisten with saline, ABD, piece of gauze on anterior ankle to pad, Kerlix (may use paper tape to hold kerlix in place-do not wrap tightly), Light Compression ACE Wrap            Float Heels with pillows while in bed-prevent back of heels from being in direct contact with surface     Frequency: Once Daily     Additional Orders: Increase protein to diet (meat, cheese, eggs, fish, peanut butter, nuts and beans)  Float Heels with pillows while in bed or chair-prevent back of heels from being in direct contact with surface  ELEVATE LEGS AS MUCH AS POSSIBLE     Your next appointment with [address] [place] [address] [facility] with Kortney Jorgensen NP on Wednesday     (Please note your next appointment above and if you are unable to keep, kindly give a 24 hour notice. Thank you.)  If more than 15 min late we cannot guarantee you will be seen due to clinician schedule  Per Policy, Excessive cancellation will call for dismissal from program.     If you experience any of the following, please call the [address] [place] [address] during business hours:  [phone]  Your Phone call may be forwarded to [employer] during business hours that [facility] is closed. * Increase in Pain  * Temperature over 101  * Increase in drainage from your wound  * Drainage with a foul odor  * Bleeding  * Increase in swelling  * Need for compression bandage changes due to slippage, breakthrough drainage. If you need medical attention outside of the business hours of the [address] [place] [address] please contact your PCP or go to the nearest emergency room.      The information contained in the After Date of service: 



12/11/2018



PROCEDURE:  CHEST RADIOGRAPH, 1 VIEW



HISTORY:

chf



COMPARISON:

12/06/2018.



FINDINGS:



LUNGS:

Progressive multifocal infiltrates including dense consolidative 

change right upper lobe with air bronchograms likely superimposed 

pneumonia.



PLEURA:

No pneumothorax or pleural fluid seen.



CARDIOVASCULAR:

 No aortic atherosclerotic calcification present. Cardiomegaly. 

Position/ configuration of pacemaker



OSSEOUS STRUCTURES:

No significant abnormalities.



VISUALIZED UPPER ABDOMEN:

Normal.



OTHER FINDINGS:

Stable large hiatal hernia. 



IMPRESSION:

New dense consolidative change right upper lobe.



Underlying pulmonary vascular congestion similar to that seen 

previously. Visit Summary has been reviewed with me, the patient and/or responsible adult, by my health care provider(s). I had the opportunity to ask questions regarding this information.  I have elected to receive;      []After Visit Summary  [x]Comprehensive Discharge Instruction        Patient signature______________________________________Date:________  Electronically signed by Deanne Lewis RN on 11/17/2022 at 9:47 AM  Electronically signed by MAYELA Lugo CNP on 11/17/2022 at 9:55 AM